# Patient Record
Sex: FEMALE | Race: WHITE | Employment: OTHER | URBAN - NONMETROPOLITAN AREA
[De-identification: names, ages, dates, MRNs, and addresses within clinical notes are randomized per-mention and may not be internally consistent; named-entity substitution may affect disease eponyms.]

---

## 2019-01-30 ENCOUNTER — DOCTOR'S OFFICE (OUTPATIENT)
Dept: URBAN - NONMETROPOLITAN AREA CLINIC 1 | Facility: CLINIC | Age: 74
Setting detail: OPHTHALMOLOGY
End: 2019-01-30
Payer: COMMERCIAL

## 2019-01-30 ENCOUNTER — RX ONLY (RX ONLY)
Age: 74
End: 2019-01-30

## 2019-01-30 DIAGNOSIS — H16.222: ICD-10-CM

## 2019-01-30 DIAGNOSIS — H25.13: ICD-10-CM

## 2019-01-30 DIAGNOSIS — E11.9: ICD-10-CM

## 2019-01-30 DIAGNOSIS — H16.221: ICD-10-CM

## 2019-01-30 DIAGNOSIS — H35.3131: ICD-10-CM

## 2019-01-30 PROCEDURE — 92004 COMPRE OPH EXAM NEW PT 1/>: CPT | Performed by: OPHTHALMOLOGY

## 2019-01-30 PROCEDURE — 92134 CPTRZ OPH DX IMG PST SGM RTA: CPT | Performed by: OPHTHALMOLOGY

## 2019-01-30 ASSESSMENT — REFRACTION_MANIFEST
OS_VA3: 20/
OD_VA2: 20/
OU_VA: 20/
OS_VA3: 20/
OD_VA2: 20/
OD_VA3: 20/
OS_VA1: 20/
OD_VA1: 20/
OU_VA: 20/
OS_VA1: 20/
OS_VA2: 20/
OS_VA2: 20/
OD_VA3: 20/
OD_VA1: 20/

## 2019-01-30 ASSESSMENT — CONFRONTATIONAL VISUAL FIELD TEST (CVF)
OD_FINDINGS: FULL
OS_FINDINGS: FULL

## 2019-01-30 ASSESSMENT — REFRACTION_AUTOREFRACTION
OD_CYLINDER: -0.75
OS_CYLINDER: -1.00
OD_SPHERE: -0.75
OD_AXIS: 012
OS_SPHERE: -0.25
OS_AXIS: 091

## 2019-01-30 ASSESSMENT — SPHEQUIV_DERIVED
OS_SPHEQUIV: -0.75
OD_SPHEQUIV: -1.125

## 2019-01-30 ASSESSMENT — REFRACTION_CURRENTRX
OS_OVR_VA: 20/
OS_OVR_VA: 20/
OD_OVR_VA: 20/
OS_OVR_VA: 20/
OD_OVR_VA: 20/
OD_OVR_VA: 20/

## 2019-01-30 ASSESSMENT — VISUAL ACUITY
OD_BCVA: 20/70-1
OS_BCVA: 20/80-1

## 2019-01-30 ASSESSMENT — SUPERFICIAL PUNCTATE KERATITIS (SPK)
OD_SPK: T 1+
OS_SPK: T 1+

## 2019-07-26 ENCOUNTER — DOCTOR'S OFFICE (OUTPATIENT)
Dept: URBAN - NONMETROPOLITAN AREA CLINIC 1 | Facility: CLINIC | Age: 74
Setting detail: OPHTHALMOLOGY
End: 2019-07-26
Payer: COMMERCIAL

## 2019-07-26 DIAGNOSIS — E11.9: ICD-10-CM

## 2019-07-26 DIAGNOSIS — H16.221: ICD-10-CM

## 2019-07-26 DIAGNOSIS — H16.222: ICD-10-CM

## 2019-07-26 DIAGNOSIS — H35.3131: ICD-10-CM

## 2019-07-26 DIAGNOSIS — H16.223: ICD-10-CM

## 2019-07-26 DIAGNOSIS — H25.13: ICD-10-CM

## 2019-07-26 PROCEDURE — 83861 MICROFLUID ANALY TEARS: CPT | Performed by: OPHTHALMOLOGY

## 2019-07-26 PROCEDURE — 92014 COMPRE OPH EXAM EST PT 1/>: CPT | Performed by: OPHTHALMOLOGY

## 2019-07-26 ASSESSMENT — REFRACTION_MANIFEST
OD_VA3: 20/
OS_VA1: 20/
OS_VA1: 20/
OD_VA2: 20/
OD_VA1: 20/
OS_VA3: 20/
OS_VA2: 20/
OU_VA: 20/
OD_VA3: 20/
OS_VA2: 20/
OS_VA3: 20/
OU_VA: 20/
OD_VA2: 20/
OD_VA1: 20/

## 2019-07-26 ASSESSMENT — SPHEQUIV_DERIVED
OD_SPHEQUIV: -1.125
OS_SPHEQUIV: -0.75

## 2019-07-26 ASSESSMENT — REFRACTION_AUTOREFRACTION
OD_SPHERE: -0.75
OS_SPHERE: -0.25
OS_AXIS: 091
OD_AXIS: 012
OS_CYLINDER: -1.00
OD_CYLINDER: -0.75

## 2019-07-26 ASSESSMENT — REFRACTION_CURRENTRX
OD_OVR_VA: 20/
OD_OVR_VA: 20/
OS_OVR_VA: 20/
OD_OVR_VA: 20/

## 2019-07-26 ASSESSMENT — SUPERFICIAL PUNCTATE KERATITIS (SPK)
OS_SPK: T 1+
OD_SPK: T 1+

## 2019-07-26 ASSESSMENT — CONFRONTATIONAL VISUAL FIELD TEST (CVF)
OD_FINDINGS: FULL
OS_FINDINGS: FULL

## 2019-07-26 ASSESSMENT — VISUAL ACUITY
OS_BCVA: 20/100-1
OD_BCVA: 20/150-1

## 2019-12-13 ENCOUNTER — DOCTOR'S OFFICE (OUTPATIENT)
Dept: URBAN - NONMETROPOLITAN AREA CLINIC 1 | Facility: CLINIC | Age: 74
Setting detail: OPHTHALMOLOGY
End: 2019-12-13
Payer: COMMERCIAL

## 2019-12-13 DIAGNOSIS — E11.9: ICD-10-CM

## 2019-12-13 DIAGNOSIS — H25.13: ICD-10-CM

## 2019-12-13 DIAGNOSIS — H16.221: ICD-10-CM

## 2019-12-13 DIAGNOSIS — H35.3131: ICD-10-CM

## 2019-12-13 PROCEDURE — 92134 CPTRZ OPH DX IMG PST SGM RTA: CPT | Performed by: OPHTHALMOLOGY

## 2019-12-13 PROCEDURE — 92025 CPTRIZED CORNEAL TOPOGRAPHY: CPT | Performed by: OPHTHALMOLOGY

## 2019-12-13 PROCEDURE — 99214 OFFICE O/P EST MOD 30 MIN: CPT | Performed by: OPHTHALMOLOGY

## 2019-12-13 ASSESSMENT — SUPERFICIAL PUNCTATE KERATITIS (SPK)
OS_SPK: T 1+
OD_SPK: T 1+

## 2019-12-13 ASSESSMENT — CONFRONTATIONAL VISUAL FIELD TEST (CVF)
OS_FINDINGS: FULL
OD_FINDINGS: FULL

## 2019-12-16 ASSESSMENT — REFRACTION_MANIFEST
OD_VA3: 20/
OS_VA2: 20/
OD_VA2: 20/
OS_VA3: 20/
OD_VA3: 20/
OU_VA: 20/
OS_VA1: 20/
OU_VA: 20/
OD_VA2: 20/
OS_VA3: 20/
OS_VA1: 20/
OD_VA1: 20/
OD_VA1: 20/
OS_VA2: 20/

## 2019-12-16 ASSESSMENT — REFRACTION_CURRENTRX
OD_OVR_VA: 20/
OD_OVR_VA: 20/
OS_OVR_VA: 20/
OD_OVR_VA: 20/

## 2019-12-16 ASSESSMENT — REFRACTION_AUTOREFRACTION
OS_SPHERE: -0.25
OD_AXIS: 012
OS_AXIS: 091
OD_SPHERE: -0.75
OS_CYLINDER: -1.00
OD_CYLINDER: -0.75

## 2019-12-16 ASSESSMENT — SPHEQUIV_DERIVED
OD_SPHEQUIV: -1.125
OS_SPHEQUIV: -0.75

## 2019-12-16 ASSESSMENT — VISUAL ACUITY
OD_BCVA: 20/150
OS_BCVA: 20/150+1

## 2020-01-15 ENCOUNTER — DOCTOR'S OFFICE (OUTPATIENT)
Dept: URBAN - NONMETROPOLITAN AREA CLINIC 1 | Facility: CLINIC | Age: 75
Setting detail: OPHTHALMOLOGY
End: 2020-01-15
Payer: COMMERCIAL

## 2020-01-15 DIAGNOSIS — H25.12: ICD-10-CM

## 2020-01-15 PROCEDURE — 92136 OPHTHALMIC BIOMETRY: CPT | Performed by: OPHTHALMOLOGY

## 2020-01-30 ENCOUNTER — AMBUL SURGICAL CARE (OUTPATIENT)
Dept: URBAN - NONMETROPOLITAN AREA SURGERY 1 | Facility: SURGERY | Age: 75
Setting detail: OPHTHALMOLOGY
End: 2020-01-30
Payer: COMMERCIAL

## 2020-01-30 DIAGNOSIS — H25.042: ICD-10-CM

## 2020-01-30 DIAGNOSIS — H25.012: ICD-10-CM

## 2020-01-30 DIAGNOSIS — H25.12: ICD-10-CM

## 2020-01-30 PROCEDURE — 66984 XCAPSL CTRC RMVL W/O ECP: CPT | Performed by: OPHTHALMOLOGY

## 2020-01-30 PROCEDURE — G8907 PT DOC NO EVENTS ON DISCHARG: HCPCS | Performed by: CLINIC/CENTER

## 2020-01-30 PROCEDURE — G8918 PT W/O PREOP ORDER IV AB PRO: HCPCS | Performed by: CLINIC/CENTER

## 2020-01-30 PROCEDURE — G8907 PT DOC NO EVENTS ON DISCHARG: HCPCS | Performed by: OPHTHALMOLOGY

## 2020-01-30 PROCEDURE — G8918 PT W/O PREOP ORDER IV AB PRO: HCPCS | Performed by: OPHTHALMOLOGY

## 2020-01-30 PROCEDURE — 66984 XCAPSL CTRC RMVL W/O ECP: CPT | Performed by: CLINIC/CENTER

## 2020-01-31 ENCOUNTER — RX ONLY (RX ONLY)
Age: 75
End: 2020-01-31

## 2020-01-31 ENCOUNTER — DOCTOR'S OFFICE (OUTPATIENT)
Dept: URBAN - NONMETROPOLITAN AREA CLINIC 1 | Facility: CLINIC | Age: 75
Setting detail: OPHTHALMOLOGY
End: 2020-01-31
Payer: COMMERCIAL

## 2020-01-31 DIAGNOSIS — H25.11: ICD-10-CM

## 2020-01-31 DIAGNOSIS — Z96.1: ICD-10-CM

## 2020-01-31 PROCEDURE — 92136 OPHTHALMIC BIOMETRY: CPT | Performed by: OPHTHALMOLOGY

## 2020-01-31 PROCEDURE — 99024 POSTOP FOLLOW-UP VISIT: CPT | Performed by: PHYSICIAN ASSISTANT

## 2020-01-31 ASSESSMENT — SUPERFICIAL PUNCTATE KERATITIS (SPK)
OD_SPK: T 1+
OS_SPK: T

## 2020-02-03 ASSESSMENT — SPHEQUIV_DERIVED
OS_SPHEQUIV: 0.125
OD_SPHEQUIV: -1.75

## 2020-02-03 ASSESSMENT — REFRACTION_AUTOREFRACTION
OD_AXIS: 016
OS_CYLINDER: -0.25
OD_SPHERE: -1.50
OD_CYLINDER: -0.50
OS_AXIS: 127
OS_SPHERE: +0.25

## 2020-02-03 ASSESSMENT — VISUAL ACUITY
OS_BCVA: 20/150-1
OD_BCVA: 20/25-1

## 2020-02-05 ENCOUNTER — AMBUL SURGICAL CARE (OUTPATIENT)
Dept: URBAN - NONMETROPOLITAN AREA SURGERY 1 | Facility: SURGERY | Age: 75
Setting detail: OPHTHALMOLOGY
End: 2020-02-05
Payer: COMMERCIAL

## 2020-02-05 DIAGNOSIS — H25.13: ICD-10-CM

## 2020-02-05 PROCEDURE — S9986 NOT MEDICALLY NECESSARY SVC: HCPCS | Performed by: OPHTHALMOLOGY

## 2020-02-05 PROCEDURE — V2787 ASTIGMATISM-CORRECT FUNCTION: HCPCS | Performed by: OPHTHALMOLOGY

## 2020-02-06 ENCOUNTER — AMBUL SURGICAL CARE (OUTPATIENT)
Dept: URBAN - NONMETROPOLITAN AREA SURGERY 1 | Facility: SURGERY | Age: 75
Setting detail: OPHTHALMOLOGY
End: 2020-02-06
Payer: COMMERCIAL

## 2020-02-06 DIAGNOSIS — H25.11: ICD-10-CM

## 2020-02-06 DIAGNOSIS — H25.041: ICD-10-CM

## 2020-02-06 DIAGNOSIS — H25.011: ICD-10-CM

## 2020-02-06 PROCEDURE — V2787 ASTIGMATISM-CORRECT FUNCTION: HCPCS | Performed by: OPHTHALMOLOGY

## 2020-02-06 PROCEDURE — G8918 PT W/O PREOP ORDER IV AB PRO: HCPCS | Performed by: CLINIC/CENTER

## 2020-02-06 PROCEDURE — 66984 XCAPSL CTRC RMVL W/O ECP: CPT | Performed by: OPHTHALMOLOGY

## 2020-02-06 PROCEDURE — G8918 PT W/O PREOP ORDER IV AB PRO: HCPCS | Performed by: OPHTHALMOLOGY

## 2020-02-06 PROCEDURE — G8907 PT DOC NO EVENTS ON DISCHARG: HCPCS | Performed by: CLINIC/CENTER

## 2020-02-06 PROCEDURE — V2787 ASTIGMATISM-CORRECT FUNCTION: HCPCS | Performed by: CLINIC/CENTER

## 2020-02-06 PROCEDURE — G8907 PT DOC NO EVENTS ON DISCHARG: HCPCS | Performed by: OPHTHALMOLOGY

## 2020-02-06 PROCEDURE — 66984 XCAPSL CTRC RMVL W/O ECP: CPT | Performed by: CLINIC/CENTER

## 2020-02-07 ENCOUNTER — DOCTOR'S OFFICE (OUTPATIENT)
Dept: URBAN - NONMETROPOLITAN AREA CLINIC 1 | Facility: CLINIC | Age: 75
Setting detail: OPHTHALMOLOGY
End: 2020-02-07
Payer: COMMERCIAL

## 2020-02-07 DIAGNOSIS — Z96.1: ICD-10-CM

## 2020-02-07 PROBLEM — H25.11 CATARACT NUCLEAR SCLEROSIS AGE RELATED; RIGHT EYE: Status: RESOLVED | Noted: 2020-01-31 | Resolved: 2020-02-07

## 2020-02-07 PROCEDURE — 99024 POSTOP FOLLOW-UP VISIT: CPT | Performed by: OPHTHALMOLOGY

## 2020-02-07 ASSESSMENT — SUPERFICIAL PUNCTATE KERATITIS (SPK): OS_SPK: T

## 2020-02-10 ASSESSMENT — VISUAL ACUITY
OS_BCVA: 20/40-1
OD_BCVA: 20/50-2

## 2020-02-10 ASSESSMENT — SPHEQUIV_DERIVED
OD_SPHEQUIV: 1.125
OS_SPHEQUIV: 0

## 2020-02-10 ASSESSMENT — REFRACTION_AUTOREFRACTION
OS_AXIS: 085
OS_CYLINDER: -1.50
OS_SPHERE: +0.75
OD_AXIS: 176
OD_CYLINDER: -1.25
OD_SPHERE: +1.75

## 2020-02-21 ENCOUNTER — RX ONLY (RX ONLY)
Age: 75
End: 2020-02-21

## 2020-02-21 ENCOUNTER — DOCTOR'S OFFICE (OUTPATIENT)
Dept: URBAN - NONMETROPOLITAN AREA CLINIC 1 | Facility: CLINIC | Age: 75
Setting detail: OPHTHALMOLOGY
End: 2020-02-21
Payer: COMMERCIAL

## 2020-02-21 DIAGNOSIS — H43.392: ICD-10-CM

## 2020-02-21 DIAGNOSIS — Z96.1: ICD-10-CM

## 2020-02-21 DIAGNOSIS — H43.391: ICD-10-CM

## 2020-02-21 PROCEDURE — 99024 POSTOP FOLLOW-UP VISIT: CPT | Performed by: OPHTHALMOLOGY

## 2020-02-21 ASSESSMENT — DRY EYES - PHYSICIAN NOTES
OD_GENERALCOMMENTS: RAPID TBUT, TR PEE
OS_GENERALCOMMENTS: RAPID TBUT, TR PEE

## 2020-02-21 ASSESSMENT — SPHEQUIV_DERIVED
OS_SPHEQUIV: 0
OD_SPHEQUIV: -0.125

## 2020-02-21 ASSESSMENT — REFRACTION_AUTOREFRACTION
OD_CYLINDER: -1.25
OD_SPHERE: +0.50
OS_AXIS: 085
OS_SPHERE: +0.75
OD_AXIS: 020
OS_CYLINDER: -1.50

## 2020-02-21 ASSESSMENT — VISUAL ACUITY
OS_BCVA: 20/30+1
OD_BCVA: 20/25-1

## 2020-03-13 ENCOUNTER — RX ONLY (RX ONLY)
Age: 75
End: 2020-03-13

## 2020-03-13 ENCOUNTER — DOCTOR'S OFFICE (OUTPATIENT)
Dept: URBAN - NONMETROPOLITAN AREA CLINIC 1 | Facility: CLINIC | Age: 75
Setting detail: OPHTHALMOLOGY
End: 2020-03-13
Payer: COMMERCIAL

## 2020-03-13 DIAGNOSIS — Z96.1: ICD-10-CM

## 2020-03-13 PROCEDURE — 99024 POSTOP FOLLOW-UP VISIT: CPT | Performed by: OPHTHALMOLOGY

## 2020-03-13 ASSESSMENT — VISUAL ACUITY
OD_BCVA: 20/25-2
OS_BCVA: 20/40-2

## 2020-03-13 ASSESSMENT — REFRACTION_AUTOREFRACTION
OS_CYLINDER: -0.75
OD_AXIS: 80
OD_CYLINDER: -0.25
OS_SPHERE: +0.25
OD_SPHERE: 0.00
OS_AXIS: 90

## 2020-03-13 ASSESSMENT — DRY EYES - PHYSICIAN NOTES
OD_GENERALCOMMENTS: RAPID TBUT, TR PEE
OS_GENERALCOMMENTS: RAPID TBUT, TR PEE

## 2020-03-13 ASSESSMENT — SPHEQUIV_DERIVED
OD_SPHEQUIV: -0.125
OS_SPHEQUIV: -0.125

## 2020-08-11 ENCOUNTER — TRANSCRIBE ORDERS (OUTPATIENT)
Dept: ADMINISTRATIVE | Facility: HOSPITAL | Age: 75
End: 2020-08-11

## 2020-08-11 DIAGNOSIS — I48.91 ATRIAL FIBRILLATION, UNSPECIFIED TYPE (HCC): Primary | ICD-10-CM

## 2020-08-18 ENCOUNTER — HOSPITAL ENCOUNTER (OUTPATIENT)
Dept: NON INVASIVE DIAGNOSTICS | Facility: HOSPITAL | Age: 75
Discharge: HOME | End: 2020-08-18
Payer: MEDICARE

## 2020-08-18 DIAGNOSIS — I48.91 ATRIAL FIBRILLATION, UNSPECIFIED TYPE (HCC): ICD-10-CM

## 2020-08-18 PROCEDURE — 93306 TTE W/DOPPLER COMPLETE: CPT | Performed by: INTERNAL MEDICINE

## 2020-08-18 PROCEDURE — C8929 TTE W OR WO FOL WCON,DOPPLER: HCPCS

## 2020-08-18 RX ADMIN — PERFLUTREN 1 ML/MIN: 6.52 INJECTION, SUSPENSION INTRAVENOUS at 11:50

## 2020-09-05 ENCOUNTER — APPOINTMENT (EMERGENCY)
Dept: RADIOLOGY | Facility: HOSPITAL | Age: 75
End: 2020-09-05
Payer: MEDICARE

## 2020-09-05 ENCOUNTER — HOSPITAL ENCOUNTER (EMERGENCY)
Facility: HOSPITAL | Age: 75
Discharge: HOME | End: 2020-09-05
Attending: EMERGENCY MEDICINE | Admitting: EMERGENCY MEDICINE
Payer: MEDICARE

## 2020-09-05 ENCOUNTER — APPOINTMENT (EMERGENCY)
Dept: CT IMAGING | Facility: HOSPITAL | Age: 75
End: 2020-09-05
Payer: MEDICARE

## 2020-09-05 VITALS
HEIGHT: 62 IN | BODY MASS INDEX: 53.92 KG/M2 | TEMPERATURE: 98 F | SYSTOLIC BLOOD PRESSURE: 141 MMHG | OXYGEN SATURATION: 97 % | WEIGHT: 293 LBS | HEART RATE: 82 BPM | DIASTOLIC BLOOD PRESSURE: 98 MMHG | RESPIRATION RATE: 16 BRPM

## 2020-09-05 DIAGNOSIS — S09.90XA INJURY OF HEAD, INITIAL ENCOUNTER: ICD-10-CM
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
## 2020-09-05 PROCEDURE — G1004 CDSM NDSC: HCPCS

## 2020-09-05 PROCEDURE — 73564 X-RAY EXAM KNEE 4 OR MORE: CPT

## 2020-09-05 PROCEDURE — 99285 EMERGENCY DEPT VISIT HI MDM: CPT | Performed by: EMERGENCY MEDICINE

## 2020-09-05 PROCEDURE — 99284 EMERGENCY DEPT VISIT MOD MDM: CPT

## 2020-09-05 PROCEDURE — 70450 CT HEAD/BRAIN W/O DYE: CPT

## 2020-09-05 RX ORDER — RIVAROXABAN 20 MG/1
TABLET, FILM COATED ORAL
Status: ON HOLD | COMMUNITY
Start: 2020-08-11 | End: 2021-01-01 | Stop reason: SDUPTHER

## 2020-09-05 RX ORDER — SIMVASTATIN 10 MG
10 TABLET ORAL
COMMUNITY
Start: 2020-08-11

## 2020-09-05 RX ORDER — FUROSEMIDE 40 MG/1
40 TABLET ORAL DAILY
COMMUNITY
Start: 2020-07-29 | End: 2021-01-01

## 2020-09-05 RX ORDER — METOPROLOL SUCCINATE 100 MG/1
100 TABLET, EXTENDED RELEASE ORAL DAILY
COMMUNITY
End: 2021-01-01 | Stop reason: HOSPADM

## 2020-09-05 RX ORDER — POTASSIUM CHLORIDE 20 MEQ/1
20 TABLET, EXTENDED RELEASE ORAL DAILY
COMMUNITY
End: 2021-01-01 | Stop reason: HOSPADM

## 2020-09-05 RX ORDER — TRAMADOL HYDROCHLORIDE 50 MG/1
TABLET ORAL
COMMUNITY
Start: 2020-07-24 | End: 2021-01-01 | Stop reason: HOSPADM

## 2020-09-05 RX ORDER — LOSARTAN POTASSIUM 100 MG/1
100 TABLET ORAL DAILY
COMMUNITY
Start: 2020-08-11 | End: 2021-01-01 | Stop reason: HOSPADM

## 2020-09-05 NOTE — ED PROVIDER NOTES
History  Chief Complaint   Patient presents with   Wash Caller Fall     fell and hit the back of her head     This is 72-year-old female presents emergency room after a fall getting out of [employer]  Patient apparently was coming home from breakfast with her brother and stepping out of the [employer] when she lost her  and fell and struck her head  She also reports that she twisted her knee  She reports no loss of consciousness but she has a slight headache  No change in vision  Patient generally gets around in a motorized scooter  Past medical history is noted  Patient does take Xarelto secondary to atrial fibrillation  History provided by:  Patient  Fall   Mechanism of injury: fall    Injury location:  Head/neck and leg  Head/neck injury location:  Head  Leg injury location:  R knee  Incident location:  Home  Fall:     Fall occurred:  From a vehicle    Impact surface: pavement     Point of impact:  Head  Prior to arrival data:     Patient ambulatory at scene: yes      Blood loss:  None  Associated symptoms: headaches    Associated symptoms: no abdominal pain, no back pain, no chest pain, no difficulty breathing and no hearing loss    Headaches:     Severity:  Mild    Onset quality:  Sudden    Timing:  Constant  Risk factors: anticoagulation therapy, CAD and diabetes        Prior to Admission Medications   Prescriptions Last Dose Informant Patient Reported? Taking?    Xarelto 20 MG tablet   Yes No   furosemide (LASIX) 40 mg tablet   Yes No   Sig: Take 40 mg by mouth daily   losartan (COZAAR) 100 MG tablet   Yes No   metFORMIN (GLUCOPHAGE) 500 mg tablet   Yes No   metoprolol succinate (TOPROL-XL) 100 mg 24 hr tablet   Yes No   Sig: Take 100 mg by mouth daily   potassium chloride (Klor-Con M20) 20 mEq tablet   Yes No   Sig: Take by mouth   silver sulfadiazine (SILVADENE,SSD) 1 % cream   Yes Yes   Sig: Apply daily   simvastatin (ZOCOR) 10 mg tablet   Yes No   traMADol (ULTRAM) 50 mg tablet   Yes No   Si TAB BY MOUTH DAILY AS NEEDED FOR PAIN      Facility-Administered Medications: None       Past Medical History:   Diagnosis Date    Coronary artery disease     Diabetes mellitus ([facility] [address] )     Hypertension     Lymphedema     Sleep apnea        History reviewed  No pertinent surgical history  History reviewed  No pertinent family history  I have reviewed and agree with the history as documented  E-Cigarette/Vaping    E-Cigarette Use Never User      E-Cigarette/Vaping Substances     Social History     Tobacco Use    Smoking status: Never Smoker    Smokeless tobacco: Never Used   Substance Use Topics    Alcohol use: Not Currently    Drug use: Not Currently       Review of Systems   Constitutional: Negative for activity change, diaphoresis and fatigue  HENT: Negative  Negative for hearing loss  Eyes: Negative  Respiratory: Negative  Cardiovascular: Negative for chest pain  Gastrointestinal: Negative for abdominal pain  Endocrine: Negative  Musculoskeletal: Negative for back pain  Neurological: Positive for headaches  All other systems reviewed and are negative  Physical Exam  Physical Exam  Vitals signs reviewed  Constitutional:       Appearance: Normal appearance  She is obese  HENT:      Head: Normocephalic  Abrasion and contusion present  No laceration  Nose: Nose normal       Mouth/Throat:      Mouth: Mucous membranes are moist       Pharynx: Oropharynx is clear  Eyes:      Conjunctiva/sclera: Conjunctivae normal       Pupils: Pupils are equal, round, and reactive to light  Neck:      Musculoskeletal: Normal range of motion and neck supple  No neck rigidity or muscular tenderness  Pulmonary:      Effort: Pulmonary effort is normal    Musculoskeletal:      Right knee: She exhibits decreased range of motion  She exhibits no swelling and no effusion  Tenderness found  Skin:     General: Skin is warm and dry  Neurological:      General: No focal deficit present        Mental Status: She is alert  Vital Signs  ED Triage Vitals [09/05/20 1128]   Temperature Pulse Respirations Blood Pressure SpO2   98 °F (36 7 °C) 82 16 (!) 201/101 97 %      Temp src Heart Rate Source Patient Position - Orthostatic VS BP Location FiO2 (%)   -- -- -- -- --      Pain Score       3           Vitals:    09/05/20 1128   BP: (!) 201/101   Pulse: 82         Visual Acuity      ED Medications  Medications - No data to display    Diagnostic Studies  Results Reviewed     None                 CT head without contrast   Final Result by Josh Viveros DO (09/05 1314)      No acute intracranial abnormality  Microangiopathic changes  Workstation performed: [id number]         XR knee 4+ vw right injury   ED Interpretation by Jurgen Vargas DO (09/05 1419)   Severe degenerative changes with bone-on-bone phenomena                 Procedures  Procedures         ED Course  ED Course as of Sep 05 1420   Sat Sep 05, 2020   1418 CT the head is negative for any acute intracranial injury  US AUDIT      Most Recent Value   Initial Alcohol Screen: US AUDIT-C    1  How often do you have a drink containing alcohol?  0 Filed at: 09/05/2020 1130   2  How many drinks containing alcohol do you have on a typical day you are drinking? 0 Filed at: 09/05/2020 1130   3a  Male UNDER 65: How often do you have five or more drinks on one occasion? 0 Filed at: 09/05/2020 1130   3b  FEMALE Any Age, or MALE 65+: How often do you have 4 or more drinks on one occassion? 0 Filed at: 09/05/2020 1130   Audit-C Score  0 Filed at: 09/05/2020 1130                  GOPAL/DAST-10      Most Recent Value   How many times in the past year have you    Used an illegal drug or used a prescription medication for non-medical reasons?   Never Filed at: 09/05/2020 1130                                MDM      Disposition  Final diagnoses:   Fall, initial encounter   Injury of head, initial encounter     Time reflects when diagnosis was documented in both MDM as applicable and the Disposition within this note     Time User Action Codes Description Comment    9/5/2020  2:20 PM Fernie Burkett Add [Q75  QUNH] Fall, initial encounter     9/5/2020  2:20 PM Fernie Burkett Add [Q03 [id number]] Injury of head, initial encounter       ED Disposition     ED Disposition Condition Date/Time Comment    Discharge Stable Sat Sep 5, 2020  2:20 PM Jose Orozco discharge to home/self care  Follow-up Information     Follow up With Specialties Details Why Contact Info    Ariel Rowan MD Family Medicine In 1 week As needed [address]  [place] [id number] [phone]            Current Discharge Medication List      CONTINUE these medications which have NOT CHANGED    Details   silver sulfadiazine (SILVADENE,SSD) 1 % cream Apply daily      furosemide (LASIX) 40 mg tablet Take 40 mg by mouth daily      losartan (COZAAR) 100 MG tablet       metFORMIN (GLUCOPHAGE) 500 mg tablet       metoprolol succinate (TOPROL-XL) 100 mg 24 hr tablet Take 100 mg by mouth daily      potassium chloride (Klor-Con M20) 20 mEq tablet Take by mouth      simvastatin (ZOCOR) 10 mg tablet       traMADol (ULTRAM) 50 mg tablet 1 TAB BY MOUTH DAILY AS NEEDED FOR PAIN      Xarelto 20 MG tablet            No discharge procedures on file      PDMP Review     None          ED Provider  Electronically Signed by           Jurgen Vargas DO  09/05/20 7445

## 2020-09-05 NOTE — DISCHARGE INSTRUCTIONS
Your imaging studies have been preliminarily reviewed by the emergency department  Further review by Radiology is pending at this time  If there is a discrepancy or a finding of additional concern identified, we will attempt to contact you at the number you have provided us  If you do not hear from us, follow-up with your primary care provider within 1-2 weeks is always recommended to ensure that all findings were normal or as initially reported  Your results may also be available on [facility] [employer] [facility]    Thank you for choosing the emergency department at [facility], [place]  We appreciated the opportunity and privilege to address your healthcare needs  We remain available to you should you require additional evaluation or assistance  We value your feedback and would appreciate the opportunity to address anything you identified as an opportunity to improve or where we excelled  If there are colleagues who deserve special recognition, please let us know! We hope you are feeling better soon!

## 2020-09-25 ENCOUNTER — DOCTOR'S OFFICE (OUTPATIENT)
Dept: URBAN - NONMETROPOLITAN AREA CLINIC 1 | Facility: CLINIC | Age: 75
Setting detail: OPHTHALMOLOGY
End: 2020-09-25
Payer: COMMERCIAL

## 2020-09-25 DIAGNOSIS — H16.221: ICD-10-CM

## 2020-09-25 DIAGNOSIS — H35.3131: ICD-10-CM

## 2020-09-25 DIAGNOSIS — E11.9: ICD-10-CM

## 2020-09-25 DIAGNOSIS — H16.222: ICD-10-CM

## 2020-09-25 DIAGNOSIS — H53.123: ICD-10-CM

## 2020-09-25 DIAGNOSIS — Z96.1: ICD-10-CM

## 2020-09-25 PROCEDURE — 92083 EXTENDED VISUAL FIELD XM: CPT | Performed by: OPHTHALMOLOGY

## 2020-09-25 PROCEDURE — 99214 OFFICE O/P EST MOD 30 MIN: CPT | Performed by: OPHTHALMOLOGY

## 2020-09-25 PROCEDURE — 92134 CPTRZ OPH DX IMG PST SGM RTA: CPT | Performed by: OPHTHALMOLOGY

## 2020-09-25 ASSESSMENT — DRY EYES - PHYSICIAN NOTES
OS_GENERALCOMMENTS: RAPID TBUT, TR PEE
OD_GENERALCOMMENTS: RAPID TBUT, TR PEE

## 2020-09-25 ASSESSMENT — SPHEQUIV_DERIVED
OS_SPHEQUIV: -0.125
OD_SPHEQUIV: -0.125

## 2020-09-25 ASSESSMENT — VISUAL ACUITY
OS_BCVA: 20/30-1
OD_BCVA: 20/20-2

## 2020-09-25 ASSESSMENT — REFRACTION_AUTOREFRACTION
OS_SPHERE: +0.25
OD_CYLINDER: -0.25
OS_AXIS: 90
OD_SPHERE: 0.00
OD_AXIS: 80
OS_CYLINDER: -0.75

## 2020-09-25 ASSESSMENT — CONFRONTATIONAL VISUAL FIELD TEST (CVF)
OS_FINDINGS: FULL
OD_FINDINGS: FULL

## 2021-01-01 ENCOUNTER — HOSPITAL ENCOUNTER (INPATIENT)
Facility: HOSPITAL | Age: 76
LOS: 6 days | Discharge: NURSING, REHAB OR LONG-TERM CARE | DRG: 571 | End: 2021-06-07
Attending: STUDENT IN AN ORGANIZED HEALTH CARE EDUCATION/TRAINING PROGRAM | Admitting: STUDENT IN AN ORGANIZED HEALTH CARE EDUCATION/TRAINING PROGRAM
Payer: MEDICARE

## 2021-01-01 ENCOUNTER — HOSPITAL ENCOUNTER (OUTPATIENT)
Dept: NON INVASIVE DIAGNOSTICS | Facility: HOSPITAL | Age: 76
Discharge: HOME | End: 2021-09-17
Payer: MEDICARE

## 2021-01-01 ENCOUNTER — NURSE TRIAGE (OUTPATIENT)
Dept: OTHER | Facility: OTHER | Age: 76
End: 2021-01-01

## 2021-01-01 ENCOUNTER — APPOINTMENT (EMERGENCY)
Dept: CT IMAGING | Facility: HOSPITAL | Age: 76
DRG: 871 | End: 2021-01-01
Payer: MEDICARE

## 2021-01-01 ENCOUNTER — ANESTHESIA (INPATIENT)
Dept: PERIOP | Facility: HOSPITAL | Age: 76
DRG: 571 | End: 2021-01-01
Payer: MEDICARE

## 2021-01-01 ENCOUNTER — APPOINTMENT (INPATIENT)
Dept: MRI IMAGING | Facility: HOSPITAL | Age: 76
DRG: 871 | End: 2021-01-01
Payer: MEDICARE

## 2021-01-01 ENCOUNTER — HOSPITAL ENCOUNTER (EMERGENCY)
Facility: HOSPITAL | Age: 76
Discharge: HOME | DRG: 571 | End: 2021-05-31
Attending: EMERGENCY MEDICINE
Payer: MEDICARE

## 2021-01-01 ENCOUNTER — APPOINTMENT (EMERGENCY)
Dept: RADIOLOGY | Facility: HOSPITAL | Age: 76
DRG: 871 | End: 2021-01-01
Payer: MEDICARE

## 2021-01-01 ENCOUNTER — APPOINTMENT (INPATIENT)
Dept: NON INVASIVE DIAGNOSTICS | Facility: HOSPITAL | Age: 76
DRG: 871 | End: 2021-01-01
Payer: MEDICARE

## 2021-01-01 ENCOUNTER — APPOINTMENT (EMERGENCY)
Dept: RADIOLOGY | Facility: HOSPITAL | Age: 76
DRG: 571 | End: 2021-01-01
Payer: MEDICARE

## 2021-01-01 ENCOUNTER — APPOINTMENT (INPATIENT)
Dept: RADIOLOGY | Facility: HOSPITAL | Age: 76
DRG: 871 | End: 2021-01-01
Payer: MEDICARE

## 2021-01-01 ENCOUNTER — APPOINTMENT (INPATIENT)
Dept: RADIOLOGY | Facility: HOSPITAL | Age: 76
DRG: 571 | End: 2021-01-01
Payer: MEDICARE

## 2021-01-01 ENCOUNTER — HOSPITAL ENCOUNTER (INPATIENT)
Facility: HOSPITAL | Age: 76
LOS: 4 days | Discharge: NURSING, REHAB OR LONG-TERM CARE | DRG: 603 | End: 2021-08-10
Attending: EMERGENCY MEDICINE | Admitting: INTERNAL MEDICINE
Payer: MEDICARE

## 2021-01-01 ENCOUNTER — APPOINTMENT (INPATIENT)
Dept: RADIOLOGY | Facility: HOSPITAL | Age: 76
DRG: 603 | End: 2021-01-01
Payer: MEDICARE

## 2021-01-01 ENCOUNTER — APPOINTMENT (INPATIENT)
Dept: ULTRASOUND IMAGING | Facility: HOSPITAL | Age: 76
DRG: 871 | End: 2021-01-01
Payer: MEDICARE

## 2021-01-01 ENCOUNTER — APPOINTMENT (INPATIENT)
Dept: CT IMAGING | Facility: HOSPITAL | Age: 76
DRG: 871 | End: 2021-01-01
Payer: MEDICARE

## 2021-01-01 ENCOUNTER — ANESTHESIA EVENT (INPATIENT)
Dept: PERIOP | Facility: HOSPITAL | Age: 76
DRG: 571 | End: 2021-01-01
Payer: MEDICARE

## 2021-01-01 ENCOUNTER — HOSPITAL ENCOUNTER (INPATIENT)
Facility: HOSPITAL | Age: 76
LOS: 6 days | Discharge: NURSING, REHAB OR LONG-TERM CARE | DRG: 871 | End: 2021-05-22
Attending: EMERGENCY MEDICINE | Admitting: ANESTHESIOLOGY
Payer: MEDICARE

## 2021-01-01 VITALS
TEMPERATURE: 96.9 F | DIASTOLIC BLOOD PRESSURE: 67 MMHG | BODY MASS INDEX: 53.92 KG/M2 | SYSTOLIC BLOOD PRESSURE: 139 MMHG | OXYGEN SATURATION: 94 % | RESPIRATION RATE: 16 BRPM | HEART RATE: 90 BPM | WEIGHT: 293 LBS | HEIGHT: 62 IN

## 2021-01-01 VITALS
TEMPERATURE: 98.8 F | HEIGHT: 62 IN | BODY MASS INDEX: 53.92 KG/M2 | RESPIRATION RATE: 21 BRPM | DIASTOLIC BLOOD PRESSURE: 69 MMHG | HEART RATE: 95 BPM | SYSTOLIC BLOOD PRESSURE: 130 MMHG | WEIGHT: 293 LBS | OXYGEN SATURATION: 93 %

## 2021-01-01 VITALS
RESPIRATION RATE: 18 BRPM | DIASTOLIC BLOOD PRESSURE: 68 MMHG | TEMPERATURE: 97.8 F | SYSTOLIC BLOOD PRESSURE: 111 MMHG | HEART RATE: 101 BPM | WEIGHT: 293 LBS | BODY MASS INDEX: 53.92 KG/M2 | HEIGHT: 62 IN | OXYGEN SATURATION: 92 %

## 2021-01-01 VITALS
SYSTOLIC BLOOD PRESSURE: 162 MMHG | HEIGHT: 62 IN | OXYGEN SATURATION: 97 % | DIASTOLIC BLOOD PRESSURE: 78 MMHG | WEIGHT: 293 LBS | BODY MASS INDEX: 53.92 KG/M2 | RESPIRATION RATE: 18 BRPM | HEART RATE: 94 BPM | TEMPERATURE: 98.6 F

## 2021-01-01 DIAGNOSIS — M25.532 LEFT WRIST PAIN: ICD-10-CM

## 2021-01-01 DIAGNOSIS — R60.0 BILATERAL LEG EDEMA: ICD-10-CM

## 2021-01-01 DIAGNOSIS — I50.32 CHRONIC DIASTOLIC HEART FAILURE (HCC): ICD-10-CM

## 2021-01-01 DIAGNOSIS — R79.1 ELEVATED INR (INTERNATIONAL NORMALIZED RATIO): ICD-10-CM

## 2021-01-01 DIAGNOSIS — I27.20 PULMONARY HYPERTENSION, UNSPECIFIED (HCC): ICD-10-CM

## 2021-01-01 DIAGNOSIS — R79.89 ELEVATED BRAIN NATRIURETIC PEPTIDE (BNP) LEVEL: ICD-10-CM

## 2021-01-01 DIAGNOSIS — I48.20 CHRONIC ATRIAL FIBRILLATION (HCC): ICD-10-CM

## 2021-01-01 DIAGNOSIS — M89.8X1 PAIN OF RIGHT SCAPULA: ICD-10-CM

## 2021-01-01 DIAGNOSIS — R79.89 ELEVATED D-DIMER: ICD-10-CM

## 2021-01-01 DIAGNOSIS — E04.1 THYROID NODULE: ICD-10-CM

## 2021-01-01 DIAGNOSIS — T14.8XXA COMPLICATED WOUND INFECTION: Primary | ICD-10-CM

## 2021-01-01 DIAGNOSIS — L08.9 COMPLICATED WOUND INFECTION: Primary | ICD-10-CM

## 2021-01-01 DIAGNOSIS — M25.522 LEFT ELBOW PAIN: ICD-10-CM

## 2021-01-01 DIAGNOSIS — R93.0 ABNORMAL CT OF THE HEAD: ICD-10-CM

## 2021-01-01 DIAGNOSIS — S81.809A WOUND OF LOWER EXTREMITY: ICD-10-CM

## 2021-01-01 DIAGNOSIS — I10 ESSENTIAL HYPERTENSION: ICD-10-CM

## 2021-01-01 DIAGNOSIS — E87.1 HYPONATREMIA: ICD-10-CM

## 2021-01-01 DIAGNOSIS — R26.2 AMBULATORY DYSFUNCTION: ICD-10-CM
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
## 2021-01-01 DIAGNOSIS — L03.115 CELLULITIS OF RIGHT LOWER EXTREMITY: ICD-10-CM

## 2021-01-01 DIAGNOSIS — N17.9 AKI (ACUTE KIDNEY INJURY) (HCC): ICD-10-CM

## 2021-01-01 DIAGNOSIS — D72.829 LEUKOCYTOSIS: ICD-10-CM

## 2021-01-01 DIAGNOSIS — I48.91 ATRIAL FIBRILLATION WITH RVR (HCC): ICD-10-CM

## 2021-01-01 DIAGNOSIS — E87.5 HYPERKALEMIA: ICD-10-CM

## 2021-01-01 DIAGNOSIS — M25.522 ELBOW PAIN, LEFT: ICD-10-CM

## 2021-01-01 DIAGNOSIS — I27.20 SEVERE PULMONARY HYPERTENSION (HCC): ICD-10-CM

## 2021-01-01 DIAGNOSIS — K59.00 CONSTIPATION: ICD-10-CM

## 2021-01-01 DIAGNOSIS — R78.81 GRAM-POSITIVE BACTEREMIA: ICD-10-CM

## 2021-01-01 DIAGNOSIS — A41.9 SEVERE SEPSIS (HCC): ICD-10-CM

## 2021-01-01 DIAGNOSIS — I50.33 ACUTE ON CHRONIC DIASTOLIC HEART FAILURE (HCC): ICD-10-CM

## 2021-01-01 DIAGNOSIS — R65.20 SEVERE SEPSIS (HCC): ICD-10-CM

## 2021-01-01 DIAGNOSIS — L03.90 WOUND CELLULITIS: ICD-10-CM

## 2021-01-01 DIAGNOSIS — M19.90 OSTEOARTHRITIS: ICD-10-CM

## 2021-01-01 DIAGNOSIS — J96.01 ACUTE RESPIRATORY FAILURE WITH HYPOXIA (HCC): ICD-10-CM

## 2021-01-01 DIAGNOSIS — T14.8XXA OPEN WOUND OF SKIN: ICD-10-CM

## 2021-01-01 DIAGNOSIS — S52.122A CLOSED DISPLACED FRACTURE OF HEAD OF LEFT RADIUS, INITIAL ENCOUNTER: Primary | ICD-10-CM

## 2021-01-01 DIAGNOSIS — N39.0 URINARY TRACT INFECTION ASSOCIATED WITH INDWELLING URETHRAL CATHETER, INITIAL ENCOUNTER (HCC): ICD-10-CM

## 2021-01-01 DIAGNOSIS — E66.01 CLASS 3 SEVERE OBESITY DUE TO EXCESS CALORIES WITH SERIOUS COMORBIDITY AND BODY MASS INDEX (BMI) OF 60.0 TO 69.9 IN ADULT (HCC): ICD-10-CM

## 2021-01-01 DIAGNOSIS — E11.9 TYPE 2 DIABETES MELLITUS, WITHOUT LONG-TERM CURRENT USE OF INSULIN (HCC): ICD-10-CM

## 2021-01-01 DIAGNOSIS — L03.115 CELLULITIS OF RIGHT LEG: Primary | ICD-10-CM

## 2021-01-01 DIAGNOSIS — T83.511A URINARY TRACT INFECTION ASSOCIATED WITH INDWELLING URETHRAL CATHETER, INITIAL ENCOUNTER (HCC): ICD-10-CM

## 2021-01-01 LAB
ALBUMIN SERPL BCP-MCNC: 1.6 G/DL (ref 3.5–5)
ALBUMIN SERPL BCP-MCNC: 1.8 G/DL (ref 3.5–5)
ALBUMIN SERPL BCP-MCNC: 2 G/DL (ref 3.5–5)
ALBUMIN SERPL BCP-MCNC: 2 G/DL (ref 3.5–5)
ALBUMIN SERPL BCP-MCNC: 2.1 G/DL (ref 3.5–5)
ALBUMIN SERPL BCP-MCNC: 2.2 G/DL (ref 3.5–5)
ALBUMIN SERPL BCP-MCNC: 2.5 G/DL (ref 3.5–5)
ALP SERPL-CCNC: 109 U/L (ref 46–116)
ALP SERPL-CCNC: 124 U/L (ref 46–116)
ALP SERPL-CCNC: 160 U/L (ref 46–116)
ALP SERPL-CCNC: 178 U/L (ref 46–116)
ALP SERPL-CCNC: 198 U/L (ref 46–116)
ALP SERPL-CCNC: 213 U/L (ref 46–116)
ALP SERPL-CCNC: 67 U/L (ref 46–116)
ALP SERPL-CCNC: 71 U/L (ref 46–116)
ALP SERPL-CCNC: 88 U/L (ref 46–116)
ALT SERPL W P-5'-P-CCNC: 14 U/L (ref 12–78)
ALT SERPL W P-5'-P-CCNC: 20 U/L (ref 12–78)
ALT SERPL W P-5'-P-CCNC: 20 U/L (ref 12–78)
ALT SERPL W P-5'-P-CCNC: 21 U/L (ref 12–78)
ALT SERPL W P-5'-P-CCNC: 21 U/L (ref 12–78)
ALT SERPL W P-5'-P-CCNC: 22 U/L (ref 12–78)
ALT SERPL W P-5'-P-CCNC: 26 U/L (ref 12–78)
ALT SERPL W P-5'-P-CCNC: 7 U/L (ref 12–78)
ALT SERPL W P-5'-P-CCNC: 9 U/L (ref 12–78)
AMORPH URATE CRY URNS QL MICRO: ABNORMAL /HPF
ANION GAP SERPL CALCULATED.3IONS-SCNC: 10 MMOL/L (ref 4–13)
ANION GAP SERPL CALCULATED.3IONS-SCNC: 11 MMOL/L (ref 4–13)
ANION GAP SERPL CALCULATED.3IONS-SCNC: 12 MMOL/L (ref 4–13)
ANION GAP SERPL CALCULATED.3IONS-SCNC: 13 MMOL/L (ref 4–13)
ANION GAP SERPL CALCULATED.3IONS-SCNC: 14 MMOL/L (ref 4–13)
ANION GAP SERPL CALCULATED.3IONS-SCNC: 4 MMOL/L (ref 4–13)
ANION GAP SERPL CALCULATED.3IONS-SCNC: 4 MMOL/L (ref 4–13)
ANION GAP SERPL CALCULATED.3IONS-SCNC: 5 MMOL/L (ref 4–13)
ANION GAP SERPL CALCULATED.3IONS-SCNC: 6 MMOL/L (ref 4–13)
ANION GAP SERPL CALCULATED.3IONS-SCNC: 7 MMOL/L (ref 4–13)
ANION GAP SERPL CALCULATED.3IONS-SCNC: 8 MMOL/L (ref 4–13)
ANION GAP SERPL CALCULATED.3IONS-SCNC: 9 MMOL/L (ref 4–13)
ANISOCYTOSIS BLD QL SMEAR: PRESENT
ANISOCYTOSIS BLD QL SMEAR: PRESENT
AST SERPL W P-5'-P-CCNC: 13 U/L (ref 5–45)
AST SERPL W P-5'-P-CCNC: 15 U/L (ref 5–45)
AST SERPL W P-5'-P-CCNC: 23 U/L (ref 5–45)
AST SERPL W P-5'-P-CCNC: 31 U/L (ref 5–45)
AST SERPL W P-5'-P-CCNC: 33 U/L (ref 5–45)
AST SERPL W P-5'-P-CCNC: 35 U/L (ref 5–45)
AST SERPL W P-5'-P-CCNC: 35 U/L (ref 5–45)
AST SERPL W P-5'-P-CCNC: 44 U/L (ref 5–45)
AST SERPL W P-5'-P-CCNC: 58 U/L (ref 5–45)
ATRIAL RATE: 141 BPM
ATRIAL RATE: 170 BPM
BACTERIA BLD CULT: ABNORMAL
BACTERIA BLD CULT: ABNORMAL
BACTERIA BLD CULT: NORMAL
BACTERIA UR CULT: ABNORMAL
BACTERIA UR CULT: ABNORMAL
BACTERIA UR QL AUTO: ABNORMAL /HPF
BACTERIA UR QL AUTO: ABNORMAL /HPF
BACTERIA WND AEROBE CULT: ABNORMAL
BASE EX.OXY STD BLDV CALC-SCNC: 83.7 % (ref 60–80)
BASE EX.OXY STD BLDV CALC-SCNC: 91.9 % (ref 60–80)
BASE EX.OXY STD BLDV CALC-SCNC: 94.3 % (ref 60–80)
BASE EXCESS BLDV CALC-SCNC: -6.6 MMOL/L
BASE EXCESS BLDV CALC-SCNC: -8.4 MMOL/L
BASE EXCESS BLDV CALC-SCNC: 1.8 MMOL/L
BASOPHILS # BLD AUTO: 0.03 THOUSANDS/ΜL (ref 0–0.1)
BASOPHILS # BLD AUTO: 0.04 THOUSANDS/ΜL (ref 0–0.1)
BASOPHILS # BLD AUTO: 0.05 THOUSANDS/ΜL (ref 0–0.1)
BASOPHILS # BLD MANUAL: 0 THOUSAND/UL (ref 0–0.1)
BASOPHILS # BLD MANUAL: 0.06 THOUSAND/UL (ref 0–0.1)
BASOPHILS NFR BLD AUTO: 0 % (ref 0–1)
BASOPHILS NFR BLD AUTO: 0 % (ref 0–1)
BASOPHILS NFR BLD AUTO: 1 % (ref 0–1)
BASOPHILS NFR MAR MANUAL: 0 % (ref 0–1)
BASOPHILS NFR MAR MANUAL: 1 % (ref 0–1)
BILIRUB DIRECT SERPL-MCNC: 2.15 MG/DL (ref 0–0.2)
BILIRUB SERPL-MCNC: 0.46 MG/DL (ref 0.2–1)
BILIRUB SERPL-MCNC: 0.51 MG/DL (ref 0.2–1)
BILIRUB SERPL-MCNC: 0.59 MG/DL (ref 0.2–1)
BILIRUB SERPL-MCNC: 2.19 MG/DL (ref 0.2–1)
BILIRUB SERPL-MCNC: 2.3 MG/DL (ref 0.2–1)
BILIRUB SERPL-MCNC: 2.36 MG/DL (ref 0.2–1)
BILIRUB SERPL-MCNC: 2.61 MG/DL (ref 0.2–1)
BILIRUB SERPL-MCNC: 2.72 MG/DL (ref 0.2–1)
BILIRUB SERPL-MCNC: 2.86 MG/DL (ref 0.2–1)
BILIRUB UR QL STRIP: ABNORMAL
BILIRUB UR QL STRIP: ABNORMAL
BUN SERPL-MCNC: 12 MG/DL (ref 5–25)
BUN SERPL-MCNC: 14 MG/DL (ref 5–25)
BUN SERPL-MCNC: 17 MG/DL (ref 5–25)
BUN SERPL-MCNC: 17 MG/DL (ref 5–25)
BUN SERPL-MCNC: 19 MG/DL (ref 5–25)
BUN SERPL-MCNC: 21 MG/DL (ref 5–25)
BUN SERPL-MCNC: 22 MG/DL (ref 5–25)
BUN SERPL-MCNC: 22 MG/DL (ref 5–25)
BUN SERPL-MCNC: 26 MG/DL (ref 5–25)
BUN SERPL-MCNC: 27 MG/DL (ref 5–25)
BUN SERPL-MCNC: 29 MG/DL (ref 5–25)
BUN SERPL-MCNC: 33 MG/DL (ref 5–25)
BUN SERPL-MCNC: 35 MG/DL (ref 5–25)
BUN SERPL-MCNC: 36 MG/DL (ref 5–25)
BUN SERPL-MCNC: 48 MG/DL (ref 5–25)
BUN SERPL-MCNC: 64 MG/DL (ref 5–25)
BUN SERPL-MCNC: 65 MG/DL (ref 5–25)
BUN SERPL-MCNC: 68 MG/DL (ref 5–25)
BUN SERPL-MCNC: 68 MG/DL (ref 5–25)
BUN SERPL-MCNC: 71 MG/DL (ref 5–25)
BUN SERPL-MCNC: 72 MG/DL (ref 5–25)
BUN SERPL-MCNC: 72 MG/DL (ref 5–25)
BUN SERPL-MCNC: 74 MG/DL (ref 5–25)
CALCIUM ALBUM COR SERPL-MCNC: 10.1 MG/DL (ref 8.3–10.1)
CALCIUM ALBUM COR SERPL-MCNC: 10.3 MG/DL (ref 8.3–10.1)
CALCIUM ALBUM COR SERPL-MCNC: 10.6 MG/DL (ref 8.3–10.1)
CALCIUM ALBUM COR SERPL-MCNC: 10.7 MG/DL (ref 8.3–10.1)
CALCIUM ALBUM COR SERPL-MCNC: 10.8 MG/DL (ref 8.3–10.1)
CALCIUM ALBUM COR SERPL-MCNC: 10.9 MG/DL (ref 8.3–10.1)
CALCIUM ALBUM COR SERPL-MCNC: 11 MG/DL (ref 8.3–10.1)
CALCIUM ALBUM COR SERPL-MCNC: 9.6 MG/DL (ref 8.3–10.1)
CALCIUM ALBUM COR SERPL-MCNC: 9.9 MG/DL (ref 8.3–10.1)
CALCIUM SERPL-MCNC: 8 MG/DL (ref 8.3–10.1)
CALCIUM SERPL-MCNC: 8.3 MG/DL (ref 8.3–10.1)
CALCIUM SERPL-MCNC: 8.4 MG/DL (ref 8.3–10.1)
CALCIUM SERPL-MCNC: 8.5 MG/DL (ref 8.3–10.1)
CALCIUM SERPL-MCNC: 8.6 MG/DL (ref 8.3–10.1)
CALCIUM SERPL-MCNC: 8.6 MG/DL (ref 8.3–10.1)
CALCIUM SERPL-MCNC: 8.7 MG/DL (ref 8.3–10.1)
CALCIUM SERPL-MCNC: 8.8 MG/DL (ref 8.3–10.1)
CALCIUM SERPL-MCNC: 8.8 MG/DL (ref 8.3–10.1)
CALCIUM SERPL-MCNC: 8.9 MG/DL (ref 8.3–10.1)
CALCIUM SERPL-MCNC: 8.9 MG/DL (ref 8.3–10.1)
CALCIUM SERPL-MCNC: 9.1 MG/DL (ref 8.3–10.1)
CALCIUM SERPL-MCNC: 9.1 MG/DL (ref 8.3–10.1)
CALCIUM SERPL-MCNC: 9.2 MG/DL (ref 8.3–10.1)
CHLORIDE SERPL-SCNC: 100 MMOL/L (ref 100–108)
CHLORIDE SERPL-SCNC: 101 MMOL/L (ref 100–108)
CHLORIDE SERPL-SCNC: 101 MMOL/L (ref 100–108)
CHLORIDE SERPL-SCNC: 102 MMOL/L (ref 100–108)
CHLORIDE SERPL-SCNC: 103 MMOL/L (ref 100–108)
CHLORIDE SERPL-SCNC: 104 MMOL/L (ref 100–108)
CHLORIDE SERPL-SCNC: 106 MMOL/L (ref 100–108)
CHLORIDE SERPL-SCNC: 108 MMOL/L (ref 100–108)
CHLORIDE SERPL-SCNC: 96 MMOL/L (ref 100–108)
CHLORIDE SERPL-SCNC: 97 MMOL/L (ref 100–108)
CHLORIDE SERPL-SCNC: 98 MMOL/L (ref 100–108)
CHLORIDE SERPL-SCNC: 99 MMOL/L (ref 100–108)
CK SERPL-CCNC: 62 U/L (ref 26–192)
CLARITY UR: ABNORMAL
CLARITY UR: CLEAR
CO2 SERPL-SCNC: 14 MMOL/L (ref 21–32)
CO2 SERPL-SCNC: 17 MMOL/L (ref 21–32)
CO2 SERPL-SCNC: 17 MMOL/L (ref 21–32)
CO2 SERPL-SCNC: 19 MMOL/L (ref 21–32)
CO2 SERPL-SCNC: 21 MMOL/L (ref 21–32)
CO2 SERPL-SCNC: 22 MMOL/L (ref 21–32)
CO2 SERPL-SCNC: 22 MMOL/L (ref 21–32)
CO2 SERPL-SCNC: 23 MMOL/L (ref 21–32)
CO2 SERPL-SCNC: 24 MMOL/L (ref 21–32)
CO2 SERPL-SCNC: 24 MMOL/L (ref 21–32)
CO2 SERPL-SCNC: 25 MMOL/L (ref 21–32)
CO2 SERPL-SCNC: 25 MMOL/L (ref 21–32)
CO2 SERPL-SCNC: 26 MMOL/L (ref 21–32)
CO2 SERPL-SCNC: 27 MMOL/L (ref 21–32)
CO2 SERPL-SCNC: 28 MMOL/L (ref 21–32)
CO2 SERPL-SCNC: 28 MMOL/L (ref 21–32)
COLOR UR: YELLOW
COLOR UR: YELLOW
CREAT SERPL-MCNC: 0.75 MG/DL (ref 0.6–1.3)
CREAT SERPL-MCNC: 0.76 MG/DL (ref 0.6–1.3)
CREAT SERPL-MCNC: 0.8 MG/DL (ref 0.6–1.3)
CREAT SERPL-MCNC: 0.8 MG/DL (ref 0.6–1.3)
CREAT SERPL-MCNC: 0.83 MG/DL (ref 0.6–1.3)
CREAT SERPL-MCNC: 0.84 MG/DL (ref 0.6–1.3)
CREAT SERPL-MCNC: 0.88 MG/DL (ref 0.6–1.3)
CREAT SERPL-MCNC: 0.91 MG/DL (ref 0.6–1.3)
CREAT SERPL-MCNC: 0.94 MG/DL (ref 0.6–1.3)
CREAT SERPL-MCNC: 0.95 MG/DL (ref 0.6–1.3)
CREAT SERPL-MCNC: 0.96 MG/DL (ref 0.6–1.3)
CREAT SERPL-MCNC: 0.97 MG/DL (ref 0.6–1.3)
CREAT SERPL-MCNC: 1.26 MG/DL (ref 0.6–1.3)
CREAT SERPL-MCNC: 1.72 MG/DL (ref 0.6–1.3)
CREAT SERPL-MCNC: 2.13 MG/DL (ref 0.6–1.3)
CREAT SERPL-MCNC: 2.2 MG/DL (ref 0.6–1.3)
CREAT SERPL-MCNC: 2.4 MG/DL (ref 0.6–1.3)
CREAT SERPL-MCNC: 2.48 MG/DL (ref 0.6–1.3)
CREAT SERPL-MCNC: 2.48 MG/DL (ref 0.6–1.3)
CREAT SERPL-MCNC: 2.49 MG/DL (ref 0.6–1.3)
CREAT SERPL-MCNC: 2.66 MG/DL (ref 0.6–1.3)
CRP SERPL QL: 297.5 MG/L
CRP SERPL QL: 403.9 MG/L
D DIMER PPP FEU-MCNC: 0.8 UG/ML FEU
DOHLE BOD BLD QL SMEAR: PRESENT
EOSINOPHIL # BLD AUTO: 0.09 THOUSAND/ΜL (ref 0–0.61)
EOSINOPHIL # BLD AUTO: 0.13 THOUSAND/ΜL (ref 0–0.61)
EOSINOPHIL # BLD AUTO: 0.25 THOUSAND/ΜL (ref 0–0.61)
EOSINOPHIL # BLD AUTO: 0.4 THOUSAND/ΜL (ref 0–0.61)
EOSINOPHIL # BLD AUTO: 0.5 THOUSAND/ΜL (ref 0–0.61)
EOSINOPHIL # BLD MANUAL: 0 THOUSAND/UL (ref 0–0.4)
EOSINOPHIL # BLD MANUAL: 0 THOUSAND/UL (ref 0–0.4)
EOSINOPHIL # BLD MANUAL: 0.26 THOUSAND/UL (ref 0–0.4)
EOSINOPHIL # BLD MANUAL: 0.32 THOUSAND/UL (ref 0–0.4)
EOSINOPHIL NFR BLD AUTO: 1 % (ref 0–6)
EOSINOPHIL NFR BLD AUTO: 2 % (ref 0–6)
EOSINOPHIL NFR BLD AUTO: 4 % (ref 0–6)
EOSINOPHIL NFR BLD AUTO: 5 % (ref 0–6)
EOSINOPHIL NFR BLD AUTO: 5 % (ref 0–6)
EOSINOPHIL NFR BLD MANUAL: 0 % (ref 0–6)
EOSINOPHIL NFR BLD MANUAL: 0 % (ref 0–6)
EOSINOPHIL NFR BLD MANUAL: 1 % (ref 0–6)
EOSINOPHIL NFR BLD MANUAL: 5 % (ref 0–6)
ERYTHROCYTE [DISTWIDTH] IN BLOOD BY AUTOMATED COUNT: 16.3 % (ref 11.6–15.1)
ERYTHROCYTE [DISTWIDTH] IN BLOOD BY AUTOMATED COUNT: 16.4 % (ref 11.6–15.1)
ERYTHROCYTE [DISTWIDTH] IN BLOOD BY AUTOMATED COUNT: 16.5 % (ref 11.6–15.1)
ERYTHROCYTE [DISTWIDTH] IN BLOOD BY AUTOMATED COUNT: 16.6 % (ref 11.6–15.1)
ERYTHROCYTE [DISTWIDTH] IN BLOOD BY AUTOMATED COUNT: 16.7 % (ref 11.6–15.1)
ERYTHROCYTE [DISTWIDTH] IN BLOOD BY AUTOMATED COUNT: 16.8 % (ref 11.6–15.1)
ERYTHROCYTE [DISTWIDTH] IN BLOOD BY AUTOMATED COUNT: 16.9 % (ref 11.6–15.1)
ERYTHROCYTE [DISTWIDTH] IN BLOOD BY AUTOMATED COUNT: 17 % (ref 11.6–15.1)
ERYTHROCYTE [DISTWIDTH] IN BLOOD BY AUTOMATED COUNT: 17.1 % (ref 11.6–15.1)
ERYTHROCYTE [DISTWIDTH] IN BLOOD BY AUTOMATED COUNT: 17.2 % (ref 11.6–15.1)
ERYTHROCYTE [DISTWIDTH] IN BLOOD BY AUTOMATED COUNT: 18 % (ref 11.6–15.1)
ERYTHROCYTE [DISTWIDTH] IN BLOOD BY AUTOMATED COUNT: 18.3 % (ref 11.6–15.1)
ERYTHROCYTE [SEDIMENTATION RATE] IN BLOOD: 130 MM/HOUR (ref 0–29)
EST. AVERAGE GLUCOSE BLD GHB EST-MCNC: 128 MG/DL
GFR SERPL CREATININE-BSD FRML MDRD: 17 ML/MIN/1.73SQ M
GFR SERPL CREATININE-BSD FRML MDRD: 19 ML/MIN/1.73SQ M
GFR SERPL CREATININE-BSD FRML MDRD: 22 ML/MIN/1.73SQ M
GFR SERPL CREATININE-BSD FRML MDRD: 22 ML/MIN/1.73SQ M
GFR SERPL CREATININE-BSD FRML MDRD: 29 ML/MIN/1.73SQ M
GFR SERPL CREATININE-BSD FRML MDRD: 42 ML/MIN/1.73SQ M
GFR SERPL CREATININE-BSD FRML MDRD: 58 ML/MIN/1.73SQ M
GFR SERPL CREATININE-BSD FRML MDRD: 59 ML/MIN/1.73SQ M
GFR SERPL CREATININE-BSD FRML MDRD: 60 ML/MIN/1.73SQ M
GFR SERPL CREATININE-BSD FRML MDRD: 60 ML/MIN/1.73SQ M
GFR SERPL CREATININE-BSD FRML MDRD: 63 ML/MIN/1.73SQ M
GFR SERPL CREATININE-BSD FRML MDRD: 65 ML/MIN/1.73SQ M
GFR SERPL CREATININE-BSD FRML MDRD: 69 ML/MIN/1.73SQ M
GFR SERPL CREATININE-BSD FRML MDRD: 70 ML/MIN/1.73SQ M
GFR SERPL CREATININE-BSD FRML MDRD: 73 ML/MIN/1.73SQ M
GFR SERPL CREATININE-BSD FRML MDRD: 73 ML/MIN/1.73SQ M
GFR SERPL CREATININE-BSD FRML MDRD: 78 ML/MIN/1.73SQ M
GFR SERPL CREATININE-BSD FRML MDRD: 79 ML/MIN/1.73SQ M
GIANT PLATELETS BLD QL SMEAR: PRESENT
GLUCOSE SERPL-MCNC: 101 MG/DL (ref 65–140)
GLUCOSE SERPL-MCNC: 102 MG/DL (ref 65–140)
GLUCOSE SERPL-MCNC: 103 MG/DL (ref 65–140)
GLUCOSE SERPL-MCNC: 104 MG/DL (ref 65–140)
GLUCOSE SERPL-MCNC: 105 MG/DL (ref 65–140)
GLUCOSE SERPL-MCNC: 108 MG/DL (ref 65–140)
GLUCOSE SERPL-MCNC: 111 MG/DL (ref 65–140)
GLUCOSE SERPL-MCNC: 111 MG/DL (ref 65–140)
GLUCOSE SERPL-MCNC: 112 MG/DL (ref 65–140)
GLUCOSE SERPL-MCNC: 113 MG/DL (ref 65–140)
GLUCOSE SERPL-MCNC: 114 MG/DL (ref 65–140)
GLUCOSE SERPL-MCNC: 115 MG/DL (ref 65–140)
GLUCOSE SERPL-MCNC: 115 MG/DL (ref 65–140)
GLUCOSE SERPL-MCNC: 116 MG/DL (ref 65–140)
GLUCOSE SERPL-MCNC: 117 MG/DL (ref 65–140)
GLUCOSE SERPL-MCNC: 118 MG/DL (ref 65–140)
GLUCOSE SERPL-MCNC: 118 MG/DL (ref 65–140)
GLUCOSE SERPL-MCNC: 119 MG/DL (ref 65–140)
GLUCOSE SERPL-MCNC: 119 MG/DL (ref 65–140)
GLUCOSE SERPL-MCNC: 120 MG/DL (ref 65–140)
GLUCOSE SERPL-MCNC: 120 MG/DL (ref 65–140)
GLUCOSE SERPL-MCNC: 121 MG/DL (ref 65–140)
GLUCOSE SERPL-MCNC: 121 MG/DL (ref 65–140)
GLUCOSE SERPL-MCNC: 122 MG/DL (ref 65–140)
GLUCOSE SERPL-MCNC: 123 MG/DL (ref 65–140)
GLUCOSE SERPL-MCNC: 124 MG/DL (ref 65–140)
GLUCOSE SERPL-MCNC: 125 MG/DL (ref 65–140)
GLUCOSE SERPL-MCNC: 127 MG/DL (ref 65–140)
GLUCOSE SERPL-MCNC: 128 MG/DL (ref 65–140)
GLUCOSE SERPL-MCNC: 129 MG/DL (ref 65–140)
GLUCOSE SERPL-MCNC: 129 MG/DL (ref 65–140)
GLUCOSE SERPL-MCNC: 130 MG/DL (ref 65–140)
GLUCOSE SERPL-MCNC: 131 MG/DL (ref 65–140)
GLUCOSE SERPL-MCNC: 131 MG/DL (ref 65–140)
GLUCOSE SERPL-MCNC: 132 MG/DL (ref 65–140)
GLUCOSE SERPL-MCNC: 134 MG/DL (ref 65–140)
GLUCOSE SERPL-MCNC: 134 MG/DL (ref 65–140)
GLUCOSE SERPL-MCNC: 135 MG/DL (ref 65–140)
GLUCOSE SERPL-MCNC: 135 MG/DL (ref 65–140)
GLUCOSE SERPL-MCNC: 136 MG/DL (ref 65–140)
GLUCOSE SERPL-MCNC: 137 MG/DL (ref 65–140)
GLUCOSE SERPL-MCNC: 138 MG/DL (ref 65–140)
GLUCOSE SERPL-MCNC: 139 MG/DL (ref 65–140)
GLUCOSE SERPL-MCNC: 144 MG/DL (ref 65–140)
GLUCOSE SERPL-MCNC: 146 MG/DL (ref 65–140)
GLUCOSE SERPL-MCNC: 147 MG/DL (ref 65–140)
GLUCOSE SERPL-MCNC: 151 MG/DL (ref 65–140)
GLUCOSE SERPL-MCNC: 152 MG/DL (ref 65–140)
GLUCOSE SERPL-MCNC: 152 MG/DL (ref 65–140)
GLUCOSE SERPL-MCNC: 153 MG/DL (ref 65–140)
GLUCOSE SERPL-MCNC: 155 MG/DL (ref 65–140)
GLUCOSE SERPL-MCNC: 155 MG/DL (ref 65–140)
GLUCOSE SERPL-MCNC: 160 MG/DL (ref 65–140)
GLUCOSE SERPL-MCNC: 165 MG/DL (ref 65–140)
GLUCOSE SERPL-MCNC: 174 MG/DL (ref 65–140)
GLUCOSE SERPL-MCNC: 175 MG/DL (ref 65–140)
GLUCOSE SERPL-MCNC: 175 MG/DL (ref 65–140)
GLUCOSE SERPL-MCNC: 187 MG/DL (ref 65–140)
GLUCOSE SERPL-MCNC: 198 MG/DL (ref 65–140)
GLUCOSE SERPL-MCNC: 83 MG/DL (ref 65–140)
GLUCOSE SERPL-MCNC: 90 MG/DL (ref 65–140)
GLUCOSE SERPL-MCNC: 93 MG/DL (ref 65–140)
GLUCOSE SERPL-MCNC: 94 MG/DL (ref 65–140)
GLUCOSE SERPL-MCNC: 96 MG/DL (ref 65–140)
GLUCOSE SERPL-MCNC: 97 MG/DL (ref 65–140)
GLUCOSE SERPL-MCNC: 99 MG/DL (ref 65–140)
GLUCOSE UR STRIP-MCNC: NEGATIVE MG/DL
GLUCOSE UR STRIP-MCNC: NEGATIVE MG/DL
GRAM STN SPEC: ABNORMAL
HBA1C MFR BLD: 6.1 %
HCO3 BLDV-SCNC: 14.7 MMOL/L (ref 24–30)
HCO3 BLDV-SCNC: 16.8 MMOL/L (ref 24–30)
HCO3 BLDV-SCNC: 24.2 MMOL/L (ref 24–30)
HCT VFR BLD AUTO: 31.6 % (ref 34.8–46.1)
HCT VFR BLD AUTO: 32.1 % (ref 34.8–46.1)
HCT VFR BLD AUTO: 32.2 % (ref 34.8–46.1)
HCT VFR BLD AUTO: 32.3 % (ref 34.8–46.1)
HCT VFR BLD AUTO: 32.5 % (ref 34.8–46.1)
HCT VFR BLD AUTO: 32.8 % (ref 34.8–46.1)
HCT VFR BLD AUTO: 33.1 % (ref 34.8–46.1)
HCT VFR BLD AUTO: 33.6 % (ref 34.8–46.1)
HCT VFR BLD AUTO: 33.6 % (ref 34.8–46.1)
HCT VFR BLD AUTO: 33.8 % (ref 34.8–46.1)
HCT VFR BLD AUTO: 33.8 % (ref 34.8–46.1)
HCT VFR BLD AUTO: 33.9 % (ref 34.8–46.1)
HCT VFR BLD AUTO: 33.9 % (ref 34.8–46.1)
HCT VFR BLD AUTO: 34.3 % (ref 34.8–46.1)
HCT VFR BLD AUTO: 34.3 % (ref 34.8–46.1)
HCT VFR BLD AUTO: 34.6 % (ref 34.8–46.1)
HCT VFR BLD AUTO: 34.8 % (ref 34.8–46.1)
HCT VFR BLD AUTO: 35.5 % (ref 34.8–46.1)
HCT VFR BLD AUTO: 35.6 % (ref 34.8–46.1)
HCT VFR BLD AUTO: 37.8 % (ref 34.8–46.1)
HGB BLD-MCNC: 10 G/DL (ref 11.5–15.4)
HGB BLD-MCNC: 10.1 G/DL (ref 11.5–15.4)
HGB BLD-MCNC: 10.2 G/DL (ref 11.5–15.4)
HGB BLD-MCNC: 10.4 G/DL (ref 11.5–15.4)
HGB BLD-MCNC: 10.4 G/DL (ref 11.5–15.4)
HGB BLD-MCNC: 10.5 G/DL (ref 11.5–15.4)
HGB BLD-MCNC: 10.6 G/DL (ref 11.5–15.4)
HGB BLD-MCNC: 10.7 G/DL (ref 11.5–15.4)
HGB BLD-MCNC: 11 G/DL (ref 11.5–15.4)
HGB BLD-MCNC: 11 G/DL (ref 11.5–15.4)
HGB BLD-MCNC: 11.2 G/DL (ref 11.5–15.4)
HGB BLD-MCNC: 11.9 G/DL (ref 11.5–15.4)
HGB BLD-MCNC: 9.2 G/DL (ref 11.5–15.4)
HGB BLD-MCNC: 9.7 G/DL (ref 11.5–15.4)
HGB BLD-MCNC: 9.8 G/DL (ref 11.5–15.4)
HGB BLD-MCNC: 9.9 G/DL (ref 11.5–15.4)
HGB UR QL STRIP.AUTO: ABNORMAL
HGB UR QL STRIP.AUTO: NEGATIVE
HYALINE CASTS #/AREA URNS LPF: ABNORMAL /LPF
HYPERCHROMIA BLD QL SMEAR: PRESENT
IMM GRANULOCYTES # BLD AUTO: 0.05 THOUSAND/UL (ref 0–0.2)
IMM GRANULOCYTES # BLD AUTO: 0.06 THOUSAND/UL (ref 0–0.2)
IMM GRANULOCYTES # BLD AUTO: 0.07 THOUSAND/UL (ref 0–0.2)
IMM GRANULOCYTES # BLD AUTO: 0.07 THOUSAND/UL (ref 0–0.2)
IMM GRANULOCYTES # BLD AUTO: 0.08 THOUSAND/UL (ref 0–0.2)
IMM GRANULOCYTES NFR BLD AUTO: 1 % (ref 0–2)
INR PPP: 2.18 (ref 0.84–1.19)
INR PPP: 2.7 (ref 0.84–1.19)
INR PPP: 3.78 (ref 0.84–1.19)
INR PPP: 5.53 (ref 0.84–1.19)
KETONES UR STRIP-MCNC: NEGATIVE MG/DL
KETONES UR STRIP-MCNC: NEGATIVE MG/DL
LACTATE SERPL-SCNC: 1.1 MMOL/L (ref 0.5–2)
LACTATE SERPL-SCNC: 1.2 MMOL/L (ref 0.5–2)
LACTATE SERPL-SCNC: 1.4 MMOL/L (ref 0.5–2)
LACTATE SERPL-SCNC: 1.7 MMOL/L (ref 0.5–2)
LACTATE SERPL-SCNC: 2.1 MMOL/L (ref 0.5–2)
LACTATE SERPL-SCNC: 2.9 MMOL/L (ref 0.5–2)
LACTATE SERPL-SCNC: 2.9 MMOL/L (ref 0.5–2)
LACTATE SERPL-SCNC: 3.2 MMOL/L (ref 0.5–2)
LEUKOCYTE ESTERASE UR QL STRIP: ABNORMAL
LEUKOCYTE ESTERASE UR QL STRIP: ABNORMAL
LG PLATELETS BLD QL SMEAR: PRESENT
LIPASE SERPL-CCNC: 121 U/L (ref 73–393)
LYMPHOCYTES # BLD AUTO: 0.68 THOUSANDS/ΜL (ref 0.6–4.47)
LYMPHOCYTES # BLD AUTO: 0.71 THOUSAND/UL (ref 0.6–4.47)
LYMPHOCYTES # BLD AUTO: 0.77 THOUSAND/UL (ref 0.6–4.47)
LYMPHOCYTES # BLD AUTO: 0.86 THOUSANDS/ΜL (ref 0.6–4.47)
LYMPHOCYTES # BLD AUTO: 0.94 THOUSANDS/ΜL (ref 0.6–4.47)
LYMPHOCYTES # BLD AUTO: 1.26 THOUSANDS/ΜL (ref 0.6–4.47)
LYMPHOCYTES # BLD AUTO: 1.33 THOUSAND/UL (ref 0.6–4.47)
LYMPHOCYTES # BLD AUTO: 1.79 THOUSANDS/ΜL (ref 0.6–4.47)
LYMPHOCYTES # BLD AUTO: 11 % (ref 14–44)
LYMPHOCYTES # BLD AUTO: 2.2 THOUSAND/UL (ref 0.6–4.47)
LYMPHOCYTES # BLD AUTO: 3 % (ref 14–44)
LYMPHOCYTES # BLD AUTO: 5 % (ref 14–44)
LYMPHOCYTES # BLD AUTO: 5 % (ref 14–44)
LYMPHOCYTES NFR BLD AUTO: 10 % (ref 14–44)
LYMPHOCYTES NFR BLD AUTO: 10 % (ref 14–44)
LYMPHOCYTES NFR BLD AUTO: 11 % (ref 14–44)
LYMPHOCYTES NFR BLD AUTO: 15 % (ref 14–44)
LYMPHOCYTES NFR BLD AUTO: 18 % (ref 14–44)
MAGNESIUM SERPL-MCNC: 1.5 MG/DL (ref 1.6–2.6)
MAGNESIUM SERPL-MCNC: 2 MG/DL (ref 1.6–2.6)
MAGNESIUM SERPL-MCNC: 2.1 MG/DL (ref 1.6–2.6)
MAGNESIUM SERPL-MCNC: 2.6 MG/DL (ref 1.6–2.6)
MAGNESIUM SERPL-MCNC: 2.7 MG/DL (ref 1.6–2.6)
MAGNESIUM SERPL-MCNC: 2.8 MG/DL (ref 1.6–2.6)
MAGNESIUM SERPL-MCNC: 3 MG/DL (ref 1.6–2.6)
MCH RBC QN AUTO: 24.9 PG (ref 26.8–34.3)
MCH RBC QN AUTO: 25.1 PG (ref 26.8–34.3)
MCH RBC QN AUTO: 25.2 PG (ref 26.8–34.3)
MCH RBC QN AUTO: 25.4 PG (ref 26.8–34.3)
MCH RBC QN AUTO: 25.5 PG (ref 26.8–34.3)
MCH RBC QN AUTO: 25.6 PG (ref 26.8–34.3)
MCH RBC QN AUTO: 25.7 PG (ref 26.8–34.3)
MCH RBC QN AUTO: 25.8 PG (ref 26.8–34.3)
MCH RBC QN AUTO: 26 PG (ref 26.8–34.3)
MCH RBC QN AUTO: 26.2 PG (ref 26.8–34.3)
MCH RBC QN AUTO: 26.4 PG (ref 26.8–34.3)
MCH RBC QN AUTO: 26.5 PG (ref 26.8–34.3)
MCH RBC QN AUTO: 27.2 PG (ref 26.8–34.3)
MCHC RBC AUTO-ENTMCNC: 29.1 G/DL (ref 31.4–37.4)
MCHC RBC AUTO-ENTMCNC: 29.2 G/DL (ref 31.4–37.4)
MCHC RBC AUTO-ENTMCNC: 29.3 G/DL (ref 31.4–37.4)
MCHC RBC AUTO-ENTMCNC: 29.7 G/DL (ref 31.4–37.4)
MCHC RBC AUTO-ENTMCNC: 29.8 G/DL (ref 31.4–37.4)
MCHC RBC AUTO-ENTMCNC: 29.9 G/DL (ref 31.4–37.4)
MCHC RBC AUTO-ENTMCNC: 30.1 G/DL (ref 31.4–37.4)
MCHC RBC AUTO-ENTMCNC: 30.2 G/DL (ref 31.4–37.4)
MCHC RBC AUTO-ENTMCNC: 30.3 G/DL (ref 31.4–37.4)
MCHC RBC AUTO-ENTMCNC: 30.7 G/DL (ref 31.4–37.4)
MCHC RBC AUTO-ENTMCNC: 30.7 G/DL (ref 31.4–37.4)
MCHC RBC AUTO-ENTMCNC: 30.9 G/DL (ref 31.4–37.4)
MCHC RBC AUTO-ENTMCNC: 30.9 G/DL (ref 31.4–37.4)
MCHC RBC AUTO-ENTMCNC: 31.5 G/DL (ref 31.4–37.4)
MCHC RBC AUTO-ENTMCNC: 31.5 G/DL (ref 31.4–37.4)
MCHC RBC AUTO-ENTMCNC: 31.6 G/DL (ref 31.4–37.4)
MCHC RBC AUTO-ENTMCNC: 31.8 G/DL (ref 31.4–37.4)
MCHC RBC AUTO-ENTMCNC: 32 G/DL (ref 31.4–37.4)
MCHC RBC AUTO-ENTMCNC: 32.3 G/DL (ref 31.4–37.4)
MCHC RBC AUTO-ENTMCNC: 32.3 G/DL (ref 31.4–37.4)
MCV RBC AUTO: 79 FL (ref 82–98)
MCV RBC AUTO: 79 FL (ref 82–98)
MCV RBC AUTO: 80 FL (ref 82–98)
MCV RBC AUTO: 80 FL (ref 82–98)
MCV RBC AUTO: 81 FL (ref 82–98)
MCV RBC AUTO: 84 FL (ref 82–98)
MCV RBC AUTO: 84 FL (ref 82–98)
MCV RBC AUTO: 85 FL (ref 82–98)
MCV RBC AUTO: 86 FL (ref 82–98)
MCV RBC AUTO: 86 FL (ref 82–98)
MCV RBC AUTO: 87 FL (ref 82–98)
MCV RBC AUTO: 88 FL (ref 82–98)
MCV RBC AUTO: 89 FL (ref 82–98)
MCV RBC AUTO: 89 FL (ref 82–98)
METAMYELOCYTES NFR BLD MANUAL: 12 % (ref 0–1)
METAMYELOCYTES NFR BLD MANUAL: 2 % (ref 0–1)
MICROCYTES BLD QL AUTO: PRESENT
MICROCYTES BLD QL AUTO: PRESENT
MONOCYTES # BLD AUTO: 0 THOUSAND/UL (ref 0–1.22)
MONOCYTES # BLD AUTO: 0.38 THOUSAND/UL (ref 0–1.22)
MONOCYTES # BLD AUTO: 0.51 THOUSAND/UL (ref 0–1.22)
MONOCYTES # BLD AUTO: 0.63 THOUSAND/ΜL (ref 0.17–1.22)
MONOCYTES # BLD AUTO: 0.8 THOUSAND/UL (ref 0–1.22)
MONOCYTES # BLD AUTO: 0.85 THOUSAND/ΜL (ref 0.17–1.22)
MONOCYTES # BLD AUTO: 0.85 THOUSAND/ΜL (ref 0.17–1.22)
MONOCYTES # BLD AUTO: 0.93 THOUSAND/ΜL (ref 0.17–1.22)
MONOCYTES # BLD AUTO: 0.94 THOUSAND/ΜL (ref 0.17–1.22)
MONOCYTES NFR BLD AUTO: 10 % (ref 4–12)
MONOCYTES NFR BLD AUTO: 11 % (ref 4–12)
MONOCYTES NFR BLD AUTO: 11 % (ref 4–12)
MONOCYTES NFR BLD AUTO: 9 % (ref 4–12)
MONOCYTES NFR BLD AUTO: 9 % (ref 4–12)
MONOCYTES NFR BLD: 0 % (ref 4–12)
MONOCYTES NFR BLD: 2 % (ref 4–12)
MONOCYTES NFR BLD: 3 % (ref 4–12)
MONOCYTES NFR BLD: 6 % (ref 4–12)
MRSA NOSE QL CULT: NORMAL
MYELOCYTES NFR BLD MANUAL: 1 % (ref 0–1)
MYELOCYTES NFR BLD MANUAL: 1 % (ref 0–1)
MYELOCYTES NFR BLD MANUAL: 8 % (ref 0–1)
NEUTROPHILS # BLD AUTO: 5.16 THOUSANDS/ΜL (ref 1.85–7.62)
NEUTROPHILS # BLD AUTO: 6.07 THOUSANDS/ΜL (ref 1.85–7.62)
NEUTROPHILS # BLD AUTO: 6.48 THOUSANDS/ΜL (ref 1.85–7.62)
NEUTROPHILS # BLD AUTO: 6.69 THOUSANDS/ΜL (ref 1.85–7.62)
NEUTROPHILS # BLD AUTO: 6.78 THOUSANDS/ΜL (ref 1.85–7.62)
NEUTROPHILS # BLD MANUAL: 18.87 THOUSAND/UL (ref 1.85–7.62)
NEUTROPHILS # BLD MANUAL: 23.75 THOUSAND/UL (ref 1.85–7.62)
NEUTROPHILS # BLD MANUAL: 4.94 THOUSAND/UL (ref 1.85–7.62)
NEUTROPHILS # BLD MANUAL: 41.39 THOUSAND/UL (ref 1.85–7.62)
NEUTS BAND NFR BLD MANUAL: 15 % (ref 0–8)
NEUTS BAND NFR BLD MANUAL: 2 % (ref 0–8)
NEUTS BAND NFR BLD MANUAL: 3 % (ref 0–8)
NEUTS BAND NFR BLD MANUAL: 4 % (ref 0–8)
NEUTS SEG NFR BLD AUTO: 67 % (ref 43–75)
NEUTS SEG NFR BLD AUTO: 68 % (ref 43–75)
NEUTS SEG NFR BLD AUTO: 70 % (ref 43–75)
NEUTS SEG NFR BLD AUTO: 74 % (ref 43–75)
NEUTS SEG NFR BLD AUTO: 75 % (ref 43–75)
NEUTS SEG NFR BLD AUTO: 76 % (ref 43–75)
NEUTS SEG NFR BLD AUTO: 91 % (ref 43–75)
NITRITE UR QL STRIP: NEGATIVE
NITRITE UR QL STRIP: NEGATIVE
NON VENT- BIPAP: ABNORMAL
NON-SQ EPI CELLS URNS QL MICRO: ABNORMAL /HPF
NON-SQ EPI CELLS URNS QL MICRO: ABNORMAL /HPF
NRBC BLD AUTO-RTO: 0 /100 WBCS
NRBC BLD AUTO-RTO: 1 /100 WBC (ref 0–2)
NRBC BLD AUTO-RTO: 1 /100 WBC (ref 0–2)
NT-PROBNP SERPL-MCNC: 6213 PG/ML
O2 CT BLDV-SCNC: 14.7 ML/DL
O2 CT BLDV-SCNC: 14.9 ML/DL
O2 CT BLDV-SCNC: 15.4 ML/DL
PCO2 BLDV: 24.3 MM HG (ref 42–50)
PCO2 BLDV: 27.9 MM HG (ref 42–50)
PCO2 BLDV: 30.7 MM HG (ref 42–50)
PH BLDV: 7.4 [PH] (ref 7.3–7.4)
PH BLDV: 7.4 [PH] (ref 7.3–7.4)
PH BLDV: 7.51 [PH] (ref 7.3–7.4)
PH UR STRIP.AUTO: 5.5 [PH]
PH UR STRIP.AUTO: 6 [PH]
PHOSPHATE SERPL-MCNC: 3.4 MG/DL (ref 2.3–4.1)
PHOSPHATE SERPL-MCNC: 3.8 MG/DL (ref 2.3–4.1)
PHOSPHATE SERPL-MCNC: 3.9 MG/DL (ref 2.3–4.1)
PHOSPHATE SERPL-MCNC: 4.3 MG/DL (ref 2.3–4.1)
PHOSPHATE SERPL-MCNC: 4.4 MG/DL (ref 2.3–4.1)
PLATELET # BLD AUTO: 180 THOUSANDS/UL (ref 149–390)
PLATELET # BLD AUTO: 189 THOUSANDS/UL (ref 149–390)
PLATELET # BLD AUTO: 196 THOUSANDS/UL (ref 149–390)
PLATELET # BLD AUTO: 199 THOUSANDS/UL (ref 149–390)
PLATELET # BLD AUTO: 209 THOUSANDS/UL (ref 149–390)
PLATELET # BLD AUTO: 217 THOUSANDS/UL (ref 149–390)
PLATELET # BLD AUTO: 223 THOUSANDS/UL (ref 149–390)
PLATELET # BLD AUTO: 224 THOUSANDS/UL (ref 149–390)
PLATELET # BLD AUTO: 225 THOUSANDS/UL (ref 149–390)
PLATELET # BLD AUTO: 233 THOUSANDS/UL (ref 149–390)
PLATELET # BLD AUTO: 234 THOUSANDS/UL (ref 149–390)
PLATELET # BLD AUTO: 239 THOUSANDS/UL (ref 149–390)
PLATELET # BLD AUTO: 240 THOUSANDS/UL (ref 149–390)
PLATELET # BLD AUTO: 247 THOUSANDS/UL (ref 149–390)
PLATELET # BLD AUTO: 258 THOUSANDS/UL (ref 149–390)
PLATELET # BLD AUTO: 262 THOUSANDS/UL (ref 149–390)
PLATELET # BLD AUTO: 264 THOUSANDS/UL (ref 149–390)
PLATELET # BLD AUTO: 266 THOUSANDS/UL (ref 149–390)
PLATELET # BLD AUTO: 275 THOUSANDS/UL (ref 149–390)
PLATELET # BLD AUTO: 313 THOUSANDS/UL (ref 149–390)
PLATELET BLD QL SMEAR: ADEQUATE
PMV BLD AUTO: 10 FL (ref 8.9–12.7)
PMV BLD AUTO: 10 FL (ref 8.9–12.7)
PMV BLD AUTO: 10.3 FL (ref 8.9–12.7)
PMV BLD AUTO: 10.3 FL (ref 8.9–12.7)
PMV BLD AUTO: 10.4 FL (ref 8.9–12.7)
PMV BLD AUTO: 10.5 FL (ref 8.9–12.7)
PMV BLD AUTO: 10.7 FL (ref 8.9–12.7)
PMV BLD AUTO: 10.8 FL (ref 8.9–12.7)
PMV BLD AUTO: 9 FL (ref 8.9–12.7)
PMV BLD AUTO: 9.1 FL (ref 8.9–12.7)
PMV BLD AUTO: 9.1 FL (ref 8.9–12.7)
PMV BLD AUTO: 9.3 FL (ref 8.9–12.7)
PMV BLD AUTO: 9.3 FL (ref 8.9–12.7)
PMV BLD AUTO: 9.4 FL (ref 8.9–12.7)
PMV BLD AUTO: 9.5 FL (ref 8.9–12.7)
PMV BLD AUTO: 9.5 FL (ref 8.9–12.7)
PMV BLD AUTO: 9.6 FL (ref 8.9–12.7)
PMV BLD AUTO: 9.6 FL (ref 8.9–12.7)
PMV BLD AUTO: 9.7 FL (ref 8.9–12.7)
PMV BLD AUTO: 9.9 FL (ref 8.9–12.7)
PO2 BLDV: 102 MM HG (ref 35–45)
PO2 BLDV: 48.8 MM HG (ref 35–45)
PO2 BLDV: 68.2 MM HG (ref 35–45)
POLYCHROMASIA BLD QL SMEAR: PRESENT
POLYCHROMASIA BLD QL SMEAR: PRESENT
POTASSIUM SERPL-SCNC: 3.1 MMOL/L (ref 3.5–5.3)
POTASSIUM SERPL-SCNC: 3.2 MMOL/L (ref 3.5–5.3)
POTASSIUM SERPL-SCNC: 3.5 MMOL/L (ref 3.5–5.3)
POTASSIUM SERPL-SCNC: 3.5 MMOL/L (ref 3.5–5.3)
POTASSIUM SERPL-SCNC: 3.6 MMOL/L (ref 3.5–5.3)
POTASSIUM SERPL-SCNC: 3.7 MMOL/L (ref 3.5–5.3)
POTASSIUM SERPL-SCNC: 3.8 MMOL/L (ref 3.5–5.3)
POTASSIUM SERPL-SCNC: 3.9 MMOL/L (ref 3.5–5.3)
POTASSIUM SERPL-SCNC: 4 MMOL/L (ref 3.5–5.3)
POTASSIUM SERPL-SCNC: 4.1 MMOL/L (ref 3.5–5.3)
POTASSIUM SERPL-SCNC: 4.3 MMOL/L (ref 3.5–5.3)
POTASSIUM SERPL-SCNC: 4.3 MMOL/L (ref 3.5–5.3)
POTASSIUM SERPL-SCNC: 4.6 MMOL/L (ref 3.5–5.3)
POTASSIUM SERPL-SCNC: 4.6 MMOL/L (ref 3.5–5.3)
POTASSIUM SERPL-SCNC: 4.9 MMOL/L (ref 3.5–5.3)
POTASSIUM SERPL-SCNC: 5.1 MMOL/L (ref 3.5–5.3)
POTASSIUM SERPL-SCNC: 5.2 MMOL/L (ref 3.5–5.3)
POTASSIUM SERPL-SCNC: 5.5 MMOL/L (ref 3.5–5.3)
POTASSIUM SERPL-SCNC: 5.6 MMOL/L (ref 3.5–5.3)
PROCALCITONIN SERPL-MCNC: 0.09 NG/ML
PROCALCITONIN SERPL-MCNC: 0.09 NG/ML
PROCALCITONIN SERPL-MCNC: 1.15 NG/ML
PROCALCITONIN SERPL-MCNC: 11.82 NG/ML
PROCALCITONIN SERPL-MCNC: 13.99 NG/ML
PROCALCITONIN SERPL-MCNC: 2.3 NG/ML
PROCALCITONIN SERPL-MCNC: 6.08 NG/ML
PROT SERPL-MCNC: 6.3 G/DL (ref 6.4–8.2)
PROT SERPL-MCNC: 6.5 G/DL (ref 6.4–8.2)
PROT SERPL-MCNC: 6.6 G/DL (ref 6.4–8.2)
PROT SERPL-MCNC: 6.7 G/DL (ref 6.4–8.2)
PROT SERPL-MCNC: 6.8 G/DL (ref 6.4–8.2)
PROT SERPL-MCNC: 6.9 G/DL (ref 6.4–8.2)
PROT SERPL-MCNC: 7 G/DL (ref 6.4–8.2)
PROT SERPL-MCNC: 7 G/DL (ref 6.4–8.2)
PROT SERPL-MCNC: 7.1 G/DL (ref 6.4–8.2)
PROT UR STRIP-MCNC: ABNORMAL MG/DL
PROT UR STRIP-MCNC: NEGATIVE MG/DL
PROTHROMBIN TIME: 23.8 SECONDS (ref 11.6–14.5)
PROTHROMBIN TIME: 28.1 SECONDS (ref 11.6–14.5)
PROTHROMBIN TIME: 36.4 SECONDS (ref 11.6–14.5)
PROTHROMBIN TIME: 49 SECONDS (ref 11.6–14.5)
QRS AXIS: -14 DEGREES
QRS AXIS: 3 DEGREES
QRS AXIS: 5 DEGREES
QRSD INTERVAL: 82 MS
QRSD INTERVAL: 84 MS
QRSD INTERVAL: 88 MS
QT INTERVAL: 294 MS
QT INTERVAL: 318 MS
QT INTERVAL: 402 MS
QTC INTERVAL: 443 MS
QTC INTERVAL: 453 MS
QTC INTERVAL: 483 MS
RBC # BLD AUTO: 3.56 MILLION/UL (ref 3.81–5.12)
RBC # BLD AUTO: 3.64 MILLION/UL (ref 3.81–5.12)
RBC # BLD AUTO: 3.66 MILLION/UL (ref 3.81–5.12)
RBC # BLD AUTO: 3.83 MILLION/UL (ref 3.81–5.12)
RBC # BLD AUTO: 3.85 MILLION/UL (ref 3.81–5.12)
RBC # BLD AUTO: 3.89 MILLION/UL (ref 3.81–5.12)
RBC # BLD AUTO: 3.9 MILLION/UL (ref 3.81–5.12)
RBC # BLD AUTO: 3.93 MILLION/UL (ref 3.81–5.12)
RBC # BLD AUTO: 3.94 MILLION/UL (ref 3.81–5.12)
RBC # BLD AUTO: 4.04 MILLION/UL (ref 3.81–5.12)
RBC # BLD AUTO: 4.04 MILLION/UL (ref 3.81–5.12)
RBC # BLD AUTO: 4.05 MILLION/UL (ref 3.81–5.12)
RBC # BLD AUTO: 4.13 MILLION/UL (ref 3.81–5.12)
RBC # BLD AUTO: 4.16 MILLION/UL (ref 3.81–5.12)
RBC # BLD AUTO: 4.23 MILLION/UL (ref 3.81–5.12)
RBC # BLD AUTO: 4.24 MILLION/UL (ref 3.81–5.12)
RBC # BLD AUTO: 4.31 MILLION/UL (ref 3.81–5.12)
RBC # BLD AUTO: 4.4 MILLION/UL (ref 3.81–5.12)
RBC # BLD AUTO: 4.42 MILLION/UL (ref 3.81–5.12)
RBC # BLD AUTO: 4.66 MILLION/UL (ref 3.81–5.12)
RBC #/AREA URNS AUTO: ABNORMAL /HPF
RBC #/AREA URNS AUTO: ABNORMAL /HPF
RBC MORPH BLD: NORMAL
RBC MORPH BLD: PRESENT
SARS-COV-2 RNA RESP QL NAA+PROBE: NEGATIVE
SMUDGE CELLS BLD QL SMEAR: PRESENT
SODIUM SERPL-SCNC: 126 MMOL/L (ref 136–145)
SODIUM SERPL-SCNC: 128 MMOL/L (ref 136–145)
SODIUM SERPL-SCNC: 128 MMOL/L (ref 136–145)
SODIUM SERPL-SCNC: 129 MMOL/L (ref 136–145)
SODIUM SERPL-SCNC: 130 MMOL/L (ref 136–145)
SODIUM SERPL-SCNC: 130 MMOL/L (ref 136–145)
SODIUM SERPL-SCNC: 132 MMOL/L (ref 136–145)
SODIUM SERPL-SCNC: 132 MMOL/L (ref 136–145)
SODIUM SERPL-SCNC: 133 MMOL/L (ref 136–145)
SODIUM SERPL-SCNC: 133 MMOL/L (ref 136–145)
SODIUM SERPL-SCNC: 134 MMOL/L (ref 136–145)
SODIUM SERPL-SCNC: 135 MMOL/L (ref 136–145)
SODIUM SERPL-SCNC: 135 MMOL/L (ref 136–145)
SODIUM SERPL-SCNC: 136 MMOL/L (ref 136–145)
SODIUM SERPL-SCNC: 136 MMOL/L (ref 136–145)
SODIUM SERPL-SCNC: 137 MMOL/L (ref 136–145)
SODIUM SERPL-SCNC: 138 MMOL/L (ref 136–145)
SODIUM SERPL-SCNC: 139 MMOL/L (ref 136–145)
SODIUM SERPL-SCNC: 140 MMOL/L (ref 136–145)
SODIUM SERPL-SCNC: 141 MMOL/L (ref 136–145)
SODIUM SERPL-SCNC: 142 MMOL/L (ref 136–145)
SP GR UR STRIP.AUTO: 1.01 (ref 1–1.03)
SP GR UR STRIP.AUTO: 1.02 (ref 1–1.03)
T WAVE AXIS: 69 DEGREES
T WAVE AXIS: 86 DEGREES
T WAVE AXIS: 97 DEGREES
TOTAL CELLS COUNTED SPEC: 100
TOXIC GRANULES BLD QL SMEAR: PRESENT
TROPONIN I SERPL-MCNC: 0.03 NG/ML
TROPONIN I SERPL-MCNC: 0.04 NG/ML
TSH SERPL DL<=0.05 MIU/L-ACNC: 1.04 UIU/ML (ref 0.36–3.74)
URATE SERPL-MCNC: 5.2 MG/DL (ref 2–6.8)
UROBILINOGEN UR QL STRIP.AUTO: 0.2 E.U./DL
UROBILINOGEN UR QL STRIP.AUTO: 4 E.U./DL
VANCOMYCIN TROUGH SERPL-MCNC: 21.8 UG/ML (ref 10–20)
VENTRICULAR RATE: 122 BPM
VENTRICULAR RATE: 137 BPM
VENTRICULAR RATE: 87 BPM
WBC # BLD AUTO: 10.66 THOUSAND/UL (ref 4.31–10.16)
WBC # BLD AUTO: 25.5 THOUSAND/UL (ref 4.31–10.16)
WBC # BLD AUTO: 26.68 THOUSAND/UL (ref 4.31–10.16)
WBC # BLD AUTO: 28.8 THOUSAND/UL (ref 4.31–10.16)
WBC # BLD AUTO: 32.6 THOUSAND/UL (ref 4.31–10.16)
WBC # BLD AUTO: 35.68 THOUSAND/UL (ref 4.31–10.16)
WBC # BLD AUTO: 38.23 THOUSAND/UL (ref 4.31–10.16)
WBC # BLD AUTO: 39.92 THOUSAND/UL (ref 4.31–10.16)
WBC # BLD AUTO: 42.95 THOUSAND/UL (ref 4.31–10.16)
WBC # BLD AUTO: 44.03 THOUSAND/UL (ref 4.31–10.16)
WBC # BLD AUTO: 5.89 THOUSAND/UL (ref 4.31–10.16)
WBC # BLD AUTO: 6.41 THOUSAND/UL (ref 4.31–10.16)
WBC # BLD AUTO: 6.82 THOUSAND/UL (ref 4.31–10.16)
WBC # BLD AUTO: 6.97 THOUSAND/UL (ref 4.31–10.16)
WBC # BLD AUTO: 7.41 THOUSAND/UL (ref 4.31–10.16)
WBC # BLD AUTO: 8.52 THOUSAND/UL (ref 4.31–10.16)
WBC # BLD AUTO: 8.67 THOUSAND/UL (ref 4.31–10.16)
WBC # BLD AUTO: 8.85 THOUSAND/UL (ref 4.31–10.16)
WBC # BLD AUTO: 9.52 THOUSAND/UL (ref 4.31–10.16)
WBC # BLD AUTO: 9.95 THOUSAND/UL (ref 4.31–10.16)
WBC #/AREA URNS AUTO: ABNORMAL /HPF
WBC #/AREA URNS AUTO: ABNORMAL /HPF
WBC CLUMPS # UR AUTO: PRESENT /UL

## 2021-01-01 PROCEDURE — 96360 HYDRATION IV INFUSION INIT: CPT

## 2021-01-01 PROCEDURE — 73010 X-RAY EXAM OF SHOULDER BLADE: CPT

## 2021-01-01 PROCEDURE — C8929 TTE W OR WO FOL WCON,DOPPLER: HCPCS

## 2021-01-01 PROCEDURE — 82948 REAGENT STRIP/BLOOD GLUCOSE: CPT

## 2021-01-01 PROCEDURE — 73720 MRI LWR EXTREMITY W/O&W/DYE: CPT

## 2021-01-01 PROCEDURE — 93010 ELECTROCARDIOGRAM REPORT: CPT | Performed by: INTERNAL MEDICINE

## 2021-01-01 PROCEDURE — 88304 TISSUE EXAM BY PATHOLOGIST: CPT | Performed by: PATHOLOGY

## 2021-01-01 PROCEDURE — 85027 COMPLETE CBC AUTOMATED: CPT | Performed by: NURSE PRACTITIONER

## 2021-01-01 PROCEDURE — 87070 CULTURE OTHR SPECIMN AEROBIC: CPT | Performed by: STUDENT IN AN ORGANIZED HEALTH CARE EDUCATION/TRAINING PROGRAM

## 2021-01-01 PROCEDURE — 85025 COMPLETE CBC W/AUTO DIFF WBC: CPT | Performed by: EMERGENCY MEDICINE

## 2021-01-01 PROCEDURE — 94760 N-INVAS EAR/PLS OXIMETRY 1: CPT

## 2021-01-01 PROCEDURE — 83690 ASSAY OF LIPASE: CPT | Performed by: EMERGENCY MEDICINE

## 2021-01-01 PROCEDURE — 73564 X-RAY EXAM KNEE 4 OR MORE: CPT

## 2021-01-01 PROCEDURE — 85027 COMPLETE CBC AUTOMATED: CPT | Performed by: FAMILY MEDICINE

## 2021-01-01 PROCEDURE — 87077 CULTURE AEROBIC IDENTIFY: CPT | Performed by: STUDENT IN AN ORGANIZED HEALTH CARE EDUCATION/TRAINING PROGRAM

## 2021-01-01 PROCEDURE — 83605 ASSAY OF LACTIC ACID: CPT | Performed by: INTERNAL MEDICINE

## 2021-01-01 PROCEDURE — 94660 CPAP INITIATION&MGMT: CPT

## 2021-01-01 PROCEDURE — 36415 COLL VENOUS BLD VENIPUNCTURE: CPT | Performed by: EMERGENCY MEDICINE

## 2021-01-01 PROCEDURE — 71045 X-RAY EXAM CHEST 1 VIEW: CPT

## 2021-01-01 PROCEDURE — 97530 THERAPEUTIC ACTIVITIES: CPT

## 2021-01-01 PROCEDURE — 93005 ELECTROCARDIOGRAM TRACING: CPT

## 2021-01-01 PROCEDURE — 94003 VENT MGMT INPAT SUBQ DAY: CPT

## 2021-01-01 PROCEDURE — NC001 PR NO CHARGE: Performed by: INTERNAL MEDICINE

## 2021-01-01 PROCEDURE — 85025 COMPLETE CBC W/AUTO DIFF WBC: CPT | Performed by: FAMILY MEDICINE

## 2021-01-01 PROCEDURE — 99239 HOSP IP/OBS DSCHRG MGMT >30: CPT | Performed by: FAMILY MEDICINE

## 2021-01-01 PROCEDURE — 85027 COMPLETE CBC AUTOMATED: CPT | Performed by: EMERGENCY MEDICINE

## 2021-01-01 PROCEDURE — 84484 ASSAY OF TROPONIN QUANT: CPT | Performed by: EMERGENCY MEDICINE

## 2021-01-01 PROCEDURE — 99232 SBSQ HOSP IP/OBS MODERATE 35: CPT

## 2021-01-01 PROCEDURE — 80048 BASIC METABOLIC PNL TOTAL CA: CPT | Performed by: FAMILY MEDICINE

## 2021-01-01 PROCEDURE — 80048 BASIC METABOLIC PNL TOTAL CA: CPT | Performed by: INTERNAL MEDICINE

## 2021-01-01 PROCEDURE — 73030 X-RAY EXAM OF SHOULDER: CPT

## 2021-01-01 PROCEDURE — 85027 COMPLETE CBC AUTOMATED: CPT | Performed by: INTERNAL MEDICINE

## 2021-01-01 PROCEDURE — 80053 COMPREHEN METABOLIC PANEL: CPT | Performed by: INTERNAL MEDICINE

## 2021-01-01 PROCEDURE — U0003 INFECTIOUS AGENT DETECTION BY NUCLEIC ACID (DNA OR RNA); SEVERE ACUTE RESPIRATORY SYNDROME CORONAVIRUS 2 (SARS-COV-2) (CORONAVIRUS DISEASE [COVID-19]), AMPLIFIED PROBE TECHNIQUE, MAKING USE OF HIGH THROUGHPUT TECHNOLOGIES AS DESCRIBED BY CMS-2020-01-R: HCPCS | Performed by: FAMILY MEDICINE

## 2021-01-01 PROCEDURE — G1004 CDSM NDSC: HCPCS

## 2021-01-01 PROCEDURE — 73110 X-RAY EXAM OF WRIST: CPT

## 2021-01-01 PROCEDURE — 84145 PROCALCITONIN (PCT): CPT

## 2021-01-01 PROCEDURE — 36415 COLL VENOUS BLD VENIPUNCTURE: CPT | Performed by: STUDENT IN AN ORGANIZED HEALTH CARE EDUCATION/TRAINING PROGRAM

## 2021-01-01 PROCEDURE — 99232 SBSQ HOSP IP/OBS MODERATE 35: CPT | Performed by: FAMILY MEDICINE

## 2021-01-01 PROCEDURE — 93970 EXTREMITY STUDY: CPT

## 2021-01-01 PROCEDURE — 99284 EMERGENCY DEPT VISIT MOD MDM: CPT | Performed by: EMERGENCY MEDICINE

## 2021-01-01 PROCEDURE — 84145 PROCALCITONIN (PCT): CPT | Performed by: NURSE PRACTITIONER

## 2021-01-01 PROCEDURE — 80048 BASIC METABOLIC PNL TOTAL CA: CPT | Performed by: NURSE PRACTITIONER

## 2021-01-01 PROCEDURE — 85007 BL SMEAR W/DIFF WBC COUNT: CPT | Performed by: EMERGENCY MEDICINE

## 2021-01-01 PROCEDURE — 96366 THER/PROPH/DIAG IV INF ADDON: CPT

## 2021-01-01 PROCEDURE — 85007 BL SMEAR W/DIFF WBC COUNT: CPT | Performed by: FAMILY MEDICINE

## 2021-01-01 PROCEDURE — A9585 GADOBUTROL INJECTION: HCPCS | Performed by: FAMILY MEDICINE

## 2021-01-01 PROCEDURE — 84100 ASSAY OF PHOSPHORUS: CPT | Performed by: STUDENT IN AN ORGANIZED HEALTH CARE EDUCATION/TRAINING PROGRAM

## 2021-01-01 PROCEDURE — 83735 ASSAY OF MAGNESIUM: CPT | Performed by: NURSE PRACTITIONER

## 2021-01-01 PROCEDURE — 82248 BILIRUBIN DIRECT: CPT | Performed by: PHYSICIAN ASSISTANT

## 2021-01-01 PROCEDURE — 80053 COMPREHEN METABOLIC PANEL: CPT | Performed by: EMERGENCY MEDICINE

## 2021-01-01 PROCEDURE — 85007 BL SMEAR W/DIFF WBC COUNT: CPT | Performed by: PHYSICIAN ASSISTANT

## 2021-01-01 PROCEDURE — 86140 C-REACTIVE PROTEIN: CPT | Performed by: NURSE PRACTITIONER

## 2021-01-01 PROCEDURE — 85610 PROTHROMBIN TIME: CPT | Performed by: EMERGENCY MEDICINE

## 2021-01-01 PROCEDURE — 94668 MNPJ CHEST WALL SBSQ: CPT

## 2021-01-01 PROCEDURE — 83605 ASSAY OF LACTIC ACID: CPT | Performed by: PHYSICIAN ASSISTANT

## 2021-01-01 PROCEDURE — 99233 SBSQ HOSP IP/OBS HIGH 50: CPT | Performed by: ANESTHESIOLOGY

## 2021-01-01 PROCEDURE — 85025 COMPLETE CBC W/AUTO DIFF WBC: CPT | Performed by: NURSE PRACTITIONER

## 2021-01-01 PROCEDURE — 87086 URINE CULTURE/COLONY COUNT: CPT | Performed by: STUDENT IN AN ORGANIZED HEALTH CARE EDUCATION/TRAINING PROGRAM

## 2021-01-01 PROCEDURE — 80048 BASIC METABOLIC PNL TOTAL CA: CPT | Performed by: PHYSICIAN ASSISTANT

## 2021-01-01 PROCEDURE — 93306 TTE W/DOPPLER COMPLETE: CPT | Performed by: INTERNAL MEDICINE

## 2021-01-01 PROCEDURE — 87040 BLOOD CULTURE FOR BACTERIA: CPT | Performed by: STUDENT IN AN ORGANIZED HEALTH CARE EDUCATION/TRAINING PROGRAM

## 2021-01-01 PROCEDURE — 84100 ASSAY OF PHOSPHORUS: CPT | Performed by: NURSE PRACTITIONER

## 2021-01-01 PROCEDURE — 84484 ASSAY OF TROPONIN QUANT: CPT | Performed by: NURSE PRACTITIONER

## 2021-01-01 PROCEDURE — 82805 BLOOD GASES W/O2 SATURATION: CPT | Performed by: NURSE PRACTITIONER

## 2021-01-01 PROCEDURE — 87186 SC STD MICRODIL/AGAR DIL: CPT | Performed by: STUDENT IN AN ORGANIZED HEALTH CARE EDUCATION/TRAINING PROGRAM

## 2021-01-01 PROCEDURE — 80202 ASSAY OF VANCOMYCIN: CPT | Performed by: NURSE PRACTITIONER

## 2021-01-01 PROCEDURE — U0005 INFEC AGEN DETEC AMPLI PROBE: HCPCS | Performed by: FAMILY MEDICINE

## 2021-01-01 PROCEDURE — 73502 X-RAY EXAM HIP UNI 2-3 VIEWS: CPT

## 2021-01-01 PROCEDURE — 83735 ASSAY OF MAGNESIUM: CPT | Performed by: PHYSICIAN ASSISTANT

## 2021-01-01 PROCEDURE — 85379 FIBRIN DEGRADATION QUANT: CPT | Performed by: EMERGENCY MEDICINE

## 2021-01-01 PROCEDURE — 80053 COMPREHEN METABOLIC PANEL: CPT | Performed by: NURSE PRACTITIONER

## 2021-01-01 PROCEDURE — 72125 CT NECK SPINE W/O DYE: CPT

## 2021-01-01 PROCEDURE — 73590 X-RAY EXAM OF LOWER LEG: CPT

## 2021-01-01 PROCEDURE — 84550 ASSAY OF BLOOD/URIC ACID: CPT | Performed by: FAMILY MEDICINE

## 2021-01-01 PROCEDURE — 87040 BLOOD CULTURE FOR BACTERIA: CPT | Performed by: EMERGENCY MEDICINE

## 2021-01-01 PROCEDURE — 99223 1ST HOSP IP/OBS HIGH 75: CPT | Performed by: INTERNAL MEDICINE

## 2021-01-01 PROCEDURE — 99291 CRITICAL CARE FIRST HOUR: CPT | Performed by: INTERNAL MEDICINE

## 2021-01-01 PROCEDURE — 80053 COMPREHEN METABOLIC PANEL: CPT | Performed by: FAMILY MEDICINE

## 2021-01-01 PROCEDURE — 99285 EMERGENCY DEPT VISIT HI MDM: CPT | Performed by: STUDENT IN AN ORGANIZED HEALTH CARE EDUCATION/TRAINING PROGRAM

## 2021-01-01 PROCEDURE — 96375 TX/PRO/DX INJ NEW DRUG ADDON: CPT

## 2021-01-01 PROCEDURE — 87081 CULTURE SCREEN ONLY: CPT | Performed by: NURSE PRACTITIONER

## 2021-01-01 PROCEDURE — 73610 X-RAY EXAM OF ANKLE: CPT

## 2021-01-01 PROCEDURE — 93970 EXTREMITY STUDY: CPT | Performed by: SURGERY

## 2021-01-01 PROCEDURE — 81001 URINALYSIS AUTO W/SCOPE: CPT | Performed by: EMERGENCY MEDICINE

## 2021-01-01 PROCEDURE — 87040 BLOOD CULTURE FOR BACTERIA: CPT | Performed by: NURSE PRACTITIONER

## 2021-01-01 PROCEDURE — 82805 BLOOD GASES W/O2 SATURATION: CPT | Performed by: STUDENT IN AN ORGANIZED HEALTH CARE EDUCATION/TRAINING PROGRAM

## 2021-01-01 PROCEDURE — 99291 CRITICAL CARE FIRST HOUR: CPT | Performed by: ANESTHESIOLOGY

## 2021-01-01 PROCEDURE — 99232 SBSQ HOSP IP/OBS MODERATE 35: CPT | Performed by: STUDENT IN AN ORGANIZED HEALTH CARE EDUCATION/TRAINING PROGRAM

## 2021-01-01 PROCEDURE — 82550 ASSAY OF CK (CPK): CPT | Performed by: EMERGENCY MEDICINE

## 2021-01-01 PROCEDURE — 97163 PT EVAL HIGH COMPLEX 45 MIN: CPT

## 2021-01-01 PROCEDURE — 97116 GAIT TRAINING THERAPY: CPT

## 2021-01-01 PROCEDURE — 87147 CULTURE TYPE IMMUNOLOGIC: CPT | Performed by: EMERGENCY MEDICINE

## 2021-01-01 PROCEDURE — 0JBL0ZZ EXCISION OF RIGHT UPPER LEG SUBCUTANEOUS TISSUE AND FASCIA, OPEN APPROACH: ICD-10-PCS | Performed by: STUDENT IN AN ORGANIZED HEALTH CARE EDUCATION/TRAINING PROGRAM

## 2021-01-01 PROCEDURE — 99285 EMERGENCY DEPT VISIT HI MDM: CPT

## 2021-01-01 PROCEDURE — 99284 EMERGENCY DEPT VISIT MOD MDM: CPT

## 2021-01-01 PROCEDURE — 73080 X-RAY EXAM OF ELBOW: CPT

## 2021-01-01 PROCEDURE — 99223 1ST HOSP IP/OBS HIGH 75: CPT

## 2021-01-01 PROCEDURE — 96361 HYDRATE IV INFUSION ADD-ON: CPT

## 2021-01-01 PROCEDURE — 1123F ACP DISCUSS/DSCN MKR DOCD: CPT | Performed by: STUDENT IN AN ORGANIZED HEALTH CARE EDUCATION/TRAINING PROGRAM

## 2021-01-01 PROCEDURE — 83605 ASSAY OF LACTIC ACID: CPT | Performed by: STUDENT IN AN ORGANIZED HEALTH CARE EDUCATION/TRAINING PROGRAM

## 2021-01-01 PROCEDURE — 94002 VENT MGMT INPAT INIT DAY: CPT

## 2021-01-01 PROCEDURE — 85610 PROTHROMBIN TIME: CPT | Performed by: NURSE PRACTITIONER

## 2021-01-01 PROCEDURE — 99231 SBSQ HOSP IP/OBS SF/LOW 25: CPT | Performed by: ORTHOPAEDIC SURGERY

## 2021-01-01 PROCEDURE — 97167 OT EVAL HIGH COMPLEX 60 MIN: CPT

## 2021-01-01 PROCEDURE — 80048 BASIC METABOLIC PNL TOTAL CA: CPT | Performed by: STUDENT IN AN ORGANIZED HEALTH CARE EDUCATION/TRAINING PROGRAM

## 2021-01-01 PROCEDURE — 83605 ASSAY OF LACTIC ACID: CPT | Performed by: EMERGENCY MEDICINE

## 2021-01-01 PROCEDURE — 70450 CT HEAD/BRAIN W/O DYE: CPT

## 2021-01-01 PROCEDURE — 85027 COMPLETE CBC AUTOMATED: CPT | Performed by: PHYSICIAN ASSISTANT

## 2021-01-01 PROCEDURE — 84100 ASSAY OF PHOSPHORUS: CPT | Performed by: PHYSICIAN ASSISTANT

## 2021-01-01 PROCEDURE — 36415 COLL VENOUS BLD VENIPUNCTURE: CPT | Performed by: NURSE PRACTITIONER

## 2021-01-01 PROCEDURE — 99222 1ST HOSP IP/OBS MODERATE 55: CPT

## 2021-01-01 PROCEDURE — 99232 SBSQ HOSP IP/OBS MODERATE 35: CPT | Performed by: INTERNAL MEDICINE

## 2021-01-01 PROCEDURE — 83735 ASSAY OF MAGNESIUM: CPT | Performed by: STUDENT IN AN ORGANIZED HEALTH CARE EDUCATION/TRAINING PROGRAM

## 2021-01-01 PROCEDURE — 83036 HEMOGLOBIN GLYCOSYLATED A1C: CPT | Performed by: INTERNAL MEDICINE

## 2021-01-01 PROCEDURE — 96365 THER/PROPH/DIAG IV INF INIT: CPT

## 2021-01-01 PROCEDURE — 76770 US EXAM ABDO BACK WALL COMP: CPT

## 2021-01-01 PROCEDURE — NC001 PR NO CHARGE

## 2021-01-01 PROCEDURE — 85025 COMPLETE CBC W/AUTO DIFF WBC: CPT | Performed by: STUDENT IN AN ORGANIZED HEALTH CARE EDUCATION/TRAINING PROGRAM

## 2021-01-01 PROCEDURE — 99223 1ST HOSP IP/OBS HIGH 75: CPT | Performed by: FAMILY MEDICINE

## 2021-01-01 PROCEDURE — 83605 ASSAY OF LACTIC ACID: CPT

## 2021-01-01 PROCEDURE — 85652 RBC SED RATE AUTOMATED: CPT | Performed by: ANESTHESIOLOGY

## 2021-01-01 PROCEDURE — 99291 CRITICAL CARE FIRST HOUR: CPT | Performed by: EMERGENCY MEDICINE

## 2021-01-01 PROCEDURE — 99222 1ST HOSP IP/OBS MODERATE 55: CPT | Performed by: PHYSICIAN ASSISTANT

## 2021-01-01 PROCEDURE — 83735 ASSAY OF MAGNESIUM: CPT | Performed by: FAMILY MEDICINE

## 2021-01-01 PROCEDURE — 96376 TX/PRO/DX INJ SAME DRUG ADON: CPT

## 2021-01-01 PROCEDURE — 85610 PROTHROMBIN TIME: CPT | Performed by: STUDENT IN AN ORGANIZED HEALTH CARE EDUCATION/TRAINING PROGRAM

## 2021-01-01 PROCEDURE — 82805 BLOOD GASES W/O2 SATURATION: CPT | Performed by: EMERGENCY MEDICINE

## 2021-01-01 PROCEDURE — 99233 SBSQ HOSP IP/OBS HIGH 50: CPT | Performed by: FAMILY MEDICINE

## 2021-01-01 PROCEDURE — 73700 CT LOWER EXTREMITY W/O DYE: CPT

## 2021-01-01 PROCEDURE — 99232 SBSQ HOSP IP/OBS MODERATE 35: CPT | Performed by: NURSE PRACTITIONER

## 2021-01-01 PROCEDURE — 83735 ASSAY OF MAGNESIUM: CPT | Performed by: EMERGENCY MEDICINE

## 2021-01-01 PROCEDURE — 97535 SELF CARE MNGMENT TRAINING: CPT

## 2021-01-01 PROCEDURE — 99292 CRITICAL CARE ADDL 30 MIN: CPT | Performed by: EMERGENCY MEDICINE

## 2021-01-01 PROCEDURE — 81001 URINALYSIS AUTO W/SCOPE: CPT | Performed by: STUDENT IN AN ORGANIZED HEALTH CARE EDUCATION/TRAINING PROGRAM

## 2021-01-01 PROCEDURE — 83880 ASSAY OF NATRIURETIC PEPTIDE: CPT | Performed by: EMERGENCY MEDICINE

## 2021-01-01 PROCEDURE — 86140 C-REACTIVE PROTEIN: CPT | Performed by: ANESTHESIOLOGY

## 2021-01-01 PROCEDURE — 87205 SMEAR GRAM STAIN: CPT | Performed by: STUDENT IN AN ORGANIZED HEALTH CARE EDUCATION/TRAINING PROGRAM

## 2021-01-01 PROCEDURE — 84443 ASSAY THYROID STIM HORMONE: CPT | Performed by: EMERGENCY MEDICINE

## 2021-01-01 RX ORDER — METOPROLOL SUCCINATE 100 MG/1
100 TABLET, EXTENDED RELEASE ORAL DAILY
Status: DISCONTINUED | OUTPATIENT
Start: 2021-01-01 | End: 2021-01-01

## 2021-01-01 RX ORDER — FENTANYL CITRATE 50 UG/ML
INJECTION, SOLUTION INTRAMUSCULAR; INTRAVENOUS AS NEEDED
Status: DISCONTINUED | OUTPATIENT
Start: 2021-01-01 | End: 2021-01-01

## 2021-01-01 RX ORDER — LEVOFLOXACIN 5 MG/ML
750 INJECTION, SOLUTION INTRAVENOUS EVERY 24 HOURS
Status: DISCONTINUED | OUTPATIENT
Start: 2021-01-01 | End: 2021-01-01

## 2021-01-01 RX ORDER — MAGNESIUM HYDROXIDE/ALUMINUM HYDROXICE/SIMETHICONE 120; 1200; 1200 MG/30ML; MG/30ML; MG/30ML
30 SUSPENSION ORAL EVERY 4 HOURS PRN
COMMUNITY

## 2021-01-01 RX ORDER — METOPROLOL SUCCINATE 25 MG/1
25 TABLET, EXTENDED RELEASE ORAL EVERY EVENING
Status: DISCONTINUED | OUTPATIENT
Start: 2021-01-01 | End: 2021-01-01

## 2021-01-01 RX ORDER — POLYETHYLENE GLYCOL 3350 17 G/17G
17 POWDER, FOR SOLUTION ORAL DAILY PRN
Status: DISCONTINUED | OUTPATIENT
Start: 2021-01-01 | End: 2021-01-01 | Stop reason: HOSPADM

## 2021-01-01 RX ORDER — CEFEPIME HYDROCHLORIDE 2 G/50ML
2000 INJECTION, SOLUTION INTRAVENOUS EVERY 24 HOURS
Status: DISCONTINUED | OUTPATIENT
Start: 2021-01-01 | End: 2021-01-01

## 2021-01-01 RX ORDER — LOSARTAN POTASSIUM 50 MG/1
100 TABLET ORAL DAILY
Status: DISCONTINUED | OUTPATIENT
Start: 2021-01-01 | End: 2021-01-01

## 2021-01-01 RX ORDER — SODIUM CHLORIDE, SODIUM LACTATE, POTASSIUM CHLORIDE, CALCIUM CHLORIDE 600; 310; 30; 20 MG/100ML; MG/100ML; MG/100ML; MG/100ML
INJECTION, SOLUTION INTRAVENOUS CONTINUOUS PRN
Status: DISCONTINUED | OUTPATIENT
Start: 2021-01-01 | End: 2021-01-01

## 2021-01-01 RX ORDER — FUROSEMIDE 10 MG/ML
20 INJECTION INTRAMUSCULAR; INTRAVENOUS ONCE
Status: COMPLETED | OUTPATIENT
Start: 2021-01-01 | End: 2021-01-01

## 2021-01-01 RX ORDER — SODIUM CHLORIDE, SODIUM GLUCONATE, SODIUM ACETATE, POTASSIUM CHLORIDE, MAGNESIUM CHLORIDE, SODIUM PHOSPHATE, DIBASIC, AND POTASSIUM PHOSPHATE .53; .5; .37; .037; .03; .012; .00082 G/100ML; G/100ML; G/100ML; G/100ML; G/100ML; G/100ML; G/100ML
500 INJECTION, SOLUTION INTRAVENOUS ONCE
Status: DISCONTINUED | OUTPATIENT
Start: 2021-01-01 | End: 2021-01-01

## 2021-01-01 RX ORDER — ONDANSETRON 2 MG/ML
4 INJECTION INTRAMUSCULAR; INTRAVENOUS ONCE AS NEEDED
Status: DISCONTINUED | OUTPATIENT
Start: 2021-01-01 | End: 2021-01-01 | Stop reason: HOSPADM

## 2021-01-01 RX ORDER — CALCIUM GLUCONATE 20 MG/ML
1 INJECTION, SOLUTION INTRAVENOUS ONCE
Status: COMPLETED | OUTPATIENT
Start: 2021-01-01 | End: 2021-01-01

## 2021-01-01 RX ORDER — HYDROMORPHONE HCL/PF 1 MG/ML
1 SYRINGE (ML) INJECTION
Status: DISCONTINUED | OUTPATIENT
Start: 2021-01-01 | End: 2021-01-01 | Stop reason: HOSPADM

## 2021-01-01 RX ORDER — CYCLOBENZAPRINE HCL 5 MG
5 TABLET ORAL 3 TIMES DAILY PRN
Refills: 0
Start: 2021-01-01

## 2021-01-01 RX ORDER — HYDROMORPHONE HCL/PF 1 MG/ML
0.5 SYRINGE (ML) INJECTION
Status: DISCONTINUED | OUTPATIENT
Start: 2021-01-01 | End: 2021-01-01

## 2021-01-01 RX ORDER — OXYCODONE HYDROCHLORIDE 5 MG/1
5 TABLET ORAL EVERY 4 HOURS PRN
Status: DISCONTINUED | OUTPATIENT
Start: 2021-01-01 | End: 2021-01-01 | Stop reason: HOSPADM

## 2021-01-01 RX ORDER — BUMETANIDE 2 MG/1
2 TABLET ORAL DAILY
Refills: 0
Start: 2021-01-01 | End: 2021-01-01 | Stop reason: HOSPADM

## 2021-01-01 RX ORDER — FENTANYL CITRATE/PF 50 MCG/ML
25 SYRINGE (ML) INJECTION
Status: DISCONTINUED | OUTPATIENT
Start: 2021-01-01 | End: 2021-01-01 | Stop reason: HOSPADM

## 2021-01-01 RX ORDER — MORPHINE SULFATE 10 MG/ML
6 INJECTION, SOLUTION INTRAMUSCULAR; INTRAVENOUS ONCE
Status: COMPLETED | OUTPATIENT
Start: 2021-01-01 | End: 2021-01-01

## 2021-01-01 RX ORDER — ONDANSETRON 2 MG/ML
4 INJECTION INTRAMUSCULAR; INTRAVENOUS EVERY 6 HOURS PRN
Status: DISCONTINUED | OUTPATIENT
Start: 2021-01-01 | End: 2021-01-01 | Stop reason: HOSPADM

## 2021-01-01 RX ORDER — TRAMADOL HYDROCHLORIDE 50 MG/1
50 TABLET ORAL EVERY 6 HOURS PRN
Qty: 20 TABLET | Refills: 0 | Status: SHIPPED | OUTPATIENT
Start: 2021-01-01 | End: 2021-01-01 | Stop reason: HOSPADM

## 2021-01-01 RX ORDER — MORPHINE SULFATE 15 MG/1
15 TABLET ORAL EVERY 4 HOURS PRN
Status: DISCONTINUED | OUTPATIENT
Start: 2021-01-01 | End: 2021-01-01 | Stop reason: HOSPADM

## 2021-01-01 RX ORDER — PROMETHAZINE HYDROCHLORIDE 12.5 MG/1
25 TABLET ORAL EVERY 6 HOURS PRN
COMMUNITY

## 2021-01-01 RX ORDER — MORPHINE SULFATE 15 MG/1
15 TABLET ORAL EVERY 4 HOURS PRN
Qty: 30 TABLET | Refills: 0 | Status: SHIPPED | OUTPATIENT
Start: 2021-01-01 | End: 2021-01-01

## 2021-01-01 RX ORDER — BUMETANIDE 1 MG/1
2 TABLET ORAL DAILY
Status: DISCONTINUED | OUTPATIENT
Start: 2021-01-01 | End: 2021-01-01

## 2021-01-01 RX ORDER — METOPROLOL SUCCINATE 100 MG/1
100 TABLET, EXTENDED RELEASE ORAL 2 TIMES DAILY
Status: DISCONTINUED | OUTPATIENT
Start: 2021-01-01 | End: 2021-01-01 | Stop reason: HOSPADM

## 2021-01-01 RX ORDER — LEVOFLOXACIN 5 MG/ML
750 INJECTION, SOLUTION INTRAVENOUS EVERY 24 HOURS
Status: DISCONTINUED | OUTPATIENT
Start: 2021-01-01 | End: 2021-01-01 | Stop reason: HOSPADM

## 2021-01-01 RX ORDER — PRAVASTATIN SODIUM 20 MG
20 TABLET ORAL
Status: DISCONTINUED | OUTPATIENT
Start: 2021-01-01 | End: 2021-01-01 | Stop reason: HOSPADM

## 2021-01-01 RX ORDER — FENTANYL CITRATE 50 UG/ML
INJECTION, SOLUTION INTRAMUSCULAR; INTRAVENOUS
Status: COMPLETED
Start: 2021-01-01 | End: 2021-01-01

## 2021-01-01 RX ORDER — BUMETANIDE 1 MG/1
1 TABLET ORAL DAILY
Refills: 0
Start: 2021-01-01

## 2021-01-01 RX ORDER — METOPROLOL TARTRATE 50 MG/1
50 TABLET, FILM COATED ORAL EVERY 12 HOURS SCHEDULED
Status: DISCONTINUED | OUTPATIENT
Start: 2021-01-01 | End: 2021-01-01

## 2021-01-01 RX ORDER — POTASSIUM CHLORIDE 20 MEQ/1
40 TABLET, EXTENDED RELEASE ORAL ONCE
Status: COMPLETED | OUTPATIENT
Start: 2021-01-01 | End: 2021-01-01

## 2021-01-01 RX ORDER — PROPOFOL 10 MG/ML
INJECTION, EMULSION INTRAVENOUS CONTINUOUS PRN
Status: DISCONTINUED | OUTPATIENT
Start: 2021-01-01 | End: 2021-01-01

## 2021-01-01 RX ORDER — CYCLOBENZAPRINE HCL 10 MG
5 TABLET ORAL ONCE
Status: COMPLETED | OUTPATIENT
Start: 2021-01-01 | End: 2021-01-01

## 2021-01-01 RX ORDER — OXYCODONE HYDROCHLORIDE 5 MG/1
5 TABLET ORAL EVERY 4 HOURS PRN
Qty: 30 TABLET | Refills: 0 | Status: SHIPPED | OUTPATIENT
Start: 2021-01-01 | End: 2021-01-01

## 2021-01-01 RX ORDER — METOPROLOL SUCCINATE 100 MG/1
100 TABLET, EXTENDED RELEASE ORAL 2 TIMES DAILY
Refills: 0
Start: 2021-01-01

## 2021-01-01 RX ORDER — LEVOFLOXACIN 750 MG/1
750 TABLET ORAL EVERY 24 HOURS
Refills: 0
Start: 2021-01-01 | End: 2021-01-01

## 2021-01-01 RX ORDER — ALBUMIN, HUMAN INJ 5% 5 %
25 SOLUTION INTRAVENOUS ONCE
Status: COMPLETED | OUTPATIENT
Start: 2021-01-01 | End: 2021-01-01

## 2021-01-01 RX ORDER — METOPROLOL TARTRATE 5 MG/5ML
5 INJECTION INTRAVENOUS EVERY 4 HOURS PRN
Status: DISCONTINUED | OUTPATIENT
Start: 2021-01-01 | End: 2021-01-01

## 2021-01-01 RX ORDER — CEPHALEXIN 500 MG/1
500 CAPSULE ORAL EVERY 6 HOURS SCHEDULED
Qty: 32 CAPSULE | Refills: 0
Start: 2021-01-01 | End: 2021-01-01

## 2021-01-01 RX ORDER — AMOXICILLIN 250 MG
1 CAPSULE ORAL
Status: DISCONTINUED | OUTPATIENT
Start: 2021-01-01 | End: 2021-01-01 | Stop reason: HOSPADM

## 2021-01-01 RX ORDER — FUROSEMIDE 10 MG/ML
40 INJECTION INTRAMUSCULAR; INTRAVENOUS ONCE
Status: COMPLETED | OUTPATIENT
Start: 2021-01-01 | End: 2021-01-01

## 2021-01-01 RX ORDER — LEVALBUTEROL 1.25 MG/.5ML
1.25 SOLUTION, CONCENTRATE RESPIRATORY (INHALATION) EVERY 4 HOURS PRN
Status: DISCONTINUED | OUTPATIENT
Start: 2021-01-01 | End: 2021-01-01 | Stop reason: HOSPADM

## 2021-01-01 RX ORDER — VANCOMYCIN HYDROCHLORIDE 1 G/200ML
1000 INJECTION, SOLUTION INTRAVENOUS ONCE
Status: COMPLETED | OUTPATIENT
Start: 2021-01-01 | End: 2021-01-01

## 2021-01-01 RX ORDER — HYDRALAZINE HYDROCHLORIDE 20 MG/ML
5 INJECTION INTRAMUSCULAR; INTRAVENOUS EVERY 6 HOURS PRN
Status: DISCONTINUED | OUTPATIENT
Start: 2021-01-01 | End: 2021-01-01 | Stop reason: HOSPADM

## 2021-01-01 RX ORDER — HYDROMORPHONE HCL/PF 1 MG/ML
0.5 SYRINGE (ML) INJECTION
Status: DISCONTINUED | OUTPATIENT
Start: 2021-01-01 | End: 2021-01-01 | Stop reason: HOSPADM

## 2021-01-01 RX ORDER — CEFEPIME HYDROCHLORIDE 2 G/50ML
2000 INJECTION, SOLUTION INTRAVENOUS ONCE
Status: COMPLETED | OUTPATIENT
Start: 2021-01-01 | End: 2021-01-01

## 2021-01-01 RX ORDER — METOPROLOL TARTRATE 5 MG/5ML
5 INJECTION INTRAVENOUS ONCE
Status: COMPLETED | OUTPATIENT
Start: 2021-01-01 | End: 2021-01-01

## 2021-01-01 RX ORDER — DEXTROSE MONOHYDRATE 25 G/50ML
25 INJECTION, SOLUTION INTRAVENOUS ONCE
Status: COMPLETED | OUTPATIENT
Start: 2021-01-01 | End: 2021-01-01

## 2021-01-01 RX ORDER — BUMETANIDE 1 MG/1
2 TABLET ORAL DAILY
Status: DISCONTINUED | OUTPATIENT
Start: 2021-01-01 | End: 2021-01-01 | Stop reason: HOSPADM

## 2021-01-01 RX ORDER — BUMETANIDE 1 MG/1
1 TABLET ORAL DAILY
Status: DISCONTINUED | OUTPATIENT
Start: 2021-01-01 | End: 2021-01-01 | Stop reason: HOSPADM

## 2021-01-01 RX ORDER — FENTANYL CITRATE 50 UG/ML
25 INJECTION, SOLUTION INTRAMUSCULAR; INTRAVENOUS ONCE
Status: COMPLETED | OUTPATIENT
Start: 2021-01-01 | End: 2021-01-01

## 2021-01-01 RX ORDER — LEVOFLOXACIN 750 MG/1
750 TABLET ORAL EVERY 24 HOURS
Status: DISCONTINUED | OUTPATIENT
Start: 2021-01-01 | End: 2021-01-01 | Stop reason: HOSPADM

## 2021-01-01 RX ORDER — RIVAROXABAN 20 MG/1
20 TABLET, FILM COATED ORAL
Refills: 0
Start: 2021-01-01

## 2021-01-01 RX ORDER — MAGNESIUM OXIDE 400 MG/1
TABLET ORAL
COMMUNITY
End: 2021-01-01 | Stop reason: HOSPADM

## 2021-01-01 RX ORDER — MORPHINE SULFATE 4 MG/ML
4 INJECTION, SOLUTION INTRAMUSCULAR; INTRAVENOUS ONCE
Status: COMPLETED | OUTPATIENT
Start: 2021-01-01 | End: 2021-01-01

## 2021-01-01 RX ORDER — CEFAZOLIN SODIUM 2 G/50ML
2000 SOLUTION INTRAVENOUS EVERY 8 HOURS
Status: DISCONTINUED | OUTPATIENT
Start: 2021-01-01 | End: 2021-01-01 | Stop reason: HOSPADM

## 2021-01-01 RX ORDER — ACETAMINOPHEN 325 MG/1
650 TABLET ORAL EVERY 6 HOURS PRN
Status: DISCONTINUED | OUTPATIENT
Start: 2021-01-01 | End: 2021-01-01 | Stop reason: HOSPADM

## 2021-01-01 RX ORDER — POLYETHYLENE GLYCOL 3350 17 G/17G
17 POWDER, FOR SOLUTION ORAL DAILY PRN
Refills: 0
Start: 2021-01-01

## 2021-01-01 RX ORDER — AMOXICILLIN 250 MG
1 CAPSULE ORAL
Refills: 0
Start: 2021-01-01

## 2021-01-01 RX ORDER — POTASSIUM CHLORIDE 750 MG/1
10 CAPSULE, EXTENDED RELEASE ORAL DAILY
Refills: 0
Start: 2021-01-01

## 2021-01-01 RX ORDER — SODIUM CHLORIDE, SODIUM GLUCONATE, SODIUM ACETATE, POTASSIUM CHLORIDE, MAGNESIUM CHLORIDE, SODIUM PHOSPHATE, DIBASIC, AND POTASSIUM PHOSPHATE .53; .5; .37; .037; .03; .012; .00082 G/100ML; G/100ML; G/100ML; G/100ML; G/100ML; G/100ML; G/100ML
1000 INJECTION, SOLUTION INTRAVENOUS ONCE
Status: COMPLETED | OUTPATIENT
Start: 2021-01-01 | End: 2021-01-01

## 2021-01-01 RX ORDER — PANTOPRAZOLE SODIUM 40 MG/1
40 TABLET, DELAYED RELEASE ORAL
Status: DISCONTINUED | OUTPATIENT
Start: 2021-01-01 | End: 2021-01-01 | Stop reason: HOSPADM

## 2021-01-01 RX ORDER — MORPHINE SULFATE 4 MG/ML
4 INJECTION, SOLUTION INTRAMUSCULAR; INTRAVENOUS EVERY 4 HOURS PRN
Status: DISCONTINUED | OUTPATIENT
Start: 2021-01-01 | End: 2021-01-01 | Stop reason: HOSPADM

## 2021-01-01 RX ORDER — MIDAZOLAM HYDROCHLORIDE 2 MG/2ML
INJECTION, SOLUTION INTRAMUSCULAR; INTRAVENOUS AS NEEDED
Status: DISCONTINUED | OUTPATIENT
Start: 2021-01-01 | End: 2021-01-01

## 2021-01-01 RX ORDER — HYDROMORPHONE HCL/PF 1 MG/ML
0.5 SYRINGE (ML) INJECTION EVERY 4 HOURS PRN
Status: DISCONTINUED | OUTPATIENT
Start: 2021-01-01 | End: 2021-01-01 | Stop reason: HOSPADM

## 2021-01-01 RX ORDER — HYDRALAZINE HYDROCHLORIDE 50 MG/1
TABLET, FILM COATED ORAL
COMMUNITY
End: 2021-01-01 | Stop reason: HOSPADM

## 2021-01-01 RX ORDER — LOPERAMIDE HYDROCHLORIDE 2 MG/1
2 CAPSULE ORAL 4 TIMES DAILY PRN
COMMUNITY

## 2021-01-01 RX ORDER — PROPOFOL 10 MG/ML
INJECTION, EMULSION INTRAVENOUS AS NEEDED
Status: DISCONTINUED | OUTPATIENT
Start: 2021-01-01 | End: 2021-01-01

## 2021-01-01 RX ORDER — OMEPRAZOLE 20 MG/1
CAPSULE, DELAYED RELEASE ORAL
COMMUNITY

## 2021-01-01 RX ORDER — BUMETANIDE 2 MG/1
TABLET ORAL
Status: ON HOLD | COMMUNITY
Start: 2021-01-01 | End: 2021-01-01 | Stop reason: SDUPTHER

## 2021-01-01 RX ORDER — METOPROLOL SUCCINATE 100 MG/1
100 TABLET, EXTENDED RELEASE ORAL DAILY
Status: DISCONTINUED | OUTPATIENT
Start: 2021-01-01 | End: 2021-01-01 | Stop reason: HOSPADM

## 2021-01-01 RX ORDER — SODIUM CHLORIDE 9 MG/ML
3 INJECTION INTRAVENOUS
Status: DISCONTINUED | OUTPATIENT
Start: 2021-01-01 | End: 2021-01-01

## 2021-01-01 RX ORDER — PROMETHAZINE HYDROCHLORIDE 25 MG/1
25 SUPPOSITORY RECTAL EVERY 6 HOURS PRN
COMMUNITY
End: 2021-01-01 | Stop reason: HOSPADM

## 2021-01-01 RX ORDER — LIDOCAINE HYDROCHLORIDE 10 MG/ML
INJECTION, SOLUTION EPIDURAL; INFILTRATION; INTRACAUDAL; PERINEURAL AS NEEDED
Status: DISCONTINUED | OUTPATIENT
Start: 2021-01-01 | End: 2021-01-01

## 2021-01-01 RX ORDER — METOPROLOL SUCCINATE 100 MG/1
100 TABLET, EXTENDED RELEASE ORAL 2 TIMES DAILY
Status: DISCONTINUED | OUTPATIENT
Start: 2021-01-01 | End: 2021-01-01

## 2021-01-01 RX ORDER — LEVALBUTEROL 1.25 MG/.5ML
1.25 SOLUTION, CONCENTRATE RESPIRATORY (INHALATION) EVERY 4 HOURS PRN
Refills: 0
Start: 2021-01-01

## 2021-01-01 RX ORDER — LOSARTAN POTASSIUM 100 MG/1
100 TABLET ORAL
COMMUNITY

## 2021-01-01 RX ORDER — ONDANSETRON 2 MG/ML
INJECTION INTRAMUSCULAR; INTRAVENOUS AS NEEDED
Status: DISCONTINUED | OUTPATIENT
Start: 2021-01-01 | End: 2021-01-01

## 2021-01-01 RX ORDER — CLINDAMYCIN PHOSPHATE 600 MG/50ML
600 INJECTION INTRAVENOUS ONCE
Status: DISCONTINUED | OUTPATIENT
Start: 2021-01-01 | End: 2021-01-01

## 2021-01-01 RX ORDER — CEFEPIME HYDROCHLORIDE 2 G/50ML
2000 INJECTION, SOLUTION INTRAVENOUS EVERY 12 HOURS
Status: DISCONTINUED | OUTPATIENT
Start: 2021-01-01 | End: 2021-01-01

## 2021-01-01 RX ORDER — FENTANYL CITRATE 50 UG/ML
25 INJECTION, SOLUTION INTRAMUSCULAR; INTRAVENOUS EVERY 2 HOUR PRN
Status: DISCONTINUED | OUTPATIENT
Start: 2021-01-01 | End: 2021-01-01

## 2021-01-01 RX ORDER — METOPROLOL TARTRATE 5 MG/5ML
5 INJECTION INTRAVENOUS EVERY 6 HOURS PRN
Status: DISCONTINUED | OUTPATIENT
Start: 2021-01-01 | End: 2021-01-01

## 2021-01-01 RX ORDER — TRAMADOL HYDROCHLORIDE 50 MG/1
50 TABLET ORAL EVERY 12 HOURS PRN
Status: DISCONTINUED | OUTPATIENT
Start: 2021-01-01 | End: 2021-01-01

## 2021-01-01 RX ORDER — LEVOFLOXACIN 750 MG/1
750 TABLET ORAL EVERY 24 HOURS
Qty: 6 TABLET | Refills: 0
Start: 2021-01-01 | End: 2021-01-01

## 2021-01-01 RX ORDER — MAGNESIUM HYDROXIDE 1200 MG/15ML
LIQUID ORAL AS NEEDED
Status: DISCONTINUED | OUTPATIENT
Start: 2021-01-01 | End: 2021-01-01 | Stop reason: HOSPADM

## 2021-01-01 RX ORDER — ACETAMINOPHEN 325 MG/1
650 TABLET ORAL EVERY 6 HOURS PRN
Refills: 0
Start: 2021-01-01

## 2021-01-01 RX ORDER — TRAMADOL HYDROCHLORIDE 50 MG/1
50 TABLET ORAL EVERY 6 HOURS PRN
Status: DISCONTINUED | OUTPATIENT
Start: 2021-01-01 | End: 2021-01-01 | Stop reason: HOSPADM

## 2021-01-01 RX ORDER — MAGNESIUM SULFATE HEPTAHYDRATE 40 MG/ML
2 INJECTION, SOLUTION INTRAVENOUS ONCE
Status: COMPLETED | OUTPATIENT
Start: 2021-01-01 | End: 2021-01-01

## 2021-01-01 RX ORDER — SUCCINYLCHOLINE/SOD CL,ISO/PF 100 MG/5ML
SYRINGE (ML) INTRAVENOUS AS NEEDED
Status: DISCONTINUED | OUTPATIENT
Start: 2021-01-01 | End: 2021-01-01

## 2021-01-01 RX ORDER — LOSARTAN POTASSIUM 50 MG/1
100 TABLET ORAL DAILY
Status: DISCONTINUED | OUTPATIENT
Start: 2021-01-01 | End: 2021-01-01 | Stop reason: HOSPADM

## 2021-01-01 RX ADMIN — TRAMADOL HYDROCHLORIDE 50 MG: 50 TABLET, FILM COATED ORAL at 00:38

## 2021-01-01 RX ADMIN — LOSARTAN POTASSIUM 100 MG: 50 TABLET, FILM COATED ORAL at 10:12

## 2021-01-01 RX ADMIN — LEVOFLOXACIN 750 MG: 5 INJECTION, SOLUTION INTRAVENOUS at 16:54

## 2021-01-01 RX ADMIN — INSULIN LISPRO 2 UNITS: 100 INJECTION, SOLUTION INTRAVENOUS; SUBCUTANEOUS at 18:00

## 2021-01-01 RX ADMIN — MICONAZOLE NITRATE: 20 CREAM TOPICAL at 10:20

## 2021-01-01 RX ADMIN — CEFEPIME HYDROCHLORIDE 2000 MG: 2 INJECTION, SOLUTION INTRAVENOUS at 13:39

## 2021-01-01 RX ADMIN — ACETAMINOPHEN 650 MG: 325 TABLET ORAL at 19:34

## 2021-01-01 RX ADMIN — CEFAZOLIN SODIUM 2000 MG: 2 SOLUTION INTRAVENOUS at 13:15

## 2021-01-01 RX ADMIN — PRAVASTATIN SODIUM 20 MG: 20 TABLET ORAL at 16:47

## 2021-01-01 RX ADMIN — POTASSIUM CHLORIDE 40 MEQ: 1500 TABLET, EXTENDED RELEASE ORAL at 14:48

## 2021-01-01 RX ADMIN — RIVAROXABAN 20 MG: 20 TABLET, FILM COATED ORAL at 10:19

## 2021-01-01 RX ADMIN — CEFAZOLIN SODIUM 2000 MG: 2 SOLUTION INTRAVENOUS at 23:31

## 2021-01-01 RX ADMIN — ONDANSETRON 4 MG: 2 INJECTION INTRAMUSCULAR; INTRAVENOUS at 11:02

## 2021-01-01 RX ADMIN — RIVAROXABAN 20 MG: 20 TABLET, FILM COATED ORAL at 10:12

## 2021-01-01 RX ADMIN — BUMETANIDE 2 MG: 1 TABLET ORAL at 08:18

## 2021-01-01 RX ADMIN — Medication 140 MG: at 10:35

## 2021-01-01 RX ADMIN — MORPHINE SULFATE 15 MG: 15 TABLET ORAL at 10:11

## 2021-01-01 RX ADMIN — LIDOCAINE HYDROCHLORIDE 50 MG: 10 INJECTION, SOLUTION EPIDURAL; INFILTRATION; INTRACAUDAL; PERINEURAL at 10:35

## 2021-01-01 RX ADMIN — SODIUM CHLORIDE, SODIUM GLUCONATE, SODIUM ACETATE, POTASSIUM CHLORIDE, MAGNESIUM CHLORIDE, SODIUM PHOSPHATE, DIBASIC, AND POTASSIUM PHOSPHATE 1000 ML: .53; .5; .37; .037; .03; .012; .00082 INJECTION, SOLUTION INTRAVENOUS at 17:10

## 2021-01-01 RX ADMIN — PANTOPRAZOLE SODIUM 40 MG: 40 TABLET, DELAYED RELEASE ORAL at 06:16

## 2021-01-01 RX ADMIN — METOROPROLOL TARTRATE 5 MG: 5 INJECTION, SOLUTION INTRAVENOUS at 08:50

## 2021-01-01 RX ADMIN — CEFAZOLIN SODIUM 2000 MG: 2 SOLUTION INTRAVENOUS at 21:25

## 2021-01-01 RX ADMIN — MICONAZOLE NITRATE 1 APPLICATION: 20 CREAM TOPICAL at 17:31

## 2021-01-01 RX ADMIN — PRAVASTATIN SODIUM 20 MG: 20 TABLET ORAL at 16:08

## 2021-01-01 RX ADMIN — METFORMIN HYDROCHLORIDE 500 MG: 500 TABLET ORAL at 08:10

## 2021-01-01 RX ADMIN — PRAVASTATIN SODIUM 20 MG: 20 TABLET ORAL at 16:54

## 2021-01-01 RX ADMIN — BUMETANIDE 2 MG: 1 TABLET ORAL at 08:29

## 2021-01-01 RX ADMIN — METOPROLOL SUCCINATE 25 MG: 25 TABLET, EXTENDED RELEASE ORAL at 17:20

## 2021-01-01 RX ADMIN — CEFAZOLIN SODIUM 2000 MG: 2 SOLUTION INTRAVENOUS at 13:34

## 2021-01-01 RX ADMIN — PRAVASTATIN SODIUM 20 MG: 20 TABLET ORAL at 16:58

## 2021-01-01 RX ADMIN — SODIUM CHLORIDE, SODIUM GLUCONATE, SODIUM ACETATE, POTASSIUM CHLORIDE, MAGNESIUM CHLORIDE, SODIUM PHOSPHATE, DIBASIC, AND POTASSIUM PHOSPHATE 1000 ML: .53; .5; .37; .037; .03; .012; .00082 INJECTION, SOLUTION INTRAVENOUS at 09:42

## 2021-01-01 RX ADMIN — CEFAZOLIN SODIUM 2000 MG: 2 SOLUTION INTRAVENOUS at 13:21

## 2021-01-01 RX ADMIN — COLLAGENASE SANTYL: 250 OINTMENT TOPICAL at 09:23

## 2021-01-01 RX ADMIN — PRAVASTATIN SODIUM 20 MG: 20 TABLET ORAL at 16:50

## 2021-01-01 RX ADMIN — PANTOPRAZOLE SODIUM 40 MG: 40 TABLET, DELAYED RELEASE ORAL at 05:00

## 2021-01-01 RX ADMIN — RIVAROXABAN 20 MG: 20 TABLET, FILM COATED ORAL at 08:10

## 2021-01-01 RX ADMIN — PRAVASTATIN SODIUM 20 MG: 20 TABLET ORAL at 16:56

## 2021-01-01 RX ADMIN — LEVOFLOXACIN 750 MG: 750 TABLET, FILM COATED ORAL at 11:30

## 2021-01-01 RX ADMIN — LOSARTAN POTASSIUM 100 MG: 50 TABLET, FILM COATED ORAL at 08:29

## 2021-01-01 RX ADMIN — DOCUSATE SODIUM AND SENNOSIDES 1 TABLET: 8.6; 5 TABLET ORAL at 23:59

## 2021-01-01 RX ADMIN — METOPROLOL SUCCINATE 100 MG: 100 TABLET, EXTENDED RELEASE ORAL at 10:12

## 2021-01-01 RX ADMIN — INSULIN HUMAN 10 UNITS: 100 INJECTION, SOLUTION PARENTERAL at 01:49

## 2021-01-01 RX ADMIN — RIVAROXABAN 20 MG: 20 TABLET, FILM COATED ORAL at 12:53

## 2021-01-01 RX ADMIN — INSULIN LISPRO 2 UNITS: 100 INJECTION, SOLUTION INTRAVENOUS; SUBCUTANEOUS at 16:25

## 2021-01-01 RX ADMIN — POLYETHYLENE GLYCOL 3350 17 G: 17 POWDER, FOR SOLUTION ORAL at 18:46

## 2021-01-01 RX ADMIN — DOCUSATE SODIUM AND SENNOSIDES 1 TABLET: 8.6; 5 TABLET ORAL at 22:03

## 2021-01-01 RX ADMIN — TRAMADOL HYDROCHLORIDE 50 MG: 50 TABLET, FILM COATED ORAL at 02:08

## 2021-01-01 RX ADMIN — LOSARTAN POTASSIUM 100 MG: 50 TABLET, FILM COATED ORAL at 08:00

## 2021-01-01 RX ADMIN — CEFAZOLIN SODIUM 2000 MG: 2 SOLUTION INTRAVENOUS at 15:34

## 2021-01-01 RX ADMIN — SODIUM BICARBONATE 50 MEQ: 84 INJECTION, SOLUTION INTRAVENOUS at 01:50

## 2021-01-01 RX ADMIN — DOCUSATE SODIUM AND SENNOSIDES 1 TABLET: 8.6; 5 TABLET ORAL at 21:39

## 2021-01-01 RX ADMIN — HYDROMORPHONE HYDROCHLORIDE 0.5 MG: 1 INJECTION, SOLUTION INTRAMUSCULAR; INTRAVENOUS; SUBCUTANEOUS at 12:27

## 2021-01-01 RX ADMIN — METOPROLOL SUCCINATE 25 MG: 25 TABLET, EXTENDED RELEASE ORAL at 16:25

## 2021-01-01 RX ADMIN — PRAVASTATIN SODIUM 20 MG: 20 TABLET ORAL at 15:55

## 2021-01-01 RX ADMIN — CALCIUM GLUCONATE 1 G: 20 INJECTION, SOLUTION INTRAVENOUS at 01:50

## 2021-01-01 RX ADMIN — METOPROLOL SUCCINATE 25 MG: 25 TABLET, EXTENDED RELEASE ORAL at 17:22

## 2021-01-01 RX ADMIN — RIVAROXABAN 20 MG: 20 TABLET, FILM COATED ORAL at 08:44

## 2021-01-01 RX ADMIN — METOPROLOL TARTRATE 25 MG: 25 TABLET, FILM COATED ORAL at 09:56

## 2021-01-01 RX ADMIN — MORPHINE SULFATE 4 MG: 4 INJECTION INTRAVENOUS at 14:31

## 2021-01-01 RX ADMIN — RIVAROXABAN 20 MG: 20 TABLET, FILM COATED ORAL at 08:29

## 2021-01-01 RX ADMIN — METOPROLOL SUCCINATE 100 MG: 100 TABLET, EXTENDED RELEASE ORAL at 08:18

## 2021-01-01 RX ADMIN — DOCUSATE SODIUM AND SENNOSIDES 1 TABLET: 8.6; 5 TABLET ORAL at 21:03

## 2021-01-01 RX ADMIN — CEFAZOLIN SODIUM 2000 MG: 2 SOLUTION INTRAVENOUS at 21:27

## 2021-01-01 RX ADMIN — PROPOFOL 50 MCG/KG/MIN: 10 INJECTION, EMULSION INTRAVENOUS at 10:41

## 2021-01-01 RX ADMIN — CEFEPIME HYDROCHLORIDE 2000 MG: 2 INJECTION, SOLUTION INTRAVENOUS at 04:43

## 2021-01-01 RX ADMIN — METOPROLOL SUCCINATE 100 MG: 100 TABLET, EXTENDED RELEASE ORAL at 21:03

## 2021-01-01 RX ADMIN — SODIUM CHLORIDE 1000 ML: 0.9 INJECTION, SOLUTION INTRAVENOUS at 00:10

## 2021-01-01 RX ADMIN — INSULIN LISPRO 2 UNITS: 100 INJECTION, SOLUTION INTRAVENOUS; SUBCUTANEOUS at 12:10

## 2021-01-01 RX ADMIN — LOSARTAN POTASSIUM 100 MG: 50 TABLET, FILM COATED ORAL at 11:33

## 2021-01-01 RX ADMIN — INSULIN LISPRO 2 UNITS: 100 INJECTION, SOLUTION INTRAVENOUS; SUBCUTANEOUS at 08:28

## 2021-01-01 RX ADMIN — TRAMADOL HYDROCHLORIDE 50 MG: 50 TABLET, FILM COATED ORAL at 13:15

## 2021-01-01 RX ADMIN — PERFLUTREN 1 ML/MIN: 6.52 INJECTION, SUSPENSION INTRAVENOUS at 11:16

## 2021-01-01 RX ADMIN — MICONAZOLE NITRATE: 20 CREAM TOPICAL at 10:06

## 2021-01-01 RX ADMIN — MICONAZOLE NITRATE 1 APPLICATION: 20 CREAM TOPICAL at 17:40

## 2021-01-01 RX ADMIN — MORPHINE SULFATE 6 MG: 10 INJECTION INTRAVENOUS at 00:47

## 2021-01-01 RX ADMIN — PANTOPRAZOLE SODIUM 40 MG: 40 TABLET, DELAYED RELEASE ORAL at 05:40

## 2021-01-01 RX ADMIN — GADOBUTROL 15 ML: 604.72 INJECTION INTRAVENOUS at 14:46

## 2021-01-01 RX ADMIN — RIVAROXABAN 20 MG: 20 TABLET, FILM COATED ORAL at 08:24

## 2021-01-01 RX ADMIN — METOPROLOL SUCCINATE 100 MG: 100 TABLET, EXTENDED RELEASE ORAL at 08:41

## 2021-01-01 RX ADMIN — LEVOFLOXACIN 750 MG: 750 TABLET, FILM COATED ORAL at 12:53

## 2021-01-01 RX ADMIN — BUMETANIDE 1 MG: 1 TABLET ORAL at 08:28

## 2021-01-01 RX ADMIN — BUMETANIDE 1 MG: 1 TABLET ORAL at 08:25

## 2021-01-01 RX ADMIN — LEVOFLOXACIN 750 MG: 750 TABLET, FILM COATED ORAL at 11:55

## 2021-01-01 RX ADMIN — FENTANYL CITRATE 25 MCG: 0.05 INJECTION, SOLUTION INTRAMUSCULAR; INTRAVENOUS at 12:14

## 2021-01-01 RX ADMIN — METOPROLOL SUCCINATE 100 MG: 100 TABLET, EXTENDED RELEASE ORAL at 08:44

## 2021-01-01 RX ADMIN — PRAVASTATIN SODIUM 20 MG: 20 TABLET ORAL at 17:36

## 2021-01-01 RX ADMIN — ACETAMINOPHEN 650 MG: 325 TABLET ORAL at 16:25

## 2021-01-01 RX ADMIN — VANCOMYCIN HYDROCHLORIDE 1250 MG: 1 INJECTION, POWDER, LYOPHILIZED, FOR SOLUTION INTRAVENOUS at 15:11

## 2021-01-01 RX ADMIN — MORPHINE SULFATE 15 MG: 15 TABLET ORAL at 06:32

## 2021-01-01 RX ADMIN — VANCOMYCIN HYDROCHLORIDE 2000 MG: 1 INJECTION, POWDER, LYOPHILIZED, FOR SOLUTION INTRAVENOUS at 15:06

## 2021-01-01 RX ADMIN — LOSARTAN POTASSIUM 100 MG: 50 TABLET, FILM COATED ORAL at 07:23

## 2021-01-01 RX ADMIN — VANCOMYCIN HYDROCHLORIDE 1000 MG: 1 INJECTION, SOLUTION INTRAVENOUS at 04:14

## 2021-01-01 RX ADMIN — BUMETANIDE 1 MG: 1 TABLET ORAL at 08:01

## 2021-01-01 RX ADMIN — TRAMADOL HYDROCHLORIDE 50 MG: 50 TABLET, FILM COATED ORAL at 19:52

## 2021-01-01 RX ADMIN — INSULIN LISPRO 2 UNITS: 100 INJECTION, SOLUTION INTRAVENOUS; SUBCUTANEOUS at 21:39

## 2021-01-01 RX ADMIN — LEVOFLOXACIN 750 MG: 750 TABLET, FILM COATED ORAL at 12:19

## 2021-01-01 RX ADMIN — FENTANYL CITRATE 25 MCG: 50 INJECTION, SOLUTION INTRAMUSCULAR; INTRAVENOUS at 11:24

## 2021-01-01 RX ADMIN — LEVOFLOXACIN 750 MG: 5 INJECTION, SOLUTION INTRAVENOUS at 14:38

## 2021-01-01 RX ADMIN — INSULIN LISPRO 2 UNITS: 100 INJECTION, SOLUTION INTRAVENOUS; SUBCUTANEOUS at 11:30

## 2021-01-01 RX ADMIN — SODIUM CHLORIDE 1000 ML: 0.9 INJECTION, SOLUTION INTRAVENOUS at 16:41

## 2021-01-01 RX ADMIN — RIVAROXABAN 20 MG: 20 TABLET, FILM COATED ORAL at 08:00

## 2021-01-01 RX ADMIN — CEFEPIME HYDROCHLORIDE 2000 MG: 2 INJECTION, SOLUTION INTRAVENOUS at 03:22

## 2021-01-01 RX ADMIN — COLLAGENASE SANTYL: 250 OINTMENT TOPICAL at 10:19

## 2021-01-01 RX ADMIN — FENTANYL CITRATE 25 MCG: 0.05 INJECTION, SOLUTION INTRAMUSCULAR; INTRAVENOUS at 20:26

## 2021-01-01 RX ADMIN — BUMETANIDE 1 MG: 1 TABLET ORAL at 07:24

## 2021-01-01 RX ADMIN — TRAMADOL HYDROCHLORIDE 50 MG: 50 TABLET, FILM COATED ORAL at 12:37

## 2021-01-01 RX ADMIN — MORPHINE SULFATE 15 MG: 15 TABLET ORAL at 18:47

## 2021-01-01 RX ADMIN — VANCOMYCIN HYDROCHLORIDE 1500 MG: 5 INJECTION, POWDER, LYOPHILIZED, FOR SOLUTION INTRAVENOUS at 16:27

## 2021-01-01 RX ADMIN — FUROSEMIDE 20 MG: 10 INJECTION, SOLUTION INTRAMUSCULAR; INTRAVENOUS at 09:23

## 2021-01-01 RX ADMIN — METOPROLOL TARTRATE 25 MG: 25 TABLET, FILM COATED ORAL at 08:05

## 2021-01-01 RX ADMIN — METOPROLOL TARTRATE 25 MG: 25 TABLET, FILM COATED ORAL at 08:54

## 2021-01-01 RX ADMIN — ACETAMINOPHEN 650 MG: 325 TABLET ORAL at 22:03

## 2021-01-01 RX ADMIN — CEFAZOLIN SODIUM 2000 MG: 2 SOLUTION INTRAVENOUS at 06:32

## 2021-01-01 RX ADMIN — BUMETANIDE 1 MG: 1 TABLET ORAL at 08:20

## 2021-01-01 RX ADMIN — DOCUSATE SODIUM AND SENNOSIDES 1 TABLET: 8.6; 5 TABLET ORAL at 21:50

## 2021-01-01 RX ADMIN — METOPROLOL SUCCINATE 100 MG: 100 TABLET, EXTENDED RELEASE ORAL at 21:26

## 2021-01-01 RX ADMIN — METOPROLOL SUCCINATE 100 MG: 100 TABLET, EXTENDED RELEASE ORAL at 08:10

## 2021-01-01 RX ADMIN — MICONAZOLE NITRATE 1 APPLICATION: 20 CREAM TOPICAL at 09:57

## 2021-01-01 RX ADMIN — DOCUSATE SODIUM AND SENNOSIDES 1 TABLET: 8.6; 5 TABLET ORAL at 20:57

## 2021-01-01 RX ADMIN — CEFEPIME HYDROCHLORIDE 2000 MG: 2 INJECTION, SOLUTION INTRAVENOUS at 11:09

## 2021-01-01 RX ADMIN — CEFAZOLIN SODIUM 2000 MG: 2 SOLUTION INTRAVENOUS at 05:45

## 2021-01-01 RX ADMIN — DOCUSATE SODIUM AND SENNOSIDES 1 TABLET: 8.6; 5 TABLET ORAL at 21:28

## 2021-01-01 RX ADMIN — BUMETANIDE 2 MG: 1 TABLET ORAL at 08:41

## 2021-01-01 RX ADMIN — LEVOFLOXACIN 750 MG: 5 INJECTION, SOLUTION INTRAVENOUS at 17:03

## 2021-01-01 RX ADMIN — CEFEPIME HYDROCHLORIDE 2000 MG: 2 INJECTION, SOLUTION INTRAVENOUS at 17:11

## 2021-01-01 RX ADMIN — MICONAZOLE NITRATE: 20 CREAM TOPICAL at 18:07

## 2021-01-01 RX ADMIN — INSULIN LISPRO 2 UNITS: 100 INJECTION, SOLUTION INTRAVENOUS; SUBCUTANEOUS at 21:28

## 2021-01-01 RX ADMIN — FENTANYL CITRATE 25 MCG: 0.05 INJECTION, SOLUTION INTRAMUSCULAR; INTRAVENOUS at 15:11

## 2021-01-01 RX ADMIN — PRAVASTATIN SODIUM 20 MG: 20 TABLET ORAL at 17:03

## 2021-01-01 RX ADMIN — METOPROLOL SUCCINATE 100 MG: 100 TABLET, EXTENDED RELEASE ORAL at 08:01

## 2021-01-01 RX ADMIN — BUMETANIDE 1 MG: 1 TABLET ORAL at 08:10

## 2021-01-01 RX ADMIN — ACETAMINOPHEN 650 MG: 325 TABLET ORAL at 10:03

## 2021-01-01 RX ADMIN — CEFEPIME HYDROCHLORIDE 2000 MG: 2 INJECTION, SOLUTION INTRAVENOUS at 23:59

## 2021-01-01 RX ADMIN — COLLAGENASE SANTYL: 250 OINTMENT TOPICAL at 08:55

## 2021-01-01 RX ADMIN — LEVOFLOXACIN 750 MG: 5 INJECTION, SOLUTION INTRAVENOUS at 17:56

## 2021-01-01 RX ADMIN — CEFEPIME HYDROCHLORIDE 2000 MG: 2 INJECTION, SOLUTION INTRAVENOUS at 12:13

## 2021-01-01 RX ADMIN — DEXTROSE MONOHYDRATE 25 ML: 25 INJECTION, SOLUTION INTRAVENOUS at 01:50

## 2021-01-01 RX ADMIN — METOROPROLOL TARTRATE 5 MG: 5 INJECTION, SOLUTION INTRAVENOUS at 16:08

## 2021-01-01 RX ADMIN — CEFAZOLIN SODIUM 2000 MG: 2 SOLUTION INTRAVENOUS at 06:57

## 2021-01-01 RX ADMIN — CEFEPIME HYDROCHLORIDE 2000 MG: 2 INJECTION, SOLUTION INTRAVENOUS at 19:30

## 2021-01-01 RX ADMIN — METOPROLOL SUCCINATE 100 MG: 100 TABLET, EXTENDED RELEASE ORAL at 13:02

## 2021-01-01 RX ADMIN — CEFEPIME HYDROCHLORIDE 2000 MG: 2 INJECTION, SOLUTION INTRAVENOUS at 00:09

## 2021-01-01 RX ADMIN — LOSARTAN POTASSIUM 100 MG: 50 TABLET, FILM COATED ORAL at 08:25

## 2021-01-01 RX ADMIN — LOSARTAN POTASSIUM 100 MG: 50 TABLET, FILM COATED ORAL at 08:10

## 2021-01-01 RX ADMIN — CEFAZOLIN SODIUM 2000 MG: 2 SOLUTION INTRAVENOUS at 04:53

## 2021-01-01 RX ADMIN — CEFEPIME HYDROCHLORIDE 2000 MG: 2 INJECTION, SOLUTION INTRAVENOUS at 12:48

## 2021-01-01 RX ADMIN — METOPROLOL TARTRATE 25 MG: 25 TABLET, FILM COATED ORAL at 21:50

## 2021-01-01 RX ADMIN — DOCUSATE SODIUM AND SENNOSIDES 1 TABLET: 8.6; 5 TABLET ORAL at 20:24

## 2021-01-01 RX ADMIN — Medication 1 SPRAY: at 00:42

## 2021-01-01 RX ADMIN — METOPROLOL SUCCINATE 100 MG: 100 TABLET, EXTENDED RELEASE ORAL at 07:24

## 2021-01-01 RX ADMIN — METOROPROLOL TARTRATE 5 MG: 5 INJECTION, SOLUTION INTRAVENOUS at 14:16

## 2021-01-01 RX ADMIN — RIVAROXABAN 15 MG: 15 TABLET, FILM COATED ORAL at 08:15

## 2021-01-01 RX ADMIN — VANCOMYCIN HYDROCHLORIDE 2000 MG: 1 INJECTION, POWDER, LYOPHILIZED, FOR SOLUTION INTRAVENOUS at 00:45

## 2021-01-01 RX ADMIN — PERFLUTREN 0.8 ML/MIN: 6.52 INJECTION, SUSPENSION INTRAVENOUS at 16:00

## 2021-01-01 RX ADMIN — METOPROLOL TARTRATE 25 MG: 25 TABLET, FILM COATED ORAL at 20:12

## 2021-01-01 RX ADMIN — RIVAROXABAN 20 MG: 20 TABLET, FILM COATED ORAL at 08:28

## 2021-01-01 RX ADMIN — BUMETANIDE 1 MG: 1 TABLET ORAL at 10:12

## 2021-01-01 RX ADMIN — CEFAZOLIN SODIUM 2000 MG: 2 SOLUTION INTRAVENOUS at 15:28

## 2021-01-01 RX ADMIN — DOXYCYCLINE 100 MG: 100 INJECTION, POWDER, LYOPHILIZED, FOR SOLUTION INTRAVENOUS at 01:27

## 2021-01-01 RX ADMIN — DOCUSATE SODIUM AND SENNOSIDES 1 TABLET: 8.6; 5 TABLET ORAL at 21:26

## 2021-01-01 RX ADMIN — FENTANYL CITRATE 25 MCG: 0.05 INJECTION, SOLUTION INTRAMUSCULAR; INTRAVENOUS at 12:11

## 2021-01-01 RX ADMIN — CEFEPIME HYDROCHLORIDE 2000 MG: 2 INJECTION, SOLUTION INTRAVENOUS at 00:07

## 2021-01-01 RX ADMIN — ALBUMIN (HUMAN) 25 G: 12.5 INJECTION, SOLUTION INTRAVENOUS at 20:27

## 2021-01-01 RX ADMIN — CEFAZOLIN SODIUM 2000 MG: 2 SOLUTION INTRAVENOUS at 23:50

## 2021-01-01 RX ADMIN — HYDROMORPHONE HYDROCHLORIDE 0.5 MG: 1 INJECTION, SOLUTION INTRAMUSCULAR; INTRAVENOUS; SUBCUTANEOUS at 20:53

## 2021-01-01 RX ADMIN — ACETAMINOPHEN 650 MG: 325 TABLET ORAL at 22:51

## 2021-01-01 RX ADMIN — SODIUM CHLORIDE 500 ML: 0.9 INJECTION, SOLUTION INTRAVENOUS at 23:04

## 2021-01-01 RX ADMIN — BUMETANIDE 1 MG: 1 TABLET ORAL at 08:44

## 2021-01-01 RX ADMIN — MICONAZOLE NITRATE: 20 CREAM TOPICAL at 17:46

## 2021-01-01 RX ADMIN — DICLOFENAC SODIUM TOPICAL GEL, 1%, 2 G: 10 GEL TOPICAL at 11:41

## 2021-01-01 RX ADMIN — OXYCODONE HYDROCHLORIDE 5 MG: 5 TABLET ORAL at 20:24

## 2021-01-01 RX ADMIN — SODIUM CHLORIDE, SODIUM LACTATE, POTASSIUM CHLORIDE, AND CALCIUM CHLORIDE: .6; .31; .03; .02 INJECTION, SOLUTION INTRAVENOUS at 10:26

## 2021-01-01 RX ADMIN — RIVAROXABAN 20 MG: 20 TABLET, FILM COATED ORAL at 08:01

## 2021-01-01 RX ADMIN — VANCOMYCIN HYDROCHLORIDE 1500 MG: 5 INJECTION, POWDER, LYOPHILIZED, FOR SOLUTION INTRAVENOUS at 05:06

## 2021-01-01 RX ADMIN — ACETAMINOPHEN 650 MG: 325 TABLET ORAL at 02:43

## 2021-01-01 RX ADMIN — INSULIN LISPRO 2 UNITS: 100 INJECTION, SOLUTION INTRAVENOUS; SUBCUTANEOUS at 12:14

## 2021-01-01 RX ADMIN — PRAVASTATIN SODIUM 20 MG: 20 TABLET ORAL at 16:14

## 2021-01-01 RX ADMIN — METOPROLOL SUCCINATE 100 MG: 100 TABLET, EXTENDED RELEASE ORAL at 21:21

## 2021-01-01 RX ADMIN — METOPROLOL SUCCINATE 100 MG: 100 TABLET, EXTENDED RELEASE ORAL at 21:11

## 2021-01-01 RX ADMIN — MICONAZOLE NITRATE 1 APPLICATION: 20 CREAM TOPICAL at 20:06

## 2021-01-01 RX ADMIN — PRAVASTATIN SODIUM 20 MG: 20 TABLET ORAL at 16:13

## 2021-01-01 RX ADMIN — DOCUSATE SODIUM AND SENNOSIDES 1 TABLET: 8.6; 5 TABLET ORAL at 21:44

## 2021-01-01 RX ADMIN — METOPROLOL SUCCINATE 100 MG: 100 TABLET, EXTENDED RELEASE ORAL at 08:28

## 2021-01-01 RX ADMIN — INSULIN LISPRO 1 UNITS: 100 INJECTION, SOLUTION INTRAVENOUS; SUBCUTANEOUS at 22:33

## 2021-01-01 RX ADMIN — BUMETANIDE 2 MG: 1 TABLET ORAL at 13:02

## 2021-01-01 RX ADMIN — MICONAZOLE NITRATE 1 APPLICATION: 20 CREAM TOPICAL at 18:00

## 2021-01-01 RX ADMIN — RIVAROXABAN 15 MG: 15 TABLET, FILM COATED ORAL at 08:05

## 2021-01-01 RX ADMIN — METOPROLOL SUCCINATE 100 MG: 100 TABLET, EXTENDED RELEASE ORAL at 08:00

## 2021-01-01 RX ADMIN — PANTOPRAZOLE SODIUM 40 MG: 40 TABLET, DELAYED RELEASE ORAL at 05:02

## 2021-01-01 RX ADMIN — VANCOMYCIN HYDROCHLORIDE 1000 MG: 1 INJECTION, SOLUTION INTRAVENOUS at 00:23

## 2021-01-01 RX ADMIN — DOCUSATE SODIUM AND SENNOSIDES 1 TABLET: 8.6; 5 TABLET ORAL at 21:07

## 2021-01-01 RX ADMIN — METOPROLOL TARTRATE 25 MG: 25 TABLET, FILM COATED ORAL at 08:55

## 2021-01-01 RX ADMIN — CYCLOBENZAPRINE HYDROCHLORIDE 5 MG: 10 TABLET, FILM COATED ORAL at 11:40

## 2021-01-01 RX ADMIN — CEFAZOLIN SODIUM 2000 MG: 2 SOLUTION INTRAVENOUS at 14:01

## 2021-01-01 RX ADMIN — RIVAROXABAN 20 MG: 20 TABLET, FILM COATED ORAL at 13:02

## 2021-01-01 RX ADMIN — MIDAZOLAM HYDROCHLORIDE 1 MG: 1 INJECTION, SOLUTION INTRAMUSCULAR; INTRAVENOUS at 10:27

## 2021-01-01 RX ADMIN — PANTOPRAZOLE SODIUM 40 MG: 40 TABLET, DELAYED RELEASE ORAL at 05:41

## 2021-01-01 RX ADMIN — LOSARTAN POTASSIUM 100 MG: 50 TABLET, FILM COATED ORAL at 08:20

## 2021-01-01 RX ADMIN — CEFAZOLIN SODIUM 2000 MG: 2 SOLUTION INTRAVENOUS at 14:04

## 2021-01-01 RX ADMIN — POTASSIUM CHLORIDE 40 MEQ: 1500 TABLET, EXTENDED RELEASE ORAL at 08:50

## 2021-01-01 RX ADMIN — CEFAZOLIN SODIUM 2000 MG: 2 SOLUTION INTRAVENOUS at 16:56

## 2021-01-01 RX ADMIN — METOPROLOL SUCCINATE 100 MG: 100 TABLET, EXTENDED RELEASE ORAL at 08:20

## 2021-01-01 RX ADMIN — BISACODYL 5 MG: 5 TABLET, COATED ORAL at 18:26

## 2021-01-01 RX ADMIN — METOPROLOL TARTRATE 5 MG: 5 INJECTION INTRAVENOUS at 19:30

## 2021-01-01 RX ADMIN — METOPROLOL TARTRATE 50 MG: 50 TABLET, FILM COATED ORAL at 12:37

## 2021-01-01 RX ADMIN — HYDROMORPHONE HYDROCHLORIDE 0.5 MG: 1 INJECTION, SOLUTION INTRAMUSCULAR; INTRAVENOUS; SUBCUTANEOUS at 11:43

## 2021-01-01 RX ADMIN — RIVAROXABAN 15 MG: 15 TABLET, FILM COATED ORAL at 09:56

## 2021-01-01 RX ADMIN — METOPROLOL TARTRATE 5 MG: 5 INJECTION INTRAVENOUS at 00:02

## 2021-01-01 RX ADMIN — COLLAGENASE SANTYL: 250 OINTMENT TOPICAL at 09:57

## 2021-01-01 RX ADMIN — TRAMADOL HYDROCHLORIDE 50 MG: 50 TABLET, FILM COATED ORAL at 11:18

## 2021-01-01 RX ADMIN — LOSARTAN POTASSIUM 100 MG: 50 TABLET, FILM COATED ORAL at 08:18

## 2021-01-01 RX ADMIN — RIVAROXABAN 20 MG: 20 TABLET, FILM COATED ORAL at 06:32

## 2021-01-01 RX ADMIN — HYDROMORPHONE HYDROCHLORIDE 0.5 MG: 1 INJECTION, SOLUTION INTRAMUSCULAR; INTRAVENOUS; SUBCUTANEOUS at 12:19

## 2021-01-01 RX ADMIN — INSULIN LISPRO 2 UNITS: 100 INJECTION, SOLUTION INTRAVENOUS; SUBCUTANEOUS at 12:26

## 2021-01-01 RX ADMIN — ACETAMINOPHEN 650 MG: 325 TABLET ORAL at 11:38

## 2021-01-01 RX ADMIN — OXYCODONE HYDROCHLORIDE 5 MG: 5 TABLET ORAL at 16:13

## 2021-01-01 RX ADMIN — METOPROLOL SUCCINATE 100 MG: 100 TABLET, EXTENDED RELEASE ORAL at 08:24

## 2021-01-01 RX ADMIN — INSULIN LISPRO 1 UNITS: 100 INJECTION, SOLUTION INTRAVENOUS; SUBCUTANEOUS at 21:11

## 2021-01-01 RX ADMIN — MAGNESIUM SULFATE HEPTAHYDRATE 2 G: 40 INJECTION, SOLUTION INTRAVENOUS at 03:34

## 2021-01-01 RX ADMIN — CEFAZOLIN SODIUM 2000 MG: 2 SOLUTION INTRAVENOUS at 05:40

## 2021-01-01 RX ADMIN — ACETAMINOPHEN 650 MG: 325 TABLET ORAL at 20:10

## 2021-01-01 RX ADMIN — PROPOFOL 130 MG: 10 INJECTION, EMULSION INTRAVENOUS at 10:35

## 2021-01-01 RX ADMIN — SODIUM CHLORIDE 500 ML: 0.9 INJECTION, SOLUTION INTRAVENOUS at 14:32

## 2021-01-01 RX ADMIN — METOPROLOL SUCCINATE 100 MG: 100 TABLET, EXTENDED RELEASE ORAL at 20:53

## 2021-01-01 RX ADMIN — METOPROLOL TARTRATE 25 MG: 25 TABLET, FILM COATED ORAL at 20:24

## 2021-01-01 RX ADMIN — DOCUSATE SODIUM AND SENNOSIDES 1 TABLET: 8.6; 5 TABLET ORAL at 22:35

## 2021-01-01 RX ADMIN — MICONAZOLE NITRATE: 20 CREAM TOPICAL at 09:00

## 2021-01-01 RX ADMIN — PRAVASTATIN SODIUM 20 MG: 20 TABLET ORAL at 16:25

## 2021-01-01 RX ADMIN — POLYETHYLENE GLYCOL 3350 17 G: 17 POWDER, FOR SOLUTION ORAL at 18:26

## 2021-01-01 RX ADMIN — CEFAZOLIN SODIUM 2000 MG: 2 SOLUTION INTRAVENOUS at 08:01

## 2021-01-01 RX ADMIN — DOCUSATE SODIUM AND SENNOSIDES 1 TABLET: 8.6; 5 TABLET ORAL at 21:21

## 2021-01-01 RX ADMIN — CEFAZOLIN SODIUM 2000 MG: 2 SOLUTION INTRAVENOUS at 21:51

## 2021-01-01 RX ADMIN — PRAVASTATIN SODIUM 20 MG: 20 TABLET ORAL at 16:28

## 2021-01-01 RX ADMIN — CEFAZOLIN SODIUM 2000 MG: 2 SOLUTION INTRAVENOUS at 05:08

## 2021-01-01 RX ADMIN — PRAVASTATIN SODIUM 20 MG: 20 TABLET ORAL at 17:11

## 2021-01-01 RX ADMIN — CEFAZOLIN SODIUM 2000 MG: 2 SOLUTION INTRAVENOUS at 22:51

## 2021-01-01 RX ADMIN — METOPROLOL SUCCINATE 100 MG: 100 TABLET, EXTENDED RELEASE ORAL at 08:29

## 2021-01-01 RX ADMIN — MICONAZOLE NITRATE 1 APPLICATION: 20 CREAM TOPICAL at 08:06

## 2021-01-01 RX ADMIN — COLLAGENASE SANTYL 1 APPLICATION: 250 OINTMENT TOPICAL at 08:06

## 2021-01-01 RX ADMIN — CEFAZOLIN SODIUM 2000 MG: 2 SOLUTION INTRAVENOUS at 20:30

## 2021-01-01 RX ADMIN — FUROSEMIDE 40 MG: 10 INJECTION, SOLUTION INTRAMUSCULAR; INTRAVENOUS at 16:08

## 2021-03-26 ENCOUNTER — DOCTOR'S OFFICE (OUTPATIENT)
Dept: URBAN - NONMETROPOLITAN AREA CLINIC 1 | Facility: CLINIC | Age: 76
Setting detail: OPHTHALMOLOGY
End: 2021-03-26
Payer: COMMERCIAL

## 2021-03-26 DIAGNOSIS — H53.123: ICD-10-CM

## 2021-03-26 DIAGNOSIS — Z96.1: ICD-10-CM

## 2021-03-26 DIAGNOSIS — E11.9: ICD-10-CM

## 2021-03-26 DIAGNOSIS — H35.3131: ICD-10-CM

## 2021-03-26 DIAGNOSIS — H16.222: ICD-10-CM

## 2021-03-26 DIAGNOSIS — Z79.4: ICD-10-CM

## 2021-03-26 DIAGNOSIS — E11.3292: ICD-10-CM

## 2021-03-26 DIAGNOSIS — E11.3211: ICD-10-CM

## 2021-03-26 DIAGNOSIS — H16.221: ICD-10-CM

## 2021-03-26 PROCEDURE — 92134 CPTRZ OPH DX IMG PST SGM RTA: CPT | Performed by: OPHTHALMOLOGY

## 2021-03-26 PROCEDURE — 92014 COMPRE OPH EXAM EST PT 1/>: CPT | Performed by: OPHTHALMOLOGY

## 2021-03-26 ASSESSMENT — VISUAL ACUITY
OS_BCVA: 20/30
OD_BCVA: 20/25-1

## 2021-03-26 ASSESSMENT — SPHEQUIV_DERIVED
OD_SPHEQUIV: -0.125
OS_SPHEQUIV: -0.125

## 2021-03-26 ASSESSMENT — REFRACTION_AUTOREFRACTION
OD_CYLINDER: -0.25
OD_SPHERE: 0.00
OS_CYLINDER: -0.75
OD_AXIS: 80
OS_SPHERE: +0.25
OS_AXIS: 90

## 2021-03-26 ASSESSMENT — CONFRONTATIONAL VISUAL FIELD TEST (CVF)
OD_FINDINGS: FULL
OS_FINDINGS: FULL

## 2021-05-12 ENCOUNTER — RX ONLY (RX ONLY)
Age: 76
End: 2021-05-12

## 2021-05-12 ENCOUNTER — DOCTOR'S OFFICE (OUTPATIENT)
Dept: URBAN - NONMETROPOLITAN AREA CLINIC 1 | Facility: CLINIC | Age: 76
Setting detail: OPHTHALMOLOGY
End: 2021-05-12
Payer: COMMERCIAL

## 2021-05-12 DIAGNOSIS — H16.221: ICD-10-CM

## 2021-05-12 DIAGNOSIS — Z96.1: ICD-10-CM

## 2021-05-12 DIAGNOSIS — H43.392: ICD-10-CM

## 2021-05-12 DIAGNOSIS — H35.3131: ICD-10-CM

## 2021-05-12 DIAGNOSIS — H43.391: ICD-10-CM

## 2021-05-12 DIAGNOSIS — E11.3211: ICD-10-CM

## 2021-05-12 DIAGNOSIS — Z79.4: ICD-10-CM

## 2021-05-12 DIAGNOSIS — E11.9: ICD-10-CM

## 2021-05-12 DIAGNOSIS — H53.123: ICD-10-CM

## 2021-05-12 DIAGNOSIS — E11.3292: ICD-10-CM

## 2021-05-12 DIAGNOSIS — H16.222: ICD-10-CM

## 2021-05-12 PROCEDURE — 92134 CPTRZ OPH DX IMG PST SGM RTA: CPT | Performed by: OPHTHALMOLOGY

## 2021-05-12 PROCEDURE — 92014 COMPRE OPH EXAM EST PT 1/>: CPT | Performed by: OPHTHALMOLOGY

## 2021-05-12 ASSESSMENT — REFRACTION_AUTOREFRACTION
OS_AXIS: 90
OS_CYLINDER: -0.75
OS_SPHERE: +0.25
OD_AXIS: 80
OD_SPHERE: 0.00
OD_CYLINDER: -0.25

## 2021-05-12 ASSESSMENT — SPHEQUIV_DERIVED
OD_SPHEQUIV: -0.125
OS_SPHEQUIV: -0.125

## 2021-05-12 ASSESSMENT — VISUAL ACUITY
OD_BCVA: 20/25-1
OS_BCVA: 20/30-1

## 2021-05-12 ASSESSMENT — CONFRONTATIONAL VISUAL FIELD TEST (CVF)
OD_FINDINGS: FULL
OS_FINDINGS: FULL

## 2021-05-13 NOTE — TELEPHONE ENCOUNTER
Patient reporting new fever and increased SOB from baseline  Patient reports chills and a runny nose but states she also has seasonal allergies  Advised patient to go to ED for evaluation of the worsening SOB with her history of pulmonary HTN  Patient verbalized understanding

## 2021-05-13 NOTE — TELEPHONE ENCOUNTER
Reason for Disposition   MODERATE difficulty breathing (e g , speaks in phrases, SOB even at rest, pulse 100-120)    Answer Assessment - Initial Assessment Questions  1  Were you within 6 feet or less, for up to 15 minutes or more with a person that has a confirmed COVID-19 test?   Denies     2  What was the date of your exposure? Denies     3  Are you experiencing any symptoms attributed to the virus?  (Assess for SOB, cough, fever, difficulty breathing)   Fever 101, chills, runny nose but reports seasonal allergies, no appetite, SOB worse then usual       4  HIGH RISK: Do you have any history heart or lung conditions, weakened immune system, diabetes, Asthma, CHF, HIV, COPD, Chemo, renal failure, sickle cell, etc?   Pulmonary HTN (SOB all the time at baseline)    Protocols used: CORONAVIRUS (COVID-19) DIAGNOSED OR SUSPECTED-ADULT-

## 2021-05-13 NOTE — TELEPHONE ENCOUNTER
Regarding: COVID test/ Symptomatic   ----- Message from Cesia Williamson sent at 5/12/2021  9:41 PM EDT -----  Pt's brother called in behalf of patient, " My sister has a spiked temperautre of 101  She does not have any other symptoms that she is aware of  I would like to know if she can get tested, and where can she get tested " Pt's brother has mentioned that she received her second vaccine

## 2021-05-16 PROBLEM — I50.22 CHRONIC SYSTOLIC HEART FAILURE (HCC): Status: ACTIVE | Noted: 2021-01-01

## 2021-05-16 PROBLEM — R93.0 ABNORMAL CT OF THE HEAD: Status: ACTIVE | Noted: 2021-01-01

## 2021-05-16 PROBLEM — E11.9 TYPE 2 DIABETES MELLITUS, WITHOUT LONG-TERM CURRENT USE OF INSULIN (HCC): Status: ACTIVE | Noted: 2021-01-01

## 2021-05-16 PROBLEM — E87.5 HYPERKALEMIA: Status: ACTIVE | Noted: 2021-01-01

## 2021-05-16 PROBLEM — R79.1 ELEVATED INR: Status: ACTIVE | Noted: 2021-01-01

## 2021-05-16 PROBLEM — R65.20 SEVERE SEPSIS (HCC): Status: ACTIVE | Noted: 2021-01-01

## 2021-05-16 PROBLEM — J96.01 ACUTE RESPIRATORY FAILURE WITH HYPOXIA (HCC): Status: ACTIVE | Noted: 2021-01-01

## 2021-05-16 PROBLEM — E04.1 THYROID NODULE GREATER THAN OR EQUAL TO 1.5 CM IN DIAMETER INCIDENTALLY NOTED ON IMAGING STUDY: Status: ACTIVE | Noted: 2021-01-01

## 2021-05-16 PROBLEM — E87.2 METABOLIC ACIDOSIS: Status: ACTIVE | Noted: 2021-01-01

## 2021-05-16 PROBLEM — N17.9 AKI (ACUTE KIDNEY INJURY) (HCC): Status: ACTIVE | Noted: 2021-01-01

## 2021-05-16 PROBLEM — S81.809A WOUND OF LOWER EXTREMITY: Status: ACTIVE | Noted: 2021-01-01

## 2021-05-16 PROBLEM — A41.9 SEVERE SEPSIS (HCC): Status: ACTIVE | Noted: 2021-01-01

## 2021-05-16 PROBLEM — E66.01 CLASS 3 SEVERE OBESITY WITH SERIOUS COMORBIDITY AND BODY MASS INDEX (BMI) OF 60.0 TO 69.9 IN ADULT (HCC): Status: ACTIVE | Noted: 2021-01-01

## 2021-05-16 PROBLEM — I10 HYPERTENSION: Status: ACTIVE | Noted: 2021-01-01

## 2021-05-16 PROBLEM — I48.91 ATRIAL FIBRILLATION WITH RVR (HCC): Status: ACTIVE | Noted: 2021-01-01

## 2021-05-16 PROBLEM — E87.1 HYPONATREMIA: Status: ACTIVE | Noted: 2021-01-01

## 2021-05-16 NOTE — ASSESSMENT & PLAN NOTE
· Longstanding history of Afib, maintained on Toprol  mg daily and Xarelto  · Hold home Toprol with current soft BP  · Hold home Xarelto due to supra therapeutic INR

## 2021-05-16 NOTE — ASSESSMENT & PLAN NOTE
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

## 2021-05-16 NOTE — ASSESSMENT & PLAN NOTE
Wt Readings from Last 3 Encounters:   05/16/21 (!) 157 kg (347 lb 3 6 oz)   09/05/20 (!) 155 kg (341 lb)         · Last ECHO 8/2020:  EF 50-55%, RV moderately reduced systolic function, PA pressure 60 mmHg, b/l atrial enlargement, mild MR and TR  · On admission BNP 6,213  · Despite lower extremity edema and elevated BNP pt appears vascularly dry and not in acute CHF exacerbation  · Caution with IVF resuscitation given tenuous cardiac status  · Hold home Toprol and Bumex

## 2021-05-16 NOTE — ASSESSMENT & PLAN NOTE
Lab Results   Component Value Date    HGBA1C 6 3 (H) 03/03/2021       No results for input(s): POCGLU in the last 72 hours      Blood Sugar Average: Last 72 hrs:  · Hold home metformin  · Start SSI and accucheck Q6

## 2021-05-16 NOTE — ASSESSMENT & PLAN NOTE
· POA a/e/b - reported fevers prior to admission, tachycardia, tachypnea, leukocytosis with bandemia, elevated lactate, Cr, and T Bili  · Likely source lower extremity cellulitis  · In the ED received initial 500 cc IVF bolus followed by additional 1L IVF, given Vanco and ordered Clindamycin but was not infused  · BC - pending  · UA - not indicative of UTI  · PCT - pending  · CT right LE - pending  · CXR - no infiltrate or concern for pna  · COVID - negative  · Broaden ABX - Cefepime and Vanco  · Lactate trend 2 8 > 3 2 - cont to trend and treat with additional IVF as needed

## 2021-05-16 NOTE — ASSESSMENT & PLAN NOTE
· Multifactorial - baseline pulm HTN with RV dysfunction, ARIAN on Cpap, Obesity Hypoventilation Syndrome  · In the ED SpO2 90% on room air with increased WOB  · Placed on 6L NC with improvement in SpO2 however no improvement in WOB  · Currently on Bipap 12/6 35%  · Wean off Bipap as tolerated  · Caution with IVF resuscitation in setting of CHF

## 2021-05-16 NOTE — ASSESSMENT & PLAN NOTE
· Secondary to severe sepsis and dehydration in setting of metformin use  · Compensated pH on VBG  · Hold metformin  · Cont IVF resuscitation as needed  · Repeat BMP

## 2021-05-16 NOTE — INCIDENTAL FINDINGS
The following findings require follow up:  Radiographic finding   Finding: Incidental 2 9 cm right thyroid nodule    Follow up required: nonemergent thyroid ultrasound is recommended     Follow up should be done within - non emergent study  Please notify the following clinician to assist with the follow up:   Dr Kwame Yadav (PCP)

## 2021-05-16 NOTE — ASSESSMENT & PLAN NOTE
· Chronic with concern for acute cellulitis, possible abscess or hematoma  · See media for imaging  · CT Right leg - pending  · See ABX under sepsis  · Wound care consult  · Pain - fentanyl prn

## 2021-05-16 NOTE — H&P
[address] Shaylee Jorgensen  H&P- Grey Music 1945, 70 y o  female MRN: [id number]  Unit/Bed#: ED 10 Encounter: [id number]  Primary Care Provider: Mana Hernandez MD   Date and time admitted to hospital: 5/16/2021  1:33 PM    * Severe sepsis ([facility] )  Assessment & Plan  · POA a/e/b - reported fevers prior to admission, tachycardia, tachypnea, leukocytosis with bandemia, elevated lactate, Cr, and T Bili  · Likely source lower extremity cellulitis  · In the ED received initial 500 cc IVF bolus followed by additional 1L IVF, given Vanco and ordered Clindamycin but was not infused  · BC - pending  · UA - not indicative of UTI  · PCT - pending  · CT right LE - pending  · CXR - no infiltrate or concern for pna  · COVID - negative  · Broaden ABX - Cefepime and Vanco  · Lactate trend 2 8 > 3 2 - cont to trend and treat with additional IVF as needed    Metabolic acidosis  Assessment & Plan  · Secondary to severe sepsis and dehydration in setting of metformin use  · Compensated pH on VBG  · Hold metformin  · Cont IVF resuscitation as needed  · Repeat BMP    Atrial fibrillation with RVR (HCC)  Assessment & Plan  · Longstanding history of Afib, maintained on Toprol  mg daily and Xarelto  · Hold home Toprol with current soft BP  · Hold home Xarelto due to supra therapeutic INR    Chronic systolic heart failure ([facility] )  Assessment & Plan  Wt Readings from Last 3 Encounters:   05/16/21 (!) 157 kg (347 lb 3 6 oz)   09/05/20 (!) 155 kg (341 lb)         · Last ECHO 8/2020:  EF 50-55%, RV moderately reduced systolic function, PA pressure 60 mmHg, b/l atrial enlargement, mild MR and TR  · On admission BNP 6,213  · Despite lower extremity edema and elevated BNP pt appears vascularly dry and not in acute CHF exacerbation  · Caution with IVF resuscitation given tenuous cardiac status  · Hold home Toprol and Bumex    Hyponatremia  Assessment & Plan  · Likely secondary to dehydration   · Na 129 on admission  · Cont IVF resuscitation  · Trend BMP    Wound of lower extremity  Assessment & Plan  · Chronic with concern for acute cellulitis, possible abscess or hematoma  · See media for imaging  · CT Right leg - pending  · See ABX under sepsis  · Wound care consult  · Pain - fentanyl prn    Abnormal CT of the head  Assessment & Plan  · CT head - There is a 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   · No indications for changes in treatment at this time  · Outpatient follow up    Type 2 diabetes mellitus, without long-term current use of insulin (HCC)  Assessment & Plan  Lab Results   Component Value Date    HGBA1C 6 3 (H) 03/03/2021       No results for input(s): POCGLU in the last 72 hours      Blood Sugar Average: Last 72 hrs:  · Hold home metformin  · Start SSI and accucheck Q6    Acute respiratory failure with hypoxia ([facility] )  Assessment & Plan  · Multifactorial - baseline pulm HTN with RV dysfunction, ARIAN on Cpap, Obesity Hypoventilation Syndrome  · In the ED SpO2 90% on room air with increased WOB  · Placed on 6L NC with improvement in SpO2 however no improvement in WOB  · Currently on Bipap 12/6 35%  · Wean off Bipap as tolerated  · Caution with IVF resuscitation in setting of CHF    Elevated INR  Assessment & Plan  · Elevated likely due to AYDEE  · INR 5 53 on admission  · Hold home Xarelto    AYDEE (acute kidney injury) ([facility] )  Assessment & Plan  · Secondary to severe sepsis and dehydration in setting of home losartan and Bumex use  · Hold home losartan and Bumex  · Cont IVF resuscitation  · Maintain ward, strict I/O  · Avoid nephrotoxins  · Trend BMP    -------------------------------------------------------------------------------------------------------------  Chief Complaint: generalized weakness    History of Present Illness   HX and PE limited by: respiratory distress  Everton Delong is a 70 y o  female with PMH systolic left and right sided heart failure, severe pulm HTN, CAD, Afib on Xarelto, DM2, ARIAN on CPAP who presents from home after family noted progressive generalized weakness, SOB, and fever 3 days ago  In the ED pt was noted to be septic and in Afib RVR, was treated with IV ABX and fluids followed by Lasix and then additional IVF  Was also given Lopressor for A fib RVR without improvement  Several imaging studies were done due to possible fall at home, all which were negative for acute injury  Upon exam pt reports to have severe pain in her right knee, which has an open wound that is hot, firm, and tender to touch  She was also noted to have visible increase in WOB  Pt was placed on Bipap and CT of the right leg was ordered  History obtained from chart review and the patient   -------------------------------------------------------------------------------------------------------------  Dispo: Admit to Stepdown Level 1    Code Status: No Order  --------------------------------------------------------------------------------------------------------------  Review of Systems    Review of systems was unable to be performed secondary to respiratory distress    Physical Exam  Constitutional:       General: She is awake  She is in acute distress  Appearance: She is morbidly obese  She is ill-appearing  Interventions: Nasal cannula in place  HENT:      Mouth/Throat:      Mouth: Mucous membranes are dry  Eyes:      Pupils: Pupils are equal, round, and reactive to light  Cardiovascular:      Rate and Rhythm: Tachycardia present  Rhythm irregular  Pulses: Decreased pulses  Heart sounds: Heart sounds are distant  No murmur  No friction rub  No gallop  Pulmonary:      Effort: Tachypnea present  Breath sounds: Decreased breath sounds and wheezing present  No rhonchi or rales  Abdominal:      General: Bowel sounds are normal  There is no distension  Palpations: Abdomen is soft  Tenderness: There is no abdominal tenderness     Musculoskeletal:      Right lower leg: 3+ Edema present  Left lower leg: 3+ Edema present  Skin:     General: Skin is warm and dry  Capillary Refill: Capillary refill takes 2 to 3 seconds  Comments: Right inner thigh with open wound, beefy red wound bed with ecchymosis surrounding, tender, firm, and hot to touch  Open wound right shin, pink wound bed with minimal sloughing  Other wounds - see media   Neurological:      General: No focal deficit present  Mental Status: She is alert and oriented to person, place, and time  Psychiatric:         Behavior: Behavior is cooperative        --------------------------------------------------------------------------------------------------------------  Vitals:   Vitals:    05/16/21 1515 05/16/21 1600 05/16/21 1630 05/16/21 1731   BP: 92/56 (!) 143/109 98/70 98/70   BP Location:       Pulse: (!) 150  (!) 150 (!) 150   Resp:    (!) 34   Temp:       TempSrc:       SpO2: 92%  97% 99%   Weight:       Height:         Temp  Min: 99 2 °F (37 3 °C)  Max: 99 2 °F (37 3 °C)  IBW (Ideal Body Weight): 50 1 kg  Height: 5' 2" (157 5 cm)  Body mass index is 63 51 kg/m²      Laboratory and Diagnostics:  Results from last 7 days   Lab Units 05/16/21  1407   WBC Thousand/uL 26 68*   HEMOGLOBIN g/dL 11 9   HEMATOCRIT % 37 8   PLATELETS Thousands/uL 209   BANDS PCT % 15*   MONO PCT % 3*     Results from last 7 days   Lab Units 05/16/21  1407   SODIUM mmol/L 129*   POTASSIUM mmol/L 5 1   CHLORIDE mmol/L 96*   CO2 mmol/L 19*   ANION GAP mmol/L 14*   BUN mg/dL 64*   CREATININE mg/dL 2 40*   CALCIUM mg/dL 9 2   GLUCOSE RANDOM mg/dL 96   ALT U/L 22   AST U/L 31   ALK PHOS U/L 124*   ALBUMIN g/dL 2 1*   TOTAL BILIRUBIN mg/dL 2 72*     Results from last 7 days   Lab Units 05/16/21  1407   MAGNESIUM mg/dL 2 6      Results from last 7 days   Lab Units 05/16/21  1407   INR  5 53*      Results from last 7 days   Lab Units 05/16/21  1415   TROPONIN I ng/mL 0 04     Results from last 7 days   Lab Units 05/16/21  1645 05/16/21  1407 LACTIC ACID mmol/L 3 2* 2 9*     ABG:    VBG:  Results from last 7 days   Lab Units 05/16/21  1645   PH FABIOLA  7 398   PCO2 FABIOLA mm Hg 27 9*   PO2 FABIOLA mm Hg 48 8*   HCO3 FABIOLA mmol/L 16 8*   BASE EXC FABIOLA mmol/L -6 6           Micro:        EKG: A fib RVR 's  Imaging: I have personally reviewed pertinent reports  and I have personally reviewed pertinent films in PACS      Historical Information   Past Medical History:   Diagnosis Date    Coronary artery disease     Diabetes mellitus ([address] )     Hypertension     Lymphedema     Sleep apnea      Past Surgical History:   Procedure Laterality Date    CHOLECYSTECTOMY      TONSILLECTOMY       Social History   Social History     Substance and Sexual Activity   Alcohol Use Not Currently     Social History     Substance and Sexual Activity   Drug Use Not Currently     Social History     Tobacco Use   Smoking Status Never Smoker   Smokeless Tobacco Never Used     Exercise History: unable  Family History:   History reviewed  No pertinent family history  I have reviewed this patient's family history and commented on sigificant items within the HPI      Medications:  Current Facility-Administered Medications   Medication Dose Route Frequency    clindamycin (CLEOCIN) IVPB (premix in dextrose) 600 mg 50 mL  600 mg Intravenous Once    sodium chloride (PF) 0 9 % injection 3 mL  3 mL Intravenous Q1H PRN    sodium chloride 0 9 % bolus 1,000 mL  1,000 mL Intravenous Once     Home medications:  Prior to Admission Medications   Prescriptions Last Dose Informant Patient Reported? Taking?    Xarelto 20 MG tablet   Yes Yes   bumetanide (Bumex) 2 mg tablet   Yes Yes   Sig: Take by mouth   losartan (COZAAR) 100 MG tablet   Yes Yes   metFORMIN (GLUCOPHAGE) 500 mg tablet   Yes Yes   metoprolol succinate (TOPROL-XL) 100 mg 24 hr tablet   Yes Yes   Sig: Take 100 mg by mouth daily   potassium chloride (Klor-Con M20) 20 mEq tablet   Yes Yes   Sig: Take by mouth   silver sulfadiazine (SILVADENE,SSD) 1 % cream   Yes Yes   Sig: Apply daily   simvastatin (ZOCOR) 10 mg tablet   Yes Yes   traMADol (ULTRAM) 50 mg tablet   Yes Yes   Si TAB BY MOUTH DAILY AS NEEDED FOR PAIN      Facility-Administered Medications: None     Allergies: Allergies   Allergen Reactions    Iodinated Diagnostic Agents     Metronidazole      Other reaction(s): Unknown Reaction  seizures  Seizures      Other Hives     IV Dye, laryngeal edema    Diltiazem Rash    Penicillins Rash       ------------------------------------------------------------------------------------------------------------  Advance Directive and Living Will:      Power of :    POLST:    ------------------------------------------------------------------------------------------------------------  Anticipated Length of Stay is > 2 midnights    Care Time Delivered:   Upon my evaluation, this patient had a high probability of imminent or life-threatening deterioration due to severe sepsis, AYDEE, a fib RVR, acute hypoxic resp failure, which required my direct attention, intervention, and personal management  I have personally provided 65 minutes (15:50 to 18:05) of critical care time, exclusive of procedures, teaching, family meetings, and any prior time recorded by providers other than myself  MELBA Berger        Portions of the record may have been created with voice recognition software  Occasional wrong word or "sound a like" substitutions may have occurred due to the inherent limitations of voice recognition software    Read the chart carefully and recognize, using context, where substitutions have occurred

## 2021-05-16 NOTE — Clinical Note
accompanied Jose Sykes to the emergency department on 5/16/2021  Return date if applicable: 15/63/7636    Patient will need to self quarantine as she has been exposed to a COVID positive individual   Recommend getting COVID test within 3-5 days  If you have any questions or concerns, please don't hesitate to call        Joan Larkin MD

## 2021-05-16 NOTE — ASSESSMENT & PLAN NOTE
· Secondary to severe sepsis and dehydration in setting of home losartan and Bumex use  · Hold home losartan and Bumex  · Cont IVF resuscitation  · Maintain ward, strict I/O  · Avoid nephrotoxins  · Trend BMP

## 2021-05-16 NOTE — ED PROVIDER NOTES
History  Chief Complaint   Patient presents with   Randa Clunes Fall     pt arrives from home after a slip out of chair  pt is unable to care for herself at home  pt has open wounds to right lower leg with bruising  pt denies hitting head, denies loc  81-year-old female with a past medical history of atrial fibrillation, pulmonary hypertension, coronary artery disease, status post stents, diabetes, eczema, hyperlipidemia, hypertension, osteoarthritis, obstructive sleep apnea on CPAP, kidney stones, status post laparoscopic cholecystectomy presents with worsening shortness of breath, right leg pain and fall at home  Patient arrives via EMS who states that patient lives with brother and that patient was getting out of bed, using her walker when she slipped out of the bed onto her buttocks on the bedroom floor  No head strike  Patient is on Xarelto  No prodromal symptoms prior to fall and no loss consciousness  Brother was unable to get her back into bed so he called 911  EMS states that patient is complaining of right leg pain and that she had chronic wounds which she has had for over a year and has a home health aide who provides wound care  Patient was also complaining of shortness of breath and generalized weakness  Patient states she has been unable to stand because of extreme fatigue  She uses CPAP at night but otherwise is not on home oxygen during the day  Patient was found in bed incontinent of urine and appeared that she had been there for several days  EMS also stated that brother insinuated that he was unable to take care of patient anymore and needed her to be placed in a nursing home  Patient is denying headache, chest pain or shortness of breath, back pain, abdominal pain, focal motor sensory deficits    Review of medical chart shows no recent hospitalizations, however, patient was seen at local emergency department on May 11, 2021 after receiving a V/Q scan that was intermediate concerning for pulmonary embolism  He completed a CTA which was negative for pulmonary embolism  Patient sees pulmonologist at [facility], Dr Abi Lee and Cardiology, Dr Tameka Puckett  Patient has also been treated by Surgeon, Dr Ramón Palacios at [facility] for her right leg ulcers  Dr Megan Metz wore PPE during clinical evaluation due to COVID-19 pandemic including bonnet, eye goggles, face mask and gloves          History provided by:  Patient, medical records, relative and EMS personnel   used: No    Fatigue  Severity:  Severe  Onset quality:  Gradual  Duration:  4 days  Timing:  Constant  Progression:  Worsening  Chronicity:  Recurrent  Context: dehydration and recent infection    Context: not alcohol use, not allergies, not change in medication, not decreased sleep, not drug use, not increased activity, not pinched nerve, not stress and not urinary tract infection    Relieved by:  Nothing  Worsened by:  Nothing  Ineffective treatments:  None tried  Associated symptoms: arthralgias, difficulty walking, falls and shortness of breath    Associated symptoms: no abdominal pain, no anorexia, no aphasia, no ataxia, no chest pain, no cough, no diarrhea, no dizziness, no drooling, no dysphagia, no dysuria, no numbness in extremities, no fever, no foul-smelling urine, no frequency, no headaches, no hematochezia, no lethargy, no loss of consciousness, no melena, no myalgias, no nausea, no near-syncope, no seizures, no sensory-motor deficit, no stroke symptoms, no syncope, no urgency, no vision change and no vomiting    Shortness of breath:     Severity:  Moderate    Onset quality:  Unable to specify    Timing:  Unable to specify    Progression:  Unable to specify  Risk factors: congestive heart failure, coronary artery disease and heart disease    Risk factors: no anemia, no diabetes, no family hx of stroke, no neurologic disease, no new medications and no recent stressors    Fall  Mechanism of injury: fall    Injury location:  Leg  Leg injury location:  R leg  Incident location:  Home  Arrived directly from scene: no    Fall:     Fall occurred:  Standing    Height of fall:  GLF    Impact surface:  [employer] of impact:  Unable to specify    Entrapped after fall: no    Suspicion of alcohol use: no    Suspicion of drug use: no    Tetanus status:  Unknown  Prior to arrival data:     Bystander interventions:  None    Patient ambulatory at scene: no      Blood loss:  None    Responsiveness at scene:  Alert    Orientation at scene:  Person, place and situation    Loss of consciousness: no      Amnesic to event: no      Airway interventions:  None    Breathing interventions:  None    IV access status:  None    IO access:  None    Fluids administered:  None    Cardiac interventions:  None    Medications administered:  None    Airway condition since incident:  Stable    Breathing condition since incident:  Stable    Circulation condition since incident:  Stable    Mental status condition since incident:  Stable    Disability condition since incident:  Stable  Associated symptoms: no abdominal pain, no back pain, no blindness, no chest pain, no difficulty breathing, no headaches, no hearing loss, no loss of consciousness, no nausea, no neck pain, no seizures and no vomiting        Prior to Admission Medications   Prescriptions Last Dose Informant Patient Reported? Taking?    Xarelto 20 MG tablet   Yes Yes   bumetanide (Bumex) 2 mg tablet   Yes Yes   Sig: Take by mouth   losartan (COZAAR) 100 MG tablet   Yes Yes   metFORMIN (GLUCOPHAGE) 500 mg tablet   Yes Yes   metoprolol succinate (TOPROL-XL) 100 mg 24 hr tablet   Yes Yes   Sig: Take 100 mg by mouth daily   potassium chloride (Klor-Con M20) 20 mEq tablet   Yes Yes   Sig: Take by mouth   silver sulfadiazine (SILVADENE,SSD) 1 % cream   Yes Yes   Sig: Apply daily   simvastatin (ZOCOR) 10 mg tablet   Yes Yes   traMADol (ULTRAM) 50 mg tablet   Yes Yes   Si TAB BY MOUTH DAILY AS NEEDED FOR PAIN      Facility-Administered Medications: None       Past Medical History:   Diagnosis Date    Coronary artery disease     Diabetes mellitus ([facility] [address] )     Hypertension     Lymphedema     Sleep apnea        Past Surgical History:   Procedure Laterality Date    CHOLECYSTECTOMY      TONSILLECTOMY         History reviewed  No pertinent family history  I have reviewed and agree with the history as documented  E-Cigarette/Vaping    E-Cigarette Use Never User      E-Cigarette/Vaping Substances     Social History     Tobacco Use    Smoking status: Never Smoker    Smokeless tobacco: Never Used   Substance Use Topics    Alcohol use: Not Currently    Drug use: Not Currently       Review of Systems   Constitutional: Positive for fatigue  Negative for chills, diaphoresis and fever  HENT: Negative for congestion, drooling, facial swelling, hearing loss, nosebleeds, sneezing, trouble swallowing and voice change  Eyes: Negative for blindness, photophobia, pain and visual disturbance  Respiratory: Positive for shortness of breath  Negative for cough, chest tightness and wheezing  Cardiovascular: Negative for chest pain, palpitations, leg swelling, syncope and near-syncope  Gastrointestinal: Negative for abdominal pain, anorexia, constipation, diarrhea, dysphagia, hematochezia, melena, nausea and vomiting  Genitourinary: Negative for decreased urine volume, difficulty urinating, dysuria, flank pain, frequency, hematuria, pelvic pain, urgency, vaginal bleeding and vaginal discharge  Musculoskeletal: Positive for arthralgias, falls and gait problem  Negative for back pain, joint swelling, myalgias, neck pain and neck stiffness  Skin: Negative for color change, pallor, rash and wound  Allergic/Immunologic: Negative for immunocompromised state  Neurological: Positive for weakness (Generalized)   Negative for dizziness, tremors, seizures, loss of consciousness, syncope, facial asymmetry, speech difficulty, light-headedness, numbness and headaches  Hematological: Negative for adenopathy  Psychiatric/Behavioral: Negative for agitation, confusion, hallucinations and suicidal ideas  The patient is not nervous/anxious  Physical Exam  Physical Exam  Vitals signs and nursing note reviewed  Exam conducted with a chaperone present  Constitutional:       General: She is not in acute distress  Appearance: Normal appearance  She is well-developed  She is obese  She is ill-appearing  She is not toxic-appearing or diaphoretic  Comments: Patient is ill-appearing and tachypneic with mild respiratory distress, placed on 2 L nasal cannula   HENT:      Head: Normocephalic and atraumatic  No raccoon eyes, King's sign, abrasion, contusion, masses, right periorbital erythema, left periorbital erythema or laceration  Hair is normal       Jaw: There is normal jaw occlusion  Right Ear: Hearing, tympanic membrane, ear canal and external ear normal  There is no impacted cerumen  No mastoid tenderness  No hemotympanum  Tympanic membrane is not injected, scarred, perforated, erythematous, retracted or bulging  Tympanic membrane has normal mobility  Left Ear: Hearing, tympanic membrane, ear canal and external ear normal  There is no impacted cerumen  No mastoid tenderness  No hemotympanum  Tympanic membrane is not injected, scarred, perforated, erythematous, retracted or bulging  Tympanic membrane has normal mobility  Nose: Nose normal       Right Nostril: No epistaxis  Left Nostril: No epistaxis  Right Sinus: No maxillary sinus tenderness or frontal sinus tenderness  Left Sinus: No maxillary sinus tenderness or frontal sinus tenderness  Mouth/Throat:      Lips: Pink  No lesions  Mouth: Mucous membranes are dry  No lacerations or angioedema  Tongue: No lesions  Tongue does not deviate from midline  Palate: No mass and lesions        Pharynx: Oropharynx is clear  Uvula midline  No pharyngeal swelling, oropharyngeal exudate, posterior oropharyngeal erythema or uvula swelling  Tonsils: No tonsillar exudate or tonsillar abscesses  Eyes:      General: Lids are normal  Vision grossly intact  Gaze aligned appropriately  No visual field deficit or scleral icterus  Right eye: No discharge  Left eye: No discharge  Extraocular Movements: Extraocular movements intact  Right eye: No nystagmus  Left eye: No nystagmus  Conjunctiva/sclera: Conjunctivae normal       Right eye: Right conjunctiva is not injected  Left eye: Left conjunctiva is not injected  Pupils: Pupils are equal, round, and reactive to light  Neck:      Musculoskeletal: Full passive range of motion without pain, normal range of motion and neck supple  No neck rigidity, spinous process tenderness or muscular tenderness  Thyroid: No thyroid mass or thyromegaly  Trachea: Trachea and phonation normal    Cardiovascular:      Rate and Rhythm: Tachycardia present  Rhythm irregular  Pulses: Normal pulses  Radial pulses are 2+ on the right side and 2+ on the left side  Dorsalis pedis pulses are 2+ on the right side and 2+ on the left side  Heart sounds: S1 normal and S2 normal    Pulmonary:      Effort: Pulmonary effort is normal  Tachypnea present  No accessory muscle usage, respiratory distress or retractions  Breath sounds: Normal air entry  No stridor or decreased air movement  Examination of the right-upper field reveals decreased breath sounds  Examination of the left-upper field reveals decreased breath sounds  Examination of the right-middle field reveals decreased breath sounds and rhonchi  Examination of the left-middle field reveals decreased breath sounds and rhonchi  Examination of the right-lower field reveals decreased breath sounds  Examination of the left-lower field reveals decreased breath sounds   Decreased breath sounds and rhonchi present  No wheezing or rales  Comments: Decreased breath sounds bilaterally due to poor inspiratory effort  Chest:      Chest wall: No tenderness  Abdominal:      General: Abdomen is flat  Bowel sounds are normal  There is no distension  Palpations: Abdomen is soft  Abdomen is not rigid  There is no mass  Tenderness: There is no abdominal tenderness  There is no right CVA tenderness, left CVA tenderness, guarding or rebound  Negative signs include Handy's sign and McBurney's sign  Hernia: No hernia is present  Musculoskeletal: Normal range of motion  General: No swelling or signs of injury  Right shoulder: She exhibits normal range of motion, no tenderness, no bony tenderness, no swelling, no effusion, no crepitus, no deformity, no laceration, no pain, no spasm, normal pulse and normal strength  Right upper leg: She exhibits tenderness and swelling  She exhibits no bony tenderness, no edema, no deformity and no laceration  Right lower leg: She exhibits tenderness  She exhibits no bony tenderness, no swelling, no deformity and no laceration  Edema present  Left lower leg: Edema present  Comments: Plus two pitting edema bilaterally; right lower extremity ulceration to medial thigh and to anterior lower extremity and ankle; skin avulsions to right lower leg with tenderness; full range of motion of right lower extremity with +2 dorsalis pedis pulse and good cap refill less than 2 seconds; motor and sensation intact; erythema and induration of right calf; open wounds to right thigh are oozing   Lymphadenopathy:      Cervical: No cervical adenopathy  Skin:     General: Skin is warm and dry  Capillary Refill: Capillary refill takes less than 2 seconds  Coloration: Skin is not ashen, cyanotic, jaundiced, mottled, pale or sallow  Findings: Erythema and lesion present   No abrasion, abscess, acne, bruising, burn, ecchymosis, signs of injury, laceration, petechiae, rash or wound  Rash is not macular or papular  Neurological:      General: No focal deficit present  Mental Status: She is alert and oriented to person, place, and time  Mental status is at baseline  She is not disoriented  GCS: GCS eye subscore is 4  GCS verbal subscore is 5  GCS motor subscore is 6  Cranial Nerves: Cranial nerves are intact  No cranial nerve deficit, dysarthria or facial asymmetry  Sensory: Sensation is intact  No sensory deficit  Motor: Motor function is intact  No weakness, tremor, atrophy, abnormal muscle tone or seizure activity  Coordination: Coordination is intact  Coordination normal       Gait: Gait is intact  Gait normal       Comments: Patient is AAOx4, GCS 15; speaking clearly and appropriately; motor and sensation intact; visual fields intact; cranial nerves II-XII grossly intact; no facial droop, slurred speech or arm drift   Psychiatric:         Attention and Perception: Attention and perception normal  She is attentive  Mood and Affect: Mood and affect normal          Speech: Speech normal          Behavior: Behavior normal  Behavior is cooperative  Thought Content:  Thought content normal          Cognition and Memory: Cognition and memory normal          Judgment: Judgment normal          Vital Signs  ED Triage Vitals   Temperature Pulse Respirations Blood Pressure SpO2   05/16/21 1359 05/16/21 1345 05/16/21 1345 05/16/21 1345 05/16/21 1345   99 2 °F (37 3 °C) (!) 159 (!) 37 98/73 94 %      Temp Source Heart Rate Source Patient Position - Orthostatic VS BP Location FiO2 (%)   05/16/21 1359 05/16/21 1504 05/16/21 1504 05/16/21 1504 --   Temporal Monitor Lying Right arm       Pain Score       05/16/21 1431       6           Vitals:    05/16/21 1515 05/16/21 1600 05/16/21 1630 05/16/21 1731   BP: 92/56 (!) 143/109 98/70 98/70   Pulse: (!) 150  (!) 150 (!) 150   Patient Position - Orthostatic VS: Visual Acuity      ED Medications  Medications   sodium chloride (PF) 0 9 % injection 3 mL (has no administration in time range)   clindamycin (CLEOCIN) IVPB (premix in dextrose) 600 mg 50 mL (has no administration in time range)   sodium chloride 0 9 % bolus 1,000 mL (1,000 mL Intravenous New Bag 5/16/21 1641)   metoprolol (LOPRESSOR) injection 5 mg (5 mg Intravenous Given 5/16/21 1416)   morphine (PF) 4 mg/mL injection 4 mg (4 mg Intravenous Given 5/16/21 1431)   sodium chloride 0 9 % bolus 500 mL (0 mL Intravenous Stopped 5/16/21 1638)   vancomycin (VANCOCIN) 2,000 mg in sodium chloride 0 9 % 500 mL IVPB (2,000 mg Intravenous New Bag 5/16/21 1506)   metoprolol (LOPRESSOR) injection 5 mg (5 mg Intravenous Given 5/16/21 1608)   furosemide (LASIX) injection 40 mg (40 mg Intravenous Given 5/16/21 1608)       Diagnostic Studies  Results Reviewed     Procedure Component Value Units Date/Time    Lactic acid 2 Hours [[id number]]  (Abnormal) Collected: 05/16/21 1645    Lab Status: Final result Specimen: Blood from Arm, Right Updated: 05/16/21 1714     LACTIC ACID 3 2 mmol/L     Narrative:      Result may be elevated if tourniquet was used during collection      Blood gas, venous [[id number]]  (Abnormal) Collected: 05/16/21 1645    Lab Status: Final result Specimen: Blood from Arm, Right Updated: 05/16/21 1651     pH, Fortun[place] 7 398     pCO2, Fortunato 27 9 mm Hg      pO2, Fortunato 48 8 mm Hg      HCO3, Fortunato 16 8 mmol/L      Base Excess, Fortunato -6 6 mmol/L      O2 Content, Fortunato 14 7 ml/dL      O2 HGB, VENOUS 83 7 %     Protime-INR [[id number]]  (Abnormal) Collected: 05/16/21 1407    Lab Status: Final result Specimen: Blood from Arm, Left Updated: 05/16/21 1515     Protime 49 0 seconds      INR 5 53    D-dimer, quantitative [[id number]]  (Abnormal) Collected: 05/16/21 1407    Lab Status: Final result Specimen: Blood from Arm, Left Updated: 05/16/21 1504     D-Dimer, Quant 0 80 ug/ml FEU     Lactic acid, plasma [[id number]]  (Abnormal) Collected: 05/16/21 1407    Lab Status: Final result Specimen: Blood from Arm, Left Updated: 05/16/21 1459     LACTIC ACID 2 9 mmol/L     Narrative:      Result may be elevated if tourniquet was used during collection      NT-BNP PRO [[id number]]  (Abnormal) Collected: 05/16/21 1407    Lab Status: Final result Specimen: Blood from Arm, Left Updated: 05/16/21 1457     NT-proBNP 6,213 pg/mL     Comprehensive metabolic panel [[id number]]  (Abnormal) Collected: 05/16/21 1407    Lab Status: Final result Specimen: Blood from Arm, Left Updated: 05/16/21 1457     Sodium 129 mmol/L      Potassium 5 1 mmol/L      Chloride 96 mmol/L      CO2 19 mmol/L      ANION GAP 14 mmol/L      BUN 64 mg/dL      Creatinine 2 40 mg/dL      Glucose 96 mg/dL      Calcium 9 2 mg/dL      Corrected Calcium 10 7 mg/dL      AST 31 U/L      ALT 22 U/L      Alkaline Phosphatase 124 U/L      Total Protein 7 1 g/dL      Albumin 2 1 g/dL      Total Bilirubin 2 72 mg/dL      eGFR 19 ml/min/1 73sq m     Narrative:      [place] guidelines for Chronic Kidney Disease (CKD):     Stage 1 with normal or high GFR (GFR > 90 mL/min/1 73 square meters)    Stage 2 Mild CKD (GFR = 60-89 mL/min/1 73 square meters)    Stage 3A Moderate CKD (GFR = 45-59 mL/min/1 73 square meters)    Stage 3B Moderate CKD (GFR = 30-44 mL/min/1 73 square meters)    Stage 4 Severe CKD (GFR = 15-29 mL/min/1 73 square meters)    Stage 5 End Stage CKD (GFR <15 mL/min/1 73 square meters)  Note: GFR calculation is accurate only with a steady state creatinine    Lipase [[id number]]  (Normal) Collected: 05/16/21 1407    Lab Status: Final result Specimen: Blood from Arm, Left Updated: 05/16/21 1457     Lipase 121 u/L     Magnesium [[id number]]  (Normal) Collected: 05/16/21 1407    Lab Status: Final result Specimen: Blood from Arm, Left Updated: 05/16/21 1457     Magnesium 2 6 mg/dL     Urine Microscopic [[id number]]  (Abnormal) Collected: 05/16/21 1435    Lab Status: Final result Specimen: Urine, Indwelling Holden Catheter Updated: 05/16/21 1455     RBC, UA 1-2 /hpf      WBC, UA 2-4 /hpf      Epithelial Cells Occasional /hpf      Bacteria, UA Innumerable /hpf      AMORPH URATES Occasional /hpf     Manual Differential(PHLEBS Do Not Order) [[id number]]  (Abnormal) Collected: 05/16/21 1407    Lab Status: Final result Specimen: Blood from Arm, Left Updated: 05/16/21 1452     Segmented % 74 %      Bands % 15 %      Lymphocytes % 5 %      Monocytes % 3 %      Eosinophils, % 0 %      Basophils % 0 %      Metamyelocytes% 2 %      Myelocytes % 1 %      Absolute Neutrophils 23 75 Thousand/uL      Lymphocytes Absolute 1 33 Thousand/uL      Monocytes Absolute 0 80 Thousand/uL      Eosinophils Absolute 0 00 Thousand/uL      Basophils Absolute 0 00 Thousand/uL      Total Counted 100     nRBC 1 /100 WBC      RBC Morphology Normal     Platelet Estimate Adequate     Giant PLTs Present    TSH, 3rd generation [[id number]]  (Normal) Collected: 05/16/21 1407    Lab Status: Final result Specimen: Blood from Arm, Left Updated: 05/16/21 1448     TSH 3RD GENERATON 1 039 uIU/mL     Narrative:      Patients undergoing fluorescein dye angiography may retain small amounts of fluorescein in the body for 48-72 hours post procedure  Samples containing fluorescein can produce falsely depressed TSH values  If the patient had this procedure,a specimen should be resubmitted post fluorescein clearance        CK (with reflex to MB) [[id number]]  (Normal) Collected: 05/16/21 1407    Lab Status: Final result Specimen: Blood from Arm, Left Updated: 05/16/21 1448     Total CK 62 U/L     UA w Reflex to Microscopic w Reflex to Culture [[id number]]  (Abnormal) Collected: 05/16/21 1435    Lab Status: Final result Specimen: Urine, Indwelling Holden Catheter Updated: 05/16/21 1445     Color, UA Yellow     Clarity, UA Cloudy     Specific [place], UA 1 020     pH, UA 5 5     Leukocytes, UA Trace     Nitrite, UA Negative     Protein, UA Trace mg/dl      Glucose, UA Negative mg/dl      Ketones, UA Negative mg/dl      Urobilinogen, UA 0 2 E U /dl      Bilirubin, UA Small     Blood, UA Negative    Troponin I [[id number]]  (Normal) Collected: 05/16/21 1415    Lab Status: Final result Specimen: Blood from Arm, Right Updated: 05/16/21 1442     Troponin I 0 04 ng/mL     Blood culture #1 [[id number]] Collected: 05/16/21 1425    Lab Status: In process Specimen: Blood from Arm, Left Updated: 05/16/21 1438    CBC and differential [[id number]]  (Abnormal) Collected: 05/16/21 1407    Lab Status: Final result Specimen: Blood from Arm, Left Updated: 05/16/21 1429     WBC 26 68 Thousand/uL      RBC 4 66 Million/uL      Hemoglobin 11 9 g/dL      Hematocrit 37 8 %      MCV 81 fL      MCH 25 5 pg      MCHC 31 5 g/dL      RDW 16 5 %      MPV 10 8 fL      Platelets 788 Thousands/uL      nRBC 0 /100 WBCs     Narrative: This is an appended report  These results have been appended to a previously verified report  Blood culture #2 [[id number]] Collected: 05/16/21 1416    Lab Status: In process Specimen: Blood from Arm, Right Updated: 05/16/21 1420                 CT head without contrast   Final Result by Ta Cabral MD (05/16 1659)      No acute intracranial injury  There is a 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 (series 2 images 18-21 )  This was not visible on 9/5/2020 CT  Workstation performed: [id number]         CT spine cervical without contrast   Final Result by Ta Cabral MD (05/16 1702)      No cervical spine fracture or traumatic malalignment  Incidental 2 9 cm right thyroid nodule for which nonemergent thyroid ultrasound is recommended  Workstation performed: [id number]         XR hip/pelv 2-3 vws right if performed   Final Result by Ta Cabral MD (05/16 1703)      No acute osseous abnormality              Workstation performed: [id number]         XR knee 4+ vw right injury   Final Result by Estell Frankel, MD (05/16 1705)      No acute osseous abnormality  Severe tricompartmental osteoarthritis  Workstation performed: [id number]         XR tibia fibula 2 views RIGHT   Final Result by Estell Frankel, MD (05/16 1705)      No acute osseous abnormality  Workstation performed: [id number]         XR ankle 3+ views RIGHT   Final Result by Estell Frankel, MD (05/16 1706)      No acute osseous abnormality  Severe osteoarthritis throughout the midfoot and hindfoot  Workstation performed: [id number]         XR chest 1 view   Final Result by Estell Frankel, MD (05/16 1703)      No acute cardiopulmonary disease  There is cardiomegaly  Workstation performed: [id number]         VAS lower limb venous duplex study, complete bilateral    (Results Pending)   CT lower extremity wo contrast right    (Results Pending)              Procedures  ECG 12 Lead Documentation Only    Date/Time: 5/16/2021 1:47 PM  Performed by: Keo Garcia MD  Authorized by: Keo Garcia MD     Patient location:  ED  Previous ECG:     Comparison to cardiac monitor: Yes    Interpretation:     Interpretation: abnormal    Rate:     ECG rate:  137bpm    ECG rate assessment: tachycardic    Rhythm:     Rhythm: atrial fibrillation    Ectopy:     Ectopy: none    QRS:     QRS axis:  Left  Conduction:     Conduction: normal    ST segments:     ST segments:  Normal  T waves:     T waves: flattening and inverted      Flattening:  V1    Inverted:  AVR and aVL  Comments:      No STEMI  QRS 84ms  QT 443ms    Cardiac monitoring ordered  Heart rate and rhythm as above  I have personally read and interpreted the EKG as above  CriticalCare Time  Performed by: Keo Garcia MD  Authorized by:  Koe Garcia MD     Critical care provider statement:     Critical care time (minutes):  90    Critical care was necessary to treat or prevent imminent or life-threatening deterioration of the following conditions:  Sepsis    Critical care was time spent personally by me on the following activities:  Obtaining history from patient or surrogate, development of treatment plan with patient or surrogate, discussions with consultants, discussions with primary provider, evaluation of patient's response to treatment, examination of patient, review of old charts, re-evaluation of patient's condition, ordering and review of radiographic studies, ordering and review of laboratory studies, ordering and performing treatments and interventions and ventilator management    I assumed direction of critical care for this patient from another provider in my specialty: no    Comments:      Severe sepsis in light chronic leg wounds and cellulitis of right lower extremity, patient presents in atrial fibrillation with RVR with hypotension, acute kidney injury requiring IV fluids, IV antibiotics and placed on BiPAP             ED Course  ED Course as of May 16 1812   Sun May 16, 2021   25 [facility], Vascular tech to come in for bilateral LE duplex ultrasound  1418 Ordered Lopressor 5mg IV for atrial fibrillation with RVR  Requested nurse to place Holden  [id number] Vascular tech is completed bedside duplex ultrasound of bilateral lower extremities which is negative for deep vein thrombosis  1508 Age-adjusted d-dimer 0 730      1515 No recent BNP no results  Today 6213       5429 INR elevated, will check CTH      1519 Last creatinine, 1 02 on 05/11/21      1519 Last INR 1 4 on 05/11/2021      1549 Texted Dr Ezekiel Rodriguez, Cardiology regarding afib w/RVR in the setting of sepsis likely secondary to her RLE cellulitis versus her afib w/RVR  3043 Dr Ezekiel Rodriguez agrees with IVF and another Lopressor 5mg  Treat sepsis  2701 [facility] with [facility] PA-C regarding patient's severe sepsis likely secondary to RLE cellulitis and wounds  Awaiting CT results   Will discuss with Dr Reji Barraza if she needs StepDown Level 1 or could go to Hospitalist for StepDown Level 2       1631 Spoke with patient's brother who is now at bedside  Patient lives with him and he states that she is typically able to ambulate with a walker but has been and ambulate to do so for the last 3-4 days due to generalized weakness  Patient slipped out of bed either 2 or 3 days ago, he cannot recall exactly  He called EMS this morning as patient was having shortness of breath and because she has new wounds to her right inner thigh which he noticed  Patient was also unable to get out of bed  He states that he is unable to take care of her if she is unable to ambulate  Patient has been in AFib with RVR for quite some time according to him and that patient has been reluctant to go to the hospital until today  Code status is unknown as he states they have never discussed it  1650 Chest x-ray read and interpreted by me  Negative for pneumothorax, mediastinal widening or focal consolidation  Cardiomegaly  IMPRESSION:     No acute cardiopulmonary disease      There is cardiomegaly             1700 [place] requested Respiratory to place patient on a CPAP which she uses at home  80 CTH:IMPRESSION:     No acute intracranial injury      There is a 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 (series 2 images 18-21 )  This was not visible on 9/5/2020 CT            1709 Right ankle xray:IMPRESSION:     No acute osseous abnormality      Severe osteoarthritis throughout the midfoot and hindfoot  1709 Right tib/fib xray:   IMPRESSION:     No acute osseous abnormality         1710 Right knee xray:IMPRESSION:     No acute osseous abnormality      Severe tricompartmental osteoarthritis  1710 Right hip/oelvis xray:IMPRESSION:     No acute osseous abnormality           1711 CT c-spine:   No cervical spine fracture or traumatic malalignment      Incidental 2 9 cm right thyroid nodule for which nonemergent thyroid ultrasound is recommended             1712 Rick states that Dr Sree lEise, [address] Attending has accepted patient to StepDown Level 1  She is getting CT RLE to rule out necrotizing fasciitis  SBIRT 20yo+      Most Recent Value   SBIRT (22 yo +)   In order to provide better care to our patients, we are screening all of our patients for alcohol and drug use  Would it be okay to ask you these screening questions? No Filed at: 05/16/2021 1400            MDM  Number of Diagnoses or Management Options  AYDEE (acute kidney injury) [facility]):   Ambulatory dysfunction:   Atrial fibrillation with RVR ([facility] ):   Elevated brain natriuretic peptide (BNP) level:   Elevated d-dimer:   Elevated INR (international normalized ratio):   Fall, initial encounter:   Leukocytosis:   Severe sepsis ([facility] [address] ):    Wound cellulitis:      Amount and/or Complexity of Data Reviewed  Clinical lab tests: reviewed and ordered  Tests in the radiology section of CPT®: reviewed and ordered  Tests in the medicine section of CPT®: ordered and reviewed  Obtain history from someone other than the patient: yes (Brother, EMS)  Review and summarize past medical records: yes  Discuss the patient with other providers: yes (Critical Care, Keyla Livingston, [facility]  Cardiology, Dr Hilda Schmidt  )  Independent visualization of images, tracings, or specimens: yes (CTH, CT C-spine, x-rays, EKG)    Risk of Complications, Morbidity, and/or Mortality  Presenting problems: moderate  Diagnostic procedures: moderate  Management options: moderate    Patient Progress  Patient progress: stable      Disposition  Final diagnoses:   Fall, initial encounter   Atrial fibrillation with RVR ([facility] [address] )   AYDEE (acute kidney injury) ([facility] [address] )   Elevated d-dimer   Wound cellulitis   Elevated brain natriuretic peptide (BNP) level   Leukocytosis   Ambulatory dysfunction   Elevated INR (international normalized ratio)   Severe sepsis ([address] )   Thyroid nodule   Open wound of skin   Osteoarthritis Hyponatremia   Abnormal CT of the head     Time reflects when diagnosis was documented in both MDM as applicable and the Disposition within this note     Time User Action Codes Description Comment    5/16/2021  3:11 PM Catracho Osuna Apo Add [R60 0] Bilateral leg edema     5/16/2021  3:27 PM Evelin Bio Add [Z20  HDLS] Fall, initial encounter     5/16/2021  3:27 PM Evelin Bio Add [I48 91] Atrial fibrillation with RVR ([facility] )     5/16/2021  3:27 PM Evelin Bio Add [N17 9] AYDEE (acute kidney injury) ([facility] )     5/16/2021  3:27 PM Evelin Bio Add [R79 89] Elevated d-dimer     5/16/2021  3:27 PM Evelin Bio Add [W20 74] Wound cellulitis     5/16/2021  3:27 PM Evelin Bio Add [R79 89] Elevated brain natriuretic peptide (BNP) level     5/16/2021  3:28 PM Evelin Bio Add [N85 525] Leukocytosis     5/16/2021  3:28 PM Evelin Bio Add [A41 9] Sepsis ([facility] )     5/16/2021  3:28 PM Evelin Bio Add [R26 2] Ambulatory dysfunction     5/16/2021  3:28 PM Evelin Bio Add [R79 1] Elevated INR (international normalized ratio)     5/16/2021  4:32 PM Evelin Bio Remove [A41 9] Sepsis ([place] [address] )     5/16/2021  4:33 PM Evelin Bio Add [A41 9,  R65 20] Severe sepsis ([facility] )     5/16/2021  5:11 PM Evelin Bio Add [E04 1] Thyroid nodule     5/16/2021  5:11 PM Evelin Bio Add [A99  8XXA] Open wound of skin     5/16/2021  5:11 PM Evelin Bio Add [M19 90] Osteoarthritis     5/16/2021  6:09 PM Evelin Bio Add [E87 1] Hyponatremia     5/16/2021  6:09 PM Evelin Bio Add [I63 89] Infarction of left temporal lobe ([facility] )     5/16/2021  6:09 PM Evelin Bio Remove [I63 89] Infarction of left temporal lobe [facility])     5/16/2021  6:10 PM Evelin Bio Add [R93 0] Abnormal CT of the head       ED Disposition     ED Disposition Condition Date/Time Comment    Admit Stable Sun May 16, 2021  5:15 PM Case was discussed with Rick [facility] and the patient's admission status was agreed to be Admission Status: inpatient status to the service of Dr Paola Munguia   Follow-up Information    None         Patient's Medications   Discharge Prescriptions    No medications on file     No discharge procedures on file      PDMP Review     None          ED Provider  Electronically Signed by      MD Maged Floyd MD  05/16/21 5342

## 2021-05-17 PROBLEM — G47.33 OSA ON CPAP: Status: ACTIVE | Noted: 2021-01-01

## 2021-05-17 PROBLEM — Z99.89 OSA ON CPAP: Status: ACTIVE | Noted: 2021-01-01

## 2021-05-17 PROBLEM — E87.2 METABOLIC ACIDOSIS: Status: RESOLVED | Noted: 2021-01-01 | Resolved: 2021-01-01

## 2021-05-17 PROBLEM — R78.81 GRAM-POSITIVE BACTEREMIA: Status: ACTIVE | Noted: 2021-01-01

## 2021-05-17 NOTE — PROGRESS NOTES
Vancomycin IV Pharmacy-to-Dose Consultation    Boni Farfan is a 70 y o  female who is currently receiving Vancomycin IV with management by the Pharmacy Consult service  Assessment/Plan:  The patient was reviewed  Renal function is stable and no signs or symptoms of nephrotoxicity and/or infusion reactions were documented in the chart  Based on todays assessment, continue current vancomycin (day # 2) dosing of 1250 mg iv q 12 hrs, with a plan for trough to be drawn at 1430 on 5/19/21  We will continue to follow the patients culture results and clinical progress daily      Caio Kaiser, Pharmacist

## 2021-05-17 NOTE — ASSESSMENT & PLAN NOTE
· Secondary to severe sepsis and dehydration in setting of metformin use  · Compensated pH on VBG  · Hold metformin  · Cont IVF resuscitation as needed

## 2021-05-17 NOTE — ASSESSMENT & PLAN NOTE
· Hx ARIAN, wears CPAP +15 at hs PTA  · Required Bipap 12/6 35% overnight on day of admission, pt tolerated well  · Transitioned back to CPAP now that clinically improved  Family brought in home unit

## 2021-05-17 NOTE — ASSESSMENT & PLAN NOTE
· GPC in pairs and clusters in 2/2 bottles from cultures drawn on 5/16  · Repeat cx ordered for 5/18  · Follow up on further cx results, sensitivities  · Cont vanc w pharmacy consult

## 2021-05-17 NOTE — PLAN OF CARE
Problem: Potential for Falls  Goal: Patient will remain free of falls  Description: INTERVENTIONS:  - Assess patient frequently for physical needs  -  Identify cognitive and physical deficits and behaviors that affect risk of falls    -  [place] fall precautions as indicated by assessment   - Educate patient/family on patient safety including physical limitations  - Instruct patient to call for assistance with activity based on assessment  - Modify environment to reduce risk of injury  - Consider OT/PT consult to assist with strengthening/mobility  Outcome: Progressing     Problem: Prexisting or High Potential for Compromised Skin Integrity  Goal: Skin integrity is maintained or improved  Description: INTERVENTIONS:  - Identify patients at risk for skin breakdown  - Assess and monitor skin integrity  - Assess and monitor nutrition and hydration status  - Monitor labs   - Assess for incontinence   - Turn and reposition patient  - Assist with mobility/ambulation  - Relieve pressure over bony prominences  - Avoid friction and shearing  - Provide appropriate hygiene as needed including keeping skin clean and dry  - Evaluate need for skin moisturizer/barrier cream  - Collaborate with interdisciplinary team   - Patient/family teaching  - Consider wound care consult   Outcome: Progressing

## 2021-05-17 NOTE — WOUND OSTOMY CARE
Consult Note - Wound   Jose Sykes 70 y o  female MRN: [id number]  Unit/Bed#: -01 Encounter: [id number]        History and Present Illness:70year old female admitted severe sepsis,elevated INR, a fib with RVR, Type 2 DM, wound of lower extremity,hyponatremia, class 3 severe obesity BMI 60 ,/gram positive bacteremia  Pt had a fall getting out of bed,per brother  Pt lives at home with her brother  Per medical record, pt sees Dr Annalee [facility] at [facility] leg ulcer  Pt states she is a patient at [facility] for chronic venous ulcers  Assessment Findings:   Alert, easily confused  Holden catheter in place max or 2 to turn and reposition  1) Bilateral heels intact  2) left calf skin intact hemosiderin staining  3)Right anterior distal tibia  Chronic venous ulcer,25% pink pale epithelization noted  edges white macerated wound bed, edges intact   4)Right lateral calf chronic full thickness venous ulcer  Wound bed pink with 30% epithelization present along with 70%white slough  Edges intact dry  5)Right inner thigh above knee and behind bend of knee  Pt had a large hematoma on admission  Area is absorbing, along with partial thickness skin loss  Area at bend of the knee Skin is intact, remains purple, non blanchable  No areas of induration noted  Pt was down an unknown time after her fall   Cleansed areas of skin loss and Dermagran guaze applied, followed by ABD and wrapped with hamilton  6) Medical aspect of right upper thigh  Wound bed 100% yellow slough  Area cleansed  Will recommend Santyl as an enzymatic debridement cover with Allevyn foam to protect fragile surrounding skin  7) Buttocks  Blanchable  Dry intact callous flaky skin  Areas of hyperpigmentation and scarring noted  Area cleansed and allevyn foam applied  8) MASD abdominal skin folds and pannus  Marce Cream as antifungal and moisture barrier  9) Bariatric Bed  All above wounds obtained prior to admission   Pt was down after fall unknown time  Dark purple area behind right knee may absorb or evolve to unstageable or stage 3 or stage 4 pressure injury sustained due to fall at home  Skin care plans:  1-Hydraguard to bilateral heels BID and PRN  2-Elevate heels to offload pressure  3-Ehob cushion in chair when out of bed  4-Moisturize skin daily with skin nourishing cream   5-Turn/reposition q2h or when medically stable for pressure re-distribution on skin  6-Cleanse right anterior tibia with nss, apply Dermagran abd hamilton  Change QOD  7- Apply Santyl to medial upper thigh wound bed cover with Allevyn foam to protect surrounding skin  Change daily and prn  8-Adaptic gauze  to right thigh absorbing hematoma  Cover with ABD and wrap with hamilton  Change daily and prn drainage  9- Allevyn foam to buttocks, gluteal cleft  Change QOD  10- Marce antifungal/moisture barrier to skin folds BID  Wounds:  Wound 05/16/21 Other (comment) Thigh Right; Inner (Active)   Wound Image   05/17/21 1159   Wound Description Bleeding;Black;Dark edges;Drainage;Fragile;Light purple;Pink;Swelling 05/17/21 1159   Pressure Injury Stage U 05/17/21 1159   Vickie-wound Assessment Black; Denuded;Edema; Erythema; Excoriated;Fragile; Purple; Swelling 05/17/21 1159   Wound Width (cm) 28 cm 05/17/21 1159   Drainage Amount Scant 05/17/21 1159   Drainage Description Bloody;Clear 05/17/21 1159   Non-staged Wound Description Partial thickness 05/17/21 1159   Treatments Cleansed;Irrigation with NSS;Site care 05/17/21 1159   Dressing ABD; Dermagran gauze 05/17/21 1159   Patient Tolerance Tolerated well 05/17/21 1159   Dressing Status Clean;Dry; Intact 05/17/21 1159       Wound 05/16/21 Venous Ulcer Pretibial Distal;Right (Active)   Wound Image   05/17/21 1146   Wound Description Beefy red;Fragile;Pale;Pink;Slough; White 05/17/21 1146   Vickie-wound Assessment Erythema 05/17/21 0815   Wound Length (cm) 8 cm 05/17/21 1146   Wound Width (cm) 8 cm 05/17/21 1146   Wound Surface Area (cm^2) 64 cm^2 05/17/21 1146   Drainage Amount None 05/17/21 1146   Drainage Description Serosanguineous 05/17/21 0815   Non-staged Wound Description Partial thickness 05/17/21 1146   Treatments Cleansed;Site care;Elevated 05/17/21 1146   Dressing ABD; Dermagran gauze 05/17/21 1146   Dressing Changed Changed 05/17/21 1146   Patient Tolerance Tolerated well 05/17/21 1146   Dressing Status Clean;Dry; Intact 05/17/21 1146       Wound 05/16/21 Venous Ulcer Tibial Distal;Posterior;Right (Active)   Wound Image   05/17/21 1208   Wound Description Fragile;Pink;Slough; Tan 05/17/21 1208   Vickie-wound Assessment Erythema 05/17/21 0815   Drainage Amount Small 05/17/21 0815   Drainage Description Serosanguineous 05/17/21 0815   Non-staged Wound Description Full thickness 05/17/21 1208   Treatments Cleansed;Irrigation with NSS;Site care;Elevated 05/17/21 1208   Dressing Dermagran gauze 05/17/21 1208   Dressing Changed Changed 05/17/21 1208   Patient Tolerance Tolerated well 05/17/21 1208   Dressing Status Clean;Dry; Intact 05/17/21 1208       Wound 05/16/21 Venous Ulcer Tibial Posterior;Right (Active)   Wound Image   05/17/21 1207   Wound Description Intact; Black; Brown;Dark edges;Fragile 05/17/21 1207   Pressure Injury Stage U 05/17/21 1207   Vickie-wound Assessment Erythema;Fragile 05/17/21 1207   Drainage Amount Small 05/17/21 0815   Drainage Description Serosanguineous 05/17/21 0815   Treatments Cleansed;Site care;Elevated 05/17/21 1207   Dressing ABD; Dermagran gauze 05/17/21 1207   Dressing Changed Changed 05/17/21 1207   Patient Tolerance Tolerated well 05/17/21 1207   Dressing Status Clean;Dry; Intact 05/17/21 1207       Wound 05/16/21 Pressure Injury Pretibial Proximal;Right (Active)   Wound Image   05/16/21 2000   Wound Description Light purple; Intact 05/17/21 0815   Vickie-wound Assessment Erythema 05/17/21 0815   Drainage Amount None 05/17/21 0815   Dressing Open to air 05/17/21 0815       Wound 05/16/21 Other (comment) Knee Anterior; Left (Active)   Wound Image   05/16/21 2000   Wound Description Light purple 05/17/21 0815   Drainage Amount None 05/17/21 0815   Dressing Open to air 05/17/21 0815       Wound 05/16/21 MASD Buttocks Inner (Active)   Wound Image    05/16/21 2000   Wound Description Fragile;Pink;Slough 05/17/21 0815   Drainage Amount None 05/17/21 0815   Dressing Open to air 05/17/21 0815       Wound 05/16/21 MASD Abdomen Lower (Active)   Wound Image    05/16/21 2000   Wound Description Pink 05/17/21 0815   Drainage Amount None 05/17/21 0815   Dressing Open to air 05/17/21 0815       Wound 05/16/21 Other (comment) Thigh Medial;Right;Upper (Active)   Wound Image   05/17/21 1214   Wound Description Yellow 05/17/21 1214   Pressure Injury Stage U 05/17/21 1214   Vickie-wound Assessment Fragile;Rash;Swelling 05/17/21 1214   Wound Length (cm) 0 8 cm 05/17/21 1214   Wound Width (cm) 1 3 cm 05/17/21 1214   Wound Surface Area (cm^2) 1 04 cm^2 05/17/21 1214   Drainage Amount Scant 05/17/21 1214   Drainage Description Clear 05/17/21 1214   Treatments Cleansed;Site care 05/17/21 1214   Dressing Enzymatic debrider 05/17/21 1214   Patient Tolerance Tolerated well 05/17/21 1214   Dressing Status Clean;Dry; Intact 05/17/21 1214       Call or tigertext with any questions  Wound Care will continue to follow    Vernon Cook RN

## 2021-05-17 NOTE — RESPIRATORY THERAPY NOTE
RT Protocol Note  Chavez Music 70 y o  female MRN: [id number]  Unit/Bed#: -01 Encounter: [id number]    Assessment    Principal Problem:    Severe sepsis [facility])  Active Problems:    AYDEE (acute kidney injury) ([facility] )    Metabolic acidosis    Elevated INR    Atrial fibrillation with RVR (HCC)    Acute respiratory failure with hypoxia (HCC)    Type 2 diabetes mellitus, without long-term current use of insulin (HCC)    Chronic systolic heart failure ([facility])    Abnormal CT of the head    Wound of lower extremity    Hyponatremia      Home Pulmonary Medications:  None  Home Devices/Therapy: BiPAP/CPAP    Past Medical History:   Diagnosis Date    Coronary artery disease     Diabetes mellitus ([facility] )     Hypertension     Lymphedema     Sleep apnea      Social History     Socioeconomic History    Marital status: Single     Spouse name: None    Number of children: None    Years of education: None    Highest education level: None   Occupational History    None   Social Needs    Financial resource strain: None    Food insecurity     Worry: None     Inability: None    Transportation needs     Medical: None     Non-medical: None   Tobacco Use    Smoking status: Never Smoker    Smokeless tobacco: Never Used   Substance and Sexual Activity    Alcohol use: Not Currently    Drug use: Not Currently    Sexual activity: Not Currently   Lifestyle    Physical activity     Days per week: None     Minutes per session: None    Stress: None   Relationships    Social connections     Talks on phone: None     Gets together: None     Attends Qu[employer] service: None     Active member of club or organization: None     Attends meetings of clubs or organizations: None     Relationship status: None    Intimate partner violence     Fear of current or ex partner: None     Emotionally abused: None     Physically abused: None     Forced sexual activity: None   Other Topics Concern    None   Social History Narrative    None Subjective    Subjective Data: Home CPAP [id number]    Objective    Physical Exam:   Assessment Type: Assess only  General Appearance: Alert, Awake  Respiratory Pattern: Labored, Tachypneic  Chest Assessment: Chest expansion symmetrical  Bilateral Breath Sounds: Clear, Diminished  O2 Device: 35% Bipap    Vitals:  Blood pressure 110/59, pulse (!) 147, temperature 99 2 °F (37 3 °C), temperature source Temporal, resp  rate (!) 42, height 5' 2" (1 575 m), weight (!) 151 kg (332 lb 14 3 oz), SpO2 (!) 89 %  Imaging and other studies: I have personally reviewed pertinent reports  O2 Device: 35% Bipap     Plan    Respiratory Plan: Mild Distress pathway, Vent/NIV/HFNC        Pt admitted due to severe sepsis  No hx of COPD/Asthma, PFT done 10/2020 Normal Lung volumes  Pt does not take any home pulmonary medications  She does have Hx of ARIAN uses CPAP of 15 cmH2O at home  Currently on Bipap 12/6 35% says she is comfortable at this time on Bipap  Cont to be tachypnic but decreased WOB  Will cont to follow due to pt WOB

## 2021-05-17 NOTE — CASE MANAGEMENT
CM met with the patient and her brother at bedside to review the CM role and discuss possible dc needs  At time of interview pt is AAOx4, pt lives with her brother in a [facility] with [address] in [facility]  Pt was IPTA  Pt has DME of a walker and a motorized scooter and is ambulatory without assistance  Pt has a CPAP at HS  Pt has bathroom/bedroom on second floor with a full flight of steps  Pt currently has a first floor set up  Pt denies any history of drug/etoh abuse, mental illness or inpatient psych admissions  Pt denies any history of SNF/Rehab or VNA  Pt does have a POA, her brother, Aurora Cartagena  Pt drives but has not been driving lately, pts brother Aurora Cartagena is available for transportation needs  Pts brother is available at time of discharge to assist pt at home  Brother sts if pt would need a leona at time of dc, they could "borrow" one from a friend that owns a DME store  Preferred Pharmacy: [facility], [address] [place] [address], brother, [phone]  PCP: Dr Telma Frias    CM reviewed d/c planning process including the following: identifying help at home, patient preference for d/c planning needs, availability of treatment team to discuss questions or concerns patient and/or family may have regarding understanding medications and recognizing signs and symptoms once discharged  CM also encouraged patient to follow up with all recommended appointments after discharge  Patient advised of importance for patient and family to participate in managing patients medical well being

## 2021-05-17 NOTE — ASSESSMENT & PLAN NOTE
· Incidental 2 9 cm right thyroid nodule reported on CT scan  · Recommends nonemergent thyroid u/s when clinically improved  · Upon further chart review- pt was been seen OP by Dr Glendy gonzales biopsy of right and left thyroid nodules in 2/2021

## 2021-05-17 NOTE — ASSESSMENT & PLAN NOTE
· Chronic with concern for acute cellulitis, possible abscess or hematoma  · Was being followed outpt since spring of 2020 by Dr Hermelindo gonzales [facility] for 3 venous insufficiency ulcers with acquired lymphedema of LLE  · Referred to vascular surgery and was seen by Dr Hina Smith on 4/6/2021  Pt w normal arterial studies and was recommended for possible ablation of great saphenous and small saphenous veins which may help her wound healing  Pt was considering her options (per office note in care everywhere)  · See media for imaging  · CT Right leg - No evidence of focal hematoma or discernible abscess within limitations of noncontrast imaging  Asymmetric diffuse fat stranding throughout the right thigh region becoming less pronounced below the level of the knee  These findings could be secondary to edema and/or cellulitis  Some subcutaneous edema in the right flank would favor at least some component of generalized edema  · See ABX under sepsis  · Wound care consult   Will need recs for specialty bed given LLE wounds and obesity  · Pain - fentanyl prn

## 2021-05-17 NOTE — ASSESSMENT & PLAN NOTE
· Pt was taking losartan and metoprolol XL prior to admission  · Hold home meds due to sepsis and hypotension

## 2021-05-17 NOTE — ASSESSMENT & PLAN NOTE
· Was prescribed PO potassium 20 mEq daily PTA     · Hold home med  · K was 5 1 then 5 5 on admit; likely secondary to sepsis, AYDEE  · EKG without changes  · Trend labs; treat as needed  · EKG PRN

## 2021-05-17 NOTE — ASSESSMENT & PLAN NOTE
Wt Readings from Last 3 Encounters:   05/18/21 (!) 156 kg (344 lb 5 7 oz)   09/05/20 (!) 155 kg (341 lb)     · Last ECHO 8/2020:  EF 50-55%, RV moderately reduced systolic function, PA pressure 60 mmHg, b/l atrial enlargement, mild MR and TR  · On admission BNP 6,213  · Despite lower extremity edema and elevated BNP pt appears vascularly dry and not in acute CHF exacerbation  · Caution with IVF resuscitation given tenuous cardiac status  · Hold home Toprol and Bumex  · Repeat Echo ordered - EF 55-60%, no RWMA identified, moderate concentric hypertrophy of LV, RV normal size and systolic function, LA markedly dilated, IVC markedly dilated with respirophasic changes blunted (< 50% variation)

## 2021-05-17 NOTE — ASSESSMENT & PLAN NOTE
· Longstanding history of Afib, maintained on Toprol  mg daily and Xarelto  · Initially held home Toprol due to soft BP  · Resume Metoprolol 50 mg BID 5/17  · Hold home Xarelto due to supra therapeutic INR  Trend INR    · Lopressor 5mg IV q4 PRN HR > 130 with hold parameters  · Goal HR < 130

## 2021-05-17 NOTE — SEPSIS NOTE
Sepsis Reassessment Note   Verlenora Crawley 70 y o  female MRN: [id number]  Unit/Bed#: - Encounter: [id number]      qSOFA     Row Name 05/16/21 2100 05/16/21 2000 05/16/21 1900 05/16/21 1731 05/16/21 1630    Altered mental status GCS < 15  --  --  --  --  --    Respiratory Rate > / =22  1  1  1  1  --    Systolic BP < / =528  0  0  0  1  1    Q Sofa Score  1  1  2  3  3    Row Name 05/16/21 1600 05/16/21 1515 05/16/21 1504 05/16/21 1502 05/16/21 1500    Altered mental status GCS < 15  --  --  --  1  --    Respiratory Rate > / =22  --  --  1  --  --    Systolic BP < / =630  0  1  1  --  1    Q Sofa Score  2  3  3  3  3    Row Name 05/16/21 1430 05/16/21 1415 05/16/21 1400 05/16/21 1359 05/16/21 1352    Altered mental status GCS < 15  --  --  --  1  --    Respiratory Rate > / =22  --  --  --  --  1    Systolic BP < / =019  1  0  0  --  1    Q Sofa Score  3  2  2  3  2    Row Name 05/16/21 1345                Altered mental status GCS < 15  --        Respiratory Rate > / =45  1        Systolic BP < / =387  1        Q Sofa Score  2                     Default Flowsheet Data (last 720 hours)      Sepsis Reassess     Row Name 05/16/21 1900                   Repeat Volume Status and Tissue Perfusion Assessment Performed    Repeat Volume Status and Tissue Perfusion Assessment Performed  Yes  -HS           Volume Status and Tissue Perfusion Post Fluid Resuscitation * Must Document All *    Vital Signs Reviewed (HR, RR, BP, T)  Yes 147, 42, 110/59, 97 8  -HS        Shock Index Reviewed  --        Arterial Oxygen Saturation Reviewed (POx, SaO2 or SpO2)  --        Cardio  (!) Tachycardia;Irregular rhythm Afib RVR  -HS        Pulmonary  (!) Tachypnea diminshed, no crackles or wheezing   On Bipap  -HS        Capillary Refill  Brisk  -HS        Peripheral Pulses  Radial;Dorsalis Pedis  -HS        Peripheral Pulse  --        Dorsalis Pedis  --        Skin  Warm Skin warm and dry except multiple LE wounds  -HS        Urine output assessed  Decreased  -HS           *OR*   Intensive Monitoring- Must Document One of the Following Four *:    Vital Signs Reviewed  --        * Central Venous Pressure (CVP or RAP)  --        * Central Venous Oxygen (SVO2, ScvO2 or Oxygen saturation via central catheter)  --        * Bedside Cardiovascular US in IVC diameter and % collapse  --        * Passive Leg Raise OR Crystalloid Challenge  --          User Key  (r) = Recorded By, (t) = Taken By, (c) = Cosigned By    Initials Name Provider Type    HS Agustin Manzano, Yareli Hurt Nurse Practitioner

## 2021-05-17 NOTE — PROGRESS NOTES
[address]  Progress Note - [place] 1945, 70 y o  female MRN: [id number]  Unit/Bed#: -01 Encounter: [id number]  Primary Care Provider: Kaya Bassett MD   Date and time admitted to hospital: 5/16/2021  1:33 PM    Acute respiratory failure with hypoxia ([place] [address] )  Assessment & Plan  · Multifactorial - baseline pulm HTN with RV dysfunction, ARIAN on Cpap, Obesity Hypoventilation Syndrome  · In the ED SpO2 90% on room air with increased WOB  · Placed on 6L NC with improvement in SpO2 however no improvement in WOB  · Currently on Bipap 12/6 35%  · Wean off Bipap as tolerated  · Caution with IVF resuscitation in setting of CHF    * Severe sepsis ([place] [address] )  Assessment & Plan  · POA a/e/b - reported fevers prior to admission, tachycardia, tachypnea, leukocytosis with bandemia, elevated lactate, Cr, and T Bili  · Likely source lower extremity cellulitis  · In the ED received initial 500 cc IVF bolus followed by additional 1L IVF, given Vanco and ordered Clindamycin but was not infused  · BC - pending  · UA - not indicative of UTI  · PCT - pending  · CT right LE - pending  · CXR - no infiltrate or concern for pna  · COVID - negative  · Broaden ABX - Cefepime and Vanco  · Lactate trend 2 8 > 3 2 - cont to trend and treat with additional IVF as needed    Metabolic acidosis  Assessment & Plan  · Secondary to severe sepsis and dehydration in setting of metformin use  · Compensated pH on VBG  · Hold metformin  · Cont IVF resuscitation as needed    Atrial fibrillation with RVR ([facility] )  Assessment & Plan  · Longstanding history of Afib, maintained on Toprol  mg daily and Xarelto  · Hold home Toprol with current soft BP  · Hold home Xarelto due to supra therapeutic INR  · Lopressor 5mg IV q4 PRN HR > 130 with hold parameters  · Consider amiodarone +/ gtt for rate control if she has worsening hypotension requiring vasopressors  · Goal HR < 130    AYDEE (acute kidney injury) ([facility] )  Assessment & Plan  · Secondary to severe sepsis and dehydration in setting of home losartan and Bumex use  · Baseline appears around 1 0 based on labs earlier this year; previously around 0 7 in 2018  · Hold home losartan and Bumex  · Cont IVF resuscitation  · Maintain ward, strict I/O  · Avoid nephrotoxins, hypotension  · Trend BMP    Chronic systolic heart failure (HCC)  Assessment & Plan  Wt Readings from Last 3 Encounters:   05/16/21 (!) 151 kg (332 lb 14 3 oz)   09/05/20 (!) 155 kg (341 lb)         · Last ECHO 8/2020:  EF 50-55%, RV moderately reduced systolic function, PA pressure 60 mmHg, b/l atrial enlargement, mild MR and TR  · On admission BNP 6,213  · Despite lower extremity edema and elevated BNP pt appears vascularly dry and not in acute CHF exacerbation  · Caution with IVF resuscitation given tenuous cardiac status  · Hold home Toprol and Bumex  · May need repeat Echo if she does not improve or if need to evaluate fluid status further    Elevated INR  Assessment & Plan  · Elevated likely due to AYDEE  · INR 5 53 on admission  · Hold home Xarelto    Hyperkalemia  Assessment & Plan  · Was prescribed PO potassium 20 mEq BID PTA  · Hold home med  · K was 5 1 then 5 5 on admit; likely secondary to sepsis, AYDEE  · EKG without changes  · Trend labs; treat as needed  · EKG PRN    Wound of lower extremity  Assessment & Plan  · Chronic with concern for acute cellulitis, possible abscess or hematoma  · Was being followed outpt since spring of 2020 by Dr Daria Simons w [facility] for 3 venous insufficiency ulcers with acquired lymphedema of LLE  · Referred to vascular surgery and was seen by Dr Tomy Smith on 4/6/2021  Pt w normal arterial studies and was recommended for possible ablation of great saphenous and small saphenous veins which may help her wound healing  Pt was considering her options (per office note in care everywhere)     · See media for imaging  · CT Right leg - No evidence of focal hematoma or discernible abscess within limitations of noncontrast imaging  Asymmetric diffuse fat stranding throughout the right thigh region becoming less pronounced below the level of the knee  These findings could be secondary to edema and/or cellulitis  Some subcutaneous edema in the right flank would favor at least some component of generalized edema  · See ABX under sepsis  · Wound care consult  Will need recs for specialty bed given LLE wounds and obesity  · Pain - fentanyl prn    Hypertension  Assessment & Plan  · Pt was taking losartan and metoprolol XL prior to admission  · Hold home meds due to sepsis and hypotension    Hyponatremia  Assessment & Plan  · Likely secondary to dehydration   · Na 129 on admission  · Cont IVF resuscitation  · Trend BMP  · Monitor neuro status    Type 2 diabetes mellitus, without long-term current use of insulin (HCC)  Assessment & Plan  Lab Results   Component Value Date    HGBA1C 6 3 (H) 03/03/2021       Recent Labs     05/16/21  1926   POCGLU 83       Blood Sugar Average: Last 72 hrs:  · Hold home metformin  · Start SSI and accucheck Q6 while NPO    Abnormal CT of the head  Assessment & Plan  · CT head - There is a 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   · No indications for changes in treatment at this time  · Outpatient follow up    Thyroid nodule greater than or equal to 1 5 cm in diameter incidentally noted on imaging study  Assessment & Plan  · Incidental 2 9 cm right thyroid nodule reported on CT scan  · Recommends nonemergent thyroid u/s when clinically improved  · Upon further chart review- pt was been seen OP by Dr Kaushik gonzales biopsy of right and left thyroid nodules in 2/2021       Class 3 severe obesity with serious comorbidity and body mass index (BMI) of 60 0 to 69 9 in adult [facility])  Assessment & Plan  · Nutrition consult  · Encourage weight management     ARIAN on CPAP  Assessment & Plan  · Hx ARIAN, wears CPAP +15 at hs PTA  · Currently on Bipap 12/6 35%, pt tolerating well  · Continue Bipap, will need to transition back to home CPAP when clinically improved  Family instructed to bring in home unit        ----------------------------------------------------------------------------------------  HPI/24hr events: Pt admitted yesterday w sepsis secondary to LE cellulitis  Was placed on bipap in ED for increased WOB  Remained comfortable to bipap overnight  CT scan of RLE was negative for hematoma or abscess  Noted for edema/ cellulitis  Remainedin Afib RVR which slowly improved overnight w iv Lopressor, Gently IVF and pain control  Lactic cleared overnight  C/o RLE pain but denies other pain  States feeling improved compared to when admitted  Disposition: Continue Stepdown Level 1 level of care   Code Status: Level 1 - Full Code  ---------------------------------------------------------------------------------------  SUBJECTIVE    Review of systems was reviewed and negative unless stated above in HPI/24-hour events   ---------------------------------------------------------------------------------------  OBJECTIVE    Vitals   Vitals:    21 2100 21 0000   BP: 112/61 101/74 123/63 100/70   BP Location: Right arm   Right arm   Pulse: (!) 138 (!) 138 (!) 140 (!) 143   Resp: (!) 27 (!) 31 (!) 26 (!) 32   Temp: 97 8 °F (36 6 °C)   98 °F (36 7 °C)   TempSrc: Axillary   Temporal   SpO2: 99% 99% 98% 99%   Weight:       Height:         Temp (24hrs), Av 5 °F (36 9 °C), Min:97 8 °F (36 6 °C), Max:99 2 °F (37 3 °C)  Current: Temperature: 97 8 °F (36 6 °C)          Respiratory:  SpO2: SpO2: 99 %, SpO2 Activity: SpO2 Activity: At Rest, SpO2 Device: O2 Device: BiPAP       Invasive/non-invasive ventilation settings   Respiratory    Lab Data (Last 4 hours)    None         O2/Vent Data (Last 4 hours)       [id number]          Non-Invasive Ventilation Mode BiPAP                   Physical Exam  Vitals signs and nursing note reviewed  Constitutional:       General: She is sleeping  She is not in acute distress  Appearance: She is morbidly obese  She is ill-appearing  She is not diaphoretic  Interventions: Face mask in place  HENT:      Head: Normocephalic and atraumatic  Nose: No congestion  Mouth/Throat:      Mouth: Mucous membranes are dry  Pharynx: No oropharyngeal exudate  Comments: Poor dentition and oral care  Teeth and mucous membranes dry  Eyes:      Conjunctiva/sclera: Conjunctivae normal       Pupils: Pupils are equal, round, and reactive to light  Neck:      Musculoskeletal: Neck supple  No neck rigidity  Trachea: No tracheal deviation  Cardiovascular:      Rate and Rhythm: Rhythm irregular  Pulses: Normal pulses  Heart sounds: Normal heart sounds  No murmur  No friction rub  Pulmonary:      Effort: Pulmonary effort is normal  No respiratory distress  Breath sounds: Normal breath sounds  No wheezing, rhonchi or rales  Comments: On bipap  Lungs clear, diminished bilat  Abdominal:      General: Bowel sounds are normal  There is no distension  Palpations: Abdomen is soft  Tenderness: There is no abdominal tenderness  There is no guarding  Comments: Abd large, obese  Genitourinary:     Comments: Holden, draining dark yellow cloudy urine  Musculoskeletal: Normal range of motion  General: Swelling present  Right lower leg: Edema present  Left lower leg: Edema present  Skin:     General: Skin is warm  Capillary Refill: Capillary refill takes less than 2 seconds  Findings: Bruising, ecchymosis and wound present  Comments: RLE with multiple wounds  3 venous stasis ulcer distal LE  Ecchymosis in areas surrounding wounds  See media for pics  Wound to sacral area, under pannis   Neurological:      General: No focal deficit present  Mental Status: She is oriented to person, place, and time and easily aroused     Psychiatric: Mood and Affect: Mood normal          Behavior: Behavior is cooperative  Laboratory and Diagnostics:  Results from last 7 days   Lab Units 05/16/21  1407   WBC Thousand/uL 26 68*   HEMOGLOBIN g/dL 11 9   HEMATOCRIT % 37 8   PLATELETS Thousands/uL 209   BANDS PCT % 15*   MONO PCT % 3*     Results from last 7 days   Lab Units 05/16/21  1925 05/16/21  1407   SODIUM mmol/L 128* 129*   POTASSIUM mmol/L 5 5* 5 1   CHLORIDE mmol/L 99* 96*   CO2 mmol/L 17* 19*   ANION GAP mmol/L 12 14*   BUN mg/dL 65* 64*   CREATININE mg/dL 2 48* 2 40*   CALCIUM mg/dL 8 3 9 2   GLUCOSE RANDOM mg/dL 94 96   ALT U/L  --  22   AST U/L  --  31   ALK PHOS U/L  --  124*   ALBUMIN g/dL  --  2 1*   TOTAL BILIRUBIN mg/dL  --  2 72*     Results from last 7 days   Lab Units 05/16/21  1407   MAGNESIUM mg/dL 2 6      Results from last 7 days   Lab Units 05/16/21  1407   INR  5 53*      Results from last 7 days   Lab Units 05/16/21  2034 05/16/21  1415   TROPONIN I ng/mL 0 03 0 04     Results from last 7 days   Lab Units 05/16/21  2205 05/16/21  1925 05/16/21  1645 05/16/21  1407   LACTIC ACID mmol/L 2 1* 2 9* 3 2* 2 9*     ABG:    VBG:  Results from last 7 days   Lab Units 05/16/21  1645   PH FABIOLA  7 398   PCO2 FABIOLA mm Hg 27 9*   PO2 FABIOLA mm Hg 48 8*   HCO3 FABIOLA mmol/L 16 8*   BASE EXC FABIOLA mmol/L -6 6           Micro  Results from last 7 days   Lab Units 05/16/21  1425 05/16/21  1416   BLOOD CULTURE  Received in Microbiology Lab  Culture in Progress  Received in Microbiology Lab  Culture in Progress  EKG: Afib rate 90's -130's  Imaging: I have personally reviewed pertinent reports  and I have personally reviewed pertinent films in PACS     CT RLE; No evidence of focal hematoma or discernible abscess within limitations of noncontrast imaging  Asymmetric diffuse fat stranding throughout the right thigh region becoming less pronounced below the level of the knee  These findings could be secondary to edema and/or cellulitis    Some subcutaneous edema in the right flank would favor at least some component of generalized edema  Intake and Output  I/O       05/15 0701 - 05/16 0700 05/16 0701 - 05/17 0700    IV Piggyback  1000    Total Intake(mL/kg)  1000 (6 6)    Urine (mL/kg/hr)  160    Total Output  160    Net  +840                Height and Weights   Height: 5' 2" (157 5 cm)  IBW (Ideal Body Weight): 50 1 kg  Body mass index is 60 89 kg/m²  Weight (last 2 days)     Date/Time   Weight    05/16/21 1912   (!) 151 (332 89)    05/16/21 1352   (!) 157 (347 22)                Nutrition       Diet Orders   (From admission, onward)             Start     Ordered    05/16/21 1912  Diet NPO  Diet effective now     Question Answer Comment   Diet Type NPO    RD to adjust diet per protocol?  Yes        05/16/21 1911                  Active Medications  Scheduled Meds:  Current Facility-Administered Medications   Medication Dose Route Frequency Provider Last Rate    acetaminophen  650 mg Oral Q6H PRN Kirke Earing, CRNP      cefepime  2,000 mg Intravenous Q24H Salena A Carmen, CRNP Stopped (05/16/21 2000)    fentanyl citrate (PF)  25 mcg Intravenous Q2H PRN Kirke Earing, CRNP      insulin lispro  2-12 Units Subcutaneous Q6H [address] Salena A Ramella, CRNP      metoprolol  5 mg Intravenous Q4H PRN Kirke Earing, CRNP      sodium chloride (PF)  3 mL Intravenous Q1H PRN Salena A Ramella, CRNP      sodium chloride  500 mL Intravenous Once Kirke Earing, CRNP 500 mL (05/16/21 2304)    vancomycin  15 mg/kg (Adjusted) Intravenous Q24H Salena A Ramella, CRNP       Continuous Infusions:     PRN Meds:   acetaminophen, 650 mg, Q6H PRN  fentanyl citrate (PF), 25 mcg, Q2H PRN  metoprolol, 5 mg, Q4H PRN  sodium chloride (PF), 3 mL, Q1H PRN        Invasive Devices Review  Invasive Devices     Peripheral Intravenous Line            Peripheral IV 05/16/21 Right Arm 1 day    Peripheral IV 05/16/21 Left;Ventral (anterior) Wrist less than 1 day Drain            Urethral Catheter Double-lumen; Latex 16 Fr  less than 1 day                Rationale for remaining devices: ward- strict I&O  ---------------------------------------------------------------------------------------  Advance Directive and Living Will:      Power of :    POLST:    ---------------------------------------------------------------------------------------  Care Time Delivered:   Upon my evaluation, this patient had a high probability of imminent or life-threatening deterioration due to sepsis, Afib RVR, resp failure, which required my direct attention, intervention, and personal management  I have personally provided 25 minutes (between 0005 to 0345) of critical care time, exclusive of procedures, teaching, family meetings, and any prior time recorded by providers other than myself  MELBA Becker      Portions of the record may have been created with voice recognition software  Occasional wrong word or "sound a like" substitutions may have occurred due to the inherent limitations of voice recognition software    Read the chart carefully and recognize, using context, where substitutions have occurred

## 2021-05-17 NOTE — ASSESSMENT & PLAN NOTE
· Chronic with concern for acute cellulitis, possible abscess or hematoma  · Was being followed outpt since spring of 2020 by Dr Aneesh gonzales [facility] for 3 venous insufficiency ulcers with acquired lymphedema of LLE  · Referred to vascular surgery and was seen by Dr Davide Smith on 4/6/2021  Pt w normal arterial studies and was recommended for possible ablation of great saphenous and small saphenous veins which may help her wound healing  Pt was considering her options (per office note in care everywhere)  · See media for imaging  · CT Right leg - No evidence of focal hematoma or discernible abscess within limitations of noncontrast imaging  Asymmetric diffuse fat stranding throughout the right thigh region becoming less pronounced below the level of the knee  These findings could be secondary to edema and/or cellulitis  Some subcutaneous edema in the right flank would favor at least some component of generalized edema  · See ABX under sepsis  · Wound care consult    · Specialty bed given LLE wounds and obesity  · Pain - tylenol, tramadol and fentanyl prn

## 2021-05-17 NOTE — ASSESSMENT & PLAN NOTE
· POA a/e/b - reported fevers prior to admission, tachycardia, tachypnea, leukocytosis with bandemia, elevated lactate, Cr, and T Bili  · Likely source lower extremity cellulitis  · Concern for left TKR hardware given pain and positive BC  · In the ED received initial 500 cc IVF bolus followed by additional 1 5L IVF, given Vanco and ordered Clindamycin but was not infused  · BC - 2/2 positive GPC  · Repeat BC drawn 5/18  · UA - not indicative of UTI  · PCT - 11 82, repeat pending  · CT right LE - no abscess  · CXR - no infiltrate or concern for pna  · COVID - negative  · ABX - Cefepime and Vanco  · D/C Cefepime as no GN growth  · Continue Vanco for GPC in pairs and chains  · Lactate trend 2 8 > 3 2 > 2 1 > 1 4

## 2021-05-17 NOTE — INCIDENTAL FINDINGS
The following findings require follow up:  Radiographic finding   Findin 8 cm right external iliac chain lymph node, nonspecific, possibly reactive due to findings in the right lower extremity        Follow up required: CT follow up   Follow up should be done within 3  month(s)  Please notify the following clinician to assist with the follow up:   Dr PATRICK Love Cap

## 2021-05-17 NOTE — QUICK NOTE
Updated brother, Dereck Espinal, at bedside per patient request   He provided history to include that the patient had slid out of bed onto the floor on Thursday May 13th and was there approximately 3 hours until he came back to check on her  At that time he called EMS to assist getting her off the floor  She refused to go to the hospital   He states she had not been feeling well since Tuesday May 11th when she had a fever and went to her PCP for a COVID test   The COVID test was negative and a V/Q scan was indeterminate, which was what prompted her to go to an ER for a CTA which was negative for PE  She then went home and they deny any further fevers to their knowledge  On Friday May 14th, he states she got out of bed to a chair where she was all day  He then assisted her getting back into bed with a repeat of minimal activity and minimal p o  Intake on May 15th  She did continue taking her Bumex  The morning of May 16th she was altered, SOB, and weak yet refusing to go to the emergency room  He called a friend who is a physician assistant who came over assessed her and convinced her to allow him to call EMS

## 2021-05-17 NOTE — ASSESSMENT & PLAN NOTE
· Incidental 2 9 cm right thyroid nodule reported on CT scan  · Recommends nonemergent thyroid u/s when clinically improved  · Upon further chart review- pt was been seen OP by Dr Nu gonzales biopsy of right and left thyroid nodules in 2/2021

## 2021-05-17 NOTE — ASSESSMENT & PLAN NOTE
Wt Readings from Last 3 Encounters:   05/16/21 (!) 151 kg (332 lb 14 3 oz)   09/05/20 (!) 155 kg (341 lb)         · Last ECHO 8/2020:  EF 50-55%, RV moderately reduced systolic function, PA pressure 60 mmHg, b/l atrial enlargement, mild MR and TR  · On admission BNP 6,213  · Despite lower extremity edema and elevated BNP pt appears vascularly dry and not in acute CHF exacerbation  · Caution with IVF resuscitation given tenuous cardiac status  · Hold home Toprol and Bumex  · May need repeat Echo if she does not improve or if need to evaluate fluid status further

## 2021-05-17 NOTE — ASSESSMENT & PLAN NOTE
· Longstanding history of Afib, maintained on Toprol  mg daily and Xarelto  · Hold home Toprol with current soft BP  · Hold home Xarelto due to supra therapeutic INR  · Lopressor 5mg IV q4 PRN HR > 130 with hold parameters  · Consider amiodarone +/ gtt for rate control if she has worsening hypotension requiring vasopressors  · Goal HR < 130

## 2021-05-17 NOTE — ASSESSMENT & PLAN NOTE
Lab Results   Component Value Date    HGBA1C 6 1 (H) 05/16/2021       Recent Labs     05/17/21  0220 05/17/21  1231 05/17/21  1706 05/17/21  2257   POCGLU 131 97 123 127       Blood Sugar Average: Last 72 hrs:  · Hold home metformin  · Continue SSI and accucheck Q6 till improved PO intake  · Transition to ACHS SSI when consistently tolerating diet

## 2021-05-17 NOTE — ASSESSMENT & PLAN NOTE
· Likely secondary to dehydration   · Na 129 on admission  · Cont IVF resuscitation  · Trend BMP  · Monitor neuro status

## 2021-05-17 NOTE — DISCHARGE INSTR - OTHER ORDERS
Skin care plans:  1-Hydraguard to bilateral heels BID and PRN  2-Elevate heels to offload pressure  3-Ehob cushion in chair when out of bed  4-Moisturize skin daily with skin nourishing cream   5-Turn/reposition q2h or when medically stable for pressure re-distribution on skin  6-Cleanse right anterior tibia with nss, apply Dermagran abd hamilton  Change QOD  7- Apply Santyl to medial upper thigh wound bed cover with Allevyn foam to protect surrounding skin  Change daily and prn  8-Adaptic gauze  to right thigh absorbing hematoma  Cover with ABD and wrap with hamilton  Change daily and prn drainage  9- Allevyn foam to buttocks, gluteal cleft   Change QOD  10- Marce antifungal/moisture barrier to skin folds BID

## 2021-05-17 NOTE — ASSESSMENT & PLAN NOTE
· Secondary to severe sepsis and dehydration in setting of home losartan and Bumex use  · Baseline appears around 1 0 based on labs earlier this year; previously around 0 7 in 2018  · Hold home losartan and Bumex  · Cont IVF resuscitation  · Maintain ward, strict I/O  · Avoid nephrotoxins, hypotension  · Trend BMP

## 2021-05-17 NOTE — PROGRESS NOTES
Vancomycin Assessment    Gilbert Urena is a 70 y o  female who is currently receiving vancomycin 1250 mg IV every 24 hours for skin-soft tissue infection     Relevant clinical data and objective history reviewed:  Creatinine   Date Value Ref Range Status   05/16/2021 2 48 (H) 0 60 - 1 30 mg/dL Final     Comment:     Standardized to IDMS reference method   05/16/2021 2 40 (H) 0 60 - 1 30 mg/dL Final     Comment:     Standardized to IDMS reference method     /59   Pulse (!) 147   Temp 99 2 °F (37 3 °C) (Temporal)   Resp (!) 42   Ht 5' 2" (1 575 m)   Wt (!) 151 kg (332 lb 14 3 oz)   SpO2 (!) 89%   BMI 60 89 kg/m²   I/O last 3 completed shifts: In: 500 [IV Piggyback:500]  Out: 100 [Urine:100]  Lab Results   Component Value Date/Time    BUN 65 (H) 05/16/2021 07:25 PM    WBC 26 68 (H) 05/16/2021 02:07 PM    HGB 11 9 05/16/2021 02:07 PM    HCT 37 8 05/16/2021 02:07 PM    MCV 81 (L) 05/16/2021 02:07 PM     05/16/2021 02:07 PM     Temp Readings from Last 3 Encounters:   05/16/21 99 2 °F (37 3 °C) (Temporal)   09/05/20 98 °F (36 7 °C)     Vancomycin Days of Therapy: 1    Assessment/Plan  The patient is currently on vancomycin utilizing scheduled dosing based on adjusted body weight (due to obesity)  The patient is currently receiving 1250 mg IV every 24 hours and is clinically appropriate and dose will be continued  Pharmacy will also follow closely for s/sx of nephrotoxicity, infusion reactions, and appropriateness of therapy  BMP and CBC will be ordered per protocol  Plan for trough as patient approaches steady state, prior to the 4th  dose at approximately 1430 on 5/19/21  Due to infection severity, will target a trough of 15-20 (appropriate for most indications)   Pharmacy will continue to follow the patients culture results and clinical progress daily      Abhay Morales, Pharmacist

## 2021-05-17 NOTE — ASSESSMENT & PLAN NOTE
· Hx ARIAN, wears CPAP +15 at hs PTA  · Currently on Bipap 12/6 35%, pt tolerating well  · Continue Bipap, will need to transition back to home CPAP when clinically improved  Family instructed to bring in home unit

## 2021-05-17 NOTE — ASSESSMENT & PLAN NOTE
· Multifactorial - baseline pulm HTN with RV dysfunction, ARIAN on Cpap, Obesity Hypoventilation Syndrome  · In the ED SpO2 90% on room air with increased WOB  · Placed on 6L NC with improvement in SpO2 however no improvement in WOB  · Weaned off Bipap as tolerated  · Currently on 1-2 L NC  · Caution with IVF resuscitation in setting of CHF  · Continue home CPAP HS

## 2021-05-17 NOTE — ASSESSMENT & PLAN NOTE
· Secondary to severe sepsis and dehydration in setting of home losartan and Bumex use  · Baseline appears around 1 0 based on labs from earlier this month; previously around 0 7 in 2018  · Hold home losartan and Bumex  · Cont IVF resuscitation  · Maintain ward, strict I/O  · Check renal US - no hydronephrosis  · Patient with h/o left ureteral stent  · Avoid nephrotoxins, hypotension  · Trend BMP

## 2021-05-18 PROBLEM — R79.1 ELEVATED INR: Status: RESOLVED | Noted: 2021-01-01 | Resolved: 2021-01-01

## 2021-05-18 PROBLEM — E87.5 HYPERKALEMIA: Status: RESOLVED | Noted: 2021-01-01 | Resolved: 2021-01-01

## 2021-05-18 PROBLEM — J96.01 ACUTE RESPIRATORY FAILURE WITH HYPOXIA (HCC): Status: RESOLVED | Noted: 2021-01-01 | Resolved: 2021-01-01

## 2021-05-18 NOTE — ASSESSMENT & PLAN NOTE
Wt Readings from Last 3 Encounters:   05/18/21 (!) 156 kg (344 lb 5 7 oz)   09/05/20 (!) 155 kg (341 lb)     · Last ECHO 8/2020:  EF 50-55%, RV moderately reduced systolic function, PA pressure 60 mmHg, b/l atrial enlargement, mild MR and TR  · On admission BNP 6,213  · Despite lower extremity edema and elevated BNP pt appears vascularly dry and not in acute CHF exacerbation  · Caution with IVF resuscitation given tenuous cardiac status  · Hold home Toprol XL and Bumex  · Repeat Echo ordered - EF 55-60%, no RWMA identified, moderate concentric hypertrophy of LV, RV normal size and systolic function, LA markedly dilated, IVC markedly dilated with respirophasic changes blunted (< 50% variation)

## 2021-05-18 NOTE — ASSESSMENT & PLAN NOTE
· Chronic with concern for acute cellulitis, possible abscess or hematoma  · Was being followed outpt since spring of 2020 by Dr Mendy gonzales [facility] for 3 venous insufficiency ulcers with acquired lymphedema of LLE  · Referred to vascular surgery and was seen by Dr Gini Smith on 4/6/2021  Pt w normal arterial studies and was recommended for possible ablation of great saphenous and small saphenous veins which may help her wound healing  Pt was considering her options (per office note in care everywhere)  · See media for imaging  · CT Right leg - No evidence of focal hematoma or discernible abscess within limitations of noncontrast imaging  Asymmetric diffuse fat stranding throughout the right thigh region becoming less pronounced below the level of the knee  These findings could be secondary to edema and/or cellulitis  Some subcutaneous edema in the right flank would favor at least some component of generalized edema  · See ABX under sepsis  · Wound care consult    · General Surgery consulted- hold off on repeat CT for now  · Specialty bed given LLE wounds and obesity  · Pain - tylenol, tramadol and fentanyl prn

## 2021-05-18 NOTE — TELEMEDICINE
Consultation - Infectious Disease   Scott Miguel 70 y o  female MRN: [id number]  Unit/Bed#: -01 Encounter: [id number]      Inpatient consult to Infectious Diseases  Consult performed by: Cornel Mulligan MD  Consult ordered by: Ivon Pabon PA-C            REQUIRED DOCUMENTATION:     1  This service was provided via Telemedicine  2  Provider located at [facility]  3  TeleMed provider: Cornel Mulligan MD   4  Identify all parties in room with patient during tele consult:RN  5  After connecting through [place], patient was identified by name and date of birth and assistant checked wristband  Patient was then informed that this was a Telemedicine visit and that the exam was being conducted confidentially over secure lines  My office door was closed  No one else was in the room  Patient acknowledged consent and understanding of privacy and security of the Telemedicine visit, and gave us permission to have the assistant stay in the room in order to assist with the history and to conduct the exam   I informed the patient that I have reviewed their record in Epic and presented the opportunity for them to ask any questions regarding the visit today  The patient agreed to participate  Assessment/Recommendations     1  Sepsis, present on admission  - Source of sepsis is bacteremia  - Clinically stable    · Management as below    2  Group A strep Bacteremia  - Source of bacteremia is right leg cellulitis  - TTE without vegetation  - Clinically stable, afebrile but with worsening leukocytosis    · Discontinue Vancomycin and switch to Cefazolin 2g iv q8h  · FU repeat blood cultures  · Duration of therapy to be defined, anticipate 2 weeks of therapy with oral keflexon discharge    3   Right leg cellulitis  - Portal of entry is likely chronic open leg wounds in the setting of morbid obesity, chronic lymphedema  - Initial CT without evidence of abscess  - No clinical evidence of deep space infection/necrotizing fasciitis but concern for evolving abscess given worsening leukocytosis     · Antibiotic management as above  · Recommend repeat CT RLE  · Recommend surgery consult  · Discussed lower extremity skin care, management of venous edema with oral diuretics, compression stockings, limb elevation  · Discussed natural history of cellulitis and discussed with patient that she is at risk for recurrent cellulitis/bacteremia if underlying risk factors cannot be modified    4  Leukocytosis  - Possibly reactive v/s evolving leg abscess    · Recommend surgery and wound care consult to evaluate for need for debridement    5  Obesity, lymphedema, pre diabetes  - A1C 6 1  - Risk factor for immunosuppression, poor wound healing    · Management per primary team and as above    Will follow  Thank you for involving me in the care of your patient  Please call with questions, change in clinical status or if tests recommended above are abnormal      Discussed with the primary service  History     Reason for Consult: Bacteremia, cellulitis  HPI: Emily Hollingsworth is a 70y o  year old female with morbid obesity, type 2 diabetes, lymphedema  She has had chronic non healing venous ulcers over her right leg  She presented to the ER on 5/16 with a mechanical fall in the setting of generalized weakness, fatigue and right leg pain  In the ER, vitals were notable for tachycardia, tachypnea   Labs were notable for lactic acidosis with leukocytosis and elevated creatinine  EKG noted no acute ST changes  CT right leg noted finidings of cellulitis  She was admitted and started on vanc, cefepime  Blood cultures from admission are growing GAS  She has had progressive worsening leukocytosis, white count is now 44K  No fever  Denies nausea, vomiting, diarrhea or rash and is tolerating antibiotics well  Infectious disease is being consulted for diagnostic work up and antibiotic management      Review of Systems  Pertinent positives and negatives as noted in HPI  Rest complete 12 point system-based review of systems is otherwise negative  PAST MEDICAL HISTORY:  Past Medical History:   Diagnosis Date    Coronary artery disease     Diabetes mellitus ([address] )     Hypertension     Lymphedema     Sleep apnea      Past Surgical History:   Procedure Laterality Date    CHOLECYSTECTOMY      TONSILLECTOMY         FAMILY HISTORY:  Non-contributory    SOCIAL HISTORY:  Social History   Single  Social History     Substance and Sexual Activity   Alcohol Use Not Currently     Social History     Substance and Sexual Activity   Drug Use Not Currently     Social History     Tobacco Use   Smoking Status Never Smoker   Smokeless Tobacco Never Used       ALLERGIES:  Allergies   Allergen Reactions    Iodinated Diagnostic Agents Anaphylaxis    Metronidazole Seizures           Cimetidine Anxiety    Diltiazem Rash    Penicillins Rash       MEDICATIONS:  All current active medications have been reviewed      Physical Exam     Temp:  [96 9 °F (36 1 °C)-98 4 °F (36 9 °C)] 97 4 °F (36 3 °C)  HR:  [] 113  Resp:  [23-42] 29  BP: ()/(51-76) 152/76  SpO2:  [93 %-98 %] 95 %  Temp (24hrs), Av 7 °F (36 5 °C), Min:96 9 °F (36 1 °C), Max:98 4 °F (36 9 °C)  Current: Temperature: (!) 97 4 °F (36 3 °C)    Intake/Output Summary (Last 24 hours) at 2021 1135  Last data filed at 2021 1101  Gross per 24 hour   Intake 3640 05 ml   Output 1280 ml   Net 2360 05 ml         Physical exam findings reported by bedside and primary medical team staff    General Appearance:  Appearing ill, nontoxic, and in no distress, appears stated age   Head:  Normocephalic, without obvious abnormality, atraumatic   Eyes:  PERRL, conjunctiva pink and sclera anicteric, both eyes   Nose: Nares normal, mucosa normal, no drainage   Throat: Oropharynx moist without lesions; lips, mucosa, and tongue normal; teeth and gums normal   Neck: Supple, symmetrical, trachea midline, no adenopathy, no tenderness/mass/nodules   Back:   Symmetric, no curvature, ROM normal, no CVA tenderness   Lungs:   Diminished   Chest Wall:  No tenderness or deformity   Heart:  Irregular   Abdomen:   Soft, non-tender, non-distended, positive bowel sounds, no masses, no organomegaly    No CVA tenderness   Extremities: Extremities normal, atraumatic, no cyanosis, clubbing or edema   Skin: Multiple open wounds right leg with surrounding erythema and ecchymosis  No crepitus  Pressure ulcers under pannus and sacrum   Lymph nodes: Cervical, supraclavicular, and axillary nodes normal   Neurologic: Alert and oriented times 3       Invasive Devices:   Peripheral IV 05/16/21 Left;Ventral (anterior) Wrist (Active)   Site Assessment WDL 05/18/21 1100   Dressing Type Transparent 05/18/21 1100   Line Status Saline locked; Flushed 05/18/21 1100   Dressing Status Clean;Dry; Intact 05/18/21 1100   Reason Not Rotated Not due 05/18/21 1100       Peripheral IV 05/16/21 Right Arm (Active)   Site Assessment WD 05/18/21 1100   Dressing Type Transparent 05/18/21 1100   Line Status Saline locked 05/18/21 1100   Dressing Status Clean;Dry; Intact 05/18/21 1100   Reason Not Rotated Not due 05/18/21 1100       Urethral Catheter Double-lumen; Latex 16 Fr  (Active)   Amt returned on insertion(mL) 100 mL 05/16/21 1430   Reasons to continue Urinary Catheter  Stage III/IV sacral ulcers or open perineal wounds in incontinent patients 05/17/21 1930   Site Assessment Painful;Red 05/18/21 0400   Collection Container Standard drainage bag 05/17/21 1930   Securement Method Securing device (Describe) 05/17/21 1930   Output (mL) 300 mL 05/18/21 1101       Labs, Imaging, & Other Studies     Lab Results:    I have personally reviewed pertinent labs      Results from last 7 days   Lab Units 05/18/21  1043 05/18/21  0441 05/17/21  1456   WBC Thousand/uL 44 03* 38 23* 35 68*   HEMOGLOBIN g/dL 10 6* 10 6* 10 7*   PLATELETS Thousands/uL 189 199 196     Results from last 7 days   Lab Units 05/18/21  0441  05/17/21  0500  05/16/21  1407   POTASSIUM mmol/L 4 6   < > 5 2   < > 5 1   CHLORIDE mmol/L 97*   < > 100   < > 96*   CO2 mmol/L 19*   < > 19*   < > 19*   BUN mg/dL 71*   < > 72*   < > 64*   CREATININE mg/dL 2 20*   < > 2 66*   < > 2 40*   EGFR ml/min/1 73sq m 22   < > 17   < > 19   CALCIUM mg/dL 9 1   < > 8 7   < > 9 2   AST U/L 33  --  35  --  31   ALT U/L 21  --  20  --  22   ALK PHOS U/L 160*  --  109  --  124*    < > = values in this interval not displayed  Results from last 7 days   Lab Units 05/18/21  0442 05/16/21  1425 05/16/21  1416   BLOOD CULTURE  Received in Microbiology Lab  Culture in Progress  Received in Microbiology Lab  Culture in Progress  Beta Hemolytic Streptococcus Group A* Beta Hemolytic Streptococcus Group A*   GRAM STAIN RESULT   --  Gram positive cocci in pairs and chains* Gram positive cocci in pairs and chains*       Imaging Studies:   I have personally reviewed pertinent imaging study reports and images in PACS  EKG, Pathology, and Other Studies:   I have personally reviewed pertinent reports  Counseling/Coordination of care: Total 70 minutes communication with the patient via telehealth  Labs, medical tests and imaging studies were independently and extensively reviewed by me as noted above in HPI and old records were obtained and summarized as noted above in HPI  My recommendations were discussed with the patient in detail who verbalized understanding

## 2021-05-18 NOTE — ASSESSMENT & PLAN NOTE
· POA a/e/b - reported fevers prior to admission, tachycardia, tachypnea, leukocytosis with bandemia, elevated lactate, Cr, and T Bili  · Likely source lower extremity cellulitis  · Concern for left TKR hardware given pain and positive BC  · In the ED received initial 500 cc IVF bolus followed by additional 1 5L IVF, given Vanco and ordered Clindamycin but was not infused  · BC - 2/2 Beta Hem Strep Group A  · Repeat BC drawn 5/18  · UA - not indicative of UTI  · PCT - 11 82, repeat 13 99  · CT right LE - no abscess  · CXR - no infiltrate or concern for pna  · COVID - negative  · ABX - Cefepime and Vanco  · D/C Cefepime as no GN growth  · Continue Vanco for GPC in pairs and chains  · ID consulted and changed to Ancef on 5/18  · Lactate trend 2 8 > 3 2 > 2 1 > 1 4

## 2021-05-18 NOTE — ASSESSMENT & PLAN NOTE
· Incidental 2 9 cm right thyroid nodule reported on CT scan  · Recommends nonemergent thyroid u/s when clinically improved  · Upon further chart review- pt was been seen OP by Dr Clarisa gonzales biopsy of right and left thyroid nodules in 2/2021

## 2021-05-18 NOTE — PLAN OF CARE
Problem: PHYSICAL THERAPY ADULT  Goal: Performs mobility at highest level of function for planned discharge setting  See evaluation for individualized goals  Description: Treatment/Interventions: Functional transfer training, LE strengthening/ROM, Elevations, Therapeutic exercise, Endurance training, Patient/family training, Equipment eval/education, Bed mobility, Gait training, Compensatory technique education, Spoke to nursing, Spoke to case management, OT  Equipment Recommended: (TBD by rehab)       See flowsheet documentation for full assessment, interventions and recommendations  [id number] by Maria Guadalupe Quintanilla PT  Note: Prognosis: Fair  Problem List: Decreased strength, Decreased endurance, Impaired balance, Decreased mobility, Decreased cognition, Impaired judgement, Decreased safety awareness, Obesity, Decreased skin integrity, Pain   Pt requires increased time and assistance to complete all mobility  She was able to tolerate standing just long enough to allow recliner chair to be moved behind patient to sit  Unable to trial wt shifting due to fatigue and pain  She is limited by decreased strength, balance, endurance and increased pain  She will continue to benefit from PT services to increase mobility as able and facilitate return to home  Barriers to Discharge: Decreased caregiver support, Inaccessible home environment  Barriers to Discharge Comments: requires increased assistance to complete mobility, unable to spt     PT Discharge Recommendation: Post acute rehabilitation services     PT - OK to Discharge: (when medically cleared to rehab)    See flowsheet documentation for full assessment

## 2021-05-18 NOTE — PROGRESS NOTES
[address]orgensen  Progress Note - Everton Delong 1945, 70 y o  female MRN: [id number]  Unit/Bed#: -01 Encounter: [id number]  Primary Care Provider: Valery Fall MD   Date and time admitted to hospital: 5/16/2021  1:33 PM    * Severe sepsis ([address] )  Assessment & Plan  · POA a/e/b - reported fevers prior to admission, tachycardia, tachypnea, leukocytosis with bandemia, elevated lactate, Cr, and T Bili  · Likely source lower extremity cellulitis  · Concern for left TKR hardware given pain and positive BC  · In the ED received initial 500 cc IVF bolus followed by additional 1 5L IVF, given Vanco and ordered Clindamycin but was not infused  · BC - 2/2 positive GPC  · Repeat BC drawn 5/18  · UA - not indicative of UTI  · PCT - 11 82, repeat pending  · CT right LE - no abscess  · CXR - no infiltrate or concern for pna  · COVID - negative  · ABX - Cefepime and Vanco  · D/C Cefepime as no GN growth  · Continue Vanco for GPC in pairs and chains  · Lactate trend 2 8 > 3 2 > 2 1 > 1 4    Wound of lower extremity  Assessment & Plan  · Chronic with concern for acute cellulitis, possible abscess or hematoma  · Was being followed outpt since spring of 2020 by Dr Veronica Okeefe w [facility] for 3 venous insufficiency ulcers with acquired lymphedema of LLE  · Referred to vascular surgery and was seen by Dr Yvonne Mandujano w [facility] on 4/6/2021  Pt w normal arterial studies and was recommended for possible ablation of great saphenous and small saphenous veins which may help her wound healing  Pt was considering her options (per office note in care everywhere)  · See media for imaging  · CT Right leg - No evidence of focal hematoma or discernible abscess within limitations of noncontrast imaging  Asymmetric diffuse fat stranding throughout the right thigh region becoming less pronounced below the level of the knee  These findings could be secondary to edema and/or cellulitis    Some subcutaneous edema in the right flank would favor at least some component of generalized edema  · See ABX under sepsis  · Wound care consult  · Specialty bed given LLE wounds and obesity  · Pain - tylenol, tramadol and fentanyl prn    Metabolic acidosis-resolved as of 5/17/2021  Assessment & Plan  · Secondary to severe sepsis and dehydration in setting of metformin use  · Compensated pH on VBG  · Hold metformin  · Cont IVF resuscitation as needed    Gram-positive bacteremia  Assessment & Plan  · GPC in pairs and clusters in 2/2 bottles from cultures drawn on 5/16  · Repeat cx ordered for 5/18  · Follow up on further cx results, sensitivities  · Cont vanc w pharmacy consult    Acute respiratory failure with hypoxia (HCC)  Assessment & Plan  · Multifactorial - baseline pulm HTN with RV dysfunction, ARIAN on Cpap, Obesity Hypoventilation Syndrome  · In the ED SpO2 90% on room air with increased WOB  · Placed on 6L NC with improvement in SpO2 however no improvement in WOB  · Weaned off Bipap as tolerated  · Currently on 1-2 L NC  · Caution with IVF resuscitation in setting of CHF  · Continue home CPAP HS    Atrial fibrillation with RVR (HCC)  Assessment & Plan  · Longstanding history of Afib, maintained on Toprol  mg daily and Xarelto  · Initially held home Toprol due to soft BP  · Resume Metoprolol 50 mg BID 5/17  · Hold home Xarelto due to supra therapeutic INR  Trend INR    · Lopressor 5mg IV q4 PRN HR > 130 with hold parameters  · Goal HR < 130    AYDEE (acute kidney injury) ([facility] [address] )  Assessment & Plan  · Secondary to severe sepsis and dehydration in setting of home losartan and Bumex use  · Baseline appears around 1 0 based on labs from earlier this month; previously around 0 7 in 2018  · Hold home losartan and Bumex  · Cont IVF resuscitation  · Maintain ward, strict I/O  · Check renal US - no hydronephrosis  · Patient with h/o left ureteral stent  · Avoid nephrotoxins, hypotension  · Trend BMP    Elevated INR  Assessment & Plan  · Elevated likely due to AYDEE  · INR 5 53 on admission  · Hold home Xarelto  · Trend INR    Hyperkalemia  Assessment & Plan  · Was prescribed PO potassium 20 mEq daily PTA     · Hold home med  · K was 5 1 then 5 5 on admit; likely secondary to sepsis, AYDEE  · EKG without changes  · Trend labs; treat as needed  · EKG PRN    Hyponatremia  Assessment & Plan  · Likely secondary to dehydration   · Na 129 on admission  · Cont IVF resuscitation  · Trend BMP  · Monitor neuro status    Chronic systolic heart failure (HCC)  Assessment & Plan  Wt Readings from Last 3 Encounters:   05/18/21 (!) 156 kg (344 lb 5 7 oz)   09/05/20 (!) 155 kg (341 lb)     · Last ECHO 8/2020:  EF 50-55%, RV moderately reduced systolic function, PA pressure 60 mmHg, b/l atrial enlargement, mild MR and TR  · On admission BNP 6,213  · Despite lower extremity edema and elevated BNP pt appears vascularly dry and not in acute CHF exacerbation  · Caution with IVF resuscitation given tenuous cardiac status  · Hold home Toprol and Bumex  · Repeat Echo ordered - EF 55-60%, no RWMA identified, moderate concentric hypertrophy of LV, RV normal size and systolic function, LA markedly dilated, IVC markedly dilated with respirophasic changes blunted (< 50% variation)    Hypertension  Assessment & Plan  · Pt was taking losartan and metoprolol XL prior to admission  · Resume home metoprolol    Type 2 diabetes mellitus, without long-term current use of insulin [facility])  Assessment & Plan  Lab Results   Component Value Date    HGBA1C 6 1 (H) 05/16/2021       Recent Labs     05/17/21  0220 05/17/21  1231 05/17/21  1706 05/17/21  2257   POCGLU 131 97 123 127       Blood Sugar Average: Last 72 hrs:  · Hold home metformin  · Continue SSI and accucheck Q6 till improved PO intake  · Transition to ACHS SSI when consistently tolerating diet    Class 3 severe obesity with serious comorbidity and body mass index (BMI) of 60 0 to 69 9 in adult [facility])  Assessment & Plan  · Nutrition consult  · Encourage weight management     ARIAN on CPAP  Assessment & Plan  · Hx ARIAN, wears CPAP +15 at hs PTA  · Required Bipap 12/6 35% overnight on day of admission, pt tolerated well  · Transitioned back to CPAP now that clinically improved  Family brought in home unit  Abnormal CT of the head  Assessment & Plan  · CT head - There is a 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   · No indications for changes in treatment at this time  · Outpatient follow up    Thyroid nodule greater than or equal to 1 5 cm in diameter incidentally noted on imaging study  Assessment & Plan  · Incidental 2 9 cm right thyroid nodule reported on CT scan  · Recommends nonemergent thyroid u/s when clinically improved  · Upon further chart review- pt was been seen OP by Dr Kate gonzales biopsy of right and left thyroid nodules in 2/2021      ----------------------------------------------------------------------------------------  HPI/24hr events: Yesterday, patient was weaned from BiPAP to nasal cannula  Weaned to 1 L NC during the day and then wore home CPAP overnight  Given total of 2 liters in IVF yesterday for low urine output  No additional bolus given overnight  Remained A  Fib with rates 90s-110s  Evening dose of metoprolol PO held due to SBP parameter of hold for < 110  No additional PRN metoprolol IV required/given  Patient denies pain and states she had "discomfort" in lower extremities  Denies chest pain, SOB, N/V  Patient transferred to specialty bed prior to bedtime      Disposition: Continue Stepdown Level 1 level of care   Code Status: Level 1 - Full Code  ---------------------------------------------------------------------------------------  SUBJECTIVE    Review of systems was reviewed and negative unless stated above in HPI/24-hour events   ---------------------------------------------------------------------------------------  OBJECTIVE    Vitals   Vitals:    05/17/21 2300 05/18/21 0000 05/18/21 0200 21 0400   BP:  107/63 116/65 123/70   BP Location:  Left arm  Left arm   Pulse: (!) 106 (!) 108 (!) 117 (!) 108   Resp: (!) 29 (!) 25 (!) 24 (!) 23   Temp:  97 9 °F (36 6 °C)  (!) 96 9 °F (36 1 °C)   TempSrc:  Axillary  Axillary   SpO2: 95% 95% 95% 93%   Weight:  (!) 156 kg (344 lb 5 7 oz)     Height:         Temp (24hrs), Av °F (36 7 °C), Min:96 9 °F (36 1 °C), Max:98 7 °F (37 1 °C)  Current: Temperature: (!) 96 9 °F (36 1 °C)          Respiratory:  SpO2: SpO2: 93 %, SpO2 Activity: SpO2 Activity: At Rest, SpO2 Device: O2 Device: CPAP(15)  Nasal Cannula O2 Flow Rate (L/min): 1 L/min    Invasive/non-invasive ventilation settings   Respiratory    Lab Data (Last 4 hours)    None         O2/Vent Data (Last 4 hours)    None                Physical Exam  Vitals signs reviewed  Constitutional:       General: She is not in acute distress  Appearance: She is well-developed  She is obese  She is ill-appearing  She is not diaphoretic  Comments: CPAP   HENT:      Head: Normocephalic and atraumatic  Nose: No congestion  Mouth/Throat:      Pharynx: Oropharynx is clear  No oropharyngeal exudate  Eyes:      General: No scleral icterus  Conjunctiva/sclera: Conjunctivae normal       Pupils: Pupils are equal, round, and reactive to light  Neck:      Musculoskeletal: Normal range of motion and neck supple  Vascular: No JVD  Cardiovascular:      Rate and Rhythm: Normal rate  Rhythm irregularly irregular  Heart sounds: Normal heart sounds  No murmur  No friction rub  Pulmonary:      Effort: Pulmonary effort is normal  No respiratory distress  Breath sounds: Wheezing present  No rales  Comments: Expiratory wheezing present  Abdominal:      General: Bowel sounds are normal  There is no distension  Palpations: Abdomen is soft  There is no mass  Tenderness: There is no abdominal tenderness  There is no guarding or rebound  Musculoskeletal: Normal range of motion  General: No tenderness or deformity  Right lower leg: Edema present  Left lower leg: Edema present  Skin:     General: Skin is warm  Capillary Refill: Capillary refill takes less than 2 seconds  Coloration: Skin is not pale  Findings: No erythema or rash  Comments: Bilateral lower extremity dressings - sanguinous drainage from wounds  See images in media  Neurological:      Mental Status: She is alert and oriented to person, place, and time  Cranial Nerves: No cranial nerve deficit  Psychiatric:         Behavior: Behavior normal  Behavior is cooperative           Laboratory and Diagnostics:  Results from last 7 days   Lab Units 05/18/21  0441 05/17/21  1456 05/17/21  0500 05/16/21  1407   WBC Thousand/uL 38 23* 35 68* 28 80* 26 68*   HEMOGLOBIN g/dL 10 6* 10 7* 10 5* 11 9   HEMATOCRIT % 32 8* 33 6* 32 5* 37 8   PLATELETS Thousands/uL 199 196 180 209   BANDS PCT %  --   --   --  15*   MONO PCT %  --   --   --  3*     Results from last 7 days   Lab Units 05/18/21 0441 05/17/21  1457 05/17/21  0500 05/17/21  0001 05/16/21  1925 05/16/21  1407   SODIUM mmol/L 129* 128* 130* 126* 128* 129*   POTASSIUM mmol/L 4 6 4 9 5 2 5 6* 5 5* 5 1   CHLORIDE mmol/L 97* 98* 100 99* 99* 96*   CO2 mmol/L 19* 17* 19* 14* 17* 19*   ANION GAP mmol/L 13 13 11 13 12 14*   BUN mg/dL 71* 74* 72* 68* 65* 64*   CREATININE mg/dL 2 20* 2 49* 2 66* 2 48* 2 48* 2 40*   CALCIUM mg/dL 9 1 8 8 8 7 8 3 8 3 9 2   GLUCOSE RANDOM mg/dL 103 120 114 102 94 96   ALT U/L 21  --  20  --   --  22   AST U/L 33  --  35  --   --  31   ALK PHOS U/L 160*  --  109  --   --  124*   ALBUMIN g/dL 1 8*  --  2 0*  --   --  2 1*   TOTAL BILIRUBIN mg/dL 2 61*  --  2 86*  --   --  2 72*     Results from last 7 days   Lab Units 05/18/21 0441 05/17/21  0500 05/17/21  0001 05/16/21  1407   MAGNESIUM mg/dL 3 0* 2 7* 2 8* 2 6   PHOSPHORUS mg/dL 3 9 4 4*  --   --       Results from last 7 days   Lab Units 05/18/21  0441 05/17/21  0500 05/16/21  1407   INR  2 18* 3 78* 5 53*      Results from last 7 days   Lab Units 05/16/21  2034 05/16/21  1415   TROPONIN I ng/mL 0 03 0 04     Results from last 7 days   Lab Units 05/17/21  1653 05/16/21  2205 05/16/21  1925 05/16/21  1645 05/16/21  1407   LACTIC ACID mmol/L 1 4 2 1* 2 9* 3 2* 2 9*     ABG:    VBG:  Results from last 7 days   Lab Units 05/17/21  0001   PH FABIOLA  7 401*   PCO2 FABIOLA mm Hg 24 3*   PO2 FABIOLA mm Hg 102 0*   HCO3 FABIOLA mmol/L 14 7*   BASE EXC FABIOLA mmol/L -8 4     Results from last 7 days   Lab Units 05/16/21  2040   PROCALCITONIN ng/ml 11 82*       Micro  Results from last 7 days   Lab Units 05/16/21  1425 05/16/21  1416   GRAM STAIN RESULT  Gram positive cocci in pairs and chains* Gram positive cocci in pairs and chains*       EKG: Atrial fibrillation on telemetry, rate 90s-110s  Imaging: I have personally reviewed pertinent reports  US kidney and bladder   Final Result by Estella Bergeron MD (05/17 1332)      1  No hydronephrosis  2   Right midpole simple cyst       3   Holden catheter in place though not well visualized  Bladder is slightly distended  Workstation performed: [id number]         XR knee 4+ vw left injury   Final Result by Albret Lozada MD (05/17 3664)   No acute osseous abnormality  Intact appearing total knee arthroplasty      Workstation performed: [id number]         CT lower extremity wo contrast right   Final Result by Yolis Wall DO (05/16 2019)      No evidence of focal hematoma or discernible abscess within limitations of noncontrast imaging  Asymmetric diffuse fat stranding throughout the right thigh region becoming less pronounced below the level of the knee  These findings could be secondary to edema and/or cellulitis  Some subcutaneous edema in the right flank would favor at least some    component of generalized edema        1 8 cm right external iliac chain lymph node, nonspecific, possibly reactive due to findings in the right lower extremity  CT follow-up is warranted in 3 months  Multiarticular degenerative changes as above  Workstation performed: [id number]         CT head without contrast   Final Result by Sophie Grande MD (05/16 1659)      No acute intracranial injury  There is a 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 (series 2 images 18-21 )  This was not visible on 9/5/2020 CT  Workstation performed: [id number]         CT spine cervical without contrast   Final Result by Sophie Grande MD (05/16 1702)      No cervical spine fracture or traumatic malalignment  Incidental 2 9 cm right thyroid nodule for which nonemergent thyroid ultrasound is recommended  Workstation performed: [id number]         XR hip/pelv 2-3 vws right if performed   Final Result by Sophie Grande MD (05/16 1703)      No acute osseous abnormality  Workstation performed: [id number]         XR knee 4+ vw right injury   Final Result by Sophie Grande MD (05/16 1705)      No acute osseous abnormality  Severe tricompartmental osteoarthritis  Workstation performed: [id number]         XR tibia fibula 2 views RIGHT   Final Result by Sophie Grande MD (05/16 1705)      No acute osseous abnormality  Workstation performed: [id number]         XR ankle 3+ views RIGHT   Final Result by Sophie Grande MD (05/16 1706)      No acute osseous abnormality  Severe osteoarthritis throughout the midfoot and hindfoot  Workstation performed: [id number]         XR chest 1 view   Final Result by Sophie Grande MD (05/16 1703)      No acute cardiopulmonary disease  There is cardiomegaly  Workstation performed: [id number]         VAS lower limb venous duplex study, complete bilateral   Final Result by Yvette Lazar MD (05/17 1258)          Intake and Output  I/O       05/16 0701 - 05/17 0700 05/17 0701 - 05/18 0700    P  O   1980    I V  (mL/kg)  3010 (19 3)    IV Piggyback 1000 250 1    Total Intake(mL/kg) 1000 (6 4) 5240 1 (33 6)    Urine (mL/kg/hr) 270 850 (0 2)    Total Output 270 850    Net +730 +4390 1                Height and Weights   Height: 5' 2" (157 5 cm)  IBW (Ideal Body Weight): 50 1 kg  Body mass index is 62 98 kg/m²  Weight (last 2 days)     Date/Time   Weight    05/18/21 0000   (!) 156 (344 36)    05/17/21 0456   (!) 156 (344 36)    05/16/21 1912   (!) 151 (332 89)    05/16/21 1352   (!) 157 (347 22)                Nutrition       Diet Orders   (From admission, onward)             Start     Ordered    05/17/21 0958  Diet Ken/CHO Controlled; Consistent Carbohydrate Diet Level 3 (6 carb servings/90 grams CHO/meal)  Diet effective now     Question Answer Comment   Diet Type Ken/CHO Controlled    Ken/CHO Controlled Consistent Carbohydrate Diet Level 3 (6 carb servings/90 grams CHO/meal)    RD to adjust diet per protocol?  Yes        05/17/21 0958                  Active Medications  Scheduled Meds:  Current Facility-Administered Medications   Medication Dose Route Frequency Provider Last Rate    acetaminophen  650 mg Oral Q6H PRN Krystle HardMELBA      collagenase   Topical Daily MELBA Watkins      insulin lispro  2-12 Units Subcutaneous Q6H [facility] MELBA Merchant      levalbuterol  1 25 mg Nebulization Q4H PRN Desiree Limon PA-C      metoprolol  5 mg Intravenous Q4H PRN MELBA Frost      metoprolol tartrate  50 mg Oral Q12H [facility] Desiree Limon PA-C      miconazole   Topical BID MELBA Watkins      polyethylene glycol  17 g Oral Daily PRN Krystle HardMELBA      senna-docusate sodium  1 tablet Oral HS Krystle HardMELBA      sodium chloride (PF)  3 mL Intravenous Q1H PRN MELBA Merchant      traMADol  50 mg Oral F77L PRN Raj Mena MD      vancomycin  15 mg/kg (Adjusted) Intravenous Q24H MELBA Merchant Stopped (05/17/21 1641)     Continuous Infusions:     PRN Meds:   acetaminophen, 650 mg, Q6H PRN  levalbuterol, 1 25 mg, Q4H PRN  metoprolol, 5 mg, Q4H PRN  polyethylene glycol, 17 g, Daily PRN  sodium chloride (PF), 3 mL, Q1H PRN  traMADol, 50 mg, Q12H PRN        Invasive Devices Review  Invasive Devices     Peripheral Intravenous Line            Peripheral IV 05/16/21 Right Arm 2 days    Peripheral IV 05/16/21 Left;Ventral (anterior) Wrist 1 day          Drain            Urethral Catheter Double-lumen; Latex 16 Fr  1 day                Rationale for remaining devices: Holden for strict/accurate I&O   ---------------------------------------------------------------------------------------  Advance Directive and Living Will:      Power of :    POLST:    ---------------------------------------------------------------------------------------  Care Time Delivered:   No Critical Care time spent       [facility]      Portions of the record may have been created with voice recognition software  Occasional wrong word or "sound a like" substitutions may have occurred due to the inherent limitations of voice recognition software    Read the chart carefully and recognize, using context, where substitutions have occurred

## 2021-05-18 NOTE — PLAN OF CARE
Problem: Potential for Falls  Goal: Patient will remain free of falls  Description: INTERVENTIONS:  - Assess patient frequently for physical needs  -  Identify cognitive and physical deficits and behaviors that affect risk of falls  -  [place] fall precautions as indicated by assessment   - Educate patient/family on patient safety including physical limitations  - Instruct patient to call for assistance with activity based on assessment  - Modify environment to reduce risk of injury  - Consider OT/PT consult to assist with strengthening/mobility  Outcome: Progressing     Problem: Prexisting or High Potential for Compromised Skin Integrity  Goal: Skin integrity is maintained or improved  Description: INTERVENTIONS:  - Identify patients at risk for skin breakdown  - Assess and monitor skin integrity  - Assess and monitor nutrition and hydration status  - Monitor labs   - Assess for incontinence   - Turn and reposition patient  - Assist with mobility/ambulation  - Relieve pressure over bony prominences  - Avoid friction and shearing  - Provide appropriate hygiene as needed including keeping skin clean and dry  - Evaluate need for skin moisturizer/barrier cream  - Collaborate with interdisciplinary team   - Patient/family teaching  - Consider wound care consult   Outcome: Progressing     Problem: Nutrition/Hydration-ADULT  Goal: Nutrient/Hydration intake appropriate for improving, restoring or maintaining nutritional needs  Description: Monitor and assess patient's nutrition/hydration status for malnutrition  Collaborate with interdisciplinary team and initiate plan and interventions as ordered  Monitor patient's weight and dietary intake as ordered or per policy  Utilize nutrition screening tool and intervene as necessary  Determine patient's food preferences and provide high-protein, high-caloric foods as appropriate       INTERVENTIONS:  - Monitor oral intake, urinary output, labs, and treatment plans  - Assess nutrition and hydration status and recommend course of action  - Evaluate amount of meals eaten  - Assist patient with eating if necessary   - Allow adequate time for meals  - Recommend/ encourage appropriate diets, oral nutritional supplements, and vitamin/mineral supplements  - Order, calculate, and assess calorie counts as needed  - Recommend, monitor, and adjust tube feedings and TPN/PPN based on assessed needs  - Assess need for intravenous fluids  - Provide specific nutrition/hydration education as appropriate  - Include patient/family/caregiver in decisions related to nutrition  Outcome: Progressing     Problem: PAIN - ADULT  Goal: Verbalizes/displays adequate comfort level or baseline comfort level  Description: Interventions:  - Encourage patient to monitor pain and request assistance  - Assess pain using appropriate pain scale  - Administer analgesics based on type and severity of pain and evaluate response  - Implement non-pharmacological measures as appropriate and evaluate response  - Consider cultural and social influences on pain and pain management  - Notify physician/advanced practitioner if interventions unsuccessful or patient reports new pain  Outcome: Progressing     Problem: INFECTION - ADULT  Goal: Absence or prevention of progression during hospitalization  Description: INTERVENTIONS:  - Assess and monitor for signs and symptoms of infection  - Monitor lab/diagnostic results  - Monitor all insertion sites, i e  indwelling lines, tubes, and drains  - Monitor endotracheal if appropriate and nasal secretions for changes in amount and color  - [place] appropriate cooling/warming therapies per order  - Administer medications as ordered  - Instruct and encourage patient and family to use good hand hygiene technique  - Identify and instruct in appropriate isolation precautions for identified infection/condition  Outcome: Progressing     Problem: SAFETY ADULT  Goal: Patient will remain free of falls  Description: INTERVENTIONS:  - Assess patient frequently for physical needs  -  Identify cognitive and physical deficits and behaviors that affect risk of falls    -  [place] fall precautions as indicated by assessment   - Educate patient/family on patient safety including physical limitations  - Instruct patient to call for assistance with activity based on assessment  - Modify environment to reduce risk of injury  - Consider OT/PT consult to assist with strengthening/mobility  Outcome: Progressing  Goal: Maintain or return to baseline ADL function  Description: INTERVENTIONS:  -  Assess patient's ability to carry out ADLs; assess patient's baseline for ADL function and identify physical deficits which impact ability to perform ADLs (bathing, care of mouth/teeth, toileting, grooming, dressing, etc )  - Assess/evaluate cause of self-care deficits   - Assess range of motion  - Assess patient's mobility; develop plan if impaired  - Assess patient's need for assistive devices and provide as appropriate  - Encourage maximum independence but intervene and supervise when necessary  - Involve family in performance of ADLs  - Assess for home care needs following discharge   - Consider OT consult to assist with ADL evaluation and planning for discharge  - Provide patient education as appropriate  Outcome: Progressing  Goal: Maintain or return mobility status to optimal level  Description: INTERVENTIONS:  - Assess patient's baseline mobility status (ambulation, transfers, stairs, etc )    - Identify cognitive and physical deficits and behaviors that affect mobility  - Identify mobility aids required to assist with transfers and/or ambulation (gait belt, sit-to-stand, lift, walker, cane, etc )  - [place] fall precautions as indicated by assessment  - Record patient progress and toleration of activity level on Mobility SBAR; progress patient to next Phase/Stage  - Instruct patient to call for assistance with activity based on assessment  - Consider rehabilitation consult to assist with strengthening/weightbearing, etc   Outcome: Progressing     Problem: DISCHARGE PLANNING  Goal: Discharge to home or other facility with appropriate resources  Description: INTERVENTIONS:  - Identify barriers to discharge w/patient and caregiver  - Arrange for needed discharge resources and transportation as appropriate  - Identify discharge learning needs (meds, wound care, etc )  - Arrange for interpretive services to assist at discharge as needed  - Refer to Case Management Department for coordinating discharge planning if the patient needs post-hospital services based on physician/advanced practitioner order or complex needs related to functional status, cognitive ability, or social support system  Outcome: Progressing     Problem: Knowledge Deficit  Goal: Patient/family/caregiver demonstrates understanding of disease process, treatment plan, medications, and discharge instructions  Description: Complete learning assessment and assess knowledge base    Interventions:  - Provide teaching at level of understanding  - Provide teaching via preferred learning methods  Outcome: Progressing     Problem: CARDIOVASCULAR - ADULT  Goal: Maintains optimal cardiac output and hemodynamic stability  Description: INTERVENTIONS:  - Monitor I/O, vital signs and rhythm  - Monitor for S/S and trends of decreased cardiac output  - Administer and titrate ordered vasoactive medications to optimize hemodynamic stability  - Assess quality of pulses, skin color and temperature  - Assess for signs of decreased coronary artery perfusion  - Instruct patient to report change in severity of symptoms  Outcome: Progressing  Goal: Absence of cardiac dysrhythmias or at baseline rhythm  Description: INTERVENTIONS:  - Continuous cardiac monitoring, vital signs, obtain 12 lead EKG if ordered  - Administer antiarrhythmic and heart rate control medications as ordered  - Monitor electrolytes and administer replacement therapy as ordered  Outcome: Progressing     Problem: GENITOURINARY - ADULT  Goal: Maintains or returns to baseline urinary function  Description: INTERVENTIONS:  - Assess urinary function  - Encourage oral fluids to ensure adequate hydration if ordered  - Administer IV fluids as ordered to ensure adequate hydration  - Administer ordered medications as needed  - Offer frequent toileting  - Follow urinary retention protocol if ordered  Outcome: Progressing  Goal: Absence of urinary retention  Description: INTERVENTIONS:  - Assess patients ability to void and empty bladder  - Monitor I/O  - Bladder scan as needed  - Discuss with physician/AP medications to alleviate retention as needed  - Discuss catheterization for long term situations as appropriate  Outcome: Progressing  Goal: Urinary catheter remains patent  Description: INTERVENTIONS:  - Assess patency of urinary catheter  - If patient has a chronic ward, consider changing catheter if non-functioning  - Follow guidelines for intermittent irrigation of non-functioning urinary catheter  Outcome: Progressing     Problem: METABOLIC, FLUID AND ELECTROLYTES - ADULT  Goal: Electrolytes maintained within normal limits  Description: INTERVENTIONS:  - Monitor labs and assess patient for signs and symptoms of electrolyte imbalances  - Administer electrolyte replacement as ordered  - Monitor response to electrolyte replacements, including repeat lab results as appropriate  - Instruct patient on fluid and nutrition as appropriate  Outcome: Progressing  Goal: Fluid balance maintained  Description: INTERVENTIONS:  - Monitor labs   - Monitor I/O and WT  - Instruct patient on fluid and nutrition as appropriate  - Assess for signs & symptoms of volume excess or deficit  Outcome: Progressing  Goal: Glucose maintained within target range  Description: INTERVENTIONS:  - Monitor Blood Glucose as ordered  - Assess for signs and symptoms of hyperglycemia and hypoglycemia  - Administer ordered medications to maintain glucose within target range  - Assess nutritional intake and initiate nutrition service referral as needed  Outcome: Progressing     Problem: SKIN/TISSUE INTEGRITY - ADULT  Goal: Incision(s), wounds(s) or drain site(s) healing without S/S of infection  Description: INTERVENTIONS  - Assess and document risk factors for skin impairment   - Assess and document dressing, incision, wound bed, drain sites and surrounding tissue  - Consider nutrition services referral as needed  - Oral mucous membranes remain intact  - Provide patient/ family education  Outcome: Progressing

## 2021-05-18 NOTE — ASSESSMENT & PLAN NOTE
· Longstanding history of Afib, maintained on Toprol  mg daily and Xarelto  · Initially held home Toprol due to soft BP  · Resume Metoprolol 50 mg BID 5/17  · Decrease Metoprolol to 25 mg BID on 5/18 due to BP  · Hold home Xarelto due to supra therapeutic INR  Trend INR    · Resume Xarelto at 15 mg daily on 5/18 due to decreased CrCl  · Lopressor 5mg IV q4 PRN HR > 110 with hold parameters  · Goal HR < 110

## 2021-05-18 NOTE — ASSESSMENT & PLAN NOTE
· Pt was taking losartan and metoprolol XL prior to admission  · Resume metoprolol at lower dose of 25 mg BID

## 2021-05-18 NOTE — PROGRESS NOTES
Vancomycin IV Pharmacy-to-Dose Consultation    Jose Orozco is a 70 y o  female who is currently receiving Vancomycin IV with management by the Pharmacy Consult service  Assessment/Plan:  The patient was reviewed  Renal function is stable and no signs or symptoms of nephrotoxicity and/or infusion reactions were documented in the chart  Based on todays assessment, continue current vancomycin (day # 3) dosing of 1250 mg iv q 24 hrs, with a plan for trough to be drawn at 1430 on 5/19/21  We will continue to follow the patients culture results and clinical progress daily      Emir Moore, Pharmacist

## 2021-05-18 NOTE — ASSESSMENT & PLAN NOTE
· Beta Hem Strep Group A grew from blood cultures drawn on 5/16  · Repeat cx sent on 5/18  · Follow up on further cx results, sensitivities  · Cont Ancef

## 2021-05-18 NOTE — CASE MANAGEMENT
STR has been recommended for pt at time of discharge  CM discussing STR options with pt and her brother who is at bedside  CM giving pt and brother a list of STR within a 20 mile radius of their home    CM to revisit subject in the monring after they had time ot review list

## 2021-05-18 NOTE — CONSULTS
The patient's Vancomycin therapy has been discontinued  Thank you for this consult  Pharmacy will sign-off now

## 2021-05-18 NOTE — PLAN OF CARE
Problem: OCCUPATIONAL THERAPY ADULT  Goal: Performs self-care activities at highest level of function for planned discharge setting  See evaluation for individualized goals  Description: Treatment Interventions: ADL retraining, Functional transfer training, UE strengthening/ROM, Endurance training, Cognitive reorientation, Patient/family training, Equipment evaluation/education, Neuromuscular reeducation, Compensatory technique education, Continued evaluation, Energy conservation, Activityengagement          See flowsheet documentation for full assessment, interventions and recommendations  Note: Limitation: Decreased ADL status, Decreased UE ROM, Decreased UE strength, Decreased Safe judgement during ADL, Decreased cognition, Decreased endurance, Decreased self-care trans, Decreased high-level ADLs  Prognosis: Fair  Assessment: Pt is a 71 y/o F admitted to [facility]/16/2021 d/t experiencing a fever and increased weakness  Dx: severe sepsis  Pt with PMHx impacting performance during functional tasks including: CAD, DM, HTN, lymphedema, sleep apnea  Pt reports living at home with her brother in a 2 story home  Pt reports completing ADLs and functional ambulation @ Mod I PTA  Although recently since fall, Pt has been requiring increased assistance for ADLs and transfers  Pt has no BSC or restroom on 1st floor of home  On evaluation, Pt A&Ox4  Pt with extensive RLE wounds  Pt completing supine>sit @ Max A  UB dressing @ Mod A  Pt completing grooming and self-feeding @ S after set-up  LB Dressing @ total A  Pt completing STS from EOB>RW @ Mod x's 2 with increased anxiety noted  Unable to safely perform SPT at this time  Pt BUE ROM and MS WFL to participate in ADL tasks although ROM in B shoulders is slightly limited d/t UB girth   Pt's barriers to d/c include: decrease activity tolerance, decrease standing balance, decrease sitting balance, decrease performance during ADL tasks, decrease cognition, decrease safety awareness , increase impulsiveness, decrease BUE ROM, decrease UB MS, increased pain, decrease generalized strength, decrease activity engagement, decrease performance during functional transfers, decrease FM control and decrease GM control  Pt would benefit from continued acute OT services to address deficits as well as Post acute rehab upon d/c from [address] [place] [address]  Pt requires PT /OT co-eval due to signficant assistance with mobility and cognitive-behavioral impairments as well as to maximize Pt's performance, participation, and functional outcomes    Recommendation: (post acute rehab)  OT Discharge Recommendation: Post acute rehabilitation services

## 2021-05-18 NOTE — OCCUPATIONAL THERAPY NOTE
Occupational Therapy Evaluation     Evaluation: [id number]  Treatment: [id number]    Patient Name: Len Marie  Today's Date: 5/18/2021  Problem List  Principal Problem:    Severe sepsis [facility])  Active Problems:    AYDEE (acute kidney injury) ([facility] )    Atrial fibrillation with RVR ([facility] )    Type 2 diabetes mellitus, without long-term current use of insulin (HCC)    Chronic systolic heart failure (HCC)    Abnormal CT of the head    Wound of lower extremity    Hyponatremia    Class 3 severe obesity with serious comorbidity and body mass index (BMI) of 60 0 to 69 9 in adult [facility])    Thyroid nodule greater than or equal to 1 5 cm in diameter incidentally noted on imaging study    Hypertension    ARIAN on CPAP    Gram-positive bacteremia    Past Medical History  Past Medical History:   Diagnosis Date    Coronary artery disease     Diabetes mellitus ([place] [address] )     Hypertension     Lymphedema     Sleep apnea      Past Surgical History  Past Surgical History:   Procedure Laterality Date    CHOLECYSTECTOMY      TONSILLECTOMY             05/18/21 1034   Note Type   Note type Evaluation   Restrictions/Precautions   Other Precautions Cognitive; Chair Alarm; Bed Alarm; Fall Risk;Pain;Telemetry;Multiple lines   Pain Assessment   Pain Assessment Tool FLACC   Pain Rating: FLACC (Rest) - Face 0   Pain Rating: FLACC (Rest) - Legs 0   Pain Rating: FLACC (Rest) - Activity 0   Pain Rating: FLACC (Rest) - Cry 0   Pain Rating: FLACC (Rest) - Consolability 0   Score: FLACC (Rest) 0   Pain Rating: FLACC (Activity) - Face 1   Pain Rating: FLACC (Activity) - Legs 0   Pain Rating: FLACC (Activity) - Activity 0   Pain Rating: FLACC (Activity) - Cry 1   Pain Rating: FLACC (Activity) - Consolability 0   Score: FLACC (Activity) 2   Home Living   Type of Home House  (2 JADE no HR)   Home Layout Two level  (6-7steps to 2nd floor B HR  )   Bathroom Shower/Tub Tub/shower unit  (Pt reports sponge bathing "for a while now")   Bathroom Toilet Standard   Bathroom Equipment Shower chair;Grab bars around toilet   [address] [facility]; Wheelchair-manual;Electric scooter  (Pt reports "im getting a hospital bed")   Additional Comments Pt poor historian at time of evaluation, Pt inconsistantly reporting PLOF and home set-up  Will consult with CM as able   Prior Function   Level of [place] Independent with ADLs and functional mobility   Lives With Other (Comment)  (Brother)   Receives Help From Family   ADL Assistance Independent   IADLs Needs assistance   Falls in the last 6 months 1 to 4  (fall on the 13th of May 2021)   ADL   Where Assessed Edge of bed   Grooming Assistance 5  Supervision/Setup   Grooming Deficit Setup   UB Dressing Assistance 3  Moderate Assistance   UB Dressing Deficit Verbal cueing;Supervision/safety; Increased time to complete; Thread RUE; Thread LUE;Pull over head;Pull around back   LB Dressing Assistance 1  Total Assistance   LB Dressing Deficit Steadying; Requires assistive device for steadying;Verbal cueing;Supervision/safety; Increased time to complete; Don/doff R sock; Don/doff L sock; Thread RLE into pants; Thread LLE into pants;Pull up over hips   Additional Comments Pt completing ADL tasks while seated at EOB  Grooming and self-feeding tasks @ S after set-up  UB dressing @ Mod A d/t decreased sitting balance and limited BUE ROM d/t girth  Pt requiring Total A for LB dressing with minimal attempts to complete independently  Total A for donning socks and CM around waist while standing   Bed Mobility   Supine to Sit 2  Maximal assistance   Additional items Assist x 1;Bedrails; Increased time required;Verbal cues;LE management  (trunk management)   Additional Comments HOB flat, use of bedrails PRN   Transfers   Sit to Stand 3  Moderate assistance   Additional items Assist x 2; Increased time required;Verbal cues   Stand to Sit 3  Moderate assistance   Additional items Assist x 2; Increased time required;Verbal cues   Stand pivot Unable to assess   Additional Comments Pt completing STS from EOB>RW @ Mod x's 2  Pt then able to achieve full stand with Mod x's 2 to maintin standing  Pt able to weight shif to L foot to progress RLE although unable to shift weight to RLE to progress LLE  Unable to safely complete SPT at this time  Balance   Static Sitting Fair   Dynamic Sitting Fair -   Static Standing Zero   Activity Tolerance   Activity Tolerance Patient limited by fatigue;Patient limited by pain   Medical Staff Made Aware Spoke with PT, Rip Pantoja   Nurse Made Aware Spoke with RN, Mechelle Current   RUE Assessment   RUE Assessment WFL   LUE Assessment   LUE Assessment WFL   Hand Function   Gross Motor Coordination Functional   Fine Motor Coordination Functional   Sensation   Light Touch No apparent deficits   Additional Comments Pt's BUE shoulder ROm slightly impaired although remains functional    Cognition   Overall Cognitive Status Impaired   Arousal/Participation Alert; Responsive; Cooperative   Attention Attends with cues to redirect   Orientation Level Oriented X4   Following Commands Follows one step commands with increased time or repetition   Assessment   Limitation Decreased ADL status; Decreased UE ROM; Decreased UE strength;Decreased Safe judgement during ADL;Decreased cognition;Decreased endurance;Decreased self-care trans;Decreased high-level ADLs   Prognosis Fair   Assessment Pt is a 69 y/o F admitted to [address] [place] [address] 5/16/2021 d/t experiencing a fever and increased weakness  Dx: severe sepsis  Pt with PMHx impacting performance during functional tasks including: CAD, DM, HTN, lymphedema, sleep apnea  Pt reports living at home with her brother in a 2 story home  Pt reports completing ADLs and functional ambulation @ Mod I PTA  Although recently since fall, Pt has been requiring increased assistance for ADLs and transfers  Pt has no BSC or restroom on 1st floor of home  On evaluation, Pt A&Ox4  Pt with extensive RLE wounds  Pt completing supine>sit @ Max A  UB dressing @ Mod A   Pt completing grooming and self-feeding @ S after set-up  LB Dressing @ total A  Pt completing STS from EOB>RW @ Mod x's 2 with increased anxiety noted  Unable to safely perform SPT at this time  Pt BUE ROM and MS WFL to participate in ADL tasks although ROM in B shoulders is slightly limited d/t UB girth  Pt's barriers to d/c include: decrease activity tolerance, decrease standing balance, decrease sitting balance, decrease performance during ADL tasks, decrease cognition, decrease safety awareness , increase impulsiveness, decrease BUE ROM, decrease UB MS, increased pain, decrease generalized strength, decrease activity engagement, decrease performance during functional transfers, decrease FM control and decrease GM control  Pt would benefit from continued acute OT services to address deficits as well as Post acute rehab upon d/c from [address] [place] [address]  Pt requires PT /OT co-eval due to signficant assistance with mobility and cognitive-behavioral impairments as well as to maximize Pt's performance, participation, and functional outcomes  Plan   Treatment Interventions ADL retraining;Functional transfer training;UE strengthening/ROM; Endurance training;Cognitive reorientation;Patient/family training;Equipment evaluation/education; Neuromuscular reeducation; Compensatory technique education;Continued evaluation; Energy conservation; Activityengagement   Goal Expiration Date 05/28/21   OT Treatment Day 1   OT Frequency 3-5x/wk   Additional Treatment Session   Start Time 7649   End Time 1108   Treatment Assessment Pt seen for treatment session #1 this date  Pt alert and agreeable to participate at this time  Pt completing 2nd attempted STS from EOB>RW @ Mod x's 2  Pt continues to be unable to progress LLE forward  At this time with the assistance of a 3rd person, hospital bed was switched with drop arm recliner chair  Once chair was safely behind Pt, Pt impulsively flopped into the chair   d/t Pt's height, Pt unable to boost self back requiring Max A x's 4 for repositioning with B knees blocked  Pt then remained sitting OOB in recliner chair with BLE supported on pillow, call bell in reach, chair alarm intact and all needs met at this time  Pt requires PT /OT co-treat due to signficant assistance with mobility and cognitive-behavioral impairments as well as to maximize Pt's performance, participation, and functional outcomes  Additional Treatment Day 1   Recommendation   Recommendation   (post acute rehab)   OT Discharge Recommendation Post acute rehabilitation services   AM-PAC Daily Activity Inpatient   Lower Body Dressing 1   Bathing 1   Toileting 1   Upper Body Dressing 2   Grooming 3   Eating 3   Daily Activity Raw Score 11   Daily Activity Standardized Score (Calc for Raw Score >=11) 29 04   AM-PAC Applied Cognition Inpatient   Following a Speech/Presentation 3   Understanding Ordinary Conversation 3   Taking Medications 2   Remembering Where Things Are Placed or Put Away 2   Remembering List of 4-5 Errands 2   Taking Care of Complicated Tasks 2   Applied Cognition Raw Score 14   Applied Cognition Standardized Score 32 02     The patient's raw score on the AM-PAC Daily Activity inpatient short form is 11, standardized score is 29 04, less than 39 4  Patients at this level are likely to benefit from DC to post-acute rehabilitation services  Please refer to the recommendation of the Occupational Therapist for safe DC planning  Pt goals to be met by 5/28/2021    1  Pt will demonstrate ability to complete grooming/hygiene tasks @ Mod I after set-up  2  Pt will demonstrate ability to complete supine<>sit @ S in order to increase safety and independence during ADL tasks  3  Pt will demonstrate ability to complete UB ADLs including washing/dressing @ Mod I in order to increase performance and participation during meaningful tasks  4   Pt will demonstrate ability to complete LB dressing @ Mod A in order to increase safety and independence during meaningful tasks  5  Pt will demonstrate ability to harjinder/doff socks/shoes while sitting EOB @ Mod A in order to increase safety and independence during meaningful tasks  6  Pt will demonstrate ability to complete toileting tasks including CM and pericare @ Mod A in order to increase safety and independence during meaningful tasks  7  Pt will demonstrate ability to complete EOB, chair, toilet/commode transfers @ Mod A in order to increase performance and participation during functional tasks  8  Pt will demonstrate ability to stand for 1-2 minutes while maintaining F+ balance with use of RW for UB support PRN  9  Pt will demonstrate ability to tolerate 30-35 minute OT session with no vc'ing for deep breathing or use of energy conservation techniques in order to increase activity tolerance during functional tasks  10  Pt will demonstrate Good carryover of use of energy conservation/compensatory strategies during ADLs and functional tasks in order to increase safety and reduce risk for falls  11  Pt will demonstrate Good attention and participation in continued evaluation of functional ambulation house hold distances in order to assist with safe d/c planning  12  Pt will attend to continued cognitive assessments 100% of the time in order to provide most appropriate d/c recommendations  13  Pt will follow 100% simple 2-step commands and be A&O x4 consistently with environmental cues to increase participation in functional activities  14  Pt will identify 3 areas of interest/hobbies and 1 intervention on how to incorporate into daily life in order to increase interaction with environment and peers as well as increase participation in meaningful tasks  15  Pt will demonstrate 100% carryover of BUE HEP in order to increase BUE MS and increase performance during functional tasks upon d/c home      Marian Vieira OTR/L

## 2021-05-18 NOTE — ASSESSMENT & PLAN NOTE
Lab Results   Component Value Date    HGBA1C 6 1 (H) 05/16/2021       Recent Labs     05/17/21  2257 05/18/21  1209 05/18/21  1759 05/18/21  2313   POCGLU 127 121 187* 121       Blood Sugar Average: Last 72 hrs:  · Hold home metformin  · Continue SSI and accucheck Q6 till improved PO intake  · Transition to ACHS SSI when consistently tolerating diet

## 2021-05-18 NOTE — PLAN OF CARE
Problem: PHYSICAL THERAPY ADULT  Goal: Performs mobility at highest level of function for planned discharge setting  See evaluation for individualized goals  Description: Treatment/Interventions: Functional transfer training, LE strengthening/ROM, Elevations, Therapeutic exercise, Endurance training, Patient/family training, Equipment eval/education, Bed mobility, Gait training, Compensatory technique education, Spoke to nursing, Spoke to case management, OT  Equipment Recommended: (TBD by rehab)       See flowsheet documentation for full assessment, interventions and recommendations  Note: Prognosis: Fair  Problem List: Decreased strength, Decreased endurance, Impaired balance, Decreased mobility, Decreased cognition, Impaired judgement, Decreased safety awareness, Obesity, Decreased skin integrity, Pain  Assessment: Pt is a 70 y o  female seen for PT evaluation s/p admission to [address] [place] [address] on 5/16/2021 with Severe sepsis ([facility] )  Order placed for PT services    Upon evaluation: Pt is presenting with impaired functional mobility due to pain, decreased strength, decreased endurance, impaired balance, impaired cognition, decreased safety awareness, impaired judgment, fall risk, LE edema and impaired skin integrity requiring maximal assistance for bed mobility, moderate of 2 assistance for transfers and unable to weight shift to complete spt and ambulation despite maxAx2 and UE support on RW  Pt's clinical presentation is currently unpredictable given the functional mobility deficits above, especially weakness, edema of extremities, decreased skin integrity, decreased endurance, gait deviations, pain, decreased activity tolerance, decreased functional mobility tolerance, decreased safety awareness, impaired judgement and decreased cognition, coupled with fall risks as indicated by 15/32 on low funcitoning basic mobility form as well as hx of falls, impaired balance, polypharmacy, impaired judgement, decreased safety awareness and decreased cognition and combined with medical complications of abnormal renal lab values, abnormal H&H, abnormal WBCs, abnormal sodium values, abnormal CO2 values, fear/retreat and need for input for mobility technique/safety  Pt's PMHx and comorbidities that may affect physical performance and progress include: CHF, A fib, DM, HTN, CAD and lymphedema, severe pulmonary HTN  Personal factors affecting pt at time of IE include: inaccessible home environment, multi-level environment, inability to perform IADLs, inability to perform ADLs, inability to navigate level surfaces without external assistance, inability to ambulate household distances, limited insight into impairments and recent fall(s)/fall history  Pt will benefit from continued skilled PT services to address deficits as defined above and to maximize level of functional mobility to facilitate return toward PLOF and improved QOL  From PT/mobility standpoint, recommendation at time of d/c would be Short term rehab pending progress in order to reduce fall risk and maximize pt's functional independence and consistency with mobility in order to facilitate return to PLOF  Recommend ther ex next 1-2 sessions and recommend stretcher board with drop arm relciner for in and out of bed for safety with nursing  Barriers to Discharge: Decreased caregiver support, Inaccessible home environment  Barriers to Discharge Comments: requires increased assistance to complete mobility, unable to spt     PT Discharge Recommendation: Post acute rehabilitation services     PT - [place] to Discharge: (when medically cleared to rehab)    See flowsheet documentation for full assessment

## 2021-05-18 NOTE — ASSESSMENT & PLAN NOTE
· Secondary to severe sepsis and dehydration in setting of home losartan and Bumex use  · Baseline appears around 1 0 based on labs from earlier this month; previously around 0 7 in 2018  · Hold home losartan and Bumex  · Improved with IVF resuscitation  · Maintain ward, strict I/O  · Renal US - no hydronephrosis  · Patient with h/o left ureteral stent  · Avoid nephrotoxins, hypotension  · Trend BMP

## 2021-05-18 NOTE — PHYSICAL THERAPY NOTE
PHYSICAL THERAPY EVALUATION  NAME:  Clinton Galicia  DATE: 05/18/21    AGE:   70 y o  Mrn:   [id number]  ADMIT DX:  Thyroid nodule [E04 1]  Hyponatremia [E87 1]  Leukocytosis [D72 829]  Osteoarthritis [M19 90]  Abnormal CT of the head [R93 0]  Bilateral leg edema [R60 0]  Elevated INR (international normalized ratio) [R79 1]  AYDEE (acute kidney injury) ([facility] [address] ) [N17 9]  Atrial fibrillation with RVR (HCC) [I48 91]  Elevated brain natriuretic peptide (BNP) level [R79 89]  Wound cellulitis [L03 90]  Generalized weakness [R53 1]  Elevated d-dimer [R79 89]  Severe sepsis ([facility] [address] ) [A41 9, R65 20]  Ambulatory dysfunction [R26 2]  Open wound of skin [T14  8XXA]    Past Medical History:   Diagnosis Date    Coronary artery disease     Diabetes mellitus ([facility] [address] )     Hypertension     Lymphedema     Sleep apnea      Length Of Stay: 2  Performed at least 2 patient identifiers during session: Name and Birthday  PHYSICAL THERAPY EVALUATION :      05/18/21 1035   PT Last Visit   PT Visit Date 05/18/21   Note Type   Note type Evaluation   Pain Assessment   Pain Assessment Tool 0-10   Pain Score No Pain  (at rest  LE pain with mobility )   Home Living   Type of Home House  (2 JADE no HR, uses walker)   Home Layout Two level  (6-7 steps with B HRs to 2nd floor bathroom  no BSC )   Bathroom Shower/Tub Tub/shower unit  (has been sponge bathing for "a while now")   [address] [place] [address]  chair;Grab bars around toilet   Home Equipment Walker;Electric scooter; Terrial   (reports getting a hospital bed)   Additional Comments Reports living in a 2Sh with 2 JADE no HR and 1st floor set up, the reported she has 6-7 steps without HRs (but getting HRs) to 2nd floor to bathroom)   Questionable historian providing inaccurate PLOF and home set up   Prior Function   Level of [place] Independent with ADLs and functional mobility   Lives With Other (Comment)  (Brother)   Receives Help From Family   ADL Assistance Independent   Falls in the last 6 months 1 to 4  (fall on the 13th of May 2021)   Comments Per nursing who spoke with brother, patient was independent with mobility with RW prior to fall on the 13th of May 2021  Since then, patient has required assistance with all mobility  Restrictions/Precautions   Other Precautions Chair Alarm; Bed Alarm; Fall Risk;Telemetry   Cognition   Orientation Level Oriented X4   Following Commands Follows one step commands with increased time or repetition   Comments offers inconsistent report of PLOF and home set up   RLE Assessment   RLE Assessment WFL  (grossly WFL, however hip flexion limited by body habitus)   LLE Assessment   LLE Assessment WFL  (grossly WFL, however hip flexion limited by body habitus)   Bed Mobility   Supine to Sit 2  Maximal assistance   Additional items Assist x 1; Increased time required;Verbal cues;LE management  (trunk management)   Additional Comments HOB with bedrail  Csaandra Frias increased time to complete bed mobility with max verbal and repeated cues for technique and sequence  knees blocked upon sitting EOB due to height and increased risk of sliding anteriorly  Transfers   Sit to Stand 3  Moderate assistance   Additional items Assist x 2; Increased time required;Verbal cues   Stand to Sit 3  Moderate assistance   Additional items Assist x 2; Increased time required;Verbal cues   Stand pivot Unable to assess  (pt unable to wt shift to spt)   Additional Comments use of RW  B knees and feet blocked  achieved standing from EOB with modAx2 with verbal and manual cues for uprihgt posture  attempted wt shfiting right and left to spt to dop arm recliner  pt able to slightly elevate R LE, unable to wt shift to right to advance L LE toward chair  returned to sitting at EOB for safety  static standing with B UE support on RW with modAx2  Ambulation/Elevation   Distance unable to wt shift bilaterally to trial ambulation despite maxAx2     Balance   Static Sitting Fair Dynamic Sitting Poor +   Static Standing Poor -   Activity Tolerance   Activity Tolerance Patient limited by fatigue;Patient limited by pain   Medical Staff Made Aware Tova VAUGHAN   Nurse Made Aware RNErnestine   Assessment   Prognosis Fair   Problem List Decreased strength;Decreased endurance; Impaired balance;Decreased mobility; Decreased cognition; Impaired judgement;Decreased safety awareness; Obesity; Decreased skin integrity;Pain   Barriers to Discharge Decreased caregiver support; Inaccessible home environment   Barriers to Discharge Comments requires increased assistance to complete mobility, unable to spt   Goals   Patient Goals "Go home"   [facility] Expiration Date 05/28/21   PT Treatment Day 1   Plan   Treatment/Interventions Functional transfer training;LE strengthening/ROM; Elevations; Therapeutic exercise; Endurance training;Patient/family training;Equipment eval/education; Bed mobility;Gait training; Compensatory technique education;Spoke to nursing;Spoke to case management;OT   PT Frequency Other (Comment)  (3-5x/week)   Recommendation   PT Discharge Recommendation Post acute rehabilitation services   Equipment Recommended   (TBD by rehab)   PT - OK to Discharge   (when medically cleared to rehab)   Briana 8 in Bed Without Bedrails 2   Lying on Back to Sitting on Edge of Flat Bed 2   Moving Bed to Chair 1   Standing Up From Chair 1   Walk in Room 1   Climb 3-5 Stairs 1   Basic Mobility Inpatient Raw Score 8   Turning Head Towards Sound 4   Follow Simple Instructions 3   Low Function Basic Mobility Raw Score 15   Low Function Basic Mobility Standardized Score 23 9     Pt requires PT /OT co-eval due to signficant assistance with mobility  (Please find full objective findings from PT assessment regarding body systems outlined above)  Assessment: Pt is a 70 y o  female seen for PT evaluation s/p admission to [id number] [place] [address] 18 on 5/16/2021 with Severe sepsis [facility])  Order placed for PT services  Upon evaluation: Pt is presenting with impaired functional mobility due to pain, decreased strength, decreased endurance, impaired balance, impaired cognition, decreased safety awareness, impaired judgment, fall risk, LE edema and impaired skin integrity requiring maximal assistance for bed mobility, moderate of 2 assistance for transfers and unable to weight shift to complete spt and ambulation despite maxAx2 and UE support on RW  Pt's clinical presentation is currently unpredictable given the functional mobility deficits above, especially weakness, edema of extremities, decreased skin integrity, decreased endurance, gait deviations, pain, decreased activity tolerance, decreased functional mobility tolerance, decreased safety awareness, impaired judgement and decreased cognition, coupled with fall risks as indicated by 15/32 on low funcitoning basic mobility form as well as hx of falls, impaired balance, polypharmacy, impaired judgement, decreased safety awareness and decreased cognition and combined with medical complications of abnormal renal lab values, abnormal H&H, abnormal WBCs, abnormal sodium values, abnormal CO2 values, fear/retreat and need for input for mobility technique/safety  Pt's PMHx and comorbidities that may affect physical performance and progress include: CHF, A fib, DM, HTN, CAD and lymphedema, severe pulmonary HTN  Personal factors affecting pt at time of IE include: inaccessible home environment, multi-level environment, inability to perform IADLs, inability to perform ADLs, inability to navigate level surfaces without external assistance, inability to ambulate household distances, limited insight into impairments and recent fall(s)/fall history  Pt will benefit from continued skilled PT services to address deficits as defined above and to maximize level of functional mobility to facilitate return toward PLOF and improved QOL   From PT/mobility standpoint, recommendation at time of d/c would be Short term rehab pending progress in order to reduce fall risk and maximize pt's functional independence and consistency with mobility in order to facilitate return to PLOF  Recommend ther ex next 1-2 sessions and recommend stretcher board with drop arm relciner for in and out of bed for safety with nursing  The patient's AM-PAC Basic Mobility Inpatient Short Form Low Function Raw Score 15 , Standardized Score is 23 9  A standardized score less than 42 9 suggests the patient may benefit from discharge to post-acute rehabilitation services  Please also refer to the recommendation of the Physical Therapist for safe discharge planning  Goals: Pt will: Perform bed mobility tasks with minAx1 to reposition in bed and prepare for transfers  Pt will perform sit<>stand transfers with minAx1 to increase Indep in home environment, decrease burden of care, decrease risk for falls and improve activity tolerance and prepare for ambulation  Pt will complete spt with RW with modAx1 to prepare for ambulation and facilitate OOB  Pt will ambulate with RW for >/= 15' with  modAx1  to decrease burden of care, decrease risk for falls, improve ease of transfers, improve activity tolerance and improve gait quality and to access home environment  Pt will increase B LE strength >/= 1/2 MMT grade to facilitate functional mobility  Liam Perez, PT,DPT     PHYSICAL THERAPY TREATMENT NOTE  Time In:1058  Time Out:1108  Total Time: 8'      S:  "I don't know " (when asked what plan was upon D/C)  O:  Sit<>stand with RW with B feet blocked  Required modAx2 to ahcieve standing  Verbal cues for hand placement and technique with anterior wt shift  Unable to wt shift to complete spt  Stood with B UE support on RW for <1' with modAx2 for balance and safety  Increased fatigue limiting further standing and attempt to wt shift  Chair moved behind patient   Upon sitting, patient with c/o left ankle pain, difficult to redirect and required assistance fo 2-4 staff members to reposition in chair to prevent anterior translation  Further mobility deferred due to safety concerns  Pt requires PT /OT co-treat due to signficant assistance with mobility and cognitive-behavioral impairments  A:  Pt requires increased time and assistance to complete all mobility  She was able to tolerate standing just long enough to allow recliner chair to be moved behind patient to sit  Unable to trial wt shifting due to fatigue and pain  She is limited by decreased strength, balance, endurance and increased pain  She will continue to benefit from PT services to increase mobility as able and facilitate return to home  P:  Recommend LE strengthening, dynamic sitting and standing balance, endurance training, bed, gait and transfer training with RW       Piper Mcdonald, PT, DPT

## 2021-05-18 NOTE — CONSULTS
Consult received for severe obesity, DM and cellulitis  Pt with reported poor appetite/not feeling hungry x a few days PTA  Noted to have refused breakfast this AM per nursing flowsheets, had about 3/4 hamburger at lunch today + banana  Pt normally eats 2 meals per day with good appetite  POC BS levels and A1C WNL at this time  Will order glucerna BID to optimize intake while appetite decreased  Pt somnolent at time of visit, deferred diet ed at this time, pt did not have questions  Will provide diet ed as appropriate/desired by pt

## 2021-05-18 NOTE — CONSULTS
Consultation - General Surgery   Clinton Galicia 70 y o  female MRN: [id number]  Unit/Bed#: -01 Encounter: [id number]    Assessment/Plan     Assessment:  Right lower leg cellulitis, chronic open right leg wounds with lymphedema secondary to chronic venous insufficiency  Low suspicion for necrotizing fasciitis or evolving right thigh leg abscess  Hemoglobin remains stable, 10 6 (10 7), doubt hematoma formation within the right thigh  Group A strep bacteremia, sepsis present on admission  Worsening leukocytosis, 44 03 (38 23)  Morbid obesity, weight 156 kg (BMI 62 98)  Atrial fib with rapid ventricular response  Acute kidney injury, creatinine 2 2 today improved  Supratherapeutic INR, 2 18 today (5 53 on admission)  Initial lactic acidosis present admission resolved  Type II diabetes mellitus  ARIAN  HTN  CAD    Plan:  Right now the right leg is showing clinical improvement  Will consider repeat CT scan of right upper leg tomorrow if worsening leukocytosis or patient does not improve clinically  ID recommendations for changed to IV cephazolin 2 g Q 8 hours  Await repeat blood cultures  Apply Santyl to wound VAC of right knee, cover with cover with dry sterile dressing and surgical netting  No plan for surgical debridement at this time, may continue Jesse Zaidi also examine the patient at this time  Will re-evaluate in the a m  History of Present Illness     HPI:  Clinton Galicia is a 70 y o  morbidly obese white female who presents with cellulitis right lower extremity and chronic leg wounds, she has chronic venous insufficiency with ulcers and lymphedema, followed in the past by vascular surgery at [facility] last seen in early April, recommended for possible ablation of the right greater saphenous and small valve saphenous veins to help wound healing    Patient presented with sepsis present on admission, CT scan of the right leg done 3 days ago showed no evidence of focal hematuria or discernible abscess present done with noncontrast   There was some asymmetrical diffuse fat stranding throughout the right thigh region, less pronounced below the level of the knee  Initial blood cultures x2 grew group A strep  Lactic acidosis elevated and resolved  General surgery was consulted regarding concern for possible evolving abscess or hematoma of the right upper thigh  Patient was seen in telemedicine consultation with ID and recommended changed to cefazolin 2 g IV Q 8 hours, repeat blood cultures have since been obtained  It was believed that the patient's source of bacteremia was right leg cellulitis  TTE did not show any source of vegetation  The patient remains afebrile  Concern over the patient's increased white count now today to 44 03  The patient denies any chest pain or shortness of breath  No nausea or vomiting, no blood in her stool  Inpatient consult to Acute Care Surgery  Consult performed by: Sanjuan Phalen, PA-C  Consult ordered by: Tabitha Jang PA-C          Review of Systems   Constitutional: Positive for fatigue  Negative for activity change, appetite change, chills, diaphoresis, fever and unexpected weight change  Eyes: Negative  Respiratory: Positive for shortness of breath, wheezing and stridor  Negative for cough  Cardiovascular: Positive for leg swelling  Negative for chest pain and palpitations  Gastrointestinal: Negative for abdominal distention, abdominal pain, constipation, diarrhea, nausea, rectal pain and vomiting  Endocrine: Negative  Genitourinary: Negative for decreased urine volume, difficulty urinating, dysuria, flank pain, frequency and hematuria  Musculoskeletal: Positive for joint swelling  Negative for back pain, neck pain and neck stiffness  Skin: Negative  Neurological: Positive for weakness  Negative for tremors, seizures, syncope, numbness and headaches  Hematological: Negative for adenopathy  Does not bruise/bleed easily  Psychiatric/Behavioral: Negative for agitation and confusion  The patient is not nervous/anxious               Historical Information   Past Medical History:   Diagnosis Date    Coronary artery disease     Diabetes mellitus ([address] )     Hypertension     Lymphedema     Sleep apnea      Past Surgical History:   Procedure Laterality Date    CHOLECYSTECTOMY      TONSILLECTOMY       Social History   Social History     Substance and Sexual Activity   Alcohol Use Not Currently     Social History     Substance and Sexual Activity   Drug Use Not Currently     E-Cigarette/Vaping    E-Cigarette Use Never User      E-Cigarette/Vaping Substances     Social History     Tobacco Use   Smoking Status Never Smoker   Smokeless Tobacco Never Used     Family History: non-contributory    Meds/Allergies   current meds:   Current Facility-Administered Medications   Medication Dose Route Frequency    acetaminophen (TYLENOL) tablet 650 mg  650 mg Oral Q6H PRN    collagenase (SANTYL) ointment   Topical Daily    insulin lispro (HumaLOG) 100 units/mL subcutaneous injection 2-12 Units  2-12 Units Subcutaneous Q6H [address]    levalbuterol (XOPENEX) inhalation solution 1 25 mg  1 25 mg Nebulization Q4H PRN    metoprolol (LOPRESSOR) injection 5 mg  5 mg Intravenous Q4H PRN    metoprolol tartrate (LOPRESSOR) tablet 25 mg  25 mg Oral Q12H CELESTINA    miconazole 2 % cream   Topical BID    polyethylene glycol (MIRALAX) packet 17 g  17 g Oral Daily PRN    rivaroxaban (XARELTO) tablet 15 mg  15 mg Oral Daily With Breakfast    senna-docusate sodium (SENOKOT S) 8 6-50 mg per tablet 1 tablet  1 tablet Oral HS    sodium chloride (PF) 0 9 % injection 3 mL  3 mL Intravenous Q1H PRN    traMADol (ULTRAM) tablet 50 mg  50 mg Oral Q12H PRN    vancomycin (VANCOCIN) 1,250 mg in sodium chloride 0 9 % 250 mL IVPB  15 mg/kg (Adjusted) Intravenous Q24H     Allergies   Allergen Reactions    Iodinated Diagnostic Agents Anaphylaxis    Metronidazole Seizures  Cimetidine Anxiety    Diltiazem Rash    Penicillins Rash       Objective   First Vitals:   Blood Pressure: 98/73 (05/16/21 1345)  Pulse: (!) 159 (05/16/21 1345)  Temperature: 99 2 °F (37 3 °C) (05/16/21 1359)  Temp Source: Temporal (05/16/21 1359)  Respirations: (!) 37 (05/16/21 1345)  Height: 5' 2" (157 5 cm) (05/16/21 1352)  Weight - Scale: (!) 157 kg (347 lb 3 6 oz) (05/16/21 1352)  SpO2: 94 % (05/16/21 1345)    Current Vitals:   Blood Pressure: 152/76 (05/18/21 1000)  Pulse: (!) 113 (05/18/21 1000)  Temperature: (!) 97 4 °F (36 3 °C) (05/18/21 0700)  Temp Source: Temporal (05/18/21 0700)  Respirations: (!) 29 (05/18/21 1000)  Height: 5' 2" (157 5 cm) (05/17/21 0950)  Weight - Scale: (!) 156 kg (344 lb 5 7 oz) (05/18/21 0000)  SpO2: 95 % (05/18/21 1000)    I/O       05/16 0701 - 05/17 0700 05/17 0701 - 05/18 0700 05/18 0701 - 05/19 0700    P  O   2340 240    I V  (mL/kg)  3010 (19 3)     IV Piggyback 1000 250 1     Total Intake(mL/kg) 1000 (6 4) 5600 1 (35 9) 240 (1 5)    Urine (mL/kg/hr) 270 1280 (0 3) 575 (0 5)    Total Output 270 1280 575    Net +730 +4320 1 -335                   Invasive Devices     Peripheral Intravenous Line            Peripheral IV 05/16/21 Right Arm 2 days    Peripheral IV 05/16/21 Left;Ventral (anterior) Wrist 1 day          Drain            Urethral Catheter Double-lumen; Latex 16 Fr  1 day                Physical Exam     Awake, sitting up in bedside chair  Her brother is present with her in the room  She is in no acute distress and does not appear toxic  Skin without pallor or jaundice  Head normocephalic  Sclera white both eyes  Oral mucosa pink and moist   Poor dentition noted  Neck is supple, trachea midline, no goiter  Chest normal expansion  Heart irregular rate and rhythm  Heart sounds are distant  Lungs no wheezing heard, breath sounds diminished bilaterally  Back without flank tenderness to percussion  Difficulty sitting up    Abdomen wide abdominal girth   Positive bowel sounds heard  The abdomen is soft, nontender  No masses, no guarding or rebound  Left buttock reveals a wound on the medial aspect with some yellow slough noted  Bilateral buttocks with closed streaks noted bilaterally which are closed without drainage  Right posterior tibial area shows an open wound with erythematous border, no drainage  Appears to be pressure injury on the back of the calf  Posterior right inner thigh shows superficial skin necrosis with ecchymosis with some sloughing and serous drainage  Distal right tibia below the knee also shows large venous stasis ulceration, skin borders show no necrosis and viable skin edges  No need for debridement at this time  Right thigh muscles are soft, low suspicion for any necrotizing infection or compartment syndrome  Lab Results:   I have personally reviewed pertinent lab results    , CBC:   Lab Results   Component Value Date    WBC 44 03 (HH) 05/18/2021    HGB 10 6 (L) 05/18/2021    HCT 33 1 (L) 05/18/2021    MCV 80 (L) 05/18/2021     05/18/2021    MCH 25 5 (L) 05/18/2021    MCHC 32 0 05/18/2021    RDW 16 7 (H) 05/18/2021    MPV 9 9 05/18/2021    NRBC 0 05/18/2021    NRBC 1 05/18/2021   , CMP:   Lab Results   Component Value Date    SODIUM 129 (L) 05/18/2021    K 4 6 05/18/2021    CL 97 (L) 05/18/2021    CO2 19 (L) 05/18/2021    BUN 71 (H) 05/18/2021    CREATININE 2 20 (H) 05/18/2021    CALCIUM 9 1 05/18/2021    AST 33 05/18/2021    ALT 21 05/18/2021    ALKPHOS 160 (H) 05/18/2021    EGFR 22 05/18/2021   , Coagulation:   Lab Results   Component Value Date    INR 2 18 (H) 05/18/2021   , Urinalysis: No results found for: COLORU, CLARITYU, SPECGRAV, PHUR, LEUKOCYTESUR, NITRITE, PROTEINUA, GLUCOSEU, KETONESU, BILIRUBINUR, BLOODU     RENAL ULTRASOUND     INDICATION:   renal failure      COMPARISON: None     TECHNIQUE:   Ultrasound of the retroperitoneum was performed with a curvilinear transducer utilizing volumetric sweeps and still imaging techniques       FINDINGS:     KIDNEYS:  Symmetric and normal size  Right kidney:  11 6 x 8 9 x 7 0 cm  Left kidney:  12 9 x 7 3 x 5 7 cm      Right kidney  Normal echogenicity and contour  No suspicious masses detected  2 2 x 1 7 x 2 1 cm simple cyst is present in the midpole  No hydronephrosis  No shadowing calculi  No perinephric fluid collections      Left kidney  Normal echogenicity and contour  No suspicious masses detected  No hydronephrosis  No shadowing calculi  No perinephric fluid collections      URETERS:  Nonvisualized      BLADDER:   Holden catheter is in place though not well visualized  The bladder is slightly distended      IMPRESSION:     1  No hydronephrosis      2  Right midpole simple cyst      3  Holden catheter in place though not well visualized  Bladder is slightly distended  Procalcitonin Reflex  Order: [id number] - Reflex for Order [id number]  Status:  Final result   Visible to patient:  No (inaccessible in [facility])   Next appt:  None   Ref Range & Units 5/18/21 0441   Procalcitonin <=0 25 ng/ml 13  99High             Contains abnormal data C-reactive protein  Order: [id number]  Status:  Final result   Visible to patient:  No (inaccessible in [facility])   Next appt:  None   Ref Range & Units 5/18/21 0441   CRP <3 0 mg/L 403  9High           Specimen Collected: 05/18/21 04:41   Last Resulted: 05/18/21 09:39           Bilirubin, direct  Order: [id number]  Status:  Final result   Visible to patient:  No (inaccessible in [facility])   Next appt:  None   Ref Range & Units 5/18/21 0441   Bilirubin, Direct 0 00 - 0 20 mg/dL 2  15High           Specimen Collected: 05/18/21 04:41   Last Resulted: 05/18/21 09:07           Sedimentation rate, automated  Order: [id number]  Status:  Final result   Visible to patient:  No (inaccessible in [facility])   Next appt:  None   Ref Range & Units 5/18/21 1043   Sed Rate 0 - 29 mm/hour 130High Specimen Collected: 05/18/21 10:43   Last Resulted: 05/18/21 10:49             Imaging: I have personally reviewed pertinent films in PACS     CT right lower extremity without IV contrast     INDICATION: Fall, open wounds to right leg with bruising  Hematoma vs cellulitis      COMPARISON: None     TECHNIQUE: CT examination of the right lower extremity was performed from above the hip joint through the plantar aspect of the foot  This examination, like all CT scans performed in the [facility], was performed utilizing techniques to   minimize radiation dose exposure, including the use of iterative reconstruction and automated exposure control software  Sagittal and coronal two dimensional reconstructed images were also submitted for interpretation      Rad dose  2136 mGy-cm      FINDINGS:     OSSEOUS STRUCTURES:  No acute fracture, dislocation or destructive osseous lesion  Prominent ossifications are seen superior to the patella and intimately associated with the undersurface of the distal quadriceps  Unclear if this represents heterotopic   ossification or possibly intra-articular loose bodies  There is advanced tricompartmental osteoarthritis of the knee      Evaluation of the toes is limited by motion  Degenerative changes of the mid, hindfoot and ankle noted      VISUALIZED MUSCULATURE:  Unremarkable      SOFT TISSUES:  There is asymmetric diffuse fat stranding throughout the right thigh region  This becomes less pronounced below the level of the knee  Some stranding of the subcutaneous fat also seen in the left lower extremity below the knee which is   partially included in the field-of-view  These findings could be secondary to edema and/or cellulitis  Assessment for abscess is limited in the absence of IV contrast although no focal fluid collections are identified on limited noncontrast imaging      There is no discrete focal hematoma      OTHER PERTINENT FINDINGS:  1 8 cm right external iliac chain lymph node, nonspecific, possibly reactive due to findings in the right lower extremity        Colonic diverticulosis  The urinary bladder is collapsed by Holden catheter  Degenerative changes pubic symphysis and visualized lower lumbar spine      There is some generalized subcutaneous edema seen in the right flank wall      IMPRESSION:     No evidence of focal hematoma or discernible abscess within limitations of noncontrast imaging      Asymmetric diffuse fat stranding throughout the right thigh region becoming less pronounced below the level of the knee  These findings could be secondary to edema and/or cellulitis  Some subcutaneous edema in the right flank would favor at least some   component of generalized edema      1 8 cm right external iliac chain lymph node, nonspecific, possibly reactive due to findings in the right lower extremity  CT follow-up is warranted in 3 months      Multiarticular degenerative changes as above        EKG, Pathology, and Other Studies: I have personally reviewed pertinent reports  Counseling / Coordination of Care  Total floor / unit time spent today 50 minutes  Greater than 50% of total time was spent with the patient and / or family counseling and / or coordination of care  A description of the counseling / coordination of care:  Lengthy discussion with the critical care team including Dr Sloan Jarvis present who also examine the patient at this time, review of CT scan imaging from 3 days ago and current laboratory values  Review existing orders and ID recommendations      Stephanie aMr PA-C

## 2021-05-18 NOTE — PLAN OF CARE
Pt tolerating protocols  Pt being treated for Sepsis w/ BC + GPC  Repeat BC x2 done this am   Labs also drawn and results pending  Pt turned and repositioned frequently  Pt with multiple wounds  Right thigh wound drainage lg amts serosanguinous fluid  Pt was placed in new bed last evening  Pt sleeping on CPAP without problems  Pt received Ultram x 1 this shift for right leg pain which resolved with med  Pt's SBP less than 110 last evening so was unable to get toprol  Pt's HR hasn't been above 130 to receive prn betablocker  Will continue to monitor and intervene as needed to promote positive outcomes  Problem: Potential for Falls  Goal: Patient will remain free of falls  Description: INTERVENTIONS:  - Assess patient frequently for physical needs  -  Identify cognitive and physical deficits and behaviors that affect risk of falls    -  [place] fall precautions as indicated by assessment   - Educate patient/family on patient safety including physical limitations  - Instruct patient to call for assistance with activity based on assessment  - Modify environment to reduce risk of injury  - Consider OT/PT consult to assist with strengthening/mobility  Outcome: Progressing     Problem: Prexisting or High Potential for Compromised Skin Integrity  Goal: Skin integrity is maintained or improved  Description: INTERVENTIONS:  - Identify patients at risk for skin breakdown  - Assess and monitor skin integrity  - Assess and monitor nutrition and hydration status  - Monitor labs   - Assess for incontinence   - Turn and reposition patient  - Assist with mobility/ambulation  - Relieve pressure over bony prominences  - Avoid friction and shearing  - Provide appropriate hygiene as needed including keeping skin clean and dry  - Evaluate need for skin moisturizer/barrier cream  - Collaborate with interdisciplinary team   - Patient/family teaching  - Consider wound care consult   Outcome: Progressing     Problem: Nutrition/Hydration-ADULT  Goal: Nutrient/Hydration intake appropriate for improving, restoring or maintaining nutritional needs  Description: Monitor and assess patient's nutrition/hydration status for malnutrition  Collaborate with interdisciplinary team and initiate plan and interventions as ordered  Monitor patient's weight and dietary intake as ordered or per policy  Utilize nutrition screening tool and intervene as necessary  Determine patient's food preferences and provide high-protein, high-caloric foods as appropriate       INTERVENTIONS:  - Monitor oral intake, urinary output, labs, and treatment plans  - Assess nutrition and hydration status and recommend course of action  - Evaluate amount of meals eaten  - Assist patient with eating if necessary   - Allow adequate time for meals  - Recommend/ encourage appropriate diets, oral nutritional supplements, and vitamin/mineral supplements  - Order, calculate, and assess calorie counts as needed  - Recommend, monitor, and adjust tube feedings and TPN/PPN based on assessed needs  - Assess need for intravenous fluids  - Provide specific nutrition/hydration education as appropriate  - Include patient/family/caregiver in decisions related to nutrition  Outcome: Progressing     Problem: PAIN - ADULT  Goal: Verbalizes/displays adequate comfort level or baseline comfort level  Description: Interventions:  - Encourage patient to monitor pain and request assistance  - Assess pain using appropriate pain scale  - Administer analgesics based on type and severity of pain and evaluate response  - Implement non-pharmacological measures as appropriate and evaluate response  - Consider cultural and social influences on pain and pain management  - Notify physician/advanced practitioner if interventions unsuccessful or patient reports new pain  Outcome: Progressing     Problem: INFECTION - ADULT  Goal: Absence or prevention of progression during hospitalization  Description: INTERVENTIONS:  - Assess and monitor for signs and symptoms of infection  - Monitor lab/diagnostic results  - Monitor all insertion sites, i e  indwelling lines, tubes, and drains  - Monitor endotracheal if appropriate and nasal secretions for changes in amount and color  - [place] appropriate cooling/warming therapies per order  - Administer medications as ordered  - Instruct and encourage patient and family to use good hand hygiene technique  - Identify and instruct in appropriate isolation precautions for identified infection/condition  Outcome: Progressing     Problem: SAFETY ADULT  Goal: Patient will remain free of falls  Description: INTERVENTIONS:  - Assess patient frequently for physical needs  -  Identify cognitive and physical deficits and behaviors that affect risk of falls    -  [place] fall precautions as indicated by assessment   - Educate patient/family on patient safety including physical limitations  - Instruct patient to call for assistance with activity based on assessment  - Modify environment to reduce risk of injury  - Consider OT/PT consult to assist with strengthening/mobility  Outcome: Progressing  Goal: Maintain or return to baseline ADL function  Description: INTERVENTIONS:  -  Assess patient's ability to carry out ADLs; assess patient's baseline for ADL function and identify physical deficits which impact ability to perform ADLs (bathing, care of mouth/teeth, toileting, grooming, dressing, etc )  - Assess/evaluate cause of self-care deficits   - Assess range of motion  - Assess patient's mobility; develop plan if impaired  - Assess patient's need for assistive devices and provide as appropriate  - Encourage maximum independence but intervene and supervise when necessary  - Involve family in performance of ADLs  - Assess for home care needs following discharge   - Consider OT consult to assist with ADL evaluation and planning for discharge  - Provide patient education as appropriate  Outcome: Progressing  Goal: Maintain or return mobility status to optimal level  Description: INTERVENTIONS:  - Assess patient's baseline mobility status (ambulation, transfers, stairs, etc )    - Identify cognitive and physical deficits and behaviors that affect mobility  - Identify mobility aids required to assist with transfers and/or ambulation (gait belt, sit-to-stand, lift, walker, cane, etc )  - [place] fall precautions as indicated by assessment  - Record patient progress and toleration of activity level on Mobility SBAR; progress patient to next Phase/Stage  - Instruct patient to call for assistance with activity based on assessment  - Consider rehabilitation consult to assist with strengthening/weightbearing, etc   Outcome: Progressing     Problem: DISCHARGE PLANNING  Goal: Discharge to home or other facility with appropriate resources  Description: INTERVENTIONS:  - Identify barriers to discharge w/patient and caregiver  - Arrange for needed discharge resources and transportation as appropriate  - Identify discharge learning needs (meds, wound care, etc )  - Arrange for interpretive services to assist at discharge as needed  - Refer to Case Management Department for coordinating discharge planning if the patient needs post-hospital services based on physician/advanced practitioner order or complex needs related to functional status, cognitive ability, or social support system  Outcome: Progressing     Problem: Knowledge Deficit  Goal: Patient/family/caregiver demonstrates understanding of disease process, treatment plan, medications, and discharge instructions  Description: Complete learning assessment and assess knowledge base    Interventions:  - Provide teaching at level of understanding  - Provide teaching via preferred learning methods  Outcome: Progressing     Problem: CARDIOVASCULAR - ADULT  Goal: Maintains optimal cardiac output and hemodynamic stability  Description: INTERVENTIONS:  - Monitor I/O, vital signs and rhythm  - Monitor for S/S and trends of decreased cardiac output  - Administer and titrate ordered vasoactive medications to optimize hemodynamic stability  - Assess quality of pulses, skin color and temperature  - Assess for signs of decreased coronary artery perfusion  - Instruct patient to report change in severity of symptoms  Outcome: Progressing  Goal: Absence of cardiac dysrhythmias or at baseline rhythm  Description: INTERVENTIONS:  - Continuous cardiac monitoring, vital signs, obtain 12 lead EKG if ordered  - Administer antiarrhythmic and heart rate control medications as ordered  - Monitor electrolytes and administer replacement therapy as ordered  Outcome: Progressing     Problem: GENITOURINARY - ADULT  Goal: Maintains or returns to baseline urinary function  Description: INTERVENTIONS:  - Assess urinary function  - Encourage oral fluids to ensure adequate hydration if ordered  - Administer IV fluids as ordered to ensure adequate hydration  - Administer ordered medications as needed  - Offer frequent toileting  - Follow urinary retention protocol if ordered  Outcome: Progressing  Goal: Absence of urinary retention  Description: INTERVENTIONS:  - Assess patients ability to void and empty bladder  - Monitor I/O  - Bladder scan as needed  - Discuss with physician/AP medications to alleviate retention as needed  - Discuss catheterization for long term situations as appropriate  Outcome: Progressing  Goal: Urinary catheter remains patent  Description: INTERVENTIONS:  - Assess patency of urinary catheter  - If patient has a chronic ward, consider changing catheter if non-functioning  - Follow guidelines for intermittent irrigation of non-functioning urinary catheter  Outcome: Progressing     Problem: METABOLIC, FLUID AND ELECTROLYTES - ADULT  Goal: Electrolytes maintained within normal limits  Description: INTERVENTIONS:  - Monitor labs and assess patient for signs and symptoms of electrolyte imbalances  - Administer electrolyte replacement as ordered  - Monitor response to electrolyte replacements, including repeat lab results as appropriate  - Instruct patient on fluid and nutrition as appropriate  Outcome: Progressing  Goal: Fluid balance maintained  Description: INTERVENTIONS:  - Monitor labs   - Monitor I/O and WT  - Instruct patient on fluid and nutrition as appropriate  - Assess for signs & symptoms of volume excess or deficit  Outcome: Progressing  Goal: Glucose maintained within target range  Description: INTERVENTIONS:  - Monitor Blood Glucose as ordered  - Assess for signs and symptoms of hyperglycemia and hypoglycemia  - Administer ordered medications to maintain glucose within target range  - Assess nutritional intake and initiate nutrition service referral as needed  Outcome: Progressing     Problem: SKIN/TISSUE INTEGRITY - ADULT  Goal: Incision(s), wounds(s) or drain site(s) healing without S/S of infection  Description: INTERVENTIONS  - Assess and document risk factors for skin impairment   - Assess and document dressing, incision, wound bed, drain sites and surrounding tissue  - Consider nutrition services referral as needed  - Oral mucous membranes remain intact  - Provide patient/ family education  Outcome: Progressing

## 2021-05-19 NOTE — PHYSICAL THERAPY NOTE
PHYSICAL THERAPY NOTE          Patient Name: Colin Sanchez  INPNM'Q Date: 5/19/2021 05/19/21 1419   Note Type   Note Type Treatment   Cancel Reasons Other  (unavailable due to wound dressing changes)     Chart reviewed  Attempted to see patient this PM for treatment, however per nursing, just going to start wound dressing changes; patient unavailable at this time  Pt with extensive wounds bilateral LEs requiring changes to be completed supine  Will continue to follow and see patient as medically appropriate at a later time       Bettina Reyes, PT,DPT

## 2021-05-19 NOTE — PLAN OF CARE
Pt's antibiotics switched to ancef by ID  Surgery saw pt yesterday and determined no need for OR for wounds  WBC continues to rise  Last WBC was 44,000  Labs for this am   Pt turning and repositioning  Pt lethargic and intermittently confused to place  Pt continues to follow commands  Pt currently sleeping comfortably on home CPAP  VS stable without major changes  [place] draining QS clear jorge a urine  Will continue to monitor and intervene to promote positive outcomes for pt  Problem: Potential for Falls  Goal: Patient will remain free of falls  Description: INTERVENTIONS:  - Assess patient frequently for physical needs  -  Identify cognitive and physical deficits and behaviors that affect risk of falls    -  [place] fall precautions as indicated by assessment   - Educate patient/family on patient safety including physical limitations  - Instruct patient to call for assistance with activity based on assessment  - Modify environment to reduce risk of injury  - Consider OT/PT consult to assist with strengthening/mobility  Outcome: Progressing     Problem: Prexisting or High Potential for Compromised Skin Integrity  Goal: Skin integrity is maintained or improved  Description: INTERVENTIONS:  - Identify patients at risk for skin breakdown  - Assess and monitor skin integrity  - Assess and monitor nutrition and hydration status  - Monitor labs   - Assess for incontinence   - Turn and reposition patient  - Assist with mobility/ambulation  - Relieve pressure over bony prominences  - Avoid friction and shearing  - Provide appropriate hygiene as needed including keeping skin clean and dry  - Evaluate need for skin moisturizer/barrier cream  - Collaborate with interdisciplinary team   - Patient/family teaching  - Consider wound care consult   Outcome: Progressing     Problem: Nutrition/Hydration-ADULT  Goal: Nutrient/Hydration intake appropriate for improving, restoring or maintaining nutritional needs  Description: Monitor and assess patient's nutrition/hydration status for malnutrition  Collaborate with interdisciplinary team and initiate plan and interventions as ordered  Monitor patient's weight and dietary intake as ordered or per policy  Utilize nutrition screening tool and intervene as necessary  Determine patient's food preferences and provide high-protein, high-caloric foods as appropriate       INTERVENTIONS:  - Monitor oral intake, urinary output, labs, and treatment plans  - Assess nutrition and hydration status and recommend course of action  - Evaluate amount of meals eaten  - Assist patient with eating if necessary   - Allow adequate time for meals  - Recommend/ encourage appropriate diets, oral nutritional supplements, and vitamin/mineral supplements  - Order, calculate, and assess calorie counts as needed  - Recommend, monitor, and adjust tube feedings and TPN/PPN based on assessed needs  - Assess need for intravenous fluids  - Provide specific nutrition/hydration education as appropriate  - Include patient/family/caregiver in decisions related to nutrition  Outcome: Progressing     Problem: PAIN - ADULT  Goal: Verbalizes/displays adequate comfort level or baseline comfort level  Description: Interventions:  - Encourage patient to monitor pain and request assistance  - Assess pain using appropriate pain scale  - Administer analgesics based on type and severity of pain and evaluate response  - Implement non-pharmacological measures as appropriate and evaluate response  - Consider cultural and social influences on pain and pain management  - Notify physician/advanced practitioner if interventions unsuccessful or patient reports new pain  Outcome: Progressing     Problem: INFECTION - ADULT  Goal: Absence or prevention of progression during hospitalization  Description: INTERVENTIONS:  - Assess and monitor for signs and symptoms of infection  - Monitor lab/diagnostic results  - Monitor all insertion sites, i e  indwelling lines, tubes, and drains  - Monitor endotracheal if appropriate and nasal secretions for changes in amount and color  - [place] appropriate cooling/warming therapies per order  - Administer medications as ordered  - Instruct and encourage patient and family to use good hand hygiene technique  - Identify and instruct in appropriate isolation precautions for identified infection/condition  Outcome: Progressing     Problem: SAFETY ADULT  Goal: Patient will remain free of falls  Description: INTERVENTIONS:  - Assess patient frequently for physical needs  -  Identify cognitive and physical deficits and behaviors that affect risk of falls    -  [place] fall precautions as indicated by assessment   - Educate patient/family on patient safety including physical limitations  - Instruct patient to call for assistance with activity based on assessment  - Modify environment to reduce risk of injury  - Consider OT/PT consult to assist with strengthening/mobility  Outcome: Progressing  Goal: Maintain or return to baseline ADL function  Description: INTERVENTIONS:  -  Assess patient's ability to carry out ADLs; assess patient's baseline for ADL function and identify physical deficits which impact ability to perform ADLs (bathing, care of mouth/teeth, toileting, grooming, dressing, etc )  - Assess/evaluate cause of self-care deficits   - Assess range of motion  - Assess patient's mobility; develop plan if impaired  - Assess patient's need for assistive devices and provide as appropriate  - Encourage maximum independence but intervene and supervise when necessary  - Involve family in performance of ADLs  - Assess for home care needs following discharge   - Consider OT consult to assist with ADL evaluation and planning for discharge  - Provide patient education as appropriate  Outcome: Progressing  Goal: Maintain or return mobility status to optimal level  Description: INTERVENTIONS:  - Assess patient's baseline mobility status (ambulation, transfers, stairs, etc )    - Identify cognitive and physical deficits and behaviors that affect mobility  - Identify mobility aids required to assist with transfers and/or ambulation (gait belt, sit-to-stand, lift, walker, cane, etc )  - [place] fall precautions as indicated by assessment  - Record patient progress and toleration of activity level on Mobility SBAR; progress patient to next Phase/Stage  - Instruct patient to call for assistance with activity based on assessment  - Consider rehabilitation consult to assist with strengthening/weightbearing, etc   Outcome: Progressing     Problem: DISCHARGE PLANNING  Goal: Discharge to home or other facility with appropriate resources  Description: INTERVENTIONS:  - Identify barriers to discharge w/patient and caregiver  - Arrange for needed discharge resources and transportation as appropriate  - Identify discharge learning needs (meds, wound care, etc )  - Arrange for interpretive services to assist at discharge as needed  - Refer to Case Management Department for coordinating discharge planning if the patient needs post-hospital services based on physician/advanced practitioner order or complex needs related to functional status, cognitive ability, or social support system  Outcome: Progressing     Problem: Knowledge Deficit  Goal: Patient/family/caregiver demonstrates understanding of disease process, treatment plan, medications, and discharge instructions  Description: Complete learning assessment and assess knowledge base    Interventions:  - Provide teaching at level of understanding  - Provide teaching via preferred learning methods  Outcome: Progressing     Problem: CARDIOVASCULAR - ADULT  Goal: Maintains optimal cardiac output and hemodynamic stability  Description: INTERVENTIONS:  - Monitor I/O, vital signs and rhythm  - Monitor for S/S and trends of decreased cardiac output  - Administer and titrate ordered vasoactive medications to optimize hemodynamic stability  - Assess quality of pulses, skin color and temperature  - Assess for signs of decreased coronary artery perfusion  - Instruct patient to report change in severity of symptoms  Outcome: Progressing  Goal: Absence of cardiac dysrhythmias or at baseline rhythm  Description: INTERVENTIONS:  - Continuous cardiac monitoring, vital signs, obtain 12 lead EKG if ordered  - Administer antiarrhythmic and heart rate control medications as ordered  - Monitor electrolytes and administer replacement therapy as ordered  Outcome: Progressing     Problem: GENITOURINARY - ADULT  Goal: Maintains or returns to baseline urinary function  Description: INTERVENTIONS:  - Assess urinary function  - Encourage oral fluids to ensure adequate hydration if ordered  - Administer IV fluids as ordered to ensure adequate hydration  - Administer ordered medications as needed  - Offer frequent toileting  - Follow urinary retention protocol if ordered  Outcome: Progressing  Goal: Absence of urinary retention  Description: INTERVENTIONS:  - Assess patients ability to void and empty bladder  - Monitor I/O  - Bladder scan as needed  - Discuss with physician/AP medications to alleviate retention as needed  - Discuss catheterization for long term situations as appropriate  Outcome: Progressing  Goal: Urinary catheter remains patent  Description: INTERVENTIONS:  - Assess patency of urinary catheter  - If patient has a chronic ward, consider changing catheter if non-functioning  - Follow guidelines for intermittent irrigation of non-functioning urinary catheter  Outcome: Progressing     Problem: METABOLIC, FLUID AND ELECTROLYTES - ADULT  Goal: Electrolytes maintained within normal limits  Description: INTERVENTIONS:  - Monitor labs and assess patient for signs and symptoms of electrolyte imbalances  - Administer electrolyte replacement as ordered  - Monitor response to electrolyte replacements, including repeat lab results as appropriate  - Instruct patient on fluid and nutrition as appropriate  Outcome: Progressing  Goal: Fluid balance maintained  Description: INTERVENTIONS:  - Monitor labs   - Monitor I/O and WT  - Instruct patient on fluid and nutrition as appropriate  - Assess for signs & symptoms of volume excess or deficit  Outcome: Progressing  Goal: Glucose maintained within target range  Description: INTERVENTIONS:  - Monitor Blood Glucose as ordered  - Assess for signs and symptoms of hyperglycemia and hypoglycemia  - Administer ordered medications to maintain glucose within target range  - Assess nutritional intake and initiate nutrition service referral as needed  Outcome: Progressing     Problem: SKIN/TISSUE INTEGRITY - ADULT  Goal: Incision(s), wounds(s) or drain site(s) healing without S/S of infection  Description: INTERVENTIONS  - Assess and document risk factors for skin impairment   - Assess and document dressing, incision, wound bed, drain sites and surrounding tissue  - Consider nutrition services referral as needed  - Oral mucous membranes remain intact  - Provide patient/ family education  Outcome: Progressing

## 2021-05-19 NOTE — PLAN OF CARE
Problem: Potential for Falls  Goal: Patient will remain free of falls  Description: INTERVENTIONS:  - Assess patient frequently for physical needs  -  Identify cognitive and physical deficits and behaviors that affect risk of falls  -  [place] fall precautions as indicated by assessment   - Educate patient/family on patient safety including physical limitations  - Instruct patient to call for assistance with activity based on assessment  - Modify environment to reduce risk of injury  - Consider OT/PT consult to assist with strengthening/mobility  Outcome: Progressing     Problem: Prexisting or High Potential for Compromised Skin Integrity  Goal: Skin integrity is maintained or improved  Description: INTERVENTIONS:  - Identify patients at risk for skin breakdown  - Assess and monitor skin integrity  - Assess and monitor nutrition and hydration status  - Monitor labs   - Assess for incontinence   - Turn and reposition patient  - Assist with mobility/ambulation  - Relieve pressure over bony prominences  - Avoid friction and shearing  - Provide appropriate hygiene as needed including keeping skin clean and dry  - Evaluate need for skin moisturizer/barrier cream  - Collaborate with interdisciplinary team   - Patient/family teaching  - Consider wound care consult   Outcome: Progressing     Problem: Nutrition/Hydration-ADULT  Goal: Nutrient/Hydration intake appropriate for improving, restoring or maintaining nutritional needs  Description: Monitor and assess patient's nutrition/hydration status for malnutrition  Collaborate with interdisciplinary team and initiate plan and interventions as ordered  Monitor patient's weight and dietary intake as ordered or per policy  Utilize nutrition screening tool and intervene as necessary  Determine patient's food preferences and provide high-protein, high-caloric foods as appropriate       INTERVENTIONS:  - Monitor oral intake, urinary output, labs, and treatment plans  - Assess nutrition and hydration status and recommend course of action  - Evaluate amount of meals eaten  - Assist patient with eating if necessary   - Allow adequate time for meals  - Recommend/ encourage appropriate diets, oral nutritional supplements, and vitamin/mineral supplements  - Order, calculate, and assess calorie counts as needed  - Recommend, monitor, and adjust tube feedings and TPN/PPN based on assessed needs  - Assess need for intravenous fluids  - Provide specific nutrition/hydration education as appropriate  - Include patient/family/caregiver in decisions related to nutrition  Outcome: Progressing     Problem: PAIN - ADULT  Goal: Verbalizes/displays adequate comfort level or baseline comfort level  Description: Interventions:  - Encourage patient to monitor pain and request assistance  - Assess pain using appropriate pain scale  - Administer analgesics based on type and severity of pain and evaluate response  - Implement non-pharmacological measures as appropriate and evaluate response  - Consider cultural and social influences on pain and pain management  - Notify physician/advanced practitioner if interventions unsuccessful or patient reports new pain  Outcome: Progressing     Problem: INFECTION - ADULT  Goal: Absence or prevention of progression during hospitalization  Description: INTERVENTIONS:  - Assess and monitor for signs and symptoms of infection  - Monitor lab/diagnostic results  - Monitor all insertion sites, i e  indwelling lines, tubes, and drains  - Monitor endotracheal if appropriate and nasal secretions for changes in amount and color  - [place] appropriate cooling/warming therapies per order  - Administer medications as ordered  - Instruct and encourage patient and family to use good hand hygiene technique  - Identify and instruct in appropriate isolation precautions for identified infection/condition  Outcome: Progressing     Problem: SAFETY ADULT  Goal: Patient will remain free of falls  Description: INTERVENTIONS:  - Assess patient frequently for physical needs  -  Identify cognitive and physical deficits and behaviors that affect risk of falls    -  [place] fall precautions as indicated by assessment   - Educate patient/family on patient safety including physical limitations  - Instruct patient to call for assistance with activity based on assessment  - Modify environment to reduce risk of injury  - Consider OT/PT consult to assist with strengthening/mobility  Outcome: Progressing  Goal: Maintain or return to baseline ADL function  Description: INTERVENTIONS:  -  Assess patient's ability to carry out ADLs; assess patient's baseline for ADL function and identify physical deficits which impact ability to perform ADLs (bathing, care of mouth/teeth, toileting, grooming, dressing, etc )  - Assess/evaluate cause of self-care deficits   - Assess range of motion  - Assess patient's mobility; develop plan if impaired  - Assess patient's need for assistive devices and provide as appropriate  - Encourage maximum independence but intervene and supervise when necessary  - Involve family in performance of ADLs  - Assess for home care needs following discharge   - Consider OT consult to assist with ADL evaluation and planning for discharge  - Provide patient education as appropriate  Outcome: Progressing  Goal: Maintain or return mobility status to optimal level  Description: INTERVENTIONS:  - Assess patient's baseline mobility status (ambulation, transfers, stairs, etc )    - Identify cognitive and physical deficits and behaviors that affect mobility  - Identify mobility aids required to assist with transfers and/or ambulation (gait belt, sit-to-stand, lift, walker, cane, etc )  - [place] fall precautions as indicated by assessment  - Record patient progress and toleration of activity level on Mobility SBAR; progress patient to next Phase/Stage  - Instruct patient to call for assistance with activity based on assessment  - Consider rehabilitation consult to assist with strengthening/weightbearing, etc   Outcome: Progressing     Problem: DISCHARGE PLANNING  Goal: Discharge to home or other facility with appropriate resources  Description: INTERVENTIONS:  - Identify barriers to discharge w/patient and caregiver  - Arrange for needed discharge resources and transportation as appropriate  - Identify discharge learning needs (meds, wound care, etc )  - Arrange for interpretive services to assist at discharge as needed  - Refer to Case Management Department for coordinating discharge planning if the patient needs post-hospital services based on physician/advanced practitioner order or complex needs related to functional status, cognitive ability, or social support system  Outcome: Progressing     Problem: Knowledge Deficit  Goal: Patient/family/caregiver demonstrates understanding of disease process, treatment plan, medications, and discharge instructions  Description: Complete learning assessment and assess knowledge base    Interventions:  - Provide teaching at level of understanding  - Provide teaching via preferred learning methods  Outcome: Progressing     Problem: CARDIOVASCULAR - ADULT  Goal: Maintains optimal cardiac output and hemodynamic stability  Description: INTERVENTIONS:  - Monitor I/O, vital signs and rhythm  - Monitor for S/S and trends of decreased cardiac output  - Administer and titrate ordered vasoactive medications to optimize hemodynamic stability  - Assess quality of pulses, skin color and temperature  - Assess for signs of decreased coronary artery perfusion  - Instruct patient to report change in severity of symptoms  Outcome: Progressing  Goal: Absence of cardiac dysrhythmias or at baseline rhythm  Description: INTERVENTIONS:  - Continuous cardiac monitoring, vital signs, obtain 12 lead EKG if ordered  - Administer antiarrhythmic and heart rate control medications as ordered  - Monitor electrolytes and administer replacement therapy as ordered  Outcome: Progressing     Problem: GENITOURINARY - ADULT  Goal: Maintains or returns to baseline urinary function  Description: INTERVENTIONS:  - Assess urinary function  - Encourage oral fluids to ensure adequate hydration if ordered  - Administer IV fluids as ordered to ensure adequate hydration  - Administer ordered medications as needed  - Offer frequent toileting  - Follow urinary retention protocol if ordered  Outcome: Progressing  Goal: Absence of urinary retention  Description: INTERVENTIONS:  - Assess patients ability to void and empty bladder  - Monitor I/O  - Bladder scan as needed  - Discuss with physician/AP medications to alleviate retention as needed  - Discuss catheterization for long term situations as appropriate  Outcome: Progressing  Goal: Urinary catheter remains patent  Description: INTERVENTIONS:  - Assess patency of urinary catheter  - If patient has a chronic ward, consider changing catheter if non-functioning  - Follow guidelines for intermittent irrigation of non-functioning urinary catheter  Outcome: Progressing     Problem: METABOLIC, FLUID AND ELECTROLYTES - ADULT  Goal: Electrolytes maintained within normal limits  Description: INTERVENTIONS:  - Monitor labs and assess patient for signs and symptoms of electrolyte imbalances  - Administer electrolyte replacement as ordered  - Monitor response to electrolyte replacements, including repeat lab results as appropriate  - Instruct patient on fluid and nutrition as appropriate  Outcome: Progressing  Goal: Fluid balance maintained  Description: INTERVENTIONS:  - Monitor labs   - Monitor I/O and WT  - Instruct patient on fluid and nutrition as appropriate  - Assess for signs & symptoms of volume excess or deficit  Outcome: Progressing  Goal: Glucose maintained within target range  Description: INTERVENTIONS:  - Monitor Blood Glucose as ordered  - Assess for signs and symptoms of hyperglycemia and hypoglycemia  - Administer ordered medications to maintain glucose within target range  - Assess nutritional intake and initiate nutrition service referral as needed  Outcome: Progressing     Problem: SKIN/TISSUE INTEGRITY - ADULT  Goal: Incision(s), wounds(s) or drain site(s) healing without S/S of infection  Description: INTERVENTIONS  - Assess and document risk factors for skin impairment   - Assess and document dressing, incision, wound bed, drain sites and surrounding tissue  - Consider nutrition services referral as needed  - Oral mucous membranes remain intact  - Provide patient/ family education  Outcome: Progressing

## 2021-05-19 NOTE — PROGRESS NOTES
Progress Note - General Surgery   Verl Snare 70 y o  female MRN: [id number]  Unit/Bed#: -01 Encounter: [id number]    Assessment:  Right thigh wounds   Sepsis      Plan:  Continue current local wound care to the lower right leg and thigh and serial labs and IV antibiotics  Subjective/Objective       Subjective: The patient is seen and complains of right upper inner thigh pain  Denies any chest pain or shortness of breath  No nausea or abominal pain or vomiting  Objective:     Blood pressure 127/77, pulse 103, temperature 98 9 °F (37 2 °C), temperature source Temporal, resp  rate 19, height 5' 2" (1 575 m), weight (!) 158 kg (348 lb 5 2 oz), SpO2 97 %  ,Body mass index is 63 71 kg/m²  Intake/Output Summary (Last 24 hours) at 5/19/2021 0803  Last data filed at 5/19/2021 0556  Gross per 24 hour   Intake 1350 ml   Output 2240 ml   Net -890 ml       Invasive Devices     Peripheral Intravenous Line            Peripheral IV 05/16/21 Right Arm 3 days    Peripheral IV 05/16/21 Left;Ventral (anterior) Wrist 2 days          Drain            Urethral Catheter Double-lumen; Latex 16 Fr  2 days                Physical Exam:  The patient is awake alert and oriented x3 in no acute distress  Right lower extremity reveals edema  The right upper inner thigh reveals some ecchymosis and superficial blisters  There are also some excoriated areas noted  The lower in her distal thigh has an area of denuded tissue almost appearing like a burn wound  Both these areas are tender but the tissue surrounding them is soft  The right lower lateral leg wound is superficial with minimal drainage  The leg is warm  There is no evidence of neurovascular compromise      Lab, Imaging and other studies:  CBC:   Lab Results   Component Value Date    WBC 42 95 (HH) 05/19/2021    HGB 11 0 (L) 05/19/2021    HCT 34 8 05/19/2021    MCV 81 (L) 05/19/2021     05/19/2021    MCH 25 5 (L) 05/19/2021    MCHC 31 6 05/19/2021    RDW 16 9 (H) 05/19/2021    MPV 10 4 05/19/2021    NRBC 0 05/18/2021    NRBC 1 05/18/2021   , CMP:   Lab Results   Component Value Date    SODIUM 132 (L) 05/19/2021    K 4 3 05/19/2021    CL 99 (L) 05/19/2021    CO2 23 05/19/2021    BUN 68 (H) 05/19/2021    CREATININE 1 72 (H) 05/19/2021    CALCIUM 8 9 05/19/2021    AST 35 05/19/2021    ALT 21 05/19/2021    ALKPHOS 178 (H) 05/19/2021    EGFR 29 05/19/2021

## 2021-05-20 PROBLEM — E87.1 HYPONATREMIA: Status: RESOLVED | Noted: 2021-01-01 | Resolved: 2021-01-01

## 2021-05-20 PROBLEM — S81.809A WOUND OF LOWER EXTREMITY: Status: RESOLVED | Noted: 2021-01-01 | Resolved: 2021-01-01

## 2021-05-20 PROBLEM — I50.33 ACUTE ON CHRONIC DIASTOLIC HEART FAILURE (HCC): Status: ACTIVE | Noted: 2021-01-01

## 2021-05-20 PROBLEM — B95.5 STREPTOCOCCAL BACTEREMIA: Status: ACTIVE | Noted: 2021-01-01

## 2021-05-20 PROBLEM — E04.1 THYROID NODULE GREATER THAN OR EQUAL TO 1.5 CM IN DIAMETER INCIDENTALLY NOTED ON IMAGING STUDY: Status: RESOLVED | Noted: 2021-01-01 | Resolved: 2021-01-01

## 2021-05-20 PROBLEM — I63.9 CVA (CEREBRAL VASCULAR ACCIDENT) (HCC): Status: ACTIVE | Noted: 2021-01-01

## 2021-05-20 NOTE — ASSESSMENT & PLAN NOTE
· Hold in losartan until kidney injury has completely gotten to normal   Otherwise continue metoprolol blood pressure is stable

## 2021-05-20 NOTE — ASSESSMENT & PLAN NOTE
· Right lower extremity cellulitis actually has worsened today the swelling the redness discoloration of the white count came down to 39,000 there is no fever concern is for an underlying neck-a CT of the lower extremities done stat unable to do IV contrast secondary to anaphylaxis she is currently made NPO with Xarelto on hold for possible surgical intervention

## 2021-05-20 NOTE — PHYSICAL THERAPY NOTE
PHYSICAL THERAPY TREATMENT NOTE  NAME:  Temi Cavanaugh  DATE: 05/20/21    Length Of Stay: 4  Performed at least 2 patient identifiers during session: Name and Birthday    TREATMENT:      05/20/21 1232   PT Last Visit   PT Visit Date 05/20/21   Note Type   Note Type Treatment   Pain Assessment   Pain Assessment Tool 0-10   Pain Score No Pain   Effect of Pain on Daily Activities   (B/L LE tenderness with motion, R > L 2/2 wounds)   Restrictions/Precautions   Other Precautions Chair Alarm; Bed Alarm; Fall Risk   General   Chart Reviewed Yes   Response to Previous Treatment Patient with no complaints from previous session  Family/Caregiver Present No   Cognition   Orientation Level Oriented X4   Following Commands Follows one step commands without difficulty   Subjective   Subjective "It feels good to move "   Bed Mobility   Supine to Sit 2  Maximal assistance   Additional items Assist x 1; Increased time required;Verbal cues;LE management;HOB elevated; Bedrails  (Trunk management)   Sit to Supine 2  Maximal assistance   Additional items Assist x 2; Increased time required;Verbal cues;LE management  (Trunk management)   Additional Comments HOB slightly elevated  Max manual assistance provided for LE management and trunk management  Mod v/c provided to prevent trunk retropulsion to improve success of reaching EOB  Min v/c for use of UEs to assist in reaching EOB   Transfers   Sit to Stand   (Max x1 + mod x1)   Additional items Assist x 2; Increased time required;Verbal cues   Stand to Sit 3  Moderate assistance   Additional items Assist x 2; Increased time required;Verbal cues   Stand pivot   (Max x1 + min x1)   Additional items Assist x 2; Increased time required;Verbal cues   Additional Comments Pt performed all transfers with use of RW for UE support  Manual assist x2 required for sit<>stand transfers for LE stabilization, trunk positioning, anterior weight shift, and hip trunk/extension to reach full standing   Mod v/c provided to prevent trunk retropulsion to keep COM anterior to improve success of transfer  Manual assist x2 during SPT for weight shifting, trunk stabilization, and RW management  Mod v/c provided for LE advancement and RW management to maintain balance while improving success of reaching transfer surface safely   Ambulation/Elevation   Gait pattern Decreased foot clearance; Short stride; Steppage; Excessively slow; Step to; Improper Weight shift;Retropulsion; Wide [place]DS[place]  (Retropulsion)   Gait Assistance 2  Maximal assist   Additional items Assist x 2;Verbal cues   Assistive Device Rolling walker   Distance Pt able to take one small step forward with max manual assist x2 for weight shifting before requiring SPT to seat   Balance   Static Sitting Fair   Dynamic Sitting Poor +   Static Standing Poor   Dynamic Standing Poor -   Ambulatory Poor -   Activity Tolerance   Activity Tolerance Patient limited by fatigue;Patient limited by pain   Medical Staff Made Aware Spoke with Karen Foster and [address] with RN, Nicolasa Acuna   Assessment   Prognosis Fair   Problem List Decreased strength;Decreased endurance; Impaired balance;Decreased mobility;Obesity; Decreased skin integrity; Decreased cognition; Impaired judgement;Decreased safety awareness;Pain  (Pain with activity)   Barriers to Discharge Decreased caregiver support; Inaccessible home environment   Barriers to Discharge Comments Pt requiring significant manual assistance with all mobility   Goals   PT Treatment Day 2   Plan   Treatment/Interventions Functional transfer training;LE strengthening/ROM; Elevations; Therapeutic exercise; Endurance training;Cognitive reorientation;Patient/family training;Equipment eval/education; Bed mobility;Gait training;Spoke to nursing;OT   Progress Progressing toward goals   PT Frequency   (3-5x/week)   Recommendation   PT Discharge Recommendation Post acute rehabilitation services   Equipment Recommended   (tbd by rehab)   PT - OK to Discharge Yes  (Once medically cleared)   Additional Comments Pt supine in bed at end of session with bed alarm on and all needs within reach   [facility] 8 in Bed Without Bedrails 2   Lying on Back to Sitting on Edge of Flat Bed 2   Moving Bed to Chair 1   Standing Up From Chair 1   Walk in Room 1   Climb 3-5 Stairs 1   Basic Mobility Inpatient Raw Score 8   Turning Head Towards Sound 4   Follow Simple Instructions 4   Low Function Basic Mobility Raw Score 16   Low Function Basic Mobility Standardized Score 25 72     Assessment: Co-treatment performed with OT 2/2 pt's significant physical deficits, requiring substantial manual assistance of multiple individuals with all mobility  Co-treatment improving safety of pt  Pt displayed improvement during treatment session today  She was able to perform a SPT to recliner and bed  However, she still requires significant manual assistance and consistent verbal cuing for technique with all functional mobility  Pt only able to ambulate 1 step with significant assistance 2/2 reduced weight shifting, LE advancement, and quick onset of fatigue  Her exercise tolerance and functional mobility are currently limited by reduced strength, balance deficits, pain, and impaired skin integrity  Pt will continue to benefit from skilled physical therapy services to address these impairments  The patient's AM-PAC Basic Mobility Inpatient Short Form Low Function Raw Score 16 , Standardized Score is 25 72  A standardized score less than 42 9 suggests the patient may benefit from discharge to post-acute rehabilitation services  Please also refer to the recommendation of the Physical Therapist for safe discharge planning      NORMA Orellana

## 2021-05-20 NOTE — OCCUPATIONAL THERAPY NOTE
OccupationalTherapy Progress Note     Patient Name: Tate Young  Today's Date: 5/20/2021  Problem List  Principal Problem:    Severe sepsis [facility])  Active Problems:    AYDEE (acute kidney injury) ([facility] )    Atrial fibrillation with RVR ([facility] )    Type 2 diabetes mellitus, without long-term current use of insulin (HCC)    Acute on chronic diastolic heart failure (HCC)    CVA (cerebral vascular accident) ([facility] )    Class 3 severe obesity with serious comorbidity and body mass index (BMI) of 60 0 to 69 9 in adult [facility])    Hypertension    ARIAN on CPAP    Streptococcal bacteremia    Cellulitis of right lower extremity            05/20/21 1231   Note Type   Note Type Treatment   Restrictions/Precautions   Other Precautions Chair Alarm; Bed Alarm; Fall Risk   Pain Assessment   Pain Assessment Tool 0-10   Pain Score No Pain   ADL   Eating Assistance 5  Supervision/Setup   Eating Deficit Setup   Grooming Assistance 5  Supervision/Setup   Grooming Deficit Setup   UB Dressing Assistance 4  Minimal Assistance   UB Dressing Deficit Verbal cueing;Supervision/safety; Increased time to complete; Thread RUE; Thread LUE;Pull over head;Pull around back   UB Dressing Comments while seated at EOB with B knees blocked    LB Dressing Assistance 1  Total Assistance   LB Dressing Deficit Steadying; Requires assistive device for steadying;Verbal cueing;Supervision/safety; Increased time to complete; Don/doff R sock; Don/doff L sock; Thread RLE into pants; Thread LLE into pants;Pull up over hips   LB Dressing Comments Pt unable to actively participate in LB dressing at this time d/t girth, decreased balance and decreased unsupported sitting balance with limited BLE support  Pt's unsupported sitting balance decreased at this time d/t Pt sitting on high surface, Pt requiring to move to edge of bed for BLE to be flat on floor which then results in Pt sliding off bed   Pt provided with step stool to support BLE for better balance   Bed Mobility   Supine to Sit 2 Maximal assistance   Additional items Assist x 1;HOB elevated; Bedrails; Increased time required;Verbal cues;LE management  (trunk management)   Additional Comments HOB slightly elevated with use of bedrails PRN   Transfers   Sit to Stand   (Max x's 1 + Mod x's 1 )   Additional items Assist x 2; Increased time required;Verbal cues   Stand to Sit 3  Moderate assistance   Additional items Assist x 2; Increased time required;Verbal cues   Stand pivot   (Max x's 1 + Min x's 1)   Additional items Assist x 2; Increased time required;Verbal cues   Additional Comments Pt completing functional transfers with use of RW for UB support  STS from EOB>RW @ Max x's 1 + Min x's 1  Pt able to tolerating standing well with Min x's 2  Pt then requiring Max x's 1 + Min x's 1 for SPT to recliner chair  Pt's biggest limitations at this time are her short stature and difficulty boosting self onto surface  Pt requires A x's 4 to fully reposition to ensure safety and decrease risk for falls  Cognition   Overall Cognitive Status WFL   Arousal/Participation Responsive; Alert; Cooperative   Attention Attends with cues to redirect   Orientation Level Oriented X4   Memory Within functional limits   Following Commands Follows one step commands without difficulty   Activity Tolerance   Activity Tolerance Patient limited by fatigue   Medical Staff Made Aware Spoke with RN, Sofía López  Spoke with PT, [facility] and Ian GREEN   Assessment   Assessment Pt seen for treatment session #2 this date  Pt alert and agreeable to participate at this time  Pt states "I want to get out of bed today"  Pt continues to require significant assistance with functional transfers and bed mobility however with increased performance during date compared to previous OT session  Pt able to progress BLE in order to complete SPT to recliner chair and back to EOB with A x's 2  Per RN, Pt to be back into bed d/t MRI being ordered   At this time Pt demonstrates Good potential to continue to make progress towards goals although is limited by decrease activity tolerance, decrease standing balance, decrease sitting balance, decrease performance during ADL tasks, decrease safety awareness , decrease BUE ROM, decrease UB MS, decrease generalized strength, decrease activity engagement and decrease performance during functional transfers  Pt requires PT /OT co-treat due to signficant assistance with mobility and cognitive-behavioral impairments as well as to maximize Pt's performance, participation, and functional outcomes  Plan   Treatment Interventions ADL retraining;Functional transfer training;UE strengthening/ROM; Endurance training;Patient/family training;Equipment evaluation/education; Neuromuscular reeducation; Compensatory technique education;Continued evaluation; Energy conservation; Activityengagement   Goal Expiration Date 05/28/21   OT Treatment Day 2   OT Frequency 3-5x/wk   Recommendation   Recommendation   (post acute rehab)   OT Discharge Recommendation Post acute rehabilitation services   AM-PAC Daily Activity Inpatient   Lower Body Dressing 1   Bathing 1   Toileting 1   Upper Body Dressing 3   Grooming 3   Eating 3   Daily Activity Raw Score 12   Daily Activity Standardized Score (Calc for Raw Score >=11) 30 6     Pt goals to be met by 5/28/2021     1  Pt will demonstrate ability to complete grooming/hygiene tasks @ Mod I after set-up  2  Pt will demonstrate ability to complete supine<>sit @ S in order to increase safety and independence during ADL tasks  3  Pt will demonstrate ability to complete UB ADLs including washing/dressing @ Mod I in order to increase performance and participation during meaningful tasks  4  Pt will demonstrate ability to complete LB dressing @ Mod A in order to increase safety and independence during meaningful tasks     5  Pt will demonstrate ability to harjinder/doff socks/shoes while sitting EOB @ Mod A in order to increase safety and independence during meaningful tasks    6  Pt will demonstrate ability to complete toileting tasks including CM and pericare @ Mod A in order to increase safety and independence during meaningful tasks  7  Pt will demonstrate ability to complete EOB, chair, toilet/commode transfers @ Mod A in order to increase performance and participation during functional tasks  8  Pt will demonstrate ability to stand for 1-2 minutes while maintaining F+ balance with use of RW for UB support PRN  9  Pt will demonstrate ability to tolerate 30-35 minute OT session with no vc'ing for deep breathing or use of energy conservation techniques in order to increase activity tolerance during functional tasks  10  Pt will demonstrate Good carryover of use of energy conservation/compensatory strategies during ADLs and functional tasks in order to increase safety and reduce risk for falls  11  Pt will demonstrate Good attention and participation in continued evaluation of functional ambulation house hold distances in order to assist with safe d/c planning  12  Pt will attend to continued cognitive assessments 100% of the time in order to provide most appropriate d/c recommendations  13  Pt will follow 100% simple 2-step commands and be A&O x4 consistently with environmental cues to increase participation in functional activities  14  Pt will identify 3 areas of interest/hobbies and 1 intervention on how to incorporate into daily life in order to increase interaction with environment and peers as well as increase participation in meaningful tasks     15  Pt will demonstrate 100% carryover of BUE HEP in order to increase BUE MS and increase performance during functional tasks upon d/c home      Claudell Loyal Cwiertniewicz OTR/L

## 2021-05-20 NOTE — ASSESSMENT & PLAN NOTE
· Subacute to chronic evidenced on the CT she is on Xarelto currently will be held possible anticipation for surgery

## 2021-05-20 NOTE — PROGRESS NOTES
Progress Note - General Surgery   Roxy Benson 70 y o  female MRN: [id number]  Unit/Bed#: -01 Encounter: [id number]    Assessment:    Worsening erythema, increased right thigh circumference and superficial necrotic skin sloughing large area involving the medial-posterior right thigh, suspicion for evolving necrotizing fasciitis  White count this morning, 39 92 (42 95)  Hemoglobin today 11 2 (11 0), doubt hematoma of the right thigh as the patient had been on rivaroxaban now being held  Patient with g positive bacteremia, believed to be secondary to right lower leg cellulitis  Patient with a history of atrial fibrillation with rapid ventricular response, she had been restarted on rivaroxaban  Acute kidney injury improved, creatinine today 1 26  The patient had been given 20 mg of IV Lasix this morning  CT scan of the right lower extremity without IV contrast done on May 16th did not show any evidence of focal hematuria or descend oval abscess, there was asymmetrical diffuse fat stranding through the right thigh region becoming less pronounced below the level of the knee, findings likely secondary to edema or cellulitis  Venous Doppler of bilateral lower limbs done 4 days ago did not show any evidence of acute or chronic deep vein thrombosis or superficial thrombophlebitis  Chronic heart failure    Morbid obesity, BMI 61 67 (weight 153 kg)    Obstructive sleep apnea, patient on CPAP nightly    Constipation, patient does not think that she has had a bowel movement in almost 2 weeks  Plan:  -Discussed at length in person with Alaina Tolliver (surgery) and Debbie (hospitalist)  -The patient needs stat CT scan of the right lower extremity performed, the patient's creatinine this morning was 1 26 and all were in agreement to proceed with IV contrast if possible     -Hold rivaroxaban  -Patient made nothing by mouth sips with meds, anticipating that the patient may need surgical debridement   -Stat lactic acid and procalcitonin level ordered  -Should suspicion of necrotizing fasciitis or drainable abscess be discovered on CT scan imaging of the right thigh, the patient would likely need surgical debridement/possible fasciotomy if indicated  -Should emergent surgical intervention be required, the patient may transfer to a tertiary care center as the general surgeon here this morning is now starting a lengthy operative case expected to span several hours  -Bowel regimen, MiraLax daily p r n  and Senokot    Subjective/Objective      Chief Complaint:  Patient has yellow serous drainage noted on her entire right thigh dressings  She has increased pain and discomfort deep within the posterior medial right thigh  Subjective:  81-year-old white female seen in critical care unit  She has increased pain and discomfort in her right thigh  She has not had much relief with tramadol 50 mg q 12 hours p o   Nursing has noted yellow serous drainage soaking through her medial right thigh wound dressings  Patient remains afebrile  She had been seen earlier this morning by Dr Nile Holt, patient was noted to have some wheezing  She was given 20 mg of IV Lasix this morning  Portable chest x-ray was performed, results pending  Concern over the patient's white count which remains quite high, 39 92  She had venous duplex study of both lower extremities done on May 16th did not show any evidence of DVT  Also the patient notes constipation, she believes her last bowel movement was nearly 2 weeks ago  She has an indwelling Holden catheter draining approximately 300 mL of clear yellow urine  All dressings involving the entire right lower extremity were removed at bedside with nursing  Pictures will be downloaded to the media tab on Epic      Scheduled Meds:  Current Facility-Administered Medications   Medication Dose Route Frequency Provider Last Rate    acetaminophen  650 mg Oral Q6H PRN Salena A Nathanella, CRNP      cefazolin  2,000 mg Intravenous Q8H Salena A Nathanella, CRNP Stopped (05/20/21 0536)    collagenase   Topical Daily Salena A Nathanella, CRNP      insulin lispro  1-6 Units Subcutaneous HS Salena A Nathanella, CRNP      insulin lispro  2-12 Units Subcutaneous TID AC Salena A Nathanella, CRNP      levalbuterol  1 25 mg Nebulization Q4H PRN Salena A Ramella, CRNP      metoprolol  5 mg Intravenous Q4H PRN Salena A Ramella, CRNP      metoprolol tartrate  25 mg Oral Q12H [facility] Salena A Ramella, CRNP      miconazole   Topical BID Salena A Ramella, CRNP      phenol  1 spray Mouth/Throat Q2H PRN Salena A Ramella, CRNP      polyethylene glycol  17 g Oral Daily PRN Salena A Ramella, CRNP      pravastatin  20 mg Oral Daily With [place], CRNP      senna-docusate sodium  1 tablet Oral HS Salena A Nathanella, CRNP      traMADol  50 mg Oral Q12H PRN Salena A Ramella, CRNP       Continuous Infusions:   PRN Meds:   acetaminophen    levalbuterol    metoprolol    phenol    polyethylene glycol    traMADol    Objective:     Blood pressure 137/84, pulse 89, temperature 97 5 °F (36 4 °C), temperature source Temporal, resp  rate 21, height 5' 2" (1 575 m), weight (!) 153 kg (337 lb 11 9 oz), SpO2 95 %  ,Body mass index is 61 77 kg/m²  Intake/Output Summary (Last 24 hours) at 5/20/2021 1024  Last data filed at 5/20/2021 1002  Gross per 24 hour   Intake 890 ml   Output 1975 ml   Net -1085 ml       Invasive Devices     Peripheral Intravenous Line            Peripheral IV 05/20/21 Left;Ventral (anterior) Antecubital less than 1 day          Drain            Urethral Catheter Double-lumen; Latex 16 Fr  3 days                Physical Exam:     Patient is awake, appears anxious and uncomfortable  General body habitus is morbidly obese  She is lying flat on a bariatric bed  ENT clear  Neck without increased neck veins  Heart irregular rate and rhythm    Heart sounds are distant  Lungs some faint rales bilaterally  Abdomen large abdominal girth  Positive bowel sounds, soft  Indwelling Holden catheter with about 300 mL clear yellow urine  No necrotizing skin changes in the groin folds or labia  Extremities all right lower extremity dressings were removed for wound inspection  The right upper posterior-medial lower extremity shows a large area with significant ecchymosis and some necrotizing changes and multiple superficial blisters with yellow sanguinous drainage  The erythema blanches with palpation and is tender to touch  The patient has discomfort of the posterior mid right thigh with deep palpation  The distal posterior thigh shows an area of denuded skin without active draining, surrounded with erythema which blanches with palpation  The distal right anterior ankle and mid shin area shows a large area of superficial wounds also with some yellow serous drainage noted on the ABD dressings  No open areas or blister formation in the distal right lower extremity  The right calf muscles were soft and supple  Patient does have tenderness with compression of the right posterior medial thigh on exam   She has 1 to 2+ pitting edema of bilateral lower extremities, to the mid shin areas right greater than left  The skin in lower extremity on the right appears shiny and taut  Doppler was used to confirm arterial pulses bilaterally, bounding Doppler pulses PT and DP in both feet  She has adequate capillary refill noted in the foot and all toes on the right  Sensation to light touch is intact in the right lower extremity into the plantar aspect of the foot  Lab, Imaging and other studies:  I have personally reviewed pertinent lab results    , CBC:   Lab Results   Component Value Date    WBC 39 92 (HH) 05/20/2021    HGB 11 2 (L) 05/20/2021    HCT 35 5 05/20/2021    MCV 81 (L) 05/20/2021     05/20/2021    MCH 25 5 (L) 05/20/2021    MCHC 31 5 05/20/2021    RDW 17 1 (H) 05/20/2021    MPV 10 3 05/20/2021    NRBC 0 05/20/2021   , CMP:   Lab Results   Component Value Date    SODIUM 136 05/20/2021    K 3 8 05/20/2021     05/20/2021    CO2 26 05/20/2021    BUN 48 (H) 05/20/2021    CREATININE 1 26 05/20/2021    CALCIUM 9 1 05/20/2021    AST 58 (H) 05/20/2021    ALT 26 05/20/2021    ALKPHOS 213 (H) 05/20/2021    EGFR 42 05/20/2021   , Coagulation: No results found for: PT, INR, APTT  VTE Pharmacologic Prophylaxis: Reason for no pharmacologic prophylaxis Apixaban on hold  VTE Mechanical Prophylaxis: reason for no mechanical VTE prophylaxis Not applied to the right lower extremity, affected limb       Valentino Harold, PA-C

## 2021-05-20 NOTE — QUICK NOTE
INF DIS PLAN OF CARE NOTE    Patient is afebrile with markedly elevated but improving leukocytosis  No new complaints with slowly improving erythema  Repeat blood cultures negative  Surgery recommends conservative management, no clinical evidence of abscess  Assessment:  Group A strep bacteremia, right leg venous ulcers and cellulitis  Mildly elevated LFTs- possibly secondary to sepsis  Clinically improving    Recommendations:    · Continue iv cefazolin  · Continue wound care  · Serial extremity exams  · Monitor LFTs  · If patient continues to improve with decline in leukocytosis anticipate completing 2 weeks of therapy with po keflex 500 qid through 5/30    Reviewed case with primary team  Will sign off  Please call with concerns or any changes in clinical status or new test results

## 2021-05-20 NOTE — PLAN OF CARE
Problem: Potential for Falls  Goal: Patient will remain free of falls  Description: INTERVENTIONS:  - Assess patient frequently for physical needs  -  Identify cognitive and physical deficits and behaviors that affect risk of falls  -  [place] fall precautions as indicated by assessment   - Educate patient/family on patient safety including physical limitations  - Instruct patient to call for assistance with activity based on assessment  - Modify environment to reduce risk of injury  - Consider OT/PT consult to assist with strengthening/mobility  Outcome: Progressing     Problem: Prexisting or High Potential for Compromised Skin Integrity  Goal: Skin integrity is maintained or improved  Description: INTERVENTIONS:  - Identify patients at risk for skin breakdown  - Assess and monitor skin integrity  - Assess and monitor nutrition and hydration status  - Monitor labs   - Assess for incontinence   - Turn and reposition patient  - Assist with mobility/ambulation  - Relieve pressure over bony prominences  - Avoid friction and shearing  - Provide appropriate hygiene as needed including keeping skin clean and dry  - Evaluate need for skin moisturizer/barrier cream  - Collaborate with interdisciplinary team   - Patient/family teaching  - Consider wound care consult   Outcome: Progressing     Problem: Nutrition/Hydration-ADULT  Goal: Nutrient/Hydration intake appropriate for improving, restoring or maintaining nutritional needs  Description: Monitor and assess patient's nutrition/hydration status for malnutrition  Collaborate with interdisciplinary team and initiate plan and interventions as ordered  Monitor patient's weight and dietary intake as ordered or per policy  Utilize nutrition screening tool and intervene as necessary  Determine patient's food preferences and provide high-protein, high-caloric foods as appropriate       INTERVENTIONS:  - Monitor oral intake, urinary output, labs, and treatment plans  - Assess nutrition and hydration status and recommend course of action  - Evaluate amount of meals eaten  - Assist patient with eating if necessary   - Allow adequate time for meals  - Recommend/ encourage appropriate diets, oral nutritional supplements, and vitamin/mineral supplements  - Order, calculate, and assess calorie counts as needed  - Recommend, monitor, and adjust tube feedings and TPN/PPN based on assessed needs  - Assess need for intravenous fluids  - Provide specific nutrition/hydration education as appropriate  - Include patient/family/caregiver in decisions related to nutrition  Outcome: Progressing     Problem: PAIN - ADULT  Goal: Verbalizes/displays adequate comfort level or baseline comfort level  Description: Interventions:  - Encourage patient to monitor pain and request assistance  - Assess pain using appropriate pain scale  - Administer analgesics based on type and severity of pain and evaluate response  - Implement non-pharmacological measures as appropriate and evaluate response  - Consider cultural and social influences on pain and pain management  - Notify physician/advanced practitioner if interventions unsuccessful or patient reports new pain  Outcome: Progressing     Problem: SAFETY ADULT  Goal: Patient will remain free of falls  Description: INTERVENTIONS:  - Assess patient frequently for physical needs  -  Identify cognitive and physical deficits and behaviors that affect risk of falls    -  [place] fall precautions as indicated by assessment   - Educate patient/family on patient safety including physical limitations  - Instruct patient to call for assistance with activity based on assessment  - Modify environment to reduce risk of injury  - Consider OT/PT consult to assist with strengthening/mobility  Outcome: Progressing  Goal: Maintain or return to baseline ADL function  Description: INTERVENTIONS:  -  Assess patient's ability to carry out ADLs; assess patient's baseline for ADL function and identify physical deficits which impact ability to perform ADLs (bathing, care of mouth/teeth, toileting, grooming, dressing, etc )  - Assess/evaluate cause of self-care deficits   - Assess range of motion  - Assess patient's mobility; develop plan if impaired  - Assess patient's need for assistive devices and provide as appropriate  - Encourage maximum independence but intervene and supervise when necessary  - Involve family in performance of ADLs  - Assess for home care needs following discharge   - Consider OT consult to assist with ADL evaluation and planning for discharge  - Provide patient education as appropriate  Outcome: Progressing  Goal: Maintain or return mobility status to optimal level  Description: INTERVENTIONS:  - Assess patient's baseline mobility status (ambulation, transfers, stairs, etc )    - Identify cognitive and physical deficits and behaviors that affect mobility  - Identify mobility aids required to assist with transfers and/or ambulation (gait belt, sit-to-stand, lift, walker, cane, etc )  - [place] fall precautions as indicated by assessment  - Record patient progress and toleration of activity level on Mobility SBAR; progress patient to next Phase/Stage  - Instruct patient to call for assistance with activity based on assessment  - Consider rehabilitation consult to assist with strengthening/weightbearing, etc   Outcome: Progressing     Problem: DISCHARGE PLANNING  Goal: Discharge to home or other facility with appropriate resources  Description: INTERVENTIONS:  - Identify barriers to discharge w/patient and caregiver  - Arrange for needed discharge resources and transportation as appropriate  - Identify discharge learning needs (meds, wound care, etc )  - Arrange for interpretive services to assist at discharge as needed  - Refer to Case Management Department for coordinating discharge planning if the patient needs post-hospital services based on physician/advanced practitioner order or complex needs related to functional status, cognitive ability, or social support system  Outcome: Progressing     Problem: Knowledge Deficit  Goal: Patient/family/caregiver demonstrates understanding of disease process, treatment plan, medications, and discharge instructions  Description: Complete learning assessment and assess knowledge base    Interventions:  - Provide teaching at level of understanding  - Provide teaching via preferred learning methods  Outcome: Progressing     Problem: CARDIOVASCULAR - ADULT  Goal: Maintains optimal cardiac output and hemodynamic stability  Description: INTERVENTIONS:  - Monitor I/O, vital signs and rhythm  - Monitor for S/S and trends of decreased cardiac output  - Administer and titrate ordered vasoactive medications to optimize hemodynamic stability  - Assess quality of pulses, skin color and temperature  - Assess for signs of decreased coronary artery perfusion  - Instruct patient to report change in severity of symptoms  Outcome: Progressing  Goal: Absence of cardiac dysrhythmias or at baseline rhythm  Description: INTERVENTIONS:  - Continuous cardiac monitoring, vital signs, obtain 12 lead EKG if ordered  - Administer antiarrhythmic and heart rate control medications as ordered  - Monitor electrolytes and administer replacement therapy as ordered  Outcome: Progressing     Problem: GENITOURINARY - ADULT  Goal: Maintains or returns to baseline urinary function  Description: INTERVENTIONS:  - Assess urinary function  - Encourage oral fluids to ensure adequate hydration if ordered  - Administer IV fluids as ordered to ensure adequate hydration  - Administer ordered medications as needed  - Offer frequent toileting  - Follow urinary retention protocol if ordered  Outcome: Progressing  Goal: Absence of urinary retention  Description: INTERVENTIONS:  - Assess patients ability to void and empty bladder  - Monitor I/O  - Bladder scan as needed  - Discuss with physician/AP medications to alleviate retention as needed  - Discuss catheterization for long term situations as appropriate  Outcome: Progressing  Goal: Urinary catheter remains patent  Description: INTERVENTIONS:  - Assess patency of urinary catheter  - If patient has a chronic ward, consider changing catheter if non-functioning  - Follow guidelines for intermittent irrigation of non-functioning urinary catheter  Outcome: Progressing     Problem: METABOLIC, FLUID AND ELECTROLYTES - ADULT  Goal: Electrolytes maintained within normal limits  Description: INTERVENTIONS:  - Monitor labs and assess patient for signs and symptoms of electrolyte imbalances  - Administer electrolyte replacement as ordered  - Monitor response to electrolyte replacements, including repeat lab results as appropriate  - Instruct patient on fluid and nutrition as appropriate  Outcome: Progressing  Goal: Fluid balance maintained  Description: INTERVENTIONS:  - Monitor labs   - Monitor I/O and WT  - Instruct patient on fluid and nutrition as appropriate  - Assess for signs & symptoms of volume excess or deficit  Outcome: Progressing  Goal: Glucose maintained within target range  Description: INTERVENTIONS:  - Monitor Blood Glucose as ordered  - Assess for signs and symptoms of hyperglycemia and hypoglycemia  - Administer ordered medications to maintain glucose within target range  - Assess nutritional intake and initiate nutrition service referral as needed  Outcome: Progressing     Problem: SKIN/TISSUE INTEGRITY - ADULT  Goal: Incision(s), wounds(s) or drain site(s) healing without S/S of infection  Description: INTERVENTIONS  - Assess and document risk factors for skin impairment   - Assess and document dressing, incision, wound bed, drain sites and surrounding tissue  - Consider nutrition services referral as needed  - Oral mucous membranes remain intact  - Provide patient/ family education  Outcome: Progressing     Problem: INFECTION - ADULT  Goal: Absence or prevention of progression during hospitalization  Description: INTERVENTIONS:  - Assess and monitor for signs and symptoms of infection  - Monitor lab/diagnostic results  - Monitor all insertion sites, i e  indwelling lines, tubes, and drains  - Monitor endotracheal if appropriate and nasal secretions for changes in amount and color  - [place] appropriate cooling/warming therapies per order  - Administer medications as ordered  - Instruct and encourage patient and family to use good hand hygiene technique  - Identify and instruct in appropriate isolation precautions for identified infection/condition  Outcome: Not Progressing

## 2021-05-20 NOTE — PLAN OF CARE
Problem: PHYSICAL THERAPY ADULT  Goal: Performs mobility at highest level of function for planned discharge setting  See evaluation for individualized goals  Description: Treatment/Interventions: Functional transfer training, LE strengthening/ROM, Elevations, Therapeutic exercise, Endurance training, Patient/family training, Equipment eval/education, Bed mobility, Gait training, Compensatory technique education, Spoke to nursing, Spoke to case management, OT  Equipment Recommended: (TBD by rehab)       See flowsheet documentation for full assessment, interventions and recommendations  Note: Prognosis: Fair  Problem List: Decreased strength, Decreased endurance, Impaired balance, Decreased mobility, Obesity, Decreased skin integrity, Decreased cognition, Impaired judgement, Decreased safety awareness, Pain(Pain with activity)  Assessment: Co-treatment performed with OT 2/2 pt's significant physical deficits, requiring substantial manual assistance of multiple individuals with all mobility  Co-treatment improving safety of pt  Pt displayed improvement during treatment session today  She was able to perform a SPT to recliner and bed  However, she still requires significant manual assistance and consistent verbal cuing for technique with all functional mobility  Pt only able to ambulate 1 step with significant assistance 2/2 reduced weight shifting, LE advancement, and quick onset of fatigue  Her exercise tolerance and functional mobility are currently limited by reduced strength, balance deficits, pain, and impaired skin integrity  Pt will continue to benefit from skilled physical therapy services to address these impairments     Barriers to Discharge: Decreased caregiver support, Inaccessible home environment  Barriers to Discharge Comments: Pt requiring significant manual assistance with all mobility     PT Discharge Recommendation: Post acute rehabilitation services     PT - OK to Discharge: Yes(Once medically cleared)    See flowsheet documentation for full assessment

## 2021-05-20 NOTE — PROGRESS NOTES
[address]  Progress Note - Marilin Virk 1945, 70 y o  female MRN: [id number]  Unit/Bed#: -01 Encounter: [id number]  Primary Care Provider: Jagdeep Escobedo MD   Date and time admitted to hospital: 5/16/2021  1:33 PM    Cellulitis of right lower extremity  Assessment & Plan  · Right lower extremity cellulitis actually has worsened today the swelling the redness discoloration of the white count came down to 39,000 there is no fever concern is for an underlying neck-a CT of the lower extremities done stat unable to do IV contrast secondary to anaphylaxis she is currently made NPO with Xarelto on hold for possible surgical intervention    Streptococcal bacteremia  Assessment & Plan  · Which has cleared    Currently she is on Ancef     ARIAN on CPAP  Assessment & Plan  · Continue CPAP    Hypertension  Assessment & Plan  · Hold in losartan until kidney injury has completely gotten to normal   Otherwise continue metoprolol blood pressure is stable    Class 3 severe obesity with serious comorbidity and body mass index (BMI) of 60 0 to 69 9 in adult [facility])  Assessment & Plan  · Weight loss counseling    CVA (cerebral vascular accident) ([facility] [address] )  Assessment & Plan  · Subacute to chronic evidenced on the CT she is on Xarelto currently will be held possible anticipation for surgery    Acute on chronic diastolic heart failure (HCC)  Assessment & Plan  Wt Readings from Last 3 Encounters:   05/20/21 (!) 153 kg (337 lb 11 9 oz)   09/05/20 (!) 155 kg (341 lb)     Patient's Bumex has been held she does have severe pulmonary hypertension on previous echo had the RV dysfunction but normal LV  2D echo was repeated5/17Repeat Echo ordered - EF 55-60%, no RWMA identified, moderate concentric hypertrophy of LV, RV normal size and systolic function, LA markedly dilated, IVC markedly dilated with respirophasic changes blunted (< 50% variation)  Patient has expiratory wheezing today chest x-ray I ordered I looked at it looks like pulmonary edema will give her a dose of Lasix 20 mg IV x1        Type 2 diabetes mellitus, without long-term current use of insulin [facility])  Assessment & Plan  Lab Results   Component Value Date    HGBA1C 6 1 (H) 05/16/2021       Recent Labs     05/19/21  1640 05/19/21  2110 05/20/21  0707 05/20/21  1106   POCGLU 119 155* 129 160*       Blood Sugar Average: Last 72 hrs:  (P) 132 [id number]   · Controlled continue sliding scale    Atrial fibrillation with RVR (HCC)  Assessment & Plan  · RVR resolved patient is rate controlled  She has been restarted on Xarelto but I will hold it now with possible anticipation for surgery  Continue metoprolol tartrate 25 mg p o  B i d  AYDEE (acute kidney injury) ([facility] [address] )  Assessment & Plan  · Her baseline looks like 0 8-1 initially she presents secondary to severe sepsis with fluid hydrated and her Saira Cowper I has improved she is off the fluids  Patient is actually now fluid overloaded but will continue holding losartan will give a dose of Lasix will monitor closely    * Severe sepsis (HCC)  Assessment & Plan  · Secondary to right lower extremity cellulitis which is getting currently worse will need to rule out necrotizing fasciitis, also secondary to group a streptococcal group a bacteremia  · Severe sepsis actually has resolved the white count starting to come down but the leg is worsening        VTE Pharmacologic Prophylaxis:   Pharmacologic: Pharmacologic VTE Prophylaxis contraindicated due to It is currently on hold secondary to a possible surgery  Mechanical VTE Prophylaxis in Place: Yes    Patient Centered Rounds: I have performed bedside rounds with nursing staff today  Discussions with Specialists or Other Care Team Provider:  I have discussed the case with surgical team    Education and Discussions with Family / Patient:  Patient    Time Spent for Care: 30 minutes    More than 50% of total time spent on counseling and coordination of care as described above  Current Length of Stay: 4 day(s)    Current Patient Status: Inpatient   Certification Statement: The patient will continue to require additional inpatient hospital stay due to Secondary to cellulitis and is worsening with concern of necrotizing fasciitis    Discharge Plan:  Pending progress    Code Status: Level 1 - Full Code      Subjective:   Patient seen and examined she is slightly short of breath leg pain is okay no chest pain    Objective:     Vitals:   Temp (24hrs), Av 6 °F (36 4 °C), Min:97 °F (36 1 °C), Max:98 °F (36 7 °C)    Temp:  [97 °F (36 1 °C)-98 °F (36 7 °C)] 97 6 °F (36 4 °C)  HR:  [] 91  Resp:  [21-32] 21  BP: (131-155)/(72-93) 155/85  SpO2:  [92 %-97 %] 96 %  Body mass index is 61 77 kg/m²  Input and Output Summary (last 24 hours): Intake/Output Summary (Last 24 hours) at 2021 1207  Last data filed at 2021 1002  Gross per 24 hour   Intake 890 ml   Output 1975 ml   Net -1085 ml       Physical Exam:     Physical Exam  Vitals signs and nursing note reviewed  Constitutional:       General: She is not in acute distress  Appearance: She is well-developed  HENT:      Head: Normocephalic and atraumatic  Eyes:      Conjunctiva/sclera: Conjunctivae normal    Neck:      Musculoskeletal: Neck supple  Cardiovascular:      Rate and Rhythm: Normal rate and regular rhythm  Heart sounds: No murmur  Pulmonary:      Effort: Pulmonary effort is normal  No respiratory distress  Breath sounds: Wheezing present  Abdominal:      Palpations: Abdomen is soft  Tenderness: There is no abdominal tenderness  Musculoskeletal:         General: Swelling present  Comments: Her thigh cellulitis is worsening and so this discoloration the swelling erythema   Skin:     General: Skin is warm and dry  Neurological:      General: No focal deficit present  Mental Status: She is alert and oriented to person, place, and time     Psychiatric: Mood and Affect: Mood normal            Additional Data:     Labs:    Results from last 7 days   Lab Units 05/20/21  0512  05/18/21  1043   WBC Thousand/uL 39 92*   < > 44 03*   HEMOGLOBIN g/dL 11 2*   < > 10 6*   HEMATOCRIT % 35 5   < > 33 1*   PLATELETS Thousands/uL 240   < > 189   BANDS PCT %  --   --  3   LYMPHO PCT %  --   --  5*   MONO PCT %  --   --  0*   EOS PCT %  --   --  0    < > = values in this interval not displayed  Results from last 7 days   Lab Units 05/20/21  0452   SODIUM mmol/L 136   POTASSIUM mmol/L 3 8   CHLORIDE mmol/L 104   CO2 mmol/L 26   BUN mg/dL 48*   CREATININE mg/dL 1 26   ANION GAP mmol/L 6   CALCIUM mg/dL 9 1   ALBUMIN g/dL 1 6*   TOTAL BILIRUBIN mg/dL 2 30*   ALK PHOS U/L 213*   ALT U/L 26   AST U/L 58*   GLUCOSE RANDOM mg/dL 114     Results from last 7 days   Lab Units 05/18/21  0441   INR  2 18*     Results from last 7 days   Lab Units 05/20/21  1106 05/20/21  0707 05/19/21  2110 05/19/21  1640 05/19/21  1116 05/19/21  0512 05/18/21  2313 05/18/21  1759 05/18/21  1209 05/17/21  2257 05/17/21  1706 05/17/21  1231   POC GLUCOSE mg/dl 160* 129 155* 119 175* 115 121 187* 121 127 123 97     Results from last 7 days   Lab Units 05/16/21  1925   HEMOGLOBIN A1C % 6 1*     Results from last 7 days   Lab Units 05/20/21  1041 05/19/21  0354 05/18/21  0441 05/17/21  1653 05/16/21  2205 05/16/21  2040 05/16/21  1925   LACTIC ACID mmol/L 1 7  --   --  1 4 2 1*  --  2 9*   PROCALCITONIN ng/ml  --  6 08* 13 99*  --   --  11 82*  --            * I Have Reviewed All Lab Data Listed Above  * Additional Pertinent Lab Tests Reviewed:  All Labs Within Last 24 Hours Reviewed    Imaging:    Imaging Reports Reviewed Today Include:  Chest x-ray in the CT of the lower extremities I am awaiting to be read  Imaging Personally Reviewed by Myself Includes:      Recent Cultures (last 7 days):     Results from last 7 days   Lab Units 05/18/21  0442 05/16/21  1425 05/16/21  1416   BLOOD CULTURE  No Growth at 48 hrs  No Growth at 48 hrs  Streptococcus pyogenes (Group A)* Streptococcus pyogenes (Group A)*   GRAM STAIN RESULT   --  Gram positive cocci in pairs and chains* Gram positive cocci in pairs and chains*       Last 24 Hours Medication List:   Current Facility-Administered Medications   Medication Dose Route Frequency Provider Last Rate    acetaminophen  650 mg Oral Q6H PRN MELBA Merchant      cefazolin  2,000 mg Intravenous Q8H MELBA Merchant Stopped (05/20/21 0536)    collagenase   Topical Daily MELBA Merchant      HYDROmorphone  0 5 mg Intravenous Q3H PRN OZIEL Anderson-LANE      HYDROmorphone  1 mg Intravenous Q3H PRN Stephanie Mar PA-C      insulin lispro  1-6 Units Subcutaneous HS MELBA Merchant      insulin lispro  2-12 Units Subcutaneous TID AC MELBA Merchant      levalbuterol  1 25 mg Nebulization Q4H PRN MELBA Merchant      metoprolol  5 mg Intravenous Q4H PRN Salena Evansella, MELBA      metoprolol tartrate  25 mg Oral Q12H [address] Salena Morales, MELBA      miconazole   Topical BID MELBA Merchant      phenol  1 spray Mouth/Throat Q2H PRN Salena EvansellaMELBA      polyethylene glycol  17 g Oral Daily PRN Salena EvansellaMELBA      pravastatin  20 mg Oral Daily With [place] VarinaMELBA Ortzi      senna-docusate sodium  1 tablet Oral HS MELBA Merchant          Today, Patient Was Seen By: Ayanna Olson MD    ** Please Note: Dictation voice to text software may have been used in the creation of this document   **

## 2021-05-20 NOTE — ASSESSMENT & PLAN NOTE
Wt Readings from Last 3 Encounters:   05/20/21 (!) 153 kg (337 lb 11 9 oz)   09/05/20 (!) 155 kg (341 lb)     Patient's Bumex has been held she does have severe pulmonary hypertension on previous echo had the RV dysfunction but normal LV  2D echo was repeated5/17Repeat Echo ordered - EF 55-60%, no RWMA identified, moderate concentric hypertrophy of LV, RV normal size and systolic function, LA markedly dilated, IVC markedly dilated with respirophasic changes blunted (< 50% variation)  Patient has expiratory wheezing today chest x-ray I ordered I looked at it looks like pulmonary edema will give her a dose of Lasix 20 mg IV x1

## 2021-05-20 NOTE — ASSESSMENT & PLAN NOTE
· Her baseline looks like 0 8-1 initially she presents secondary to severe sepsis with fluid hydrated and her Cassandra Vasques I has improved she is off the fluids    Patient is actually now fluid overloaded but will continue holding losartan will give a dose of Lasix will monitor closely

## 2021-05-20 NOTE — ASSESSMENT & PLAN NOTE
· Secondary to right lower extremity cellulitis which is getting currently worse will need to rule out necrotizing fasciitis, also secondary to group a streptococcal group a bacteremia  · Severe sepsis actually has resolved the white count starting to come down but the leg is worsening

## 2021-05-20 NOTE — ASSESSMENT & PLAN NOTE
· RVR resolved patient is rate controlled  She has been restarted on Xarelto but I will hold it now with possible anticipation for surgery    Continue metoprolol tartrate 25 mg p o  B i d

## 2021-05-20 NOTE — PLAN OF CARE
Problem: OCCUPATIONAL THERAPY ADULT  Goal: Performs self-care activities at highest level of function for planned discharge setting  See evaluation for individualized goals  Description: Treatment Interventions: ADL retraining, Functional transfer training, UE strengthening/ROM, Endurance training, Cognitive reorientation, Patient/family training, Equipment evaluation/education, Neuromuscular reeducation, Compensatory technique education, Continued evaluation, Energy conservation, Activityengagement          See flowsheet documentation for full assessment, interventions and recommendations  Outcome: Progressing  Note: Limitation: Decreased ADL status, Decreased UE ROM, Decreased UE strength, Decreased Safe judgement during ADL, Decreased cognition, Decreased endurance, Decreased self-care trans, Decreased high-level ADLs  Prognosis: Fair  Assessment: Pt seen for treatment session #2 this date  Pt alert and agreeable to participate at this time  Pt states "I want to get out of bed today"  Pt continues to require significant assistance with functional transfers and bed mobility however with increased performance during date compared to previous OT session  Pt able to progress BLE in order to complete SPT to recliner chair and back to EOB with A x's 2  Per RN, Pt to be back into bed d/t MRI being ordered  At this time Pt demonstrates Good potential to continue to make progress towards goals although is limited by decrease activity tolerance, decrease standing balance, decrease sitting balance, decrease performance during ADL tasks, decrease safety awareness , decrease BUE ROM, decrease UB MS, decrease generalized strength, decrease activity engagement and decrease performance during functional transfers  Pt requires PT /OT co-treat due to signficant assistance with mobility and cognitive-behavioral impairments as well as to maximize Pt's performance, participation, and functional outcomes    Recommendation: (post acute rehab)  OT Discharge Recommendation: Post acute rehabilitation services

## 2021-05-21 PROBLEM — S81.801A WOUND OF RIGHT LEG: Status: ACTIVE | Noted: 2021-01-01

## 2021-05-21 NOTE — PROGRESS NOTES
[address]  Progress Note - Camryn Adame 1945, 70 y o  female MRN: [id number]  Unit/Bed#: -01 Encounter: [id number]  Primary Care Provider: Kate Angelo MD   Date and time admitted to hospital: 5/16/2021  1:33 PM    Cellulitis of right lower extremity  Assessment & Plan  · Right lower extremity cellulitis as discussed with surgery team actually has improved today she had an MRI with gadolinium done yesterday no abscess or necrotizing fasciitis just soft tissue swelling  White count continues to improve procalcitonin continue to improve discussed with surgery no plan for OR at this point will continue Ancef and if white count continues to improve was switched to Keflex will need antibiotics till 5/30 and local wound care    Streptococcal bacteremia  Assessment & Plan  · Which has cleared  Currently she is on Ancef white count continues to improve and procalcitonin continues to improve if tomorrow continues to improve will be switched to Keflex and discharged and will need antibiotics till 5/30    ARIAN on CPAP  Assessment & Plan  · Continue CPAP    Hypertension  Assessment & Plan  · Uncontrolled actually restart her Toprol  mg daily discontinue tartrate    Restart Bumex 2 mg daily tomorrow kidney function is stable and blood pressure still uncontrolled will restart her Cozaar    Class 3 severe obesity with serious comorbidity and body mass index (BMI) of 60 0 to 69 9 in adult [facility])  Assessment & Plan  · Weight loss counseling    CVA (cerebral vascular accident) ([facility] [address] )  Assessment & Plan  · Subacute to chronic evidenced on the CT she is on Xarelto     Acute on chronic diastolic heart failure [facility])  Assessment & Plan  Wt Readings from Last 3 Encounters:   05/21/21 (!) 151 kg (332 lb 0 2 oz)   05/20/21 (!) 153 kg (337 lb)   09/05/20 (!) 155 kg (341 lb)     Patient's Bumex has been held she does have severe pulmonary hypertension on previous echo had the RV dysfunction but normal LV  2D echo was repeated5/17Repeat Echo ordered - EF 55-60%, no RWMA identified, moderate concentric hypertrophy of LV, RV normal size and systolic function, LA markedly dilated, IVC markedly dilated with respirophasic changes blunted (< 50% variation)  Resolved post 1 dose of Lasix with good amount of weight loss no wheezing  Resume her Bumex 2 mg daily        Type 2 diabetes mellitus, without long-term current use of insulin [facility])  Assessment & Plan  Lab Results   Component Value Date    HGBA1C 6 1 (H) 05/16/2021       Recent Labs     05/20/21  1605 05/20/21  2122 05/21/21  0707 05/21/21  1125   POCGLU 118 136 152* 137       Blood Sugar Average: Last 72 hrs:  (P) 372 [id number]   · Controlled continue sliding scale    Atrial fibrillation with RVR ([facility])  Assessment & Plan  · Her rate is slightly up today  Patient is actually on Toprol  mg daily at home discontinue metoprolol tartrate place her back on Toprol  mg daily restart Xarelto 20 mg daily she is not going for surgery  AYDEE (acute kidney injury) ([facility] [address] )  Assessment & Plan  · Her baseline looks like 0 8-1 initially she presents secondary to severe sepsis with fluid hydrated and her Berneice Hick I has improved she is off the fluids  Resolved    * Severe sepsis (HCC)  Assessment & Plan  · Secondary to right lower extremity cellulitis - and strep bacteremia which has the cleared-> necrotizing fasciitis ruled out no abscess Severe sepsis actually has resolved the white count starting to come down but the leg is worsening      VTE Pharmacologic Prophylaxis:   Pharmacologic: Rivaroxaban (Xarelto)  Mechanical VTE Prophylaxis in Place: Yes    Patient Centered Rounds: I have performed bedside rounds with nursing staff today  Discussions with Specialists or Other Care Team Provider:  I have discussed with surgery team    Education and Discussions with Family / Patient:  Patient    Time Spent for Care: 30 minutes    More than 50% of total time spent on counseling and coordination of care as described above  Current Length of Stay: 5 day(s)    Current Patient Status: Inpatient   Certification Statement: The patient will continue to require additional inpatient hospital stay due to Secondary to cellulitis    Discharge Plan:  Anticipate discharge to SNF in 24 hours if all continues to improve    Code Status: Level 1 - Full Code      Subjective:   Patient seen and examined shortness of breath improved no chest pain  Objective:     Vitals:   Temp (24hrs), Av 7 °F (36 5 °C), Min:97 5 °F (36 4 °C), Max:98 1 °F (36 7 °C)    Temp:  [97 5 °F (36 4 °C)-98 1 °F (36 7 °C)] 97 5 °F (36 4 °C)  HR:  [] 104  Resp:  [20-22] 21  BP: (147-176)/() 176/86  SpO2:  [95 %-98 %] 98 %  Body mass index is 60 73 kg/m²  Input and Output Summary (last 24 hours): Intake/Output Summary (Last 24 hours) at 2021 1149  Last data filed at 2021 1101  Gross per 24 hour   Intake 50 ml   Output 2450 ml   Net -2400 ml       Physical Exam:     Physical Exam  Vitals signs and nursing note reviewed  Constitutional:       General: She is not in acute distress  Appearance: She is well-developed  HENT:      Head: Normocephalic and atraumatic  Eyes:      Conjunctiva/sclera: Conjunctivae normal    Neck:      Musculoskeletal: Neck supple  Cardiovascular:      Rate and Rhythm: Normal rate  Rhythm irregular  Heart sounds: No murmur  Pulmonary:      Effort: Pulmonary effort is normal  No respiratory distress  Breath sounds: Normal breath sounds  No wheezing or rales  Abdominal:      General: There is no distension  Palpations: Abdomen is soft  Tenderness: There is no abdominal tenderness  Musculoskeletal:         General: Swelling (Right lower extremity the erythema and the swelling has to use to improve) present  Skin:     General: Skin is warm and dry  Neurological:      General: No focal deficit present        Mental Status: She is alert and oriented to person, place, and time  Psychiatric:         Mood and Affect: Mood normal            Additional Data:     Labs:    Results from last 7 days   Lab Units 05/21/21  0551  05/18/21  1043   WBC Thousand/uL 32 60*   < > 44 03*   HEMOGLOBIN g/dL 11 0*   < > 10 6*   HEMATOCRIT % 35 6   < > 33 1*   PLATELETS Thousands/uL 239   < > 189   BANDS PCT %  --   --  3   LYMPHO PCT %  --   --  5*   MONO PCT %  --   --  0*   EOS PCT %  --   --  0    < > = values in this interval not displayed  Results from last 7 days   Lab Units 05/21/21  0551   SODIUM mmol/L 136   POTASSIUM mmol/L 4 0   CHLORIDE mmol/L 104   CO2 mmol/L 22   BUN mg/dL 35*   CREATININE mg/dL 0 88   ANION GAP mmol/L 10   CALCIUM mg/dL 8 7   ALBUMIN g/dL 1 6*   TOTAL BILIRUBIN mg/dL 2 36*   ALK PHOS U/L 198*   ALT U/L 20   AST U/L 44   GLUCOSE RANDOM mg/dL 131     Results from last 7 days   Lab Units 05/18/21  0441   INR  2 18*     Results from last 7 days   Lab Units 05/21/21  1125 05/21/21  0707 05/20/21  2122 05/20/21  1605 05/20/21  1106 05/20/21  0707 05/19/21  2110 05/19/21  1640 05/19/21  1116 05/19/21  0512 05/18/21  2313 05/18/21  1759   POC GLUCOSE mg/dl 137 152* 136 118 160* 129 155* 119 175* 115 121 187*     Results from last 7 days   Lab Units 05/16/21  1925   HEMOGLOBIN A1C % 6 1*     Results from last 7 days   Lab Units 05/21/21  0551 05/20/21  1041 05/19/21  0354 05/18/21  0441 05/17/21  1653 05/16/21  2205 05/16/21  2040 05/16/21  1925   LACTIC ACID mmol/L  --  1 7  --   --  1 4 2 1*  --  2 9*   PROCALCITONIN ng/ml 1 15* 2 30* 6 08* 13 99*  --   --  11 82*  --            * I Have Reviewed All Lab Data Listed Above  * Additional Pertinent Lab Tests Reviewed:  All Labs Within Last 24 Hours Reviewed    Imaging:    Imaging Reports Reviewed Today Include:  None today  Imaging Personally Reviewed by Myself Includes:      Recent Cultures (last 7 days):     Results from last 7 days   Lab Units 05/18/21  0442 05/16/21  1421 05/16/21  1416   BLOOD CULTURE  No Growth at 72 hrs  No Growth at 72 hrs  Streptococcus pyogenes (Group A)* Streptococcus pyogenes (Group A)*   GRAM STAIN RESULT   --  Gram positive cocci in pairs and chains* Gram positive cocci in pairs and chains*       Last 24 Hours Medication List:   Current Facility-Administered Medications   Medication Dose Route Frequency Provider Last Rate    acetaminophen  650 mg Oral Q6H PRN MELBA Merchant      bumetanide  2 mg Oral Daily Dat Wayne MD      cefazolin  2,000 mg Intravenous Q8H TAN MerchantNP 2,000 mg (05/21/21 0540)    collagenase   Topical Daily MELBA Merchant      HYDROmorphone  0 5 mg Intravenous Q3H PRN OZIEL Ayoub-LANE      HYDROmorphone  1 mg Intravenous Q3H PRN OZIEL Ayoub-LANE      insulin lispro  1-6 Units Subcutaneous HS MELBA Merchant      insulin lispro  2-12 Units Subcutaneous 4x Daily (with meals and at bedtime) Dat Wayne MD      levalbuterol  1 25 mg Nebulization Q4H PRN MELBA Smallwood      metoprolol  5 mg Intravenous Q4H PRN MELBA Merchant      metoprolol succinate  100 mg Oral Daily Dat Wayne MD      miconazole   Topical BID MELBA Merchant      phenol  1 spray Mouth/Throat Q2H PRN MELBA Merchant      polyethylene glycol  17 g Oral Daily PRN MELBA Merchant      pravastatin  20 mg Oral Daily With [place] MELBA Garg      rivaroxaban  20 mg Oral Daily With Breakfast Dat Wayne MD      senna-docusate sodium  1 tablet Oral HS MELBA Merchant          Today, Patient Was Seen By: Dat Wayne MD    ** Please Note: Dictation voice to text software may have been used in the creation of this document   **

## 2021-05-21 NOTE — PROGRESS NOTES
Progress Note - General Surgery   Monik Estrada 70 y o  female MRN: [id number]  Unit/Bed#: -01 Encounter: [id number]    Assessment:    Overall appearance of the right lower extremity wounds improving  Less erythema and skin blanching, still some yellow serous drainage noted on the dressings  The right thigh and calf muscles are soft to palpation  She has been nothing by mouth since yesterday anticipating the possible need for surgical debridement  CT scan of the right lower leg without IV contrast yesterday showed diffuse fat stranding in the right thigh region particularly posteromedial aspect slightly increased in the upper thigh region from comparison, no soft tissue gas was seen  Still concern for possible necrotizing fasciitis prompted MRI with and without IV  Gadobutrol contrast which demonstrated subcutaneous edema and stranding within the subcutaneous fat of the right thigh compatible with cellulitis, there was no well circumcised rim enhancing collection to suggest abscess  Leukocytosis slowly improving, today 32 60 (39 92, 42 95)  Patient remains afebrile      Patient with g positive bacteremia, believed to be secondary to right lower leg cellulitis      History of atrial fibrillation with rapid ventricular response, she had been held from rivaroxaban since yesterday      Acute kidney injury resolved, creatinine today 0 88 normal (1 26)  The patient had been given 20 mg of IV Lasix this morning       Chronic heart failure    History of prior CVA     Morbid obesity, BMI 61 67 (weight 153 kg)     Obstructive sleep apnea, patient on CPAP nightly     Constipation, unresolved  Patient does not think that she has had a bowel movement in almost 2 weeks  She did not have a bowel movement yesterday      Plan:  Patient was personally examined with Dr Yuni Mulligan, all dressings were removed to inspect the wounds    Continue local wound care as before  No plan for surgical debridement at this time  Advanced diabetic diet  Discussed with Dr Aman Marsh present IV antibiotics  Out of bed today, up in chair  Will evaluate tomorrow, possible plan for discharge to skilled nursing facility  May restart rivaroxaban    Subjective/Objective   Chief Complaint:  Right leg still offers her a lot of superficial discomfort, swelling is improved  Subjective:  She is still having yellow serous drainage from the right leg wounds  Most of her discomfort to her seems more superficial, she denies any deep muscle or bone pain in her right lower extremity  No report of any fever  She has been nothing by mouth since yesterday  There was concern over increased skin sloughing and erythema of the posterior medial right thigh pain yesterday which prompted CT scan without contrast which was nonconclusive for any gas formation  Her white count still remained quite high yesterday  MRI with contrast was performed with the below mentioned findings  Patient is having some breakthrough pain in the current medications were really helping  She is frustrated that she is supine in bed all the time, she would like to try to get up and sit up in a bedside chair  Also she remains constipated despite current bowel regimen, she does not think that she had a bowel movement now in about 2 weeks        Scheduled Meds:  Current Facility-Administered Medications   Medication Dose Route Frequency Provider Last Rate    acetaminophen  650 mg Oral Q6H PRN MELBA Merchant      cefazolin  2,000 mg Intravenous Q8H MELBA Merchant 2,000 mg (05/21/21 0540)    collagenase   Topical Daily MELBA Merchant      HYDROmorphone  0 5 mg Intravenous Q3H PRN Arsen Granados PA-C      HYDROmorphone  1 mg Intravenous Q3H PRN Arsen Granados PA-C      insulin lispro  1-6 Units Subcutaneous HS MELBA Merchant      insulin lispro  2-12 Units Subcutaneous TID AC Laureano Peña MD      levalbuterol  1 25 mg Nebulization Q4H PRN MELBA Connolly      metoprolol  5 mg Intravenous Q4H PRN Salenaliberty Evansella, MELBA      metoprolol tartrate  25 mg Oral Q12H [address] Salena EvansellaMELBA      miconazole   Topical BID Salena JAMEL EvansellaMELBA      phenol  1 spray Mouth/Throat Q2H PRN Salenaliberty Evansella, MELBA      polyethylene glycol  17 g Oral Daily PRN Salena JAMEL Evansella, MELBA      pravastatin  20 mg Oral Daily With [place] MELBA Garg      senna-docusate sodium  1 tablet Oral HS Salena JAMEL MELBA Morales       Continuous Infusions:   PRN Meds:   acetaminophen    HYDROmorphone    HYDROmorphone    levalbuterol    metoprolol    phenol    polyethylene glycol    Objective:     Blood pressure (!) 176/86, pulse 104, temperature 97 5 °F (36 4 °C), temperature source Temporal, resp  rate 21, height 5' 2" (1 575 m), weight (!) 151 kg (332 lb 0 2 oz), SpO2 98 %  ,Body mass index is 60 73 kg/m²  I/O       05/19 0701 - 05/20 0700 05/20 0701 - 05/21 0700 05/21 0701 - 05/22 0700    P  O  850 240     I V  (mL/kg) 60 (0 4)      IV Piggyback 100 50     Total Intake(mL/kg) 1010 (6 6) 290 (1 9)     Urine (mL/kg/hr) 2075 (0 6) 2600 (0 7)     Total Output 2075 2600     Net -1065 -2310                  Invasive Devices     Peripheral Intravenous Line            Peripheral IV 05/20/21 Left;Ventral (anterior) Antecubital 1 day          Drain            Urethral Catheter Double-lumen; Latex 16 Fr  4 days                Physical Exam:     Awake alert  No acute distress  She is lying supine in a bariatric bed  All dressings removed covering the entire right lower extremity  She has improved right lower extremity edema to the level of the knee  There is superficial skin sloughing noted of the wounds of the medial posterior aspect of the right thigh involved    There is a large area measuring approximately 9 x 15 cm wide involving the distal medial right posterior thigh with erythema that blanches with palpation, there is an area of full-thickness sloughing in the middle of the wound which appears superficial and right now does not need debridement  The right ankle area does improved with just a scant amount of clear serous fluid  The right lateral calf shows an area of eschar and tender, some erythema circumferential around the area measuring approximately 3 x 5 cm  The right medial upper thigh shows an area 8 cm by 10 cm wide with clean wound edges and some superficial skin sloughing  DP and PT pulses are readily palpable in both lower extremities  The right thigh and calf muscles are soft, most of the discomfort is superficial tenderness to touch  Lab, Imaging and other studies:  I have personally reviewed pertinent lab results  , CBC:   Lab Results   Component Value Date    WBC 32 60 (HH) 05/21/2021    HGB 11 0 (L) 05/21/2021    HCT 35 6 05/21/2021    MCV 81 (L) 05/21/2021     05/21/2021    MCH 24 9 (L) 05/21/2021    MCHC 30 9 (L) 05/21/2021    RDW 17 1 (H) 05/21/2021    MPV 10 0 05/21/2021    NRBC 0 05/21/2021   , CMP:   Lab Results   Component Value Date    SODIUM 136 05/21/2021    K 4 0 05/21/2021     05/21/2021    CO2 22 05/21/2021    BUN 35 (H) 05/21/2021    CREATININE 0 88 05/21/2021    CALCIUM 8 7 05/21/2021    AST 44 05/21/2021    ALT 20 05/21/2021    ALKPHOS 198 (H) 05/21/2021    EGFR 65 05/21/2021   , Coagulation: No results found for: PT, INR, APTT     CT RIGHT LEG WITHOUT IV CONTRAST     INDICATION: Necrotizing fasciitis Chiqui Thacker       IMPRESSION:  Soft tissue evaluation is limited without IV contrast   1   Diffuse fat stranding in the right thigh region particularly at the posterior medial aspect has slightly increased in the upper thigh region, nonspecific, may be related to cellulitis  No soft tissue gas    If there is persistent concern for   necrotizing fasciitis, consider follow-up with dedicated MRI with and without IV contrast      MRI RIGHT LOWER EXTREMITY WITH AND WITHOUT CONTRAST (Extremity)     INDICATION:   Right lower extremity cellulitis        COMPARISON:  CT right lower extremity 5/20/2021     IMPRESSION:  1  Subcutaneous edema and stranding within the subcutaneous fat of the right thigh compatible with cellulitis  There is no well-circumscribed rim-enhancing collection to suggest abscess  2   Advanced tricompartmental degenerative changes of the right knee      VTE Pharmacologic Prophylaxis: Reason for no pharmacologic prophylaxis Apixaban on hold  VTE Mechanical Prophylaxis: reason for no mechanical VTE prophylaxis Not on affected right lower extremity     Iqra Holly PA-C

## 2021-05-21 NOTE — CASE MANAGEMENT
CM spoke to Shereen Aguilera at [facility], as patient has her own CPAP they are willing to accept tomorrow  CM informed patient and her brother at the bedside  They were appreciative  Discussed with patient that transportation via [facility] is not covered by insurance and patient will be billed for this service  They verbalized understanding  CM requested MD to order COVID screen for tomorrow      [facility]_ transport to be set up tomorrow

## 2021-05-21 NOTE — ASSESSMENT & PLAN NOTE
Lab Results   Component Value Date    HGBA1C 6 1 (H) 05/16/2021       Recent Labs     05/20/21  1605 05/20/21  2122 05/21/21  0707 05/21/21  1125   POCGLU 118 136 152* 137       Blood Sugar Average: Last 72 hrs:  (P) 750 [id number]   · Controlled continue sliding scale

## 2021-05-21 NOTE — NUTRITION
05/21/21 1800   Recommendations/Interventions   Nutrition Recommendations Continue diet as ordered  (suggest add cardiac diet restrictions to current diet order )

## 2021-05-21 NOTE — CASE MANAGEMENT
Chart reviewed aware of medical management  Clinical information supporting hospitalization:   - bacteremia ( Strept)  - Cellulitis of right lower extremity  - Surgery following  - Ancef    _ WBC is decreasing per MD in AM MD liza feels patient will be ready for dc tomorrow  CM spoke to patient_   A post acute care recommendation was made by your care team for STR  Discussed Freedom of Choice with patient  List of agencies given to patient via in person  patient aware the list is custom filtered for them by zip code location and that  [facility] post acute providers are designated  - Her choice is [address] was reviewed  - Discussed with patient that transportation via [facility] is not covered by insurance and patient will be billed for this service  Verbalized understanding        Barriers to discharge:  - medical management  Discharge plan: STR  -- Pending acceptance of [facility]

## 2021-05-21 NOTE — ASSESSMENT & PLAN NOTE
· Which has cleared    Currently she is on Ancef white count continues to improve and procalcitonin continues to improve if tomorrow continues to improve will be switched to Keflex and discharged and will need antibiotics till 5/30

## 2021-05-21 NOTE — ASSESSMENT & PLAN NOTE
Wt Readings from Last 3 Encounters:   05/21/21 (!) 151 kg (332 lb 0 2 oz)   05/20/21 (!) 153 kg (337 lb)   09/05/20 (!) 155 kg (341 lb)     Patient's Bumex has been held she does have severe pulmonary hypertension on previous echo had the RV dysfunction but normal LV  2D echo was repeated5/17Repeat Echo ordered - EF 55-60%, no RWMA identified, moderate concentric hypertrophy of LV, RV normal size and systolic function, LA markedly dilated, IVC markedly dilated with respirophasic changes blunted (< 50% variation)  Resolved post 1 dose of Lasix with good amount of weight loss no wheezing    Resume her Bumex 2 mg daily

## 2021-05-21 NOTE — ASSESSMENT & PLAN NOTE
· Uncontrolled actually restart her Toprol  mg daily discontinue tartrate    Restart Bumex 2 mg daily tomorrow kidney function is stable and blood pressure still uncontrolled will restart her Cozaar

## 2021-05-21 NOTE — ASSESSMENT & PLAN NOTE
· Her rate is slightly up today  Patient is actually on Toprol  mg daily at home discontinue metoprolol tartrate place her back on Toprol  mg daily restart Xarelto 20 mg daily she is not going for surgery

## 2021-05-21 NOTE — ASSESSMENT & PLAN NOTE
· Right lower extremity cellulitis as discussed with surgery team actually has improved today she had an MRI with gadolinium done yesterday no abscess or necrotizing fasciitis just soft tissue swelling    White count continues to improve procalcitonin continue to improve discussed with surgery no plan for OR at this point will continue Ancef and if white count continues to improve was switched to Keflex will need antibiotics till 5/30 and local wound care

## 2021-05-21 NOTE — PLAN OF CARE
Problem: Potential for Falls  Goal: Patient will remain free of falls  Description: INTERVENTIONS:  - Assess patient frequently for physical needs  -  Identify cognitive and physical deficits and behaviors that affect risk of falls  -  [place] fall precautions as indicated by assessment   - Educate patient/family on patient safety including physical limitations  - Instruct patient to call for assistance with activity based on assessment  - Modify environment to reduce risk of injury  - Consider OT/PT consult to assist with strengthening/mobility  Outcome: Progressing     Problem: Prexisting or High Potential for Compromised Skin Integrity  Goal: Skin integrity is maintained or improved  Description: INTERVENTIONS:  - Identify patients at risk for skin breakdown  - Assess and monitor skin integrity  - Assess and monitor nutrition and hydration status  - Monitor labs   - Assess for incontinence   - Turn and reposition patient  - Assist with mobility/ambulation  - Relieve pressure over bony prominences  - Avoid friction and shearing  - Provide appropriate hygiene as needed including keeping skin clean and dry  - Evaluate need for skin moisturizer/barrier cream  - Collaborate with interdisciplinary team   - Patient/family teaching  - Consider wound care consult   Outcome: Progressing     Problem: Nutrition/Hydration-ADULT  Goal: Nutrient/Hydration intake appropriate for improving, restoring or maintaining nutritional needs  Description: Monitor and assess patient's nutrition/hydration status for malnutrition  Collaborate with interdisciplinary team and initiate plan and interventions as ordered  Monitor patient's weight and dietary intake as ordered or per policy  Utilize nutrition screening tool and intervene as necessary  Determine patient's food preferences and provide high-protein, high-caloric foods as appropriate       INTERVENTIONS:  - Monitor oral intake, urinary output, labs, and treatment plans  - Assess nutrition and hydration status and recommend course of action  - Evaluate amount of meals eaten  - Assist patient with eating if necessary   - Allow adequate time for meals  - Recommend/ encourage appropriate diets, oral nutritional supplements, and vitamin/mineral supplements  - Order, calculate, and assess calorie counts as needed  - Recommend, monitor, and adjust tube feedings and TPN/PPN based on assessed needs  - Assess need for intravenous fluids  - Provide specific nutrition/hydration education as appropriate  - Include patient/family/caregiver in decisions related to nutrition  Outcome: Progressing     Problem: PAIN - ADULT  Goal: Verbalizes/displays adequate comfort level or baseline comfort level  Description: Interventions:  - Encourage patient to monitor pain and request assistance  - Assess pain using appropriate pain scale  - Administer analgesics based on type and severity of pain and evaluate response  - Implement non-pharmacological measures as appropriate and evaluate response  - Consider cultural and social influences on pain and pain management  - Notify physician/advanced practitioner if interventions unsuccessful or patient reports new pain  Outcome: Progressing     Problem: INFECTION - ADULT  Goal: Absence or prevention of progression during hospitalization  Description: INTERVENTIONS:  - Assess and monitor for signs and symptoms of infection  - Monitor lab/diagnostic results  - Monitor all insertion sites, i e  indwelling lines, tubes, and drains  - Monitor endotracheal if appropriate and nasal secretions for changes in amount and color  - [place] appropriate cooling/warming therapies per order  - Administer medications as ordered  - Instruct and encourage patient and family to use good hand hygiene technique  - Identify and instruct in appropriate isolation precautions for identified infection/condition  Outcome: Progressing     Problem: SAFETY ADULT  Goal: Patient will remain free of falls  Description: INTERVENTIONS:  - Assess patient frequently for physical needs  -  Identify cognitive and physical deficits and behaviors that affect risk of falls    -  [place] fall precautions as indicated by assessment   - Educate patient/family on patient safety including physical limitations  - Instruct patient to call for assistance with activity based on assessment  - Modify environment to reduce risk of injury  - Consider OT/PT consult to assist with strengthening/mobility  Outcome: Progressing  Goal: Maintain or return to baseline ADL function  Description: INTERVENTIONS:  -  Assess patient's ability to carry out ADLs; assess patient's baseline for ADL function and identify physical deficits which impact ability to perform ADLs (bathing, care of mouth/teeth, toileting, grooming, dressing, etc )  - Assess/evaluate cause of self-care deficits   - Assess range of motion  - Assess patient's mobility; develop plan if impaired  - Assess patient's need for assistive devices and provide as appropriate  - Encourage maximum independence but intervene and supervise when necessary  - Involve family in performance of ADLs  - Assess for home care needs following discharge   - Consider OT consult to assist with ADL evaluation and planning for discharge  - Provide patient education as appropriate  Outcome: Progressing  Goal: Maintain or return mobility status to optimal level  Description: INTERVENTIONS:  - Assess patient's baseline mobility status (ambulation, transfers, stairs, etc )    - Identify cognitive and physical deficits and behaviors that affect mobility  - Identify mobility aids required to assist with transfers and/or ambulation (gait belt, sit-to-stand, lift, walker, cane, etc )  - [place] fall precautions as indicated by assessment  - Record patient progress and toleration of activity level on Mobility SBAR; progress patient to next Phase/Stage  - Instruct patient to call for assistance with activity based on assessment  - Consider rehabilitation consult to assist with strengthening/weightbearing, etc   Outcome: Progressing     Problem: DISCHARGE PLANNING  Goal: Discharge to home or other facility with appropriate resources  Description: INTERVENTIONS:  - Identify barriers to discharge w/patient and caregiver  - Arrange for needed discharge resources and transportation as appropriate  - Identify discharge learning needs (meds, wound care, etc )  - Arrange for interpretive services to assist at discharge as needed  - Refer to Case Management Department for coordinating discharge planning if the patient needs post-hospital services based on physician/advanced practitioner order or complex needs related to functional status, cognitive ability, or social support system  Outcome: Progressing     Problem: Knowledge Deficit  Goal: Patient/family/caregiver demonstrates understanding of disease process, treatment plan, medications, and discharge instructions  Description: Complete learning assessment and assess knowledge base    Interventions:  - Provide teaching at level of understanding  - Provide teaching via preferred learning methods  Outcome: Progressing     Problem: CARDIOVASCULAR - ADULT  Goal: Maintains optimal cardiac output and hemodynamic stability  Description: INTERVENTIONS:  - Monitor I/O, vital signs and rhythm  - Monitor for S/S and trends of decreased cardiac output  - Administer and titrate ordered vasoactive medications to optimize hemodynamic stability  - Assess quality of pulses, skin color and temperature  - Assess for signs of decreased coronary artery perfusion  - Instruct patient to report change in severity of symptoms  Outcome: Progressing  Goal: Absence of cardiac dysrhythmias or at baseline rhythm  Description: INTERVENTIONS:  - Continuous cardiac monitoring, vital signs, obtain 12 lead EKG if ordered  - Administer antiarrhythmic and heart rate control medications as ordered  - Monitor electrolytes and administer replacement therapy as ordered  Outcome: Progressing     Problem: GENITOURINARY - ADULT  Goal: Maintains or returns to baseline urinary function  Description: INTERVENTIONS:  - Assess urinary function  - Encourage oral fluids to ensure adequate hydration if ordered  - Administer IV fluids as ordered to ensure adequate hydration  - Administer ordered medications as needed  - Offer frequent toileting  - Follow urinary retention protocol if ordered  Outcome: Progressing  Goal: Absence of urinary retention  Description: INTERVENTIONS:  - Assess patients ability to void and empty bladder  - Monitor I/O  - Bladder scan as needed  - Discuss with physician/AP medications to alleviate retention as needed  - Discuss catheterization for long term situations as appropriate  Outcome: Progressing  Goal: Urinary catheter remains patent  Description: INTERVENTIONS:  - Assess patency of urinary catheter  - If patient has a chronic ward, consider changing catheter if non-functioning  - Follow guidelines for intermittent irrigation of non-functioning urinary catheter  Outcome: Progressing     Problem: METABOLIC, FLUID AND ELECTROLYTES - ADULT  Goal: Electrolytes maintained within normal limits  Description: INTERVENTIONS:  - Monitor labs and assess patient for signs and symptoms of electrolyte imbalances  - Administer electrolyte replacement as ordered  - Monitor response to electrolyte replacements, including repeat lab results as appropriate  - Instruct patient on fluid and nutrition as appropriate  Outcome: Progressing  Goal: Fluid balance maintained  Description: INTERVENTIONS:  - Monitor labs   - Monitor I/O and WT  - Instruct patient on fluid and nutrition as appropriate  - Assess for signs & symptoms of volume excess or deficit  Outcome: Progressing  Goal: Glucose maintained within target range  Description: INTERVENTIONS:  - Monitor Blood Glucose as ordered  - Assess for signs and symptoms of hyperglycemia and hypoglycemia  - Administer ordered medications to maintain glucose within target range  - Assess nutritional intake and initiate nutrition service referral as needed  Outcome: Progressing     Problem: SKIN/TISSUE INTEGRITY - ADULT  Goal: Incision(s), wounds(s) or drain site(s) healing without S/S of infection  Description: INTERVENTIONS  - Assess and document risk factors for skin impairment   - Assess and document dressing, incision, wound bed, drain sites and surrounding tissue  - Consider nutrition services referral as needed  - Oral mucous membranes remain intact  - Provide patient/ family education  Outcome: Progressing

## 2021-05-21 NOTE — ASSESSMENT & PLAN NOTE
· Her baseline looks like 0 8-1 initially she presents secondary to severe sepsis with fluid hydrated and her Onetha Reus I has improved she is off the fluids    Resolved

## 2021-05-21 NOTE — PLAN OF CARE
Problem: Potential for Falls  Goal: Patient will remain free of falls  Description: INTERVENTIONS:  - Assess patient frequently for physical needs  -  Identify cognitive and physical deficits and behaviors that affect risk of falls  -  [place] fall precautions as indicated by assessment   - Educate patient/family on patient safety including physical limitations  - Instruct patient to call for assistance with activity based on assessment  - Modify environment to reduce risk of injury  - Consider OT/PT consult to assist with strengthening/mobility  Outcome: Progressing     Problem: Prexisting or High Potential for Compromised Skin Integrity  Goal: Skin integrity is maintained or improved  Description: INTERVENTIONS:  - Identify patients at risk for skin breakdown  - Assess and monitor skin integrity  - Assess and monitor nutrition and hydration status  - Monitor labs   - Assess for incontinence   - Turn and reposition patient  - Assist with mobility/ambulation  - Relieve pressure over bony prominences  - Avoid friction and shearing  - Provide appropriate hygiene as needed including keeping skin clean and dry  - Evaluate need for skin moisturizer/barrier cream  - Collaborate with interdisciplinary team   - Patient/family teaching  - Consider wound care consult   Outcome: Progressing     Problem: Nutrition/Hydration-ADULT  Goal: Nutrient/Hydration intake appropriate for improving, restoring or maintaining nutritional needs  Description: Monitor and assess patient's nutrition/hydration status for malnutrition  Collaborate with interdisciplinary team and initiate plan and interventions as ordered  Monitor patient's weight and dietary intake as ordered or per policy  Utilize nutrition screening tool and intervene as necessary  Determine patient's food preferences and provide high-protein, high-caloric foods as appropriate       INTERVENTIONS:  - Monitor oral intake, urinary output, labs, and treatment plans  - Assess nutrition and hydration status and recommend course of action  - Evaluate amount of meals eaten  - Assist patient with eating if necessary   - Allow adequate time for meals  - Recommend/ encourage appropriate diets, oral nutritional supplements, and vitamin/mineral supplements  - Order, calculate, and assess calorie counts as needed  - Recommend, monitor, and adjust tube feedings and TPN/PPN based on assessed needs  - Assess need for intravenous fluids  - Provide specific nutrition/hydration education as appropriate  - Include patient/family/caregiver in decisions related to nutrition  Outcome: Progressing     Problem: PAIN - ADULT  Goal: Verbalizes/displays adequate comfort level or baseline comfort level  Description: Interventions:  - Encourage patient to monitor pain and request assistance  - Assess pain using appropriate pain scale  - Administer analgesics based on type and severity of pain and evaluate response  - Implement non-pharmacological measures as appropriate and evaluate response  - Consider cultural and social influences on pain and pain management  - Notify physician/advanced practitioner if interventions unsuccessful or patient reports new pain  Outcome: Progressing     Problem: INFECTION - ADULT  Goal: Absence or prevention of progression during hospitalization  Description: INTERVENTIONS:  - Assess and monitor for signs and symptoms of infection  - Monitor lab/diagnostic results  - Monitor all insertion sites, i e  indwelling lines, tubes, and drains  - Monitor endotracheal if appropriate and nasal secretions for changes in amount and color  - [place] appropriate cooling/warming therapies per order  - Administer medications as ordered  - Instruct and encourage patient and family to use good hand hygiene technique  - Identify and instruct in appropriate isolation precautions for identified infection/condition  Outcome: Progressing     Problem: SAFETY ADULT  Goal: Patient will remain free of falls  Description: INTERVENTIONS:  - Assess patient frequently for physical needs  -  Identify cognitive and physical deficits and behaviors that affect risk of falls    -  [place] fall precautions as indicated by assessment   - Educate patient/family on patient safety including physical limitations  - Instruct patient to call for assistance with activity based on assessment  - Modify environment to reduce risk of injury  - Consider OT/PT consult to assist with strengthening/mobility  Outcome: Progressing  Goal: Maintain or return to baseline ADL function  Description: INTERVENTIONS:  -  Assess patient's ability to carry out ADLs; assess patient's baseline for ADL function and identify physical deficits which impact ability to perform ADLs (bathing, care of mouth/teeth, toileting, grooming, dressing, etc )  - Assess/evaluate cause of self-care deficits   - Assess range of motion  - Assess patient's mobility; develop plan if impaired  - Assess patient's need for assistive devices and provide as appropriate  - Encourage maximum independence but intervene and supervise when necessary  - Involve family in performance of ADLs  - Assess for home care needs following discharge   - Consider OT consult to assist with ADL evaluation and planning for discharge  - Provide patient education as appropriate  Outcome: Progressing  Goal: Maintain or return mobility status to optimal level  Description: INTERVENTIONS:  - Assess patient's baseline mobility status (ambulation, transfers, stairs, etc )    - Identify cognitive and physical deficits and behaviors that affect mobility  - Identify mobility aids required to assist with transfers and/or ambulation (gait belt, sit-to-stand, lift, walker, cane, etc )  - [place] fall precautions as indicated by assessment  - Record patient progress and toleration of activity level on Mobility SBAR; progress patient to next Phase/Stage  - Instruct patient to call for assistance with activity based on assessment  - Consider rehabilitation consult to assist with strengthening/weightbearing, etc   Outcome: Progressing     Problem: DISCHARGE PLANNING  Goal: Discharge to home or other facility with appropriate resources  Description: INTERVENTIONS:  - Identify barriers to discharge w/patient and caregiver  - Arrange for needed discharge resources and transportation as appropriate  - Identify discharge learning needs (meds, wound care, etc )  - Arrange for interpretive services to assist at discharge as needed  - Refer to Case Management Department for coordinating discharge planning if the patient needs post-hospital services based on physician/advanced practitioner order or complex needs related to functional status, cognitive ability, or social support system  Outcome: Progressing     Problem: Knowledge Deficit  Goal: Patient/family/caregiver demonstrates understanding of disease process, treatment plan, medications, and discharge instructions  Description: Complete learning assessment and assess knowledge base    Interventions:  - Provide teaching at level of understanding  - Provide teaching via preferred learning methods  Outcome: Progressing     Problem: CARDIOVASCULAR - ADULT  Goal: Maintains optimal cardiac output and hemodynamic stability  Description: INTERVENTIONS:  - Monitor I/O, vital signs and rhythm  - Monitor for S/S and trends of decreased cardiac output  - Administer and titrate ordered vasoactive medications to optimize hemodynamic stability  - Assess quality of pulses, skin color and temperature  - Assess for signs of decreased coronary artery perfusion  - Instruct patient to report change in severity of symptoms  Outcome: Progressing  Goal: Absence of cardiac dysrhythmias or at baseline rhythm  Description: INTERVENTIONS:  - Continuous cardiac monitoring, vital signs, obtain 12 lead EKG if ordered  - Administer antiarrhythmic and heart rate control medications as ordered  - Monitor electrolytes and administer replacement therapy as ordered  Outcome: Progressing     Problem: GENITOURINARY - ADULT  Goal: Maintains or returns to baseline urinary function  Description: INTERVENTIONS:  - Assess urinary function  - Encourage oral fluids to ensure adequate hydration if ordered  - Administer IV fluids as ordered to ensure adequate hydration  - Administer ordered medications as needed  - Offer frequent toileting  - Follow urinary retention protocol if ordered  Outcome: Progressing  Goal: Absence of urinary retention  Description: INTERVENTIONS:  - Assess patients ability to void and empty bladder  - Monitor I/O  - Bladder scan as needed  - Discuss with physician/AP medications to alleviate retention as needed  - Discuss catheterization for long term situations as appropriate  Outcome: Progressing  Goal: Urinary catheter remains patent  Description: INTERVENTIONS:  - Assess patency of urinary catheter  - If patient has a chronic ward, consider changing catheter if non-functioning  - Follow guidelines for intermittent irrigation of non-functioning urinary catheter  Outcome: Progressing     Problem: METABOLIC, FLUID AND ELECTROLYTES - ADULT  Goal: Electrolytes maintained within normal limits  Description: INTERVENTIONS:  - Monitor labs and assess patient for signs and symptoms of electrolyte imbalances  - Administer electrolyte replacement as ordered  - Monitor response to electrolyte replacements, including repeat lab results as appropriate  - Instruct patient on fluid and nutrition as appropriate  Outcome: Progressing  Goal: Fluid balance maintained  Description: INTERVENTIONS:  - Monitor labs   - Monitor I/O and WT  - Instruct patient on fluid and nutrition as appropriate  - Assess for signs & symptoms of volume excess or deficit  Outcome: Progressing  Goal: Glucose maintained within target range  Description: INTERVENTIONS:  - Monitor Blood Glucose as ordered  - Assess for signs and symptoms of hyperglycemia and hypoglycemia  - Administer ordered medications to maintain glucose within target range  - Assess nutritional intake and initiate nutrition service referral as needed  Outcome: Progressing     Problem: SKIN/TISSUE INTEGRITY - ADULT  Goal: Incision(s), wounds(s) or drain site(s) healing without S/S of infection  Description: INTERVENTIONS  - Assess and document risk factors for skin impairment   - Assess and document dressing, incision, wound bed, drain sites and surrounding tissue  - Consider nutrition services referral as needed  - Oral mucous membranes remain intact  - Provide patient/ family education  Outcome: Progressing

## 2021-05-21 NOTE — ASSESSMENT & PLAN NOTE
· Secondary to right lower extremity cellulitis - and strep bacteremia which has the cleared-> necrotizing fasciitis ruled out no abscess Severe sepsis actually has resolved the white count starting to come down but the leg is worsening

## 2021-05-22 NOTE — ASSESSMENT & PLAN NOTE
· Her baseline looks like 0 8-1 initially she presents secondary to severe sepsis with fluid hydrated and her [place] I has improved she is off the fluids    Resolved

## 2021-05-22 NOTE — ASSESSMENT & PLAN NOTE
· Secondary to right lower extremity cellulitis - and strep bacteremia which has the cleared-> necrotizing fasciitis ruled out no abscess Severe sepsis actually has resolved patient continues to improve white count continues to come down without any fever will be switched to p o   Antibiotics to complete the course

## 2021-05-22 NOTE — DISCHARGE INSTRUCTIONS
Continuation of antibiotic treatment at the discretion of the attending hospitalist physician upon hospital discharge  Medicine to order follow-up labs as an outpatient next week at [facility]  Wound care right lower extremity as follows:    -Apply Dermagran gauze, cover with  [place] and ABD to anterior right tibia and lateral right tibia Change daily   -Adaptic, ABD [place] to medial/posterior right thigh hematoma, Change daily   -Santyl , Allevyn foam to inner right thigh wound  Change daily  -Do not apply Santyl to right anterior ankle wound  -Wash sacrum and buttocks with soap and water, apply Allevyn foam, change QOD  Monitor skin beneath foam every shift   -Apply hydroguard cream to heels and sacral buttocks Bid and prn  -Apply skin nourishing cream to body   Use waffle cushion when out of bed   -Turn patient every 2 hours if possible while the patient is supine in bed during the daytime while awake      Follow-up after hospital discharge as an outpatient with Tamie Patterson PA-C, with [address] [facility] in 2 weeks, [facility] will need to call to set up an appointment date and time at [address] PA-C

## 2021-05-22 NOTE — CASE MANAGEMENT
[facility] will be here at 2 PM for patient with a Luis WC to transport to [facility], nursing is aware

## 2021-05-22 NOTE — PLAN OF CARE
Problem: Potential for Falls  Goal: Patient will remain free of falls  Description: INTERVENTIONS:  - Assess patient frequently for physical needs  -  Identify cognitive and physical deficits and behaviors that affect risk of falls  -  [place] fall precautions as indicated by assessment   - Educate patient/family on patient safety including physical limitations  - Instruct patient to call for assistance with activity based on assessment  - Modify environment to reduce risk of injury  - Consider OT/PT consult to assist with strengthening/mobility  Outcome: Progressing     Problem: Prexisting or High Potential for Compromised Skin Integrity  Goal: Skin integrity is maintained or improved  Description: INTERVENTIONS:  - Identify patients at risk for skin breakdown  - Assess and monitor skin integrity  - Assess and monitor nutrition and hydration status  - Monitor labs   - Assess for incontinence   - Turn and reposition patient  - Assist with mobility/ambulation  - Relieve pressure over bony prominences  - Avoid friction and shearing  - Provide appropriate hygiene as needed including keeping skin clean and dry  - Evaluate need for skin moisturizer/barrier cream  - Collaborate with interdisciplinary team   - Patient/family teaching  - Consider wound care consult   Outcome: Progressing     Problem: Nutrition/Hydration-ADULT  Goal: Nutrient/Hydration intake appropriate for improving, restoring or maintaining nutritional needs  Description: Monitor and assess patient's nutrition/hydration status for malnutrition  Collaborate with interdisciplinary team and initiate plan and interventions as ordered  Monitor patient's weight and dietary intake as ordered or per policy  Utilize nutrition screening tool and intervene as necessary  Determine patient's food preferences and provide high-protein, high-caloric foods as appropriate       INTERVENTIONS:  - Monitor oral intake, urinary output, labs, and treatment plans  - Assess nutrition and hydration status and recommend course of action  - Evaluate amount of meals eaten  - Assist patient with eating if necessary   - Allow adequate time for meals  - Recommend/ encourage appropriate diets, oral nutritional supplements, and vitamin/mineral supplements  - Order, calculate, and assess calorie counts as needed  - Recommend, monitor, and adjust tube feedings and TPN/PPN based on assessed needs  - Assess need for intravenous fluids  - Provide specific nutrition/hydration education as appropriate  - Include patient/family/caregiver in decisions related to nutrition  Outcome: Progressing     Problem: PAIN - ADULT  Goal: Verbalizes/displays adequate comfort level or baseline comfort level  Description: Interventions:  - Encourage patient to monitor pain and request assistance  - Assess pain using appropriate pain scale  - Administer analgesics based on type and severity of pain and evaluate response  - Implement non-pharmacological measures as appropriate and evaluate response  - Consider cultural and social influences on pain and pain management  - Notify physician/advanced practitioner if interventions unsuccessful or patient reports new pain  Outcome: Progressing     Problem: INFECTION - ADULT  Goal: Absence or prevention of progression during hospitalization  Description: INTERVENTIONS:  - Assess and monitor for signs and symptoms of infection  - Monitor lab/diagnostic results  - Monitor all insertion sites, i e  indwelling lines, tubes, and drains  - Monitor endotracheal if appropriate and nasal secretions for changes in amount and color  - [place] appropriate cooling/warming therapies per order  - Administer medications as ordered  - Instruct and encourage patient and family to use good hand hygiene technique  - Identify and instruct in appropriate isolation precautions for identified infection/condition  Outcome: Progressing     Problem: SAFETY ADULT  Goal: Patient will remain free of falls  Description: INTERVENTIONS:  - Assess patient frequently for physical needs  -  Identify cognitive and physical deficits and behaviors that affect risk of falls    -  [place] fall precautions as indicated by assessment   - Educate patient/family on patient safety including physical limitations  - Instruct patient to call for assistance with activity based on assessment  - Modify environment to reduce risk of injury  - Consider OT/PT consult to assist with strengthening/mobility  Outcome: Progressing  Goal: Maintain or return to baseline ADL function  Description: INTERVENTIONS:  -  Assess patient's ability to carry out ADLs; assess patient's baseline for ADL function and identify physical deficits which impact ability to perform ADLs (bathing, care of mouth/teeth, toileting, grooming, dressing, etc )  - Assess/evaluate cause of self-care deficits   - Assess range of motion  - Assess patient's mobility; develop plan if impaired  - Assess patient's need for assistive devices and provide as appropriate  - Encourage maximum independence but intervene and supervise when necessary  - Involve family in performance of ADLs  - Assess for home care needs following discharge   - Consider OT consult to assist with ADL evaluation and planning for discharge  - Provide patient education as appropriate  Outcome: Progressing  Goal: Maintain or return mobility status to optimal level  Description: INTERVENTIONS:  - Assess patient's baseline mobility status (ambulation, transfers, stairs, etc )    - Identify cognitive and physical deficits and behaviors that affect mobility  - Identify mobility aids required to assist with transfers and/or ambulation (gait belt, sit-to-stand, lift, walker, cane, etc )  - [place] fall precautions as indicated by assessment  - Record patient progress and toleration of activity level on Mobility SBAR; progress patient to next Phase/Stage  - Instruct patient to call for assistance with activity based on assessment  - Consider rehabilitation consult to assist with strengthening/weightbearing, etc   Outcome: Progressing     Problem: DISCHARGE PLANNING  Goal: Discharge to home or other facility with appropriate resources  Description: INTERVENTIONS:  - Identify barriers to discharge w/patient and caregiver  - Arrange for needed discharge resources and transportation as appropriate  - Identify discharge learning needs (meds, wound care, etc )  - Arrange for interpretive services to assist at discharge as needed  - Refer to Case Management Department for coordinating discharge planning if the patient needs post-hospital services based on physician/advanced practitioner order or complex needs related to functional status, cognitive ability, or social support system  Outcome: Progressing     Problem: Knowledge Deficit  Goal: Patient/family/caregiver demonstrates understanding of disease process, treatment plan, medications, and discharge instructions  Description: Complete learning assessment and assess knowledge base    Interventions:  - Provide teaching at level of understanding  - Provide teaching via preferred learning methods  Outcome: Progressing     Problem: CARDIOVASCULAR - ADULT  Goal: Maintains optimal cardiac output and hemodynamic stability  Description: INTERVENTIONS:  - Monitor I/O, vital signs and rhythm  - Monitor for S/S and trends of decreased cardiac output  - Administer and titrate ordered vasoactive medications to optimize hemodynamic stability  - Assess quality of pulses, skin color and temperature  - Assess for signs of decreased coronary artery perfusion  - Instruct patient to report change in severity of symptoms  Outcome: Progressing  Goal: Absence of cardiac dysrhythmias or at baseline rhythm  Description: INTERVENTIONS:  - Continuous cardiac monitoring, vital signs, obtain 12 lead EKG if ordered  - Administer antiarrhythmic and heart rate control medications as ordered  - Monitor electrolytes and administer replacement therapy as ordered  Outcome: Progressing     Problem: GENITOURINARY - ADULT  Goal: Maintains or returns to baseline urinary function  Description: INTERVENTIONS:  - Assess urinary function  - Encourage oral fluids to ensure adequate hydration if ordered  - Administer IV fluids as ordered to ensure adequate hydration  - Administer ordered medications as needed  - Offer frequent toileting  - Follow urinary retention protocol if ordered  Outcome: Progressing  Goal: Absence of urinary retention  Description: INTERVENTIONS:  - Assess patients ability to void and empty bladder  - Monitor I/O  - Bladder scan as needed  - Discuss with physician/AP medications to alleviate retention as needed  - Discuss catheterization for long term situations as appropriate  Outcome: Progressing  Goal: Urinary catheter remains patent  Description: INTERVENTIONS:  - Assess patency of urinary catheter  - If patient has a chronic ward, consider changing catheter if non-functioning  - Follow guidelines for intermittent irrigation of non-functioning urinary catheter  Outcome: Progressing     Problem: METABOLIC, FLUID AND ELECTROLYTES - ADULT  Goal: Electrolytes maintained within normal limits  Description: INTERVENTIONS:  - Monitor labs and assess patient for signs and symptoms of electrolyte imbalances  - Administer electrolyte replacement as ordered  - Monitor response to electrolyte replacements, including repeat lab results as appropriate  - Instruct patient on fluid and nutrition as appropriate  Outcome: Progressing  Goal: Fluid balance maintained  Description: INTERVENTIONS:  - Monitor labs   - Monitor I/O and WT  - Instruct patient on fluid and nutrition as appropriate  - Assess for signs & symptoms of volume excess or deficit  Outcome: Progressing  Goal: Glucose maintained within target range  Description: INTERVENTIONS:  - Monitor Blood Glucose as ordered  - Assess for signs and symptoms of hyperglycemia and hypoglycemia  - Administer ordered medications to maintain glucose within target range  - Assess nutritional intake and initiate nutrition service referral as needed  Outcome: Progressing     Problem: SKIN/TISSUE INTEGRITY - ADULT  Goal: Incision(s), wounds(s) or drain site(s) healing without S/S of infection  Description: INTERVENTIONS  - Assess and document risk factors for skin impairment   - Assess and document dressing, incision, wound bed, drain sites and surrounding tissue  - Consider nutrition services referral as needed  - Oral mucous membranes remain intact  - Provide patient/ family education  Outcome: Progressing

## 2021-05-22 NOTE — ASSESSMENT & PLAN NOTE
Lab Results   Component Value Date    HGBA1C 6 1 (H) 05/16/2021       Recent Labs     05/21/21  1125 05/21/21  1654 05/21/21  2230 05/22/21  0821   POCGLU 137 134 165* 130       Blood Sugar Average: Last 72 hrs:  (P) 851 [id number]   · Patient's sugars are controlled may resume outpatient metformin

## 2021-05-22 NOTE — DISCHARGE SUMMARY
[address]  Discharge- Alisia Gunning 1945, 70 y o  female MRN: [id number]  Unit/Bed#: -01 Encounter: [id number]  Primary Care Provider: Dayana Trujillo MD   Date and time admitted to hospital: 5/16/2021  1:33 PM    Cellulitis of right lower extremity  Assessment & Plan  · Right lower extremity cellulitis as discussed with surgery team actually has improved today she had an MRI with gadolinium done yesterday no abscess or necrotizing fasciitis just soft tissue swelling  White count continues to improve procalcitonin continue to improve discussed with surgery no plan for OR at this point will continue Ancef and if white count continues to improve was switched to Keflex will need antibiotics till 5/30 and local wound care as per recommended and outlined by surgery team with follow-ups  · The patient's white count continues to improve she remains afebrile and stable she is medically clear to be discharged to SNF on p o  Antibiotics will repeat labs next week    Streptococcal bacteremia  Assessment & Plan  · Which has cleared  She is continuously improving with procalcitonin and WBCs trending down will switch to Keflex 500 mg p o  Q i d   As recommended by ID and she will be treated till 5/30 will have blood work drawn on 05/26    ARIAN on CPAP  Assessment & Plan  · Continue CPAP    Hypertension  Assessment & Plan  · Improved continue Toprol XL may restart Cozaar 100 mg daily and continue Bumex    Class 3 severe obesity with serious comorbidity and body mass index (BMI) of 60 0 to 69 9 in adult [facility])  Assessment & Plan  · Weight loss counseling    CVA (cerebral vascular accident) ([facility] [address] )  Assessment & Plan  · Subacute to chronic evidenced on the CT she is on Xarelto     Acute on chronic diastolic heart failure [facility])  Assessment & Plan  Wt Readings from Last 3 Encounters:   05/22/21 (!) 150 kg (331 lb 2 1 oz)   05/20/21 (!) 153 kg (337 lb)   09/05/20 (!) 155 kg (341 lb)     Patient's Bumex has been held she does have severe pulmonary hypertension on previous echo had the RV dysfunction but normal LV  2D echo was repeated5/17Repeat Echo ordered - EF 55-60%, no RWMA identified, moderate concentric hypertrophy of LV, RV normal size and systolic function, LA markedly dilated, IVC markedly dilated with respirophasic changes blunted (< 50% variation)  Resolved continue Bumex 2 mg p o  Daily with supplementation of potassium        Type 2 diabetes mellitus, without long-term current use of insulin [facility])  Assessment & Plan  Lab Results   Component Value Date    HGBA1C 6 1 (H) 05/16/2021       Recent Labs     05/21/21  1125 05/21/21  1654 05/21/21  2230 05/22/21  0821   POCGLU 137 134 165* 130       Blood Sugar Average: Last 72 hrs:  (P) 620 [id number]   · Patient's sugars are controlled may resume outpatient metformin    Atrial fibrillation with RVR (HCC)  Assessment & Plan  · Rate control continue Xarelto 20 mg daily and Toprol  mg daily    AYDEE (acute kidney injury) ([facility] [address] )  Assessment & Plan  · Her baseline looks like 0 8-1 initially she presents secondary to severe sepsis with fluid hydrated and her Julio Cesar Shoulder I has improved she is off the fluids  Resolved    * Severe sepsis (HCC)  Assessment & Plan  · Secondary to right lower extremity cellulitis - and strep bacteremia which has the cleared-> necrotizing fasciitis ruled out no abscess Severe sepsis actually has resolved patient continues to improve white count continues to come down without any fever will be switched to p o   Antibiotics to complete the course        Discharging Physician / Practitioner: Som Dickens MD  PCP: Olvin Parsons MD  Admission Date:   Admission Orders (From admission, onward)     Ordered        05/16/21 1716  Inpatient Admission  Once                   Discharge Date: 05/22/21    Resolved Problems  Date Reviewed: 5/22/2021          Resolved    Metabolic acidosis 0/35/8660     Resolved by  Jaime Erwin PA-C Elevated INR 5/18/2021     Resolved by  Ilana Graham PA-C    Acute respiratory failure with hypoxia ([address] ) 5/18/2021     Resolved by  Ilana Graham PA-C    Hyponatremia 5/20/2021     Resolved by  Shaan Almodovar MD    Thyroid nodule greater than or equal to 1 5 cm in diameter incidentally noted on imaging study 5/20/2021     Resolved by  Shaan Almodovar MD    Hyperkalemia 5/18/2021     Resolved by  Ilana Graham PA-C          Consultations During Hospital Stay:  · Critical care  · General surgery  · Infectious disease    Procedures Performed:   · None    Significant Findings / Test Results:   · Venous duplex of lower extremities negative for acute DVT  · X-ray of the right hip negative for any acute fractures  · X-ray of the right knee severe tricompartmental osteoarthritis  · CT head- There is a 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 (series 2 images 18-21 )  This was not visible on 9/5/2020 CT  · CT C-spine-No cervical spine fracture or traumatic malalignment      Incidental 2 9 cm right thyroid nodule for which nonemergent thyroid ultrasound is recommended  · 05/16/2021- CT lower extremity without contrast right- No evidence of focal hematoma or discernible abscess within limitations of noncontrast imaging      Asymmetric diffuse fat stranding throughout the right thigh region becoming less pronounced below the level of the knee  These findings could be secondary to edema and/or cellulitis  Some subcutaneous edema in the right flank would favor at least some   component of generalized edema      1 8 cm right external iliac chain lymph node, nonspecific, possibly reactive due to findings in the right lower extremity  CT follow-up is warranted in 3 months  ·  Ultrasound of the kidney-1  No hydronephrosis      2  Right midpole simple cyst      3  Hold[place] catheter in place though not well visualized  Bladder is slightly distended     · CT of the lower extremity without contrast right- Soft tissue evaluation is limited without IV contrast   1   Diffuse fat stranding in the right thigh region particularly at the posterior medial aspect has slightly increased in the upper thigh region, nonspecific, may be related to cellulitis  No soft tissue gas  If there is persistent concern for   necrotizing fasciitis, consider follow-up with dedicated MRI with and without IV contrast   · MRI of the femur right- 1  Subcutaneous edema and stranding within the subcutaneous fat of the right thigh compatible with cellulitis  There is no well-circumscribed rim-enhancing collection to suggest abscess  2   Advanced tricompartmental degenerative changes of the right knee  · 2D echo- Ejection fraction was estimated in the range of 55 % to 60 %  Although no diagnostic regional wall motion abnormality was identified, this possibility cannot be completely excluded on the basis of this study  There was moderate concentric hypertrophy  Incidental Findings:   · See above     Test Results Pending at Discharge (will require follow up): · None     Outpatient Tests Requested:  · CBC cmp next week    Complications:  None    Reason for Admission:  Generalized weakness    Hospital Course:     Yaritza Ramires is a 70 y o  female patient who originally presented to the hospital on 5/16/2021 due to generalized weakness, patient initially was admitted to critical care service secondary to severe sepsis acute kidney injury and right lower extremity cellulitis with CT being done without any evidence of an abscess or deep tissue infection patient was started on IV antibiotics IV fluids with consultation to Infectious Disease and General surgery  Eventually patient's acute kidney injury resolved and antibiotics which switched to Ancef and her white count started to come down she remained afebrile her vitals were stable all her antihypertensives and diuretics were able to be restarted    Patient is right lower extremity cellulitis continued to improve repeat CT and MRI were not negative for necrotizing fasciitis any abscess  White count continued to go down she was switched to Keflex 500 mg p o  Q i d  To complete till 5/30  As discussed with surgery team she will continue wound care an outpatient follow-up all wound care instructions were placed on discharge paperwork she will have left son next week  Medically clear to be discharged to SNF [facility] to remain in place to avoid any further contamination of the right lower extremity wound cellulitis        Please see above list of diagnoses and related plan for additional information  Condition at Discharge: stable     Discharge Day Visit / Exam:     Subjective:  Patient seen and examined denies any chest pain or shortness of breath  Vitals: Blood Pressure: 162/78 (05/22/21 0820)  Pulse: 94 (05/22/21 0820)  Temperature: 98 6 °F (37 °C) (05/22/21 0800)  Temp Source: Temporal (05/22/21 0800)  Respirations: 18 (05/22/21 0820)  Height: 5' 2" (157 5 cm) (05/17/21 0950)  Weight - Scale: (!) 150 kg (331 lb 2 1 oz) (05/22/21 0554)  SpO2: 97 % (05/22/21 0820)  Exam:   Physical Exam  Vitals signs and nursing note reviewed  Constitutional:       General: She is not in acute distress  Appearance: She is well-developed  She is obese  HENT:      Head: Normocephalic and atraumatic  Eyes:      Conjunctiva/sclera: Conjunctivae normal    Neck:      Musculoskeletal: Neck supple  Cardiovascular:      Rate and Rhythm: Normal rate and regular rhythm  Heart sounds: No murmur  Pulmonary:      Effort: Pulmonary effort is normal  No respiratory distress  Breath sounds: Normal breath sounds  No wheezing or rales  Abdominal:      General: Bowel sounds are normal  There is no distension  Palpations: Abdomen is soft  Tenderness: There is no abdominal tenderness  Musculoskeletal:         General: Swelling (Lower extremity) present     Skin:     General: Skin is warm and dry  Neurological:      General: No focal deficit present  Mental Status: She is alert and oriented to person, place, and time  Psychiatric:         Mood and Affect: Mood normal          Discussion with Family: no    Discharge instructions/Information to patient and family:   See after visit summary for information provided to patient and family  Provisions for Follow-Up Care:  See after visit summary for information related to follow-up care and any pertinent home health orders  Disposition:     Other [facility]    For Discharges to [facility] SNF:   · Not Applicable to this Patient - Not Applicable to this Patient    Planned Readmission: no     Discharge Statement:  I spent >35 minutes discharging the patient  This time was spent on the day of discharge  I had direct contact with the patient on the day of discharge  Greater than 50% of the total time was spent examining patient, answering all patient questions, arranging and discussing plan of care with patient as well as directly providing post-discharge instructions  Additional time then spent on discharge activities  Discharge Medications:  See after visit summary for reconciled discharge medications provided to patient and family        ** Please Note: This note has been constructed using a voice recognition system **

## 2021-05-22 NOTE — PROGRESS NOTES
Progress Note - General Surgery   [place] 70 y o  female MRN: [id number]  Unit/Bed#: -01 Encounter: [id number]    Assessment:  Leukocytosis continues decline, 25 50  Wounds continue to show improvement, superficial skin sloughing easily debrided  Constipation resolved, patient had 2 large bowel movements this morning  History of atrial fibrillation with rapid ventricular response, patient on rivaroxaban  Acute kidney injury resolved, creatinine 0 91  Old CVA  Obstructive sleep apnea  Chronic heart failure Morbid obesity, BMI 60 56    Plan:  Patient for hospital discharge today, plan for skilled nursing at [facility]    Continuation of antibiotic treatment at the discretion of the attending hospitalist physician upon hospital discharge  Medicine to order follow-up labs as an outpatient next week at [facility]  Wound care right lower extremity as follows:    -Apply Dermagran gauze, cover with  jaqui and ABD to anterior right tibia and lateral right tibia Change daily   -Adaptic, ABD Jaqui to medial/posterior right thigh hematoma, Change daily   -Santyl , Allevyn foam to inner right thigh wound  Change daily  -Do not apply Santyl to right anterior ankle wound  -Wash sacrum and buttocks with soap and water, apply Allevyn foam, change QOD  Monitor skin beneath foam every shift   -Apply hydroguard cream to heels and sacral buttocks Bid and prn  -Apply skin nourishing cream to body   Use waffle cushion when out of bed   -Turn patient every 2 hours if possible while the patient is supine in bed during the daytime while awake  Follow-up after hospital discharge as an outpatient with Evelin Casas PA-C, with [address] [facility] in 2 weeks, [facility] nursing facility will need to call to set up an appointment date and time at [phone]    Medical management and discharge medications per the attending hospitalist physician      Subjective/Objective   Chief Complaint:  Patient had 2 large bowel movements this morning  Subjective:  No new complaints at this time  Patient to be discharged from the hospital today to skilled nursing facility  Local wound care applied to right lower extremity wounds  She is afebrile  Leukocytosis continues to decline  Constipation now resolved  Scheduled Meds:  Current Facility-Administered Medications   Medication Dose Route Frequency Provider Last Rate    acetaminophen  650 mg Oral Q6H PRN MELBA Merchant      bumetanide  2 mg Oral Daily Laureano Peña MD      cefazolin  2,000 mg Intravenous Q8H MELBA Merchant 2,000 mg (05/22/21 0545)    collagenase   Topical Daily MELBA Merchant      hydrALAZINE  5 mg Intravenous Q6H PRN Laureano Peña MD      HYDROmorphone  1 mg Intravenous Q3H PRN Arsen Granados PA-C      insulin lispro  1-6 Units Subcutaneous HS MELBA Merchant      insulin lispro  2-12 Units Subcutaneous TID With Meals Laureano Peña MD      levalbuterol  1 25 mg Nebulization Q4H PRN Radha Paling, MELBA      metoprolol succinate  100 mg Oral Daily Laureano Peña MD      miconazole   Topical BID MELBA Merchant      phenol  1 spray Mouth/Throat Q2H PRN Salena Morales, MELBA      polyethylene glycol  17 g Oral Daily PRN Radha Paling, MELBA      pravastatin  20 mg Oral Daily With Gaston JAMEL Morales, MELBA      rivaroxaban  20 mg Oral Daily With Breakfast Laureano Peña MD      senna-docusate sodium  1 tablet Oral HS MELBA Merchant      traMADol  50 mg Oral Q6H PRN Laureano Peña MD       Continuous Infusions:   PRN Meds:   acetaminophen    hydrALAZINE    HYDROmorphone    levalbuterol    phenol    polyethylene glycol    traMADol    Objective:     Blood pressure 148/84, pulse 99, temperature 98 6 °F (37 °C), temperature source Temporal, resp  rate 20, height 5' 2" (1 575 m), weight (!) 150 kg (331 lb 2 1 oz), SpO2 96 %  ,Body mass index is 60 56 kg/m²  Intake/Output Summary (Last 24 hours) at 5/22/2021 0910  Last data filed at 5/22/2021 0801  Gross per 24 hour   Intake 720 ml   Output 3075 ml   Net -2355 ml       Invasive Devices     Peripheral Intravenous Line            Peripheral IV 05/20/21 Left;Ventral (anterior) Antecubital 2 days          Drain            Urethral Catheter Double-lumen; Latex 16 Fr  5 days                Physical Exam:     Right lower extremity inspected, all dressings were removed  Patient lying supine, bariatric bed  Patient has improvement with right thigh circumference, wounds proximally appear less erythematous, still edward with palpation  There is a large area of the medial distal right thigh still with some yellowish white exudate in the central part of the area which remains intact  Multiple wounds with superficial skin sloughing which was easily debrided  There is a large area measuring approximately 9 x 15 cm wide involving the distal medial right posterior thighthere is an area of full-thickness yellowish exudate in the middle of the wound which appears superficial and right now does not need debridement  The right ankle appears improved no serous drainage noted  The right lateral calf shows an area of eschar and tender, appears unchanged  Some erythema circumferential around the area measuring approximately 3 x 5 cm  The right medial upper thigh shows an area 8 cm by 10 cm wide with clean wound edges and some superficial skin sloughing  The right thigh circumference appears less swollen  DP and PT pulses are readily palpable in both lower extremities  The right thigh and calf muscles are soft, most of the discomfort is superficial tenderness to touch  Lab, Imaging and other studies:  I have personally reviewed pertinent lab results    , CBC:   Lab Results   Component Value Date    WBC 25 50 (H) 05/22/2021    HGB 10 6 (L) 05/22/2021    HCT 34 3 (L) 05/22/2021    MCV 81 (L) 05/22/2021     05/22/2021    MCH 25 1 (L) 05/22/2021    MCHC 30 9 (L) 05/22/2021    RDW 17 2 (H) 05/22/2021    MPV 10 0 05/22/2021   , CMP:   Lab Results   Component Value Date    SODIUM 138 05/22/2021    K 3 7 05/22/2021     05/22/2021    CO2 25 05/22/2021    BUN 26 (H) 05/22/2021    CREATININE 0 91 05/22/2021    CALCIUM 8 4 05/22/2021    EGFR 63 05/22/2021     VTE Pharmacologic Prophylaxis:  Rivaroxaban  VTE Mechanical Prophylaxis: sequential compression device contraindicated on affected right lower extremity due to open wounds      Beth Conway PA-C

## 2021-05-22 NOTE — ASSESSMENT & PLAN NOTE
Wt Readings from Last 3 Encounters:   05/22/21 (!) 150 kg (331 lb 2 1 oz)   05/20/21 (!) 153 kg (337 lb)   09/05/20 (!) 155 kg (341 lb)     Patient's Bumex has been held she does have severe pulmonary hypertension on previous echo had the RV dysfunction but normal LV  2D echo was repeated5/17Repeat Echo ordered - EF 55-60%, no RWMA identified, moderate concentric hypertrophy of LV, RV normal size and systolic function, LA markedly dilated, IVC markedly dilated with respirophasic changes blunted (< 50% variation)  Resolved continue Bumex 2 mg p o   Daily with supplementation of potassium

## 2021-05-22 NOTE — TRANSPORTATION MEDICAL NECESSITY
Section I - General Information    Name of Patient: Alisia Locke                 : 1945    Medicare #: [id number]  Transport Date: 21 (PCS is valid for round trips on this date and for all repetitive trips in the 60-day range as noted below )  Origin: [address] [place] [address] West:  [facility]  Is the pt's stay covered under Medicare Part A (PPS/DRG)   [x]     Closest appropriate facility? If no, why is transport to more distant facility required? Yes  If hospice pt, is this transport related to pt's terminal illness? No       Section II - Medical Necessity Questionnaire  Ambulance transportation is medically necessary only if other means of transport are contraindicated or would be potentially harmful to the patient  To meet this requirement, the patient must either be "bed confined" or suffer from a condition such that transport by means other than ambulance is contraindicated by the patient's condition  The following questions must be answered by the medical professional signing below for this form to be valid:    1)  Describe the MEDICAL CONDITION (physical and/or mental) of this patient AT [address] [place] [address] that requires the patient to be transported in an ambulance and why transport by other means is contraindicated by the patient's condition: sepsis    2) Is the patient "bed confined" as defined below? No  To be "be confined" the patient must satisfy all three of the following conditions: (1) unable to get up from bed without Assistance; AND (2) unable to ambulate; AND (3) unable to sit in a chair or wheelchair  3) Can this patient safely be transported by car or wheelchair van (i e , seated during transport without a medical attendant or monitoring)?    No    4) In addition to completing questions 1-3 above, please check any of the following conditions that apply*:   Medical attendant required Max assistance 2 to stand  Pt able to take one small step forward with max manual assist x2 for weight shifting before requiring SPT to seat  Sit to stand  Max Assist     *Note: supporting documentation for any boxes checked must be maintained in the patient's medical records  If hosp-hosp transfer, describe services needed at 2nd facility not available at 1st facility? N/A        Section III - Signature of Physician or Healthcare Professional  I certify that the above information is true and correct based on my evaluation of this patient, and represent that the patient requires transport by ambulance and that other forms of transport are contraindicated  I understand that this information will be used by the Centers for Medicare and Medicaid Services (CMS) to support the determination of medical necessity for ambulance services, and I represent that I have personal knowledge of the patient's condition at time of transport  []  If this box is checked, I also certify that the patient is physically or mentally incapable of signing the ambulance service's claim and that the institution with which I am affiliated has furnished care, services, or assistance to the patient  My signature below is made on behalf of the patient pursuant to 42 CFR §424 36(b)(4)  In accordance with 42 CFR §424 37, the specific reason(s) that the patient is physically or mentally incapable of signing the claim form is as follows: Jesse Pulido of Physician* or Healthcare Professional______________________________________________________________  Signature Date 05/22/21 (For scheduled repetitive transports, this form is not valid for transports performed more than 60 days after this date)    Printed Name & Credentials of Physician or Healthcare Professional (MD, DO, RN, etc )_Monserrat Dodge RN_______________________________  *Form must be signed by patient's attending physician for scheduled, repetitive transports   For non-repetitive, unscheduled ambulance transports, if unable to obtain the signature of the attending physician, any of the following may sign (choose appropriate option below)  [] Physician Assistant []  Clinical Nurse Specialist []  Registered Nurse  []  Nurse Practitioner  [x] Discharge Planner

## 2021-05-22 NOTE — CASE MANAGEMENT
Called for BLS transport to safely transport patient to [place]  Await a call back  Medical Necessity for transportation was completed  Copy available for transport team and a copy was placed in bin to be scanned into the chart  4PM  Confirmed transport will be 6:30 Nursing is aware and has been communicating with the facility

## 2021-05-22 NOTE — ASSESSMENT & PLAN NOTE
· Which has cleared  She is continuously improving with procalcitonin and WBCs trending down will switch to Keflex 500 mg p o  Q i d   As recommended by ID and she will be treated till 5/30 will have blood work drawn on 05/26

## 2021-05-22 NOTE — CASE MANAGEMENT
Dc today:  Plan is [facility]  Called [facility] and spoke to Bisi requesting WC transport about 1200-- await confirmation  Faxed COVID results to [facility]

## 2021-05-22 NOTE — ASSESSMENT & PLAN NOTE
· Right lower extremity cellulitis as discussed with surgery team actually has improved today she had an MRI with gadolinium done yesterday no abscess or necrotizing fasciitis just soft tissue swelling  White count continues to improve procalcitonin continue to improve discussed with surgery no plan for OR at this point will continue Ancef and if white count continues to improve was switched to Keflex will need antibiotics till 5/30 and local wound care as per recommended and outlined by surgery team with follow-ups  · The patient's white count continues to improve she remains afebrile and stable she is medically clear to be discharged to SNF on p o   Antibiotics will repeat labs next week

## 2021-05-22 NOTE — CASE MANAGEMENT
[facility] [place] transport arrived for patient without a WC  Staff notified me after she informed them that the transport [facility] will not fit in her [place]  CM called [facility] to report the above, as bariatric WC was requested this AM     Call placed back to [facility] to arrange transport to [facility], await call back

## 2021-05-31 NOTE — ED NOTES
Call placed to [facility] for transport back to [facility]  Waiting for  time        Gene Gonzalez RN  05/31/21 0117

## 2021-05-31 NOTE — ED PROVIDER NOTES
History  Chief Complaint   Patient presents with    Arm Pain     left arm pain  Nursing home states they did an xray which indicates a fracture     Patient complaining of left arm pain for the past 2 days  Patient denies any trauma  States that some was pulling on her arm 2 days ago  X-ray showed no fracture at the shoulder and wrist   There is a questionable age indeterminate fracture of the left radial head  Patient is tender at the elbow  No fevers or chills  No nausea vomiting or diarrhea  History provided by:  Patient   used: No    Elbow Pain  Location:  Elbow  Elbow location:  L elbow  Injury: yes    Time since incident:  2 days  Pain details:     Quality:  Aching    Radiates to:  Does not radiate    Severity:  Mild    Onset quality:  Sudden    Duration:  2 days    Timing:  Constant    Progression:  Unchanged  Foreign body present:  No foreign bodies  Prior injury to area:  No  Relieved by:  Nothing  Worsened by: Movement  Ineffective treatments:  None tried  Associated symptoms: no back pain, no fatigue, no fever, no muscle weakness, no neck pain and no numbness        Prior to Admission Medications   Prescriptions Last Dose Informant Patient Reported? Taking? Xarelto 20 MG tablet   No No   Sig: Take 1 tablet (20 mg total) by mouth daily with breakfast   acetaminophen (TYLENOL) 325 mg tablet   No No   Sig: Take 2 tablets (650 mg total) by mouth every 6 (six) hours as needed for mild pain or fever   bumetanide (Bumex) 2 mg tablet   No No   Sig: Take 1 tablet (2 mg total) by mouth daily   cephalexin (KEFLEX) 500 mg capsule   No No   Sig: Take 1 capsule (500 mg total) by mouth every 6 (six) hours for 8 days   collagenase (SANTYL) ointment   No No   Sig: Apply topically daily Apply to medial upper thigh wound bed and cover with Allevyn foam to protect surrounding skin  Change daily and prn  For external use only  Clean target area of all interfering agents   What is the location for this topical application? medial upper thigh wound bed   levalbuterol (XOPENEX) 1 25 mg/0 5 mL nebulizer solution   No No   Sig: Take 0 5 mL (1 25 mg total) by nebulization every 4 (four) hours as needed for wheezing   losartan (COZAAR) 100 MG tablet   Yes No   metFORMIN (GLUCOPHAGE) 500 mg tablet   Yes No   metoprolol succinate (TOPROL-XL) 100 mg 24 hr tablet   Yes No   Sig: Take 100 mg by mouth daily   miconazole 2 % cream   No No   Sig: Apply topically 2 (two) times a day Skin folds   polyethylene glycol (MIRALAX) 17 g packet   No No   Sig: Take 17 g by mouth daily as needed (constipation)   potassium chloride (Klor-Con M20) 20 mEq tablet   Yes No   Sig: Take by mouth   senna-docusate sodium (SENOKOT S) 8 6-50 mg per tablet   No No   Sig: Take 1 tablet by mouth daily at bedtime   simvastatin (ZOCOR) 10 mg tablet   Yes No   traMADol (ULTRAM) 50 mg tablet   No No   Sig: Take 1 tablet (50 mg total) by mouth every 6 (six) hours as needed for moderate pain for up to 10 days      Facility-Administered Medications: None       Past Medical History:   Diagnosis Date    Coronary artery disease     Diabetes mellitus ([facility] [address] )     Hypertension     Lymphedema     Sleep apnea        Past Surgical History:   Procedure Laterality Date    CHOLECYSTECTOMY      TONSILLECTOMY         History reviewed  No pertinent family history  I have reviewed and agree with the history as documented  E-Cigarette/Vaping    E-Cigarette Use Never User      E-Cigarette/Vaping Substances     Social History     Tobacco Use    Smoking status: Never Smoker    Smokeless tobacco: Never Used   Substance Use Topics    Alcohol use: Not Currently    Drug use: Not Currently       Review of Systems   Constitutional: Negative for chills, fatigue and fever  HENT: Negative for ear pain, hearing loss, sore throat, trouble swallowing and voice change  Eyes: Negative for pain and discharge     Respiratory: Negative for cough, shortness of breath and wheezing  Cardiovascular: Negative for chest pain and palpitations  Gastrointestinal: Negative for abdominal pain, blood in stool, constipation, diarrhea, nausea and vomiting  Genitourinary: Negative for dysuria, flank pain, frequency and hematuria  Musculoskeletal: Positive for arthralgias  Negative for back pain, joint swelling, neck pain and neck stiffness  Skin: Negative for rash and wound  Neurological: Negative for dizziness, seizures, syncope, facial asymmetry and headaches  Psychiatric/Behavioral: Negative for hallucinations, self-injury and suicidal ideas  All other systems reviewed and are negative  Physical Exam  Physical Exam  Constitutional:       General: She is not in acute distress  Appearance: Normal appearance  She is not ill-appearing  HENT:      Head: Normocephalic and atraumatic  Right Ear: External ear normal       Left Ear: External ear normal       Nose: Nose normal       Mouth/Throat:      Mouth: Mucous membranes are moist    Eyes:      Extraocular Movements: Extraocular movements intact  Pupils: Pupils are equal, round, and reactive to light  Neck:      Musculoskeletal: Normal range of motion and neck supple  Cardiovascular:      Rate and Rhythm: Normal rate and regular rhythm  Pulmonary:      Effort: Pulmonary effort is normal  No respiratory distress  Breath sounds: Normal breath sounds  Abdominal:      General: Abdomen is flat  Bowel sounds are normal  There is no distension  Palpations: Abdomen is soft  Tenderness: There is no abdominal tenderness  Musculoskeletal:         General: No swelling or tenderness  Comments: Left elbow diffusely tender palpation  Decreased range of motion secondary to pain  Mild swelling noted  Left hand shows swelling with erythema  Mild warmth  Full range of motion of fingers  Wrappings noted around the right lower leg  There is some foul-smelling drainage    Patient is currently on Keflex  Skin:     General: Skin is warm and dry  Capillary Refill: Capillary refill takes less than 2 seconds  Neurological:      General: No focal deficit present  Mental Status: She is alert and oriented to person, place, and time  Psychiatric:         Mood and Affect: Mood normal          Behavior: Behavior normal          Vital Signs  ED Triage Vitals   Temperature Pulse Respirations Blood Pressure SpO2   05/31/21 0713 05/31/21 0713 05/31/21 0713 05/31/21 0713 05/31/21 0713   (!) 96 9 °F (36 1 °C) (!) 108 16 134/82 93 %      Temp Source Heart Rate Source Patient Position - Orthostatic VS BP Location FiO2 (%)   05/31/21 0713 05/31/21 0713 05/31/21 0715 05/31/21 0713 --   Temporal Monitor Lying Right arm       Pain Score       --                  Vitals:    05/31/21 0715 05/31/21 0730 05/31/21 0745 05/31/21 0800   BP: 140/82 142/67 128/60 139/67   Pulse: 103 (!) 106 95 90   Patient Position - Orthostatic VS: Lying Lying Lying Lying         Visual Acuity  Visual Acuity      Most Recent Value   L Pupil Size (mm)  3   R Pupil Size (mm)  3          ED Medications  Medications - No data to display    Diagnostic Studies  Results Reviewed     None                 XR elbow 3+ vw LEFT   ED Interpretation by Yahir Boateng MD (05/31 3280)   Radial head fracture      Final Result by Cathie Durant MD (05/31 0098)         1  No definite acute fracture or dislocation involving the elbow  2   Considerable degenerative change with probable joint effusion and intra-articular loose bodies  The study was marked in [facility] for immediate notification  Workstation performed: [id number]                    Procedures  Splint application    Date/Time: 5/31/2021 7:36 AM  Performed by: Yahir Boateng MD  Authorized by: Yahir Boateng MD   Universal Protocol:  Consent: Verbal consent obtained    Consent given by: patient  Patient identity confirmed: verbally with patient      Pre-procedure details:     Sensation:  Normal  Procedure details:     Laterality:  Left    Location:  Elbow    Elbow:  L elbow    Strapping: no      Supplies:  Sling  Post-procedure details:     Pain:  Unchanged    Sensation:  Normal    Patient tolerance of procedure: Tolerated well, no immediate complications             ED Course  ED Course as of May 31 1329   Mon May 31, 2021   8103 X-ray shows a radial head fracture  Will place in a sling and have orthopedic follow-up as an outpatient  1327 Discussed with the [facility] about our reading of the x-ray here versus our radiologist reading about the x-ray here  The x-ray done at [facility] manner was read by an outside source as an age indeterminate radial head fracture  The patient at this time it does have pain at the elbow  Has decreased range of motion secondary pain  Is tender along the radial head  There is no surrounding erythema or warmth  I told the person at [facility] manner that given this ambiguous reading of the two conflicting x-rays I would err on the side of caution and keep the patient in a sling and have her follow-up with orthopedics this week  SBIRT 20yo+      Most Recent Value   SBIRT (24 yo +)   In order to provide better care to our patients, we are screening all of our patients for alcohol and drug use  Would it be okay to ask you these screening questions? Yes Filed at: 05/31/2021 6048   Initial Alcohol Screen: US AUDIT-C    1  How often do you have a drink containing alcohol?  0 Filed at: 05/31/2021 0717   2  How many drinks containing alcohol do you have on a typical day you are drinking? 0 Filed at: 05/31/2021 0717   3a  Male UNDER 65: How often do you have five or more drinks on one occasion? 0 Filed at: 05/31/2021 0717   3b  FEMALE Any Age, or MALE 65+: How often do you have 4 or more drinks on one occassion?   0 Filed at: 05/31/2021 0717   Audit-C Score  0 Filed at: 05/31/2021 8953 GOPAL: How many times in the past year have you    Used an illegal drug or used a prescription medication for non-medical reasons? Never Filed at: 05/31/2021 2440                    [facility]  Number of Diagnoses or Management Options  Closed displaced fracture of head of left radius, initial encounter:   Diagnosis management comments: X-ray shows a radial head fracture  The radial head fracture appears to be healing  This could be from the fall from a few weeks ago when she was admitted here  Will place left arm in a sling and have a patient orthopedic follow-up  Disposition  Final diagnoses:   Closed displaced fracture of head of left radius, initial encounter     Time reflects when diagnosis was documented in both MDM as applicable and the Disposition within this note     Time User Action Codes Description Comment    5/31/2021  7:35 AM Emily Pena Add [F60 [id number]] Closed displaced fracture of head of left radius, initial encounter       ED Disposition     ED Disposition Condition Date/Time Comment    Discharge Stable Mon May 31, 2021  7:35 AM Lisbet Austin discharge to home/self care  Follow-up Information     Follow up With Specialties Details Why [address] [facility] Orthopedic Surgery Call in 1 day  [facility] Leena Williamson  [phone]            Discharge Medication List as of 5/31/2021  7:35 AM      CONTINUE these medications which have NOT CHANGED    Details   acetaminophen (TYLENOL) 325 mg tablet Take 2 tablets (650 mg total) by mouth every 6 (six) hours as needed for mild pain or fever, Starting Sat 5/22/2021, No Print      bumetanide (Bumex) 2 mg tablet Take 1 tablet (2 mg total) by mouth daily, Starting Sat 5/22/2021, No Print      collagenase (SANTYL) ointment Apply topically daily Apply to medial upper thigh wound bed and cover with Allevyn foam to protect surrounding skin  Change daily and prn  For external use only   Clean target area of all interfering agents  What is the location for this topical applicatio n? medial upper thigh wound bed, Starting Sat 5/22/2021, No Print      levalbuterol (XOPENEX) 1 25 mg/0 5 mL nebulizer solution Take 0 5 mL (1 25 mg total) by nebulization every 4 (four) hours as needed for wheezing, Starting Sat 5/22/2021, No Print      losartan (COZAAR) 100 MG tablet Starting Tue 8/11/2020, Historical Med      metFORMIN (GLUCOPHAGE) 500 mg tablet Starting Tue 8/11/2020, Historical Med      metoprolol succinate (TOPROL-XL) 100 mg 24 hr tablet Take 100 mg by mouth daily, Historical Med      miconazole 2 % cream Apply topically 2 (two) times a day Skin folds, Starting Sat 5/22/2021, No Print      polyethylene glycol (MIRALAX) 17 g packet Take 17 g by mouth daily as needed (constipation), Starting Sat 5/22/2021, No Print      potassium chloride (Klor-Con M20) 20 mEq tablet Take by mouth, Historical Med      senna-docusate sodium (SENOKOT S) 8 6-50 mg per tablet Take 1 tablet by mouth daily at bedtime, Starting Sat 5/22/2021, No Print      simvastatin (ZOCOR) 10 mg tablet Starting Tue 8/11/2020, Historical Med      traMADol (ULTRAM) 50 mg tablet Take 1 tablet (50 mg total) by mouth every 6 (six) hours as needed for moderate pain for up to 10 days, Starting Sat 5/22/2021, Until Tue 6/1/2021, Print      Xarelto 20 MG tablet Take 1 tablet (20 mg total) by mouth daily with breakfast, Starting Sat 5/22/2021, No Print         STOP taking these medications       cephalexin (KEFLEX) 500 mg capsule Comments:   Reason for Stopping:             No discharge procedures on file      PDMP Review     None          ED Provider  Electronically Signed by           Anna Mcdowell MD  05/31/21 [facility] Melba Ngo MD  05/31/21 [address] [facility] Melba Ngo MD  05/31/21 5882

## 2021-05-31 NOTE — ED NOTES
Patient discharged and waiting for [facility] to pick her up        Teresa Tesfaye RN  05/31/21 5793

## 2021-06-01 PROBLEM — I50.32 CHRONIC DIASTOLIC HEART FAILURE (HCC): Status: ACTIVE | Noted: 2021-01-01

## 2021-06-01 PROBLEM — E83.42 HYPOMAGNESEMIA: Status: ACTIVE | Noted: 2021-01-01

## 2021-06-01 PROBLEM — I48.20 CHRONIC ATRIAL FIBRILLATION (HCC): Status: ACTIVE | Noted: 2021-01-01

## 2021-06-01 PROBLEM — N30.01 ACUTE CYSTITIS WITH HEMATURIA: Status: ACTIVE | Noted: 2021-01-01

## 2021-06-01 PROBLEM — M25.522 ELBOW PAIN, LEFT: Status: ACTIVE | Noted: 2021-01-01

## 2021-06-01 PROBLEM — N30.00 ACUTE CYSTITIS: Status: ACTIVE | Noted: 2021-01-01

## 2021-06-01 NOTE — ASSESSMENT & PLAN NOTE
· POA temperature 100 3°, tachycardia, suspected infection  · Lactic acid 1 1  · Blood cultures are pending  · Cefepime was started in the ER  · Check procalcitonin

## 2021-06-01 NOTE — CONSULTS
Orthopedics   Anni Seaman 70 y o  female MRN: [id number]  Unit/Bed#: -01      Chief Complaint:   Left elbow pain    HPI:  70 y o  female complaining of left elbow pain after a fall that occurred yesterday morning  The patient is currently residing at [facility]  As nursing staff were attempted to transfer her, she lost her foot  One of the nurses grasped her left elbow to steady her and to keep her from hitting the floor  After the incident, the patient noted significant left elbow pain  She denies history of trauma prior to yesterday's incident  She denies prior pain in the elbow before yesterday  Denies numbness/tingling  Review Of Systems:   · Skin: Normal  · Neuro: See HPI  · Musculoskeletal: See HPI  · 14 point review of systems negative except as stated above     Past Medical History:   Past Medical History:   Diagnosis Date    A-fib ([place] [address] )     CHF (congestive heart failure) ([place] [address] )     Coronary artery disease     Diabetes mellitus ([place] [address] )     Hypertension     Lymphedema     Lymphedema     Obesity     Sepsis ([place] [address] )     Sleep apnea        Past Surgical History:   Past Surgical History:   Procedure Laterality Date    CHOLECYSTECTOMY      TONSILLECTOMY         Family History:  Family history reviewed and non-contributory  History reviewed  No pertinent family history      Social History:  Social History     Socioeconomic History    Marital status: Single     Spouse name: None    Number of children: None    Years of education: None    Highest education level: None   Occupational History    None   Social Needs    Financial resource strain: None    Food insecurity     Worry: None     Inability: None    Transportation needs     Medical: None     Non-medical: None   Tobacco Use    Smoking status: Never Smoker    Smokeless tobacco: Never Used   Substance and Sexual Activity    Alcohol use: Not Currently    Drug use: Not Currently    Sexual activity: Not Currently   Lifestyle    Physical activity Days per week: None     Minutes per session: None    Stress: None   Relationships    Social connections     Talks on phone: None     Gets together: None     Attends Christian service: None     Active member of club or organization: None     Attends meetings of clubs or organizations: None     Relationship status: None    Intimate partner violence     Fear of current or ex partner: None     Emotionally abused: None     Physically abused: None     Forced sexual activity: None   Other Topics Concern    None   Social History Narrative    None       Allergies:    Allergies   Allergen Reactions    Iodinated Diagnostic Agents Anaphylaxis    Metronidazole Seizures           Cimetidine Anxiety    Diltiazem Rash    Penicillins Rash           Labs:  0   Lab Value Date/Time    HCT 33 6 (L) 06/01/2021 0553    HCT 33 9 (L) 05/31/2021 2359    HCT 34 3 (L) 05/22/2021 0545    HGB 10 0 (L) 06/01/2021 0553    HGB 10 4 (L) 05/31/2021 2359    HGB 10 6 (L) 05/22/2021 0545    INR 2 70 (H) 05/31/2021 2359    WBC 8 52 06/01/2021 0553    WBC 8 85 05/31/2021 2359    WBC 25 50 (H) 05/22/2021 0545     (H) 05/18/2021 1043     5 (H) 05/19/2021 0354       Meds:    Current Facility-Administered Medications:     acetaminophen (TYLENOL) tablet 650 mg, 650 mg, Oral, Q6H PRN, Jamison Krabbe, CRNP    bisacodyl (DULCOLAX) EC tablet 5 mg, 5 mg, Oral, Daily PRN, Jamison Krabbe, CRNP    bumetanide (BUMEX) tablet 2 mg, 2 mg, Oral, Daily, Jamison Krabbe, CRNP, 2 mg at 06/01/21 0829    insulin lispro (HumaLOG) 100 units/mL subcutaneous injection 2-12 Units, 2-12 Units, Subcutaneous, TID AC, 2 Units at 06/01/21 0828 **AND** Fingerstick Glucose (POCT), , , TID AC, Jamison Krabbe, CRNP    insulin lispro (HumaLOG) 100 units/mL subcutaneous injection 2-12 Units, 2-12 Units, Subcutaneous, HS, Jamison Krabbe, CRNP    levalbuterol [facility]) inhalation solution 1 25 mg, 1 25 mg, Nebulization, Q4H PRN, Jamison Krabbe, CRNP   losartan (COZAAR) tablet 100 mg, 100 mg, Oral, Daily, Ashley Pretzel, CRNP, 100 mg at 06/01/21 5142    magnesium hydroxide (MILK OF MAGNESIA) oral suspension 30 mL, 30 mL, Oral, Daily PRN, Ashley Pretzel, CRNP    metoprolol succinate (TOPROL-XL) 24 hr tablet 100 mg, 100 mg, Oral, Daily, Ashley Pretzel, CRNP, 100 mg at 06/01/21 6774    ondansetron (ZOFRAN) injection 4 mg, 4 mg, Intravenous, Q6H PRN, Ashley Pretzel, CRNP    polyethylene glycol (MIRALAX) packet 17 g, 17 g, Oral, Daily PRN, Ashley Pretzel, CRNP    pravastatin (PRAVACHOL) tablet 20 mg, 20 mg, Oral, Daily With Dinner, Ashley Pretzel, CRNP    rivaroxaban (XARELTO) tablet 20 mg, 20 mg, Oral, Daily With Breakfast, Ashley Pretarnaldo, CRNP, 20 mg at 06/01/21 9500    senna-docusate sodium (SENOKOT S) 8 6-50 mg per tablet 1 tablet, 1 tablet, Oral, HS, Ashley Pretzel, CRNP    Blood Culture:   Lab Results   Component Value Date    BLOODCX Received in Microbiology Lab  Culture in Progress  06/01/2021       Wound Culture:   No results found for: WOUNDCULT    Ins and Outs:  I/O last 24 hours: In: 48 [IV Piggyback:50]  Out: 375 [Urine:375]          Physical Exam:   /68   Pulse (!) 118   Temp (!) 97 2 °F (36 2 °C)   Resp 16   Ht 5' 2" (1 575 m)   Wt (!) 140 kg (309 lb 1 4 oz)   SpO2 90%   BMI 56 53 kg/m²   Gen: Alert and oriented to person, place, time  HEENT: EOMI, eyes clear, moist mucus membranes, hearing intact  Respiratory: Bilateral chest rise  No audible wheezing found  Cardiovascular: Regular Rate and Rhythm  Abdomen: soft nontender/nondistended  Musculoskeletal: left upper extremity  · Diffuse swelling and ecchymosis of the left elbow  Per patient, ecchymosis is from antecubital IV, not from incident yesterday  · Diffuse tenderness to palpation over radial head/neck, olecranon, medial and lateral epicondyle  Maximal tenderness over the soft tissues medially  · Patient unable to actively range the elbow secondary to pain   Resting with the elbow at 90 degrees of flexion  Passive flexion of 90 degrees-120 degrees with complaints of pain  Passive extension 90 degrees-100 degrees with complaints of severe pain and withdrawal response  Supination/pronation of the forearm again causes complaints of elbow pain  With the patient resting at 45 degrees of supination  · SILT m/r/u    · Motor intact ain/pin/m/r/u  · 2+ rad pulse, limb is warm and well perfused with good color and capillary refill distally  Radiology:   I personally reviewed the films  XRs of the left elbow performed yesterday morning and early this morning demonstrate no acute osseous abnormalities  Significant degenerative changes with osteophyte formation  Assessment:  70 y  o female with left elbow contusion and arthritic flare  Plan:   · WBAT LUE   · OT consult for gentle elbow PROM  · Discontinue sling  · Encourage ROM as tolerated   to avoid stiffness  · Pain control per primary team  · Apply ice prn    Tania Benson PA-C

## 2021-06-01 NOTE — PROGRESS NOTES
Patient is a readmission  Patient was readmitted with Sepsis/Wounds  Patients risk for unplanned readmission score is 19  Patient was here 5/16-5/22  Patient was admitted for cellulitis and a fall  CM met with patient at the bedside,baseline information  was obtained  CM discussed the role of CM in helping the patient develop a discharge plan and assist the patient in carry out their plan  06/01/21 1106   Readmission Root Cause   Who directed you to return to the hospital? Other (comment)  [facility])   Did you understand whom to contact if you had questions or problems? Yes   Did you get your prescriptions before you left the hospital?   (N/A)   Were you able to get your prescriptions filled when you left the hospital?   (N/A)   Did you take your medications as prescribed?   (N/A)   Were you able to get to your follow-up appointments?   (N/A)   Patient Information   Mental Status Alert   Primary Caregiver Other (Comment)  (SNF)   Support System Immediate family   Activities of Daily Living Prior to Admission   Functional Status Moderate assistance   Assistive Device Wheelchair   Living Arrangement Other (Comment)  ([place]/SNF)   Ambulation Moderate assistance   Income Information   Income Source Pension/skilled nursing   Means of Transportation   Means of Transport to Appts: Family       Patient has identified her brother, Mateus Woodall as their primary   Her brother, Charm Lefort,  is able to assist upon discharge  POA: Mateus Woodall  Has an advanced directive  Pt verbalized her brother, Charm Lefort, would be the person assisting with decisions with making if need be  Pt has an advance directive, requested patient to bring in a copy of their AD to be scanned into the chart  PCP: Michael Lenz        Pt has a prescription plan and uses [facility] on [address]  Medications are affordable  Patient is not a [place]  Pt denies SA/MH history  House set up: 3 SH with 2STE to enter   Bedroom and Bathroom are on the first floor so patient does not need to go up the stairs inside the home  Functional level PTA: Assistance from nursing staff at [facility]  DME use: Uses walker, wheel chair, Cane and CPAP  DME=Jaden Tapia  Prior use of Home Care or STR:  STR occurred started on 5/22 at [facility]  Transportation: Needs WC transport  Vito Puente, her brother, drives her  Community Resources: None  CM reviewed d/c planning process including the following: identifying help at home, patient preference for d/c planning needs, availability of treatment team to discuss questions or concerns patient and/or family may have regarding understanding medications and recognizing signs and symptoms once discharged  CM also encouraged patient to follow up with all recommended appointments after discharge  Patient advised of importance for patient and family to participate in managing patients medical well being  Patient is currently thinking about whether she wants to return to [facility] or another STR  She has been provided CM contact number  DC plan is SNF which is TBD

## 2021-06-01 NOTE — RESPIRATORY THERAPY NOTE
RT Protocol Note  Charlene Xavier 70 y o  female MRN: [id number]  Unit/Bed#: -01 Encounter: [id number]    Assessment    Active Problems:    Sepsis ([place] [address] )    Chronic atrial fibrillation (HCC)    Type 2 diabetes mellitus, without long-term current use of insulin (HCC)    Chronic diastolic heart failure (HCC)    Wound of right leg    Class 3 severe obesity with serious comorbidity and body mass index (BMI) of 60 0 to 69 9 in adult [facility])    Essential hypertension    ARIAN on CPAP    Elbow pain, left    Acute cystitis with hematuria    Hypomagnesemia      Home Pulmonary Medications:  Xopenex  Home Devices/Therapy: BiPAP/CPAP    Past Medical History:   Diagnosis Date    A-fib ([place] [address] )     CHF (congestive heart failure) ([place] [address] )     Coronary artery disease     Diabetes mellitus ([place] [address] )     Hypertension     Lymphedema     Lymphedema     Obesity     Sepsis ([place] [address] )     Sleep apnea      Social History     Socioeconomic History    Marital status: Single     Spouse name: None    Number of children: None    Years of education: None    Highest education level: None   Occupational History    None   Social Needs    Financial resource strain: None    Food insecurity     Worry: None     Inability: None    Transportation needs     Medical: None     Non-medical: None   Tobacco Use    Smoking status: Never Smoker    Smokeless tobacco: Never Used   Substance and Sexual Activity    Alcohol use: Not Currently    Drug use: Not Currently    Sexual activity: Not Currently   Lifestyle    Physical activity     Days per week: None     Minutes per session: None    Stress: None   Relationships    Social connections     Talks on phone: None     Gets together: None     Attends [employer] service: None     Active member of club or organization: None     Attends meetings of clubs or organizations: None     Relationship status: None    Intimate partner violence     Fear of current or ex partner: None     Emotionally abused: None Physically abused: None     Forced sexual activity: None   Other Topics Concern    None   Social History Narrative    None       Subjective         Objective    Physical Exam:   Assessment Type: Assess only  General Appearance: Alert, Awake  Respiratory Pattern: Normal  Chest Assessment: Chest expansion symmetrical  Bilateral Breath Sounds: Diminished  Cough: None  O2 Device: 2L nasal cannula    Vitals:  Blood pressure 132/67, pulse 99, temperature (!) 97 1 °F (36 2 °C), resp  rate 15, height 5' 2" (1 575 m), weight (!) 140 kg (309 lb 1 4 oz), SpO2 96 %            Imaging and other studies: Pending    O2 Device: 2L nasal cannula     Plan    Respiratory Plan: Mild Distress pathway        Resp Comments: Patient admitted for left arm pain and lower extremety infection

## 2021-06-01 NOTE — PLAN OF CARE
Problem: Potential for Falls  Goal: Patient will remain free of falls  Description: INTERVENTIONS:  - Assess patient frequently for physical needs  -  Identify cognitive and physical deficits and behaviors that affect risk of falls  -  [place] fall precautions as indicated by assessment   - Educate patient/family on patient safety including physical limitations  - Instruct patient to call for assistance with activity based on assessment  - Modify environment to reduce risk of injury  - Consider OT/PT consult to assist with strengthening/mobility  Outcome: Progressing     Problem: Prexisting or High Potential for Compromised Skin Integrity  Goal: Skin integrity is maintained or improved  Description: INTERVENTIONS:  - Identify patients at risk for skin breakdown  - Assess and monitor skin integrity  - Assess and monitor nutrition and hydration status  - Monitor labs   - Assess for incontinence   - Turn and reposition patient  - Assist with mobility/ambulation  - Relieve pressure over bony prominences  - Avoid friction and shearing  - Provide appropriate hygiene as needed including keeping skin clean and dry  - Evaluate need for skin moisturizer/barrier cream  - Collaborate with interdisciplinary team   - Patient/family teaching  - Consider wound care consult   Outcome: Progressing     Problem: Nutrition/Hydration-ADULT  Goal: Nutrient/Hydration intake appropriate for improving, restoring or maintaining nutritional needs  Description: Monitor and assess patient's nutrition/hydration status for malnutrition  Collaborate with interdisciplinary team and initiate plan and interventions as ordered  Monitor patient's weight and dietary intake as ordered or per policy  Utilize nutrition screening tool and intervene as necessary  Determine patient's food preferences and provide high-protein, high-caloric foods as appropriate       INTERVENTIONS:  - Monitor oral intake, urinary output, labs, and treatment plans  - Assess nutrition and hydration status and recommend course of action  - Evaluate amount of meals eaten  - Assist patient with eating if necessary   - Allow adequate time for meals  - Recommend/ encourage appropriate diets, oral nutritional supplements, and vitamin/mineral supplements  - Order, calculate, and assess calorie counts as needed  - Recommend, monitor, and adjust tube feedings and TPN/PPN based on assessed needs  - Assess need for intravenous fluids  - Provide specific nutrition/hydration education as appropriate  - Include patient/family/caregiver in decisions related to nutrition  Outcome: Progressing     Problem: PAIN - ADULT  Goal: Verbalizes/displays adequate comfort level or baseline comfort level  Description: Interventions:  - Encourage patient to monitor pain and request assistance  - Assess pain using appropriate pain scale  - Administer analgesics based on type and severity of pain and evaluate response  - Implement non-pharmacological measures as appropriate and evaluate response  - Consider cultural and social influences on pain and pain management  - Notify physician/advanced practitioner if interventions unsuccessful or patient reports new pain  Outcome: Progressing     Problem: INFECTION - ADULT  Goal: Absence or prevention of progression during hospitalization  Description: INTERVENTIONS:  - Assess and monitor for signs and symptoms of infection  - Monitor lab/diagnostic results  - Monitor all insertion sites, i e  indwelling lines, tubes, and drains  - Monitor endotracheal if appropriate and nasal secretions for changes in amount and color  - [place] appropriate cooling/warming therapies per order  - Administer medications as ordered  - Instruct and encourage patient and family to use good hand hygiene technique  - Identify and instruct in appropriate isolation precautions for identified infection/condition  Outcome: Progressing     Problem: SAFETY ADULT  Goal: Patient will remain free of falls  Description: INTERVENTIONS:  - Assess patient frequently for physical needs  -  Identify cognitive and physical deficits and behaviors that affect risk of falls  -  [place] fall precautions as indicated by assessment   - Educate patient/family on patient safety including physical limitations  - Instruct patient to call for assistance with activity based on assessment  - Modify environment to reduce risk of injury  - Consider OT/PT consult to assist with strengthening/mobility  Outcome: Progressing     Problem: DISCHARGE PLANNING  Goal: Discharge to home or other facility with appropriate resources  Description: INTERVENTIONS:  - Identify barriers to discharge w/patient and caregiver  - Arrange for needed discharge resources and transportation as appropriate  - Identify discharge learning needs (meds, wound care, etc )  - Arrange for interpretive services to assist at discharge as needed  - Refer to Case Management Department for coordinating discharge planning if the patient needs post-hospital services based on physician/advanced practitioner order or complex needs related to functional status, cognitive ability, or social support system  Outcome: Progressing     Problem: Knowledge Deficit  Goal: Patient/family/caregiver demonstrates understanding of disease process, treatment plan, medications, and discharge instructions  Description: Complete learning assessment and assess knowledge base    Interventions:  - Provide teaching at level of understanding  - Provide teaching via preferred learning methods  Outcome: Progressing     Problem: SKIN/TISSUE INTEGRITY - ADULT  Goal: Incision(s), wounds(s) or drain site(s) healing without S/S of infection  Description: INTERVENTIONS  - Assess and document risk factors for skin impairment   - Assess and document dressing, incision, wound bed, drain sites and surrounding tissue  - Consider nutrition services referral as needed  - Oral mucous membranes remain intact  - Provide patient/ family education  Outcome: Progressing     Problem: MUSCULOSKELETAL - ADULT  Goal: Maintain or return mobility to safest level of function  Description: INTERVENTIONS:  - Assess patient's ability to carry out ADLs; assess patient's baseline for ADL function and identify physical deficits which impact ability to perform ADLs (bathing, care of mouth/teeth, toileting, grooming, dressing, etc )  - Assess/evaluate cause of self-care deficits   - Assess range of motion  - Assess patient's mobility  - Assess patient's need for assistive devices and provide as appropriate  - Encourage maximum independence but intervene and supervise when necessary  - Involve family in performance of ADLs  - Assess for home care needs following discharge   - Consider OT consult to assist with ADL evaluation and planning for discharge  - Provide patient education as appropriate  Outcome: Progressing

## 2021-06-01 NOTE — H&P
[address] Shaylee Jorgensen  H&P- Lisbet Austin 1945, 70 y o  female MRN: [id number]  Unit/Bed#: -[place]ak Encounter: [id number]  Primary Care Provider: Ramone Gamboa MD   Date and time admitted to hospital: 5/31/2021 11:43 PM    * Sepsis ([address] )  Assessment & Plan  · POA temperature 100 3°, tachycardia, suspected infection  · Lactic acid 1 1  · Blood cultures are pending  · Cefepime was started in the ER  · Check procalcitonin    Wound of right leg  Assessment & Plan            · Presents for left elbow pain, worsening right leg wound  · Admitted 5/16/2021 for acute cellulitis, possible abscess  She was admitted to the ICU at that time  CT of right leg showed no evidence of hematoma or discernible abscess within limitations of noncontrast imaging  Asymmetric diffuse fat stranding throughout the right thigh region  Could be secondary to edema and/or cellulitis  · MRI with gadolinium done 05/21/2021 showed no evidence of abscess or necrotizing fasciitis just soft tissue swelling  · Patient was discharged on oral Keflex  · Blood and wound cultures are pending  · Patient was started on cefepime and vancomycin in the ER  · Wound care consult    Elbow pain, left  Assessment & Plan  · Presents with worsening of left elbow pain, was seen ER 5/30  · Left elbow x-ray 05/30/2021:No definite acute fracture or dislocation involving the elbow   Considerable degenerative change with probable joint effusion and intra-articular loose bodies  · Treated with sling, follow-up with ortho in 1 week  · Returns today for worsening pain  · Neurovascular checks  · Orthopedic consult  · Pain control if needed    Hypomagnesemia  Assessment & Plan  · Magnesium 1 5  · Give 2 g IV  · Recheck in the morning    Acute cystitis with hematuria  Assessment & Plan  · Urinalysis: innumerable bacteria, WBC 20-30, trace leukocytes  · Holden catheter in place, insertion date 05/21/2021-will request removal and replacement  · Cefepime started in the ER, deescalate as indicated     ARIAN on CPAP  Assessment & Plan  · Continue CPAP at bedtime    Essential hypertension  Assessment & Plan  · BP reviewed and acceptable  · Continue losartan, metoprolol  · Monitor blood pressure    Class 3 severe obesity with serious comorbidity and body mass index (BMI) of 60 0 to 69 9 in adult [facility])  Assessment & Plan  · Weight loss counseling    Chronic diastolic heart failure (HCC)  Assessment & Plan  Wt Readings from Last 3 Encounters:   05/31/21 (!) 143 kg (315 lb 7 7 oz)   05/31/21 (!) 152 kg (336 lb)   05/22/21 (!) 150 kg (331 lb 2 1 oz)       · Does not appear volume overloaded  · Daily weights and I&Os  · Low-salt diet  · Continue Bumex and metoprolol      Type 2 diabetes mellitus, without long-term current use of insulin ([facility])  Assessment & Plan    Lab Results   Component Value Date    HGBA1C 6 1 (H) 05/16/2021     · Diabetic diet  · Fingerstick blood sugar sliding scale coverage  · Hold metformin while in hospital    Chronic atrial fibrillation ([address] )  Assessment & Plan  · Currently controlled  · Continue metoprolol and Xarelto    VTE Prophylaxis: Rivaroxaban (Xarelto)  / sequential compression device   Code Status:  Full  POLST: POLST form is not discussed and not completed at this time  Discussion with family:  Patient    Anticipated Length of Stay:  Patient will be admitted on an Inpatient basis with an anticipated length of stay of  greater than 2 midnights  Justification for Hospital Stay:  Per plan above    Total Time for Visit, including Counseling / Coordination of Care: 30 minutes  Greater than 50% of this total time spent on direct patient counseling and coordination of care      Chief Complaint:   Left elbow pain and right thigh wound    History of Present Illness:    Scott Miguel is a 70 y o  female with history of atrial fibrillation on Xarelto, CAD, non-insulin-dependent type 2 diabetes, diastolic heart failure, ARIAN on CPAP, essential hypertension, and CVA, sepsis, and bacteremia who presents with left elbow pain and right thigh wound  She was seen here one day ago for same  Prior to that she had an admission May 16th for the right thigh wound, sepsis and bacteremia  She presents today for continued left elbow pain and worsening right thigh drainage with malodor  Left elbow x-ray performed on 05/30 did not show evidence of fracture per radiology read, the patient continues with pain  She was using a sling, she said it did not really help  She denies fever, congestion, sore throat, shortness of breath, chest pain, abdominal pain, vomiting, diarrhea, dysuria, back pain, or focal weakness  Review of Systems:    Review of Systems   Constitutional: Negative for chills and fever  HENT: Negative for congestion  Respiratory: Negative for cough and shortness of breath  Cardiovascular: Negative for chest pain, palpitations and leg swelling  Gastrointestinal: Negative for abdominal distention, abdominal pain, constipation, diarrhea, nausea and vomiting  Genitourinary: Negative for dysuria and frequency  Musculoskeletal: Positive for arthralgias  Negative for myalgias  Skin: Positive for wound  Neurological: Negative for dizziness, syncope, weakness and headaches  Psychiatric/Behavioral: Negative for dysphoric mood  All other systems reviewed and are negative  Past Medical and Surgical History:     Past Medical History:   Diagnosis Date    A-fib [facility])     CHF [facility]congestive heart failure) ([facility] )     Coronary artery disease     Diabetes mellitus ([facility] )     Hypertension     Lymphedema     Lymphedema     Obesity     Sepsis ([facility] )     Sleep apnea        Past Surgical History:   Procedure Laterality Date    CHOLECYSTECTOMY      TONSILLECTOMY         Meds/Allergies:    Prior to Admission medications    Medication Sig Start Date End Date Taking?  Authorizing Provider   acetaminophen (TYLENOL) 325 mg tablet Take 2 tablets (650 mg total) by mouth every 6 (six) hours as needed for mild pain or fever 5/22/21  Yes Zhen Estrada MD   aluminum-magnesium hydroxide-simethicone (MYLANTA) 384-417-45 mg/5 mL suspension Take 30 mL by mouth every 4 (four) hours as needed for indigestion or heartburn   Yes Historical Provider, MD   bisacodyl (bisacodyl) 5 mg EC tablet Take 5 mg by mouth daily as needed for constipation   Yes Historical Provider, MD   bumetanide (Bumex) 2 mg tablet Take 1 tablet (2 mg total) by mouth daily 5/22/21  Yes Zhen Estrada MD   collagenase (SANTYL) ointment Apply topically daily Apply to medial upper thigh wound bed and cover with Allevyn foam to protect surrounding skin  Change daily and prn  For external use only  Clean target area of all interfering agents   What is the location for this topical application? medial upper thigh wound bed 5/22/21  Yes Zhen Estrada MD   levalbuterol (XOPENEX) 1 25 mg/0 5 mL nebulizer solution Take 0 5 mL (1 25 mg total) by nebulization every 4 (four) hours as needed for wheezing 5/22/21  Yes Zhen Estrdaa MD   loperamide (IMODIUM) 2 mg capsule Take 2 mg by mouth 4 (four) times a day as needed for diarrhea   Yes Historical Provider, MD   losartan (COZAAR) 100 MG tablet  8/11/20  Yes Historical Provider, MD   magnesium hydroxide (MILK OF MAGNESIA) 400 mg/5 mL oral suspension Take 30 mL by mouth daily as needed for constipation   Yes Historical Provider, MD   metFORMIN (GLUCOPHAGE) 500 mg tablet 500 mg daily with breakfast  8/11/20  Yes Historical Provider, MD   metoprolol succinate (TOPROL-XL) 100 mg 24 hr tablet Take 100 mg by mouth daily   Yes Historical Provider, MD   miconazole 2 % cream Apply topically 2 (two) times a day Skin folds 5/22/21  Yes Zhen Estrada MD   polyethylene glycol (MIRALAX) 17 g packet Take 17 g by mouth daily as needed (constipation) 5/22/21  Yes Zhen Estrada MD   potassium chloride (Klor-Con M20) 20 mEq tablet Take 20 mEq by mouth daily    Yes Historical Provider, MD   promethazine (PHENERGAN) 12 5 MG tablet Take 25 mg by mouth every 6 (six) hours as needed for nausea or vomiting   Yes Historical Provider, MD   promethazine (PHENERGAN) 25 mg suppository Insert 25 mg into the rectum every 6 (six) hours as needed for nausea or vomiting   Yes Historical Provider, MD   senna-docusate sodium (SENOKOT S) 8 6-50 mg per tablet Take 1 tablet by mouth daily at bedtime 5/22/21  Yes Adrian Almodovar MD   simvastatin (ZOCOR) 10 mg tablet  8/11/20  Yes Historical Provider, MD   traMADol (ULTRAM) 50 mg tablet Take 1 tablet (50 mg total) by mouth every 6 (six) hours as needed for moderate pain for up to 10 days 5/22/21 6/1/21 Yes Adrian Almodovar MD   Xarelto 20 MG tablet Take 1 tablet (20 mg total) by mouth daily with breakfast 5/22/21  Yes Adrian Almodovar MD   LACTOBACILLUS PO Take by mouth    Historical Provider, MD   cephalexin (KEFLEX) 500 mg capsule Take 1 capsule (500 mg total) by mouth every 6 (six) hours for 8 days 5/22/21 6/1/21  Adrian Almodovar MD     I have reveiwed home medications using records provided by [facility]  Allergies:    Allergies   Allergen Reactions    Iodinated Diagnostic Agents Anaphylaxis    Metronidazole Seizures           Cimetidine Anxiety    Diltiazem Rash    Penicillins Rash       Social History:     Marital Status: Single   Occupation:  Not employed  Patient Pre-hospital Living Situation:  [address] [place] [address]  Patient Pre-hospital Level of Mobility:  Requires assist  Patient Pre-hospital Diet Restrictions:  Cardiac diabetic  Substance Use History:   Social History     Substance and Sexual Activity   Alcohol Use Not Currently     Social History     Tobacco Use   Smoking Status Never Smoker   Smokeless Tobacco Never Used     Social History     Substance and Sexual Activity   Drug Use Not Currently       Family History:    non-contributory    Physical Exam:     Vitals:   Blood Pressure: 132/67 (06/01/21 0146)  Pulse: 99 (06/01/21 0247)  Temperature: (!) 97 1 °F (36 2 °C) (06/01/21 0146)  Respirations: 15 (06/01/21 0247)  Height: 5' 2" (157 5 cm) (06/01/21 0154)  Weight - Scale: (!) 140 kg (309 lb 1 4 oz) (06/01/21 0553)  SpO2: 96 % (06/01/21 0247)    Physical Exam  Vitals signs and nursing note reviewed  Constitutional:       Appearance: She is obese  HENT:      Head: Normocephalic and atraumatic  Mouth/Throat:      Mouth: Mucous membranes are moist       Pharynx: Oropharynx is clear  Eyes:      Extraocular Movements: Extraocular movements intact  Pupils: Pupils are equal, round, and reactive to light  Neck:      Musculoskeletal: Normal range of motion and neck supple  Cardiovascular:      Rate and Rhythm: Normal rate and regular rhythm  Pulses: Normal pulses  Heart sounds: Normal heart sounds  Pulmonary:      Effort: Pulmonary effort is normal       Breath sounds: Normal breath sounds  Abdominal:      General: Abdomen is flat  Bowel sounds are normal       Palpations: Abdomen is soft  Genitourinary:     Comments: Holden catheter in place for clear yellow urine  Musculoskeletal: Normal range of motion  Skin:     General: Skin is warm and dry  Capillary Refill: Capillary refill takes less than 2 seconds  Findings: Wound present  Comments: Large wounds medial right thigh with eschar and slough as pictured with malodorous purulent drainage   Neurological:      General: No focal deficit present  Mental Status: She is alert and oriented to person, place, and time  Additional Data:     Lab Results: I have personally reviewed pertinent reports        Results from last 7 days   Lab Units 06/01/21  0553   WBC Thousand/uL 8 52   HEMOGLOBIN g/dL 10 0*   HEMATOCRIT % 33 6*   PLATELETS Thousands/uL 275   NEUTROS PCT % 75   LYMPHS PCT % 10*   MONOS PCT % 11   EOS PCT % 2     Results from last 7 days   Lab Units 06/01/21  0553   SODIUM mmol/L 134*   POTASSIUM mmol/L 3 5   CHLORIDE mmol/L 99* CO2 mmol/L 27   BUN mg/dL 21   CREATININE mg/dL 0 95   ANION GAP mmol/L 8   CALCIUM mg/dL 8 4   GLUCOSE RANDOM mg/dL 132     Results from last 7 days   Lab Units 05/31/21  2359   INR  2 70*             Results from last 7 days   Lab Units 05/31/21  2359   LACTIC ACID mmol/L 1 1       Imaging: I have personally reviewed pertinent reports  XR elbow 3+ vw LEFT   ED Interpretation by Arielle Adler DO (06/01 0050)   No fracture/dislocation noted on elbow x-ray      XR wrist 3+ views LEFT   ED Interpretation by Arielle Adler DO (06/01 0050)   No fracture/dislocation noted on wrist x-ray      XR chest 1 view portable   ED Interpretation by Arielle Adler DO (06/01 0049)   No acute process noted on chest x-ray          Allscripts / Epic Records Reviewed: Yes     ** Please Note: This note has been constructed using a voice recognition system   **

## 2021-06-01 NOTE — ED PROVIDER NOTES
History  Chief Complaint   Patient presents with    Arm Swelling     Patinet has possible fracture in left arm t hat has worsening pain and swelling  Patient has pre-existing wound on right leg that is worsening   Wound Check     HPI     82-year-old female  Past medical history of lymphedema, type 2 diabetes  Resides at an skilled nursing facility  She states that 2 days ago, her left elbow and wrist were grabbed at the time she developed LUE pain  X-ray was obtained negative for fracture  She was re-evaluated in the emergency department yesterday morning were repeat imaging was obtained negative for fracture  The patient continues to express left elbow, left wrist pain so patient sent back to the ED for repeat imaging  In addition to her left upper extremity pain, the patient has been having worsening pain, swelling, purulent discharge along the wounds of her right lower extremity  She recently completed a course of Keflex  Denies fevers but expresses chills  Prior to Admission Medications   Prescriptions Last Dose Informant Patient Reported? Taking? Xarelto 20 MG tablet   No No   Sig: Take 1 tablet (20 mg total) by mouth daily with breakfast   acetaminophen (TYLENOL) 325 mg tablet   No No   Sig: Take 2 tablets (650 mg total) by mouth every 6 (six) hours as needed for mild pain or fever   bumetanide (Bumex) 2 mg tablet   No No   Sig: Take 1 tablet (2 mg total) by mouth daily   cephalexin (KEFLEX) 500 mg capsule   No No   Sig: Take 1 capsule (500 mg total) by mouth every 6 (six) hours for 8 days   collagenase (SANTYL) ointment   No No   Sig: Apply topically daily Apply to medial upper thigh wound bed and cover with Allevyn foam to protect surrounding skin  Change daily and prn  For external use only  Clean target area of all interfering agents   What is the location for this topical application? medial upper thigh wound bed   levalbuterol (XOPENEX) 1 25 mg/0 5 mL nebulizer solution   No No   Sig: Take 0 5 mL (1 25 mg total) by nebulization every 4 (four) hours as needed for wheezing   losartan (COZAAR) 100 MG tablet   Yes No   metFORMIN (GLUCOPHAGE) 500 mg tablet   Yes No   metoprolol succinate (TOPROL-XL) 100 mg 24 hr tablet   Yes No   Sig: Take 100 mg by mouth daily   miconazole 2 % cream   No No   Sig: Apply topically 2 (two) times a day Skin folds   polyethylene glycol (MIRALAX) 17 g packet   No No   Sig: Take 17 g by mouth daily as needed (constipation)   potassium chloride (Klor-Con M20) 20 mEq tablet   Yes No   Sig: Take by mouth   senna-docusate sodium (SENOKOT S) 8 6-50 mg per tablet   No No   Sig: Take 1 tablet by mouth daily at bedtime   simvastatin (ZOCOR) 10 mg tablet   Yes No   traMADol (ULTRAM) 50 mg tablet   No No   Sig: Take 1 tablet (50 mg total) by mouth every 6 (six) hours as needed for moderate pain for up to 10 days      Facility-Administered Medications: None       Past Medical History:   Diagnosis Date    Coronary artery disease     Diabetes mellitus ([facility] [address] )     Hypertension     Lymphedema     Sleep apnea        Past Surgical History:   Procedure Laterality Date    CHOLECYSTECTOMY      TONSILLECTOMY         History reviewed  No pertinent family history  I have reviewed and agree with the history as documented  E-Cigarette/Vaping    E-Cigarette Use Never User      E-Cigarette/Vaping Substances     Social History     Tobacco Use    Smoking status: Never Smoker    Smokeless tobacco: Never Used   Substance Use Topics    Alcohol use: Not Currently    Drug use: Not Currently     Review of Systems   Constitutional: Positive for chills  Negative for fever  HENT: Negative for congestion, rhinorrhea and sinus pain  Eyes: Negative for pain and visual disturbance  Respiratory: Negative for chest tightness and shortness of breath  Cardiovascular: Negative for chest pain and palpitations  Gastrointestinal: Negative for abdominal pain, nausea and vomiting     Genitourinary: Negative for flank pain and pelvic pain  Musculoskeletal: Positive for arthralgias and gait problem  Negative for myalgias  Left elbow, left wrist pain   Skin: Positive for wound  Neurological: Negative for dizziness, weakness, light-headedness and headaches  Hematological: Does not bruise/bleed easily  Psychiatric/Behavioral: Negative for confusion and decreased concentration  All other systems reviewed and are negative  Physical Exam  Physical Exam  Vitals signs and nursing note reviewed  Constitutional:       General: She is not in acute distress  Appearance: She is obese  HENT:      Head: Normocephalic and atraumatic  Right Ear: External ear normal       Left Ear: External ear normal       Mouth/Throat:      Mouth: Mucous membranes are moist       Pharynx: Oropharynx is clear  No oropharyngeal exudate or posterior oropharyngeal erythema  Eyes:      Extraocular Movements: Extraocular movements intact  Neck:      Musculoskeletal: No muscular tenderness  Cardiovascular:      Rate and Rhythm: Regular rhythm  Tachycardia present  Pulses: Normal pulses  Pulmonary:      Effort: Pulmonary effort is normal       Breath sounds: Normal breath sounds  Abdominal:      Palpations: Abdomen is soft  Tenderness: There is no abdominal tenderness  Musculoskeletal:         General: Swelling and tenderness present  Skin:     Capillary Refill: Capillary refill takes less than 2 seconds  Findings: Lesion present  Comments: Multiple wounds along the medial right lower extremity with associated malodorous, purulent discharge  Neurological:      General: No focal deficit present  Mental Status: She is alert and oriented to person, place, and time     Psychiatric:         Mood and Affect: Mood normal          Behavior: Behavior normal          Vital Signs  ED Triage Vitals [05/31/21 2348]   Temperature Pulse Respirations Blood Pressure SpO2   100 3 °F (37 9 °C) (!) 119 18 153/75 91 %      Temp src Heart Rate Source Patient Position - Orthostatic VS BP Location FiO2 (%)   -- Monitor Lying Right arm --      Pain Score       8           Vitals:    05/31/21 2348   BP: 153/75   Pulse: (!) 119   Patient Position - Orthostatic VS: Lying     ED Medications  Medications   vancomycin (VANCOCIN) 2,000 mg in sodium chloride 0 9 % 500 mL IVPB (has no administration in time range)   cefepime (MAXIPIME) IVPB (premix in dextrose) 2,000 mg 50 mL (has no administration in time range)     Diagnostic Studies  Results Reviewed     Procedure Component Value Units Date/Time    Procalcitonin with AM Reflex [[id number]] Collected: 06/01/21 0110    Lab Status: In process Specimen: Blood from Arm, Right Updated: 06/01/21 0114    Urine Microscopic [[id number]]  (Abnormal) Collected: 06/01/21 0002    Lab Status: Final result Specimen: Urine, Indwelling Holden Catheter Updated: 06/01/21 0037     RBC, UA 20-30 /hpf      WBC, UA 20-30 /hpf      Epithelial Cells Moderate /hpf      Bacteria, UA Innumerable /hpf      Hyaline Casts, UA 1-2 /lpf      WBC Clumps Present    Urine culture [[id number]] Collected: 06/01/21 0002    Lab Status: In process Specimen: Urine, Indwelling Holden Catheter Updated: 06/01/21 0035    Lactic acid, plasma [[id number]]  (Normal) Collected: 05/31/21 2359    Lab Status: Final result Specimen: Blood from Arm, Right Updated: 06/01/21 0027     LACTIC ACID 1 1 mmol/L     Narrative:      Result may be elevated if tourniquet was used during collection      Protime-INR [[id number]]  (Abnormal) Collected: 05/31/21 2359    Lab Status: Final result Specimen: Blood from Arm, Right Updated: 06/01/21 0021     Protime 28 1 seconds      INR 7 55    Basic metabolic panel [[id number]]  (Abnormal) Collected: 05/31/21 2359    Lab Status: Final result Specimen: Blood from Arm, Right Updated: 06/01/21 0021     Sodium 133 mmol/L      Potassium 3 8 mmol/L      Chloride 98 mmol/L      CO2 26 mmol/L      ANION GAP 9 mmol/L      BUN 17 mg/dL      Creatinine 0 94 mg/dL      Glucose 108 mg/dL      Calcium 8 6 mg/dL      eGFR 60 ml/min/1 73sq m     Narrative:      [place] guidelines for Chronic Kidney Disease (CKD):     Stage 1 with normal or high GFR (GFR > 90 mL/min/1 73 square meters)    Stage 2 Mild CKD (GFR = 60-89 mL/min/1 73 square meters)    Stage 3A Moderate CKD (GFR = 45-59 mL/min/1 73 square meters)    Stage 3B Moderate CKD (GFR = 30-44 mL/min/1 73 square meters)    Stage 4 Severe CKD (GFR = 15-29 mL/min/1 73 square meters)    Stage 5 End Stage CKD (GFR <15 mL/min/1 73 square meters)  Note: GFR calculation is accurate only with a steady state creatinine    Magnesium [[id number]]  (Abnormal) Collected: 05/31/21 2359    Lab Status: Final result Specimen: Blood from Arm, Right Updated: 06/01/21 0021     Magnesium 1 5 mg/dL     Phosphorus [[id number]]  (Normal) Collected: 05/31/21 2359    Lab Status: Final result Specimen: Blood from Arm, Right Updated: 06/01/21 0021     Phosphorus 3 4 mg/dL     UA w Reflex to Microscopic w Reflex to Culture [[id number]]  (Abnormal) Collected: 06/01/21 0002    Lab Status: Final result Specimen: Urine, Indwelling Holden Catheter Updated: 06/01/21 0017     Color, UA Yellow     Clarity, UA Clear     Specific Gravity, UA 1 010     pH, UA 6 0     Leukocytes, UA Small     Nitrite, UA Negative     Protein, UA Negative mg/dl      Glucose, UA Negative mg/dl      Ketones, UA Negative mg/dl      Urobilinogen, UA 4 0 E U /dl      Bilirubin, UA Small     Blood, UA Large    Wound culture and Gram stain [[id number]] Collected: 06/01/21 0005    Lab Status: In process Specimen: Wound from Leg, Right Updated: 06/01/21 0012    Blood culture #2 [[id number]] Collected: 06/01/21 0005    Lab Status:  In process Specimen: Blood from Hand, Right Updated: 06/01/21 0011    Blood gas, venous [[id number]]  (Abnormal) Collected: 05/31/21 2359    Lab Status: Final result Specimen: Blood from Arm, Right Updated: 06/01/21 0008     pH, Fortun[place] 7 515     pCO2, Fortunato 30 7 mm Hg      pO2, Fortunato 68 2 mm Hg      HCO3, Fortun[place] 24 2 mmol/L      Base Excess, Fortunato 1 8 mmol/L      O2 Content, Fortun[place] 14 9 ml/dL      O2 HGB, VENOUS 91 9 %     CBC and differential [[id number]]  (Abnormal) Collected: 05/31/21 2359    Lab Status: Final result Specimen: Blood from Arm, Right Updated: 06/01/21 0008     WBC 8 85 Thousand/uL      RBC 4 05 Million/uL      Hemoglobin 10 4 g/dL      Hematocrit 33 9 %      MCV 84 fL      MCH 25 7 pg      MCHC 30 7 g/dL      RDW 17 0 %      MPV 9 5 fL      Platelets 815 Thousands/uL      nRBC 0 /100 WBCs      Neutrophils Relative 76 %      Immat GRANS % 1 %      Lymphocytes Relative 11 %      Monocytes Relative 11 %      Eosinophils Relative 1 %      Basophils Relative 0 %      Neutrophils Absolute 6 78 Thousands/µL      Immature Grans Absolute 0 07 Thousand/uL      Lymphocytes Absolute 0 94 Thousands/µL      Monocytes Absolute 0 94 Thousand/µL      Eosinophils Absolute 0 09 Thousand/µL      Basophils Absolute 0 03 Thousands/µL     Blood culture #1 [[id number]] Collected: 05/31/21 2359    Lab Status: In process Specimen: Blood from Arm, Right Updated: 06/01/21 0006             XR elbow 3+ vw LEFT   ED Interpretation by Mallorie Krishnamurthy DO (06/01 0050)   No fracture/dislocation noted on elbow x-ray      XR wrist 3+ views LEFT   ED Interpretation by Mallorie Krishnamurthy DO (06/01 0050)   No fracture/dislocation noted on wrist x-ray      XR chest 1 view portable   ED Interpretation by Mallorie Krishnamurthy DO (06/01 0049)   No acute process noted on chest x-ray             Procedures  Procedures     ED Course     MDM  Number of Diagnoses or Management Options  Complicated wound infection:   Elbow pain, left:   Left elbow pain:   Left wrist pain:   Diagnosis management comments: 30-year-old female    Presents to the emergency department with continued left elbow pain, left wrist pain and possible sepsis from multiple right lower extremity wounds  The patient was evaluated in the emergency department earlier for elbow pain  X-rays were obtained negative  She was subsequently discharged  She returns to the emergency department for continued elbow pain  Repeat imaging negative for acute fracture  Official radiology interpretation is pending  The patient has been having worsening right lower extremity pain, discharge from multiple wounds  She recently completed a course of Keflex  Upon arrival in the ED, she was tachycardic and had a low-grade fever  She was empirically administered vancomycin/cefepime  Blood cultures are pending  UA concerning for infection as well  Urine culture pending  Admitted to Internal Medicine  The patient was stable while under my care  Disposition  Final diagnoses:   Complicated wound infection   Left elbow pain   Left wrist pain   Elbow pain, left   Urinary tract infection associated with indwelling urethral catheter, initial encounter [facility])     Time reflects when diagnosis was documented in both MDM as applicable and the Disposition within this note     Time User Action Codes Description Comment    6/1/2021 12:51 AM Edna Blanco Fly  8XXA,  C98 3] Complicated wound infection     6/1/2021 12:51 AM Rox Hamilton Add [M25 522] Left elbow pain     6/1/2021 12:51 AM Rox Hamilton Add [M25 532] Left wrist pain     6/1/2021 12:52 AM Adan Lynchas Add [M25 522] Elbow pain, left       ED Disposition     ED Disposition Condition Date/Time Comment    Admit Stable Tue Jun 1, 2021 12:50 AM Case was discussed with Danni Fischer and the patient's admission status was agreed to be Admission Status: inpatient status to the service of Dr Jennifer Griggs          Follow-up Information    None         Patient's Medications   Discharge Prescriptions    No medications on file     No discharge procedures on file      PDMP Review     None          ED Provider  Electronically Signed by           Luci Rai [address] [facility],   06/01/21 6542

## 2021-06-01 NOTE — ASSESSMENT & PLAN NOTE
Lab Results   Component Value Date    HGBA1C 6 1 (H) 05/16/2021     · Diabetic diet  · Fingerstick blood sugar sliding scale coverage  · Hold metformin while in hospital

## 2021-06-01 NOTE — CONSULTS
Consultation - General Surgery   Chavez Music 70 y o  female MRN: [id number]  Unit/Bed#: -[facility] Encounter: [id number]    Assessment/Plan     Assessment:  Worsening right lower extremity cellulitis, wound cultures growing Gram-negative rods  Patient will likely need debridement of multiple large areas involving the right lower extremity, patient has been on Xarelto  Supratherapeutic INR, 2 7 05/31/2021  Morbid obesity, weight 140 kg (BMI 56 53)  History of chronic lymphedema  Left arm pain for the past 3 days, history of fall at SNF 2 days ago  Patient has complaints of pain left elbow,  x-ray of the left radius done at the [facility] showed a radial head fracture which appeared to be healing X-ray showed no fracture left shoulder or wrist   Orthopedics saw the patient in consultation, diagnosis left elbow contusion and arthritic flare-up  Hypomagnesemia, 1 5  Chronic atrial fibrillation, Xarelto on hold starting today  Chronic diastolic congestive heart failure  Obstructive sleep apnea, on CPAP  Type 2 diabetes mellitus  Acute cystitis with hematuria, urinalysis shows innumerable bacteria  She has an indwelling Holden catheter last exchange this morning in the ED  Hypertension, controlled  Old CVA  History of chronic constipation    Plan:  Hold Xarelto starting today, anticipate surgery for this Thursday  Patient will likely need right lower extremity debridement performed, will need to place nothing by mouth after midnight Thursday morning  Wound care consultation, for recommendations  Await wound and blood cultures  If Pseudomonas is present in the wound cultures, then may recommend Dakin's solution applied topically  Continue present IV antibiotics, cefepime  Continue Xeroform dressing right lower extremity  Infectious disease consultation  Repeat a m  labs, CBC, CMP, procalcitonin and uric acid level ordered by Medicine  Analgesics p r n    Maintain Holden catheter  Bowel regimen, laxatives and stool softener  Medical management per the attending hospitalist provider    History of Present Illness     HPI:  Yaritza Ramires is a 70 y o  diabetic, obese female who resides at [facility] and presents with worsening right medial lower extremity wounds with foul order and increased erythema  She was seen emergency department here yesterday  She is a resident of [facility] nursing facility  The patient had just been discharged here about 3 weeks ago as an inpatient with sepsis and significant leukocytosis, large areas of superficial skin sloughing right entire medial lower extremity  She underwent MRI with gadolinium on May 21st which showed no evidence of abscess or necrotizing fasciitis, just mostly superficial soft tissue swelling  She has a history of chronic atrial fibrillation with rapid ventricular response, she had been on rivaroxaban  Patient initially presented with left arm pain, 2 days ago she reported left elbow injury sustained when she was being transferred in bed at the [facility] nursing facility  Patient was seen in orthopedic consultation earlier today with consultation present on chart for review  The patient has been on daily Xarelto for chronic atrial fibrillation, Xarelto is now being held since this morning in anticipation of likely surgical debridement involving the right medial lower extremity  General surgery was asked to evaluate the patient regarding right lower extremity worsening necrotic appearing large superficial skin ulcerations and foul-smelling odor  Initial wound cultures are growing Gram-negative rods  The patient has been started on cefepime 2 g IV q 12 hours  She recently completed oral Keflex as an outpatient since hospital discharge, she had associated streptococcal bacteremia during the last hospital admission  Infectious disease consultation has been requested at this time by the hospitalist providers      Review of Systems   Constitutional: Negative for activity change, appetite change, fever and unexpected weight change  HENT:        Right upper lateral lip fever blister   Eyes: Negative  Respiratory: Negative for apnea, cough, shortness of breath, wheezing and stridor  Cardiovascular: Positive for chest pain and leg swelling  Negative for palpitations  She denies experiencing any palpitations  No chest pain  Gastrointestinal:        Last bowel movement was 2 days ago  She had chronic constipation on her last hospital stay here  Endocrine: Negative  Genitourinary: Positive for hematuria  Negative for decreased urine volume, dysuria, urgency, vaginal bleeding and vaginal discharge  Indwelling Holden catheter  Last exchange was this morning in the ED  Musculoskeletal: Positive for arthralgias, joint swelling and myalgias  Left elbow stiffness, decreased range of motion and  strength  Bruising noted  Skin:        Extensive areas of large superficial skin necrosis and skin sloughing involving the entire medial aspect into the groin of the right lower extremity with foul odor  Allergic/Immunologic: Negative  Neurological: Positive for weakness  Negative for dizziness, tremors, seizures, syncope, speech difficulty, light-headedness, numbness and headaches  Hematological: Bruises/bleeds easily  Psychiatric/Behavioral: Negative          Historical Information   Past Medical History:   Diagnosis Date    A-fib [facility])     CHF [facility]congestive [facility]) [facility]     Coronary artery disease     Diabetes mellitus ([facility] )     Hypertension     Lymphedema     Lymphedema     Obesity     Sepsis ([facility] )     Sleep apnea      Past Surgical History:   Procedure Laterality Date    CHOLECYSTECTOMY      TONSILLECTOMY       Social History   Social History     Substance and Sexual Activity   Alcohol Use Not Currently     Social History     Substance and Sexual Activity   Drug Use Not Currently     E-Cigarette/Vaping    E-Cigarette Use Never User      E-Cigarette/Vaping Substances     Social History     Tobacco Use   Smoking Status Never Smoker   Smokeless Tobacco Never Used     Family History: non-contributory    Meds/Allergies   current meds:   Current Facility-Administered Medications   Medication Dose Route Frequency    acetaminophen (TYLENOL) tablet 650 mg  650 mg Oral Q6H PRN    bisacodyl (DULCOLAX) EC tablet 5 mg  5 mg Oral Daily PRN    bumetanide (BUMEX) tablet 2 mg  2 mg Oral Daily    cefepime (MAXIPIME) IVPB (premix in dextrose) 2,000 mg 50 mL  2,000 mg Intravenous Q12H    insulin lispro (HumaLOG) 100 units/mL subcutaneous injection 2-12 Units  2-12 Units Subcutaneous TID AC    insulin lispro (HumaLOG) 100 units/mL subcutaneous injection 2-12 Units  2-12 Units Subcutaneous HS    levalbuterol (XOPENEX) inhalation solution 1 25 mg  1 25 mg Nebulization Q4H PRN    losartan (COZAAR) tablet 100 mg  100 mg Oral Daily    magnesium hydroxide (MILK OF MAGNESIA) oral suspension 30 mL  30 mL Oral Daily PRN    metoprolol succinate (TOPROL-XL) 24 hr tablet 100 mg  100 mg Oral Daily    ondansetron (ZOFRAN) injection 4 mg  4 mg Intravenous Q6H PRN    polyethylene glycol (MIRALAX) packet 17 g  17 g Oral Daily PRN    pravastatin (PRAVACHOL) tablet 20 mg  20 mg Oral Daily With Dinner    senna-docusate sodium (SENOKOT S) 8 6-50 mg per tablet 1 tablet  1 tablet Oral HS     Allergies   Allergen Reactions    Iodinated Diagnostic Agents Anaphylaxis    Metronidazole Seizures           Cimetidine Anxiety    Diltiazem Rash    Penicillins Rash       Objective   First Vitals:   Blood Pressure: 153/75 (05/31/21 2348)  Pulse: (!) 119 (05/31/21 2348)  Temperature: 100 3 °F (37 9 °C) (05/31/21 2348)  Respirations: 18 (05/31/21 2348)  Height: 5' 2" (157 5 cm) (06/01/21 0154)  Weight - Scale: (!) 143 kg (315 lb 7 7 oz) (05/31/21 2348)  SpO2: 91 % (05/31/21 2348)    Current Vitals:   Blood Pressure: 129/68 (06/01/21 0732)  Pulse: (!) 118 (06/01/21 0732)  Temperature: (!) 97 2 °F (36 2 °C) (06/01/21 0732)  Respirations: 16 (06/01/21 0732)  Height: 5' 2" (157 5 cm) (06/01/21 0154)  Weight - Scale: (!) 140 kg (309 lb 1 4 oz) (06/01/21 0553)  SpO2: 92 % (06/01/21 0900)      Intake/Output Summary (Last 24 hours) at 6/1/2021 1252  Last data filed at 6/1/2021 0336  Gross per 24 hour   Intake 50 ml   Output 375 ml   Net -325 ml       Invasive Devices     Peripheral Intravenous Line            Peripheral IV 05/31/21 Right Antecubital less than 1 day          Drain            Urethral Catheter 16 Fr  less than 1 day                Physical Exam     Awake alert  Significant obesity  Her brother is present here with her at this time  Speech is clear  She seems in good spirits  ENT shows a fever blister like lesion of her right upper lip  Oral mucosa shows no evidence of thrush or lesions  Chest symmetric  Heart irregular rate and rhythm, heart sounds are distant  Lungs clear to auscultation  No rales or rhonchi heard  Abdomen large girth  Normal bowel sounds  The abdomen is soft  Indwelling urinary catheter with 450 mL of darker yellow urine without sediment or hematuria  Extremities patient has large areas of significant skin eschar and superficial sloughing with some areas of necrosis and malodorous yellow purulent drainage noted on the medial right thigh  The thigh and calf muscles are soft and supple  She moves the toes well  There is good sensation light touch in the plantar aspect of the right foot  DP and PT pulses are present and palpable  Both lower extremities with 1 to 2+ edema to the mid shin area  Pictures are available for review on the media tab on epic for this patient  The left elbow is swollen with some mild erythema which blanches with palpation of the left wrist, decreased range of motion particularly the extension of the left elbow, diffusely tender to palpation without any bony abnormality    She has decreased  strength in left hand  Radial and ulnar pulses are palpable  No numbness in the left hand to light touch  No tremor  She has good range of motion left wrist   Neurological exam finds the patient fully oriented  Cranial nerves intact  No tremor noted  Mental status is appropriate  Ambulation not observed  Lab Results:   I have personally reviewed pertinent lab results  , CBC:   Lab Results   Component Value Date    WBC 8 52 06/01/2021    HGB 10 0 (L) 06/01/2021    HCT 33 6 (L) 06/01/2021    MCV 86 06/01/2021     06/01/2021    MCH 25 7 (L) 06/01/2021    MCHC 29 8 (L) 06/01/2021    RDW 17 1 (H) 06/01/2021    MPV 9 6 06/01/2021    NRBC 0 06/01/2021   , CMP:   Lab Results   Component Value Date    SODIUM 134 (L) 06/01/2021    K 3 5 06/01/2021    CL 99 (L) 06/01/2021    CO2 27 06/01/2021    BUN 21 06/01/2021    CREATININE 0 95 06/01/2021    CALCIUM 8 4 06/01/2021    EGFR 60 06/01/2021   , Coagulation:   Lab Results   Component Value Date    INR 2 70 (H) 05/31/2021   , Urinalysis:   Lab Results   Component Value Date    COLORU Yellow 06/01/2021    CLARITYU Clear 06/01/2021    SPECGRAV 1 010 06/01/2021    PHUR 6 0 06/01/2021    LEUKOCYTESUR Small (A) 06/01/2021    NITRITE Negative 06/01/2021    GLUCOSEU Negative 06/01/2021    KETONESU Negative 06/01/2021    BILIRUBINUR Small (A) 06/01/2021    BLOODU Large (A) 06/01/2021     Wound culture and Gram stain  Order: [id number]  Status:  Preliminary result   Visible to patient:  No (not released)   Next appt:  None  Specimen Information: Leg, Right; Wound        GRAM STAIN RESULT  Abnormal   No polys seen      3+ Gram negative rods                  Specimen Collected: 06/01/21 00:05   Last Resulted: 06/01/21 10:32               Imaging: I have personally reviewed pertinent films in PACS  EKG, Pathology, and Other Studies: I have personally reviewed pertinent reports  Counseling / Coordination of Care  Total floor / unit time spent today 45 minutes  Greater than 50% of total time was spent with the patient and / or family counseling and / or coordination of care  A description of the counseling / coordination of care:  Review of diagnostic imaging, laboratory studies, examination patient, discussion with the consulting general surgeon who also examine the patient all provided in the surgical evaluation of the patient at this time        Jun Velez PA-C

## 2021-06-01 NOTE — ASSESSMENT & PLAN NOTE
· Urinalysis: innumerable bacteria, WBC 20-30, trace leukocytes  · Holden catheter in place, insertion date 05/21/2021-will request removal and replacement  · Cefepime started in the ER, deescalate as indicated

## 2021-06-01 NOTE — ASSESSMENT & PLAN NOTE
· Presents with worsening of left elbow pain, was seen ER 5/30  · Left elbow x-ray 05/30/2021:No definite acute fracture or dislocation involving the elbow   Considerable degenerative change with probable joint effusion and intra-articular loose bodies  · Treated with sling, follow-up with ortho in 1 week  · Returns today for worsening pain  · Neurovascular checks  · Orthopedic consult  · Pain control if needed

## 2021-06-01 NOTE — PLAN OF CARE
Problem: Potential for Falls  Goal: Patient will remain free of falls  Description: INTERVENTIONS:  - Assess patient frequently for physical needs  -  Identify cognitive and physical deficits and behaviors that affect risk of falls  -  Larue fall precautions as indicated by assessment   - Educate patient/family on patient safety including physical limitations  - Instruct patient to call for assistance with activity based on assessment  - Modify environment to reduce risk of injury  - Consider OT/PT consult to assist with strengthening/mobility  Outcome: Progressing     Problem: Prexisting or High Potential for Compromised Skin Integrity  Goal: Skin integrity is maintained or improved  Description: INTERVENTIONS:  - Identify patients at risk for skin breakdown  - Assess and monitor skin integrity  - Assess and monitor nutrition and hydration status  - Monitor labs   - Assess for incontinence   - Turn and reposition patient  - Assist with mobility/ambulation  - Relieve pressure over bony prominences  - Avoid friction and shearing  - Provide appropriate hygiene as needed including keeping skin clean and dry  - Evaluate need for skin moisturizer/barrier cream  - Collaborate with interdisciplinary team   - Patient/family teaching  - Consider wound care consult   Outcome: Progressing     Problem: Nutrition/Hydration-ADULT  Goal: Nutrient/Hydration intake appropriate for improving, restoring or maintaining nutritional needs  Description: Monitor and assess patient's nutrition/hydration status for malnutrition  Collaborate with interdisciplinary team and initiate plan and interventions as ordered  Monitor patient's weight and dietary intake as ordered or per policy  Utilize nutrition screening tool and intervene as necessary  Determine patient's food preferences and provide high-protein, high-caloric foods as appropriate       INTERVENTIONS:  - Monitor oral intake, urinary output, labs, and treatment plans  - Assess nutrition and hydration status and recommend course of action  - Evaluate amount of meals eaten  - Assist patient with eating if necessary   - Allow adequate time for meals  - Recommend/ encourage appropriate diets, oral nutritional supplements, and vitamin/mineral supplements  - Order, calculate, and assess calorie counts as needed  - Recommend, monitor, and adjust tube feedings and TPN/PPN based on assessed needs  - Assess need for intravenous fluids  - Provide specific nutrition/hydration education as appropriate  - Include patient/family/caregiver in decisions related to nutrition  Outcome: Progressing     Problem: PAIN - ADULT  Goal: Verbalizes/displays adequate comfort level or baseline comfort level  Description: Interventions:  - Encourage patient to monitor pain and request assistance  - Assess pain using appropriate pain scale  - Administer analgesics based on type and severity of pain and evaluate response  - Implement non-pharmacological measures as appropriate and evaluate response  - Consider cultural and social influences on pain and pain management  - Notify physician/advanced practitioner if interventions unsuccessful or patient reports new pain  Outcome: Progressing     Problem: INFECTION - ADULT  Goal: Absence or prevention of progression during hospitalization  Description: INTERVENTIONS:  - Assess and monitor for signs and symptoms of infection  - Monitor lab/diagnostic results  - Monitor all insertion sites, i e  indwelling lines, tubes, and drains  - Monitor endotracheal if appropriate and nasal secretions for changes in amount and color  - [place] appropriate cooling/warming therapies per order  - Administer medications as ordered  - Instruct and encourage patient and family to use good hand hygiene technique  - Identify and instruct in appropriate isolation precautions for identified infection/condition  Outcome: Progressing     Problem: SAFETY ADULT  Goal: Patient will remain free of falls  Description: INTERVENTIONS:  - Assess patient frequently for physical needs  -  Identify cognitive and physical deficits and behaviors that affect risk of falls  -  Winifred fall precautions as indicated by assessment   - Educate patient/family on patient safety including physical limitations  - Instruct patient to call for assistance with activity based on assessment  - Modify environment to reduce risk of injury  - Consider OT/PT consult to assist with strengthening/mobility  Outcome: Progressing     Problem: DISCHARGE PLANNING  Goal: Discharge to home or other facility with appropriate resources  Description: INTERVENTIONS:  - Identify barriers to discharge w/patient and caregiver  - Arrange for needed discharge resources and transportation as appropriate  - Identify discharge learning needs (meds, wound care, etc )  - Arrange for interpretive services to assist at discharge as needed  - Refer to Case Management Department for coordinating discharge planning if the patient needs post-hospital services based on physician/advanced practitioner order or complex needs related to functional status, cognitive ability, or social support system  Outcome: Progressing     Problem: Knowledge Deficit  Goal: Patient/family/caregiver demonstrates understanding of disease process, treatment plan, medications, and discharge instructions  Description: Complete learning assessment and assess knowledge base    Interventions:  - Provide teaching at level of understanding  - Provide teaching via preferred learning methods  Outcome: Progressing     Problem: SKIN/TISSUE INTEGRITY - ADULT  Goal: Incision(s), wounds(s) or drain site(s) healing without S/S of infection  Description: INTERVENTIONS  - Assess and document risk factors for skin impairment   - Assess and document dressing, incision, wound bed, drain sites and surrounding tissue  - Consider nutrition services referral as needed  - Oral mucous membranes remain intact  - Provide patient/ family education  Outcome: Progressing     Problem: MUSCULOSKELETAL - ADULT  Goal: Maintain or return mobility to safest level of function  Description: INTERVENTIONS:  - Assess patient's ability to carry out ADLs; assess patient's baseline for ADL function and identify physical deficits which impact ability to perform ADLs (bathing, care of mouth/teeth, toileting, grooming, dressing, etc )  - Assess/evaluate cause of self-care deficits   - Assess range of motion  - Assess patient's mobility  - Assess patient's need for assistive devices and provide as appropriate  - Encourage maximum independence but intervene and supervise when necessary  - Involve family in performance of ADLs  - Assess for home care needs following discharge   - Consider OT consult to assist with ADL evaluation and planning for discharge  - Provide patient education as appropriate  Outcome: Progressing

## 2021-06-01 NOTE — ASSESSMENT & PLAN NOTE
Wt Readings from Last 3 Encounters:   05/31/21 (!) 143 kg (315 lb 7 7 oz)   05/31/21 (!) 152 kg (336 lb)   05/22/21 (!) 150 kg (331 lb 2 1 oz)       · Does not appear volume overloaded  · Daily weights and I&Os  · Low-salt diet  · Continue Bumex and metoprolol

## 2021-06-01 NOTE — ASSESSMENT & PLAN NOTE
· Presents for left elbow pain, worsening right leg wound  · Admitted 5/16/2021 for acute cellulitis, possible abscess  She was admitted to the ICU at that time  CT of right leg showed no evidence of hematoma or discernible abscess within limitations of noncontrast imaging  Asymmetric diffuse fat stranding throughout the right thigh region  Could be secondary to edema and/or cellulitis    · MRI with gadolinium done 05/21/2021 showed no evidence of abscess or necrotizing fasciitis just soft tissue swelling  · Patient was discharged on oral Keflex  · Blood and wound cultures are pending  · Patient was started on cefepime and vancomycin in the ER  · Wound care consult

## 2021-06-02 PROBLEM — C44.712: Status: RESOLVED | Noted: 2021-01-01 | Resolved: 2021-01-01

## 2021-06-02 PROBLEM — C44.712: Status: ACTIVE | Noted: 2021-01-01

## 2021-06-02 NOTE — PROGRESS NOTES
[address]ay[address] Jorgensen  Progress Note - Jose Orozco 1945, 70 y o  female MRN: [id number]  Unit/Bed#: -01 Encounter: [id number]  Primary Care Provider: Ariel Rowan MD   Date and time admitted to hospital: 5/31/2021 11:43 PM    Hypomagnesemia  Assessment & Plan  · Magnesium 1 5  · Resolved    Acute cystitis with hematuria  Assessment & Plan  · Urinalysis: innumerable bacteria, WBC 20-30, trace leukocytes  · Holden catheter in place, insertion date 05/21/2021-will request removal and replacement  · On antibiotics urine cultures growing Pseudomonas    Elbow pain, left  Assessment & Plan  · Presents with worsening of left elbow pain, was seen ER 5/30 also I did do an x-ray of the shoulder which is not show any fracture but severe arthritis as discussed with ortho this suspected is all secondary to the contusion she would need OT therapy  · Pain control    Cellulitis of right lower extremity  Assessment & Plan  · With necrosis of the wound that is getting worse she recently completed course of antibiotics as she had also associated streptococcal bacteremia  Very foul odor consistent with Pseudomonas wound cultures preliminary is Pseudomonas and Proteus  Continue cefepime and Vanco discussed with infectious disease patient is to be placed NPO for debridement tomorrow    Final antibiotics can be determined post that and culture results    ARIAN on CPAP  Assessment & Plan  · Continue CPAP at bedtime    Essential hypertension  Assessment & Plan  · BP reviewed and acceptable  · Continue losartan, metoprolol  · Monitor blood pressure    Chronic diastolic heart failure (HCC)  Assessment & Plan  Wt Readings from Last 3 Encounters:   06/02/21 (!) 140 kg (308 lb 10 3 oz)   05/31/21 (!) 152 kg (336 lb)   05/22/21 (!) 150 kg (331 lb 2 1 oz)       · Does not appear volume overloaded  · Daily weights and I&Os  · Low-salt diet  · I think she is actually over diuresing with potassium low and chloride being alone she has lost lot of weight since previous decrease her Bumex to 1 mg daily      Type 2 diabetes mellitus, without long-term current use of insulin ([facility])  Assessment & Plan    Lab Results   Component Value Date    HGBA1C 6 1 (H) 05/16/2021     · Diabetic diet  · Fingerstick blood sugar sliding scale coverage  · Hold metformin while in hospital    Chronic atrial fibrillation ([facility] [address] )  Assessment & Plan  · Patient has some RVR today in the morning as thing she is over diuresing as well decrease Bumex to 1 mg daily she has lost weight  I gave her an extra dose of Lopressor and actually continue her Toprol  mg daily in the morning will add a Toprol XL 25 mg at night  Xarelto on hold currently secondary to going to the OR tomorrow    * Sepsis [facility])  Assessment & Plan  · Ruled out she has not met any SIRS criteria        VTE Pharmacologic Prophylaxis:   Pharmacologic: Pharmacologic VTE Prophylaxis contraindicated due to On hold secondary to holding for surgery tomorrow  Mechanical VTE Prophylaxis in Place: Yes    Patient Centered Rounds: I have performed bedside rounds with nursing staff today  Discussions with Specialists or Other Care Team Provider:  I have discussed with General surgery and Infectious Disease    Education and Discussions with Family / Patient:  Patient    Time Spent for Care: 30 minutes  More than 50% of total time spent on counseling and coordination of care as described above      Current Length of Stay: 1 day(s)    Current Patient Status: Inpatient   Certification Statement: The patient will continue to require additional inpatient hospital stay due to Secondary to in right lower extremity infection    Discharge Plan:  Pending progress    Code Status: Level 1 - Full Code      Subjective:   Patient seen and examined complaining of pain in the right lower extremity no chest pain or shortness of breath she is just feeling down because of everything that is happening also still unable to move her left upper extremity    Objective:     Vitals:   Temp (24hrs), Av 8 °F (36 6 °C), Min:97 6 °F (36 4 °C), Max:98 °F (36 7 °C)    Temp:  [97 6 °F (36 4 °C)-98 °F (36 7 °C)] 98 °F (36 7 °C)  HR:  [] 96  Resp:  [16-22] 22  BP: (125-139)/(60-82) 126/60  SpO2:  [88 %-97 %] 88 %  Body mass index is 56 45 kg/m²  Input and Output Summary (last 24 hours): Intake/Output Summary (Last 24 hours) at 2021 1406  Last data filed at 2021 0900  Gross per 24 hour   Intake 210 ml   Output 1300 ml   Net -1090 ml       Physical Exam:     Physical Exam  Vitals signs and nursing note reviewed  Constitutional:       General: She is not in acute distress  Appearance: She is well-developed  She is obese  HENT:      Head: Normocephalic and atraumatic  Eyes:      Conjunctiva/sclera: Conjunctivae normal    Neck:      Musculoskeletal: Neck supple  Cardiovascular:      Rate and Rhythm: Normal rate  Rhythm irregular  Heart sounds: No murmur  Pulmonary:      Effort: Pulmonary effort is normal  No respiratory distress  Breath sounds: Normal breath sounds  No wheezing or rales  Abdominal:      Palpations: Abdomen is soft  Tenderness: There is no abdominal tenderness  Musculoskeletal:         General: Swelling (Right lower extremity necrotic infected wounds) and tenderness (Left AC joint and also mid humerus  Painful and active range of motion) present  Skin:     General: Skin is warm and dry  Neurological:      General: No focal deficit present  Mental Status: She is alert and oriented to person, place, and time  Mental status is at baseline     Psychiatric:         Mood and Affect: Mood normal          Additional Data:     Labs:    Results from last 7 days   Lab Units 21  0634   WBC Thousand/uL 6 82   HEMOGLOBIN g/dL 9 8*   HEMATOCRIT % 32 3*   PLATELETS Thousands/uL 247   NEUTROS PCT % 75   LYMPHS PCT % 10*   MONOS PCT % 9   EOS PCT % 4     Results from last 7 days Lab Units 06/02/21  0634   SODIUM mmol/L 132*   POTASSIUM mmol/L 3 1*   CHLORIDE mmol/L 98*   CO2 mmol/L 26   BUN mg/dL 19   CREATININE mg/dL 0 83   ANION GAP mmol/L 8   CALCIUM mg/dL 8 3   GLUCOSE RANDOM mg/dL 112     Results from last 7 days   Lab Units 05/31/21  2359   INR  2 70*     Results from last 7 days   Lab Units 06/02/21  1118 06/02/21  0738 06/01/21  2046 06/01/21  1602 06/01/21  1130 06/01/21  0731   POC GLUCOSE mg/dl 155* 125 111 113 147* 153*         Results from last 7 days   Lab Units 06/02/21  0634 06/01/21  0110 05/31/21  2359   LACTIC ACID mmol/L  --   --  1 1   PROCALCITONIN ng/ml 0 09 0 09  --            * I Have Reviewed All Lab Data Listed Above  * Additional Pertinent Lab Tests Reviewed: All Labs Within Last 24 Hours Reviewed    Imaging:    Imaging Reports Reviewed Today Include:  Shoulder x-ray  Imaging Personally Reviewed by Myself Includes:      Recent Cultures (last 7 days):     Results from last 7 days   Lab Units 06/01/21  0005 06/01/21  0002 05/31/21  2359   BLOOD CULTURE  No Growth at 24 hrs   --  No Growth at 24 hrs     GRAM STAIN RESULT  No polys seen*  3+ Gram negative rods*  --   --    URINE CULTURE   --  >100,000 cfu/ml Pseudomonas aeruginosa*  --    WOUND CULTURE  4+ Growth of Pseudomonas aeruginosa*  4+ Growth of Proteus species*  --   --        Last 24 Hours Medication List:   Current Facility-Administered Medications   Medication Dose Route Frequency Provider Last Rate    acetaminophen  650 mg Oral Q6H PRN [place] Spikes, CRNP      bisacodyl  5 mg Oral Daily PRN Jose Spikes, CRNP      [START ON 6/3/2021] bumetanide  1 mg Oral Daily Lary Noland MD      cefepime  2,000 mg Intravenous Q12H Lary oNland MD 2,000 mg (06/02/21 1213)    insulin lispro  2-12 Units Subcutaneous TID AC [place] Spikes, CRNP      insulin lispro  2-12 Units Subcutaneous HS [place] Spikes, CRNP      levalbuterol  1 25 mg Nebulization Q4H PRN Jose Spikes, CRNP      losartan 100 mg Oral Daily MELBA Barakat      magnesium hydroxide  30 mL Oral Daily PRN MELBA Barakat      metoprolol succinate  100 mg Oral Daily MELBA Barakat      metoprolol succinate  25 mg Oral QPM Julse Villatoro MD      ondansetron  4 mg Intravenous Q6H PRN MELBA Barakat      polyethylene glycol  17 g Oral Daily PRN MELBA Barakat      pravastatin  20 mg Oral Daily With Dinner MELBA Barakat      senna-docusate sodium  1 tablet Oral HS MELBA Barakat          Today, Patient Was Seen By: Jules Villatoro MD    ** Please Note: Dictation voice to text software may have been used in the creation of this document   **

## 2021-06-02 NOTE — CASE MANAGEMENT
Discussed case in rounds_  Pt is for OR tomorrow for wound debridements  CM spoke to patient today, + support provided  Pt did not decide at this time whether she wants to return to [facility]  RAMIREZ will continue to follow care and will need to follow up with Joan Lala for dc needs and choice as she will need to continue STR

## 2021-06-02 NOTE — OCCUPATIONAL THERAPY NOTE
Occupational Therapy Cancellation Note     Patient Name: Everton Delong  Today's Date: 6/2/2021  Problem List  Principal Problem:    Sepsis [facility])  Active Problems:    Chronic atrial fibrillation ([address] )    Type 2 diabetes mellitus, without long-term current use of insulin (HCC)    Chronic diastolic heart failure (HCC)    Wound of right leg    Class 3 severe obesity with serious comorbidity and body mass index (BMI) of 60 0 to 69 9 in adult [facility])    Essential hypertension    ARIAN on CPAP    Elbow pain, left    Acute cystitis with hematuria    Hypomagnesemia            06/02/21 1322   Note Type   Note type Evaluation   Cancel Reasons Other       OT orders received  Chart review completed  Pt referred to OT services for PROM to L elbow d/t new onset of pain  Spoke with Dr Sara Flores who reports at this time, Pt is not experiencing pain in L elbow and request that OT evaluation is held until post surgical procedure tomorrow (6/3)  Will continue to follow and address as medically appropriate and as schedule allows       Mary Leigh, OTR/L

## 2021-06-02 NOTE — ASSESSMENT & PLAN NOTE
Wt Readings from Last 3 Encounters:   06/02/21 (!) 140 kg (308 lb 10 3 oz)   05/31/21 (!) 152 kg (336 lb)   05/22/21 (!) 150 kg (331 lb 2 1 oz)       · Does not appear volume overloaded  · Daily weights and I&Os  · Low-salt diet  · I think she is actually over diuresing with potassium low and chloride being alone she has lost lot of weight since previous decrease her Bumex to 1 mg daily

## 2021-06-02 NOTE — DISCHARGE INSTR - OTHER ORDERS
Skin care plans:  1-Hydraguard to bilateral sacrum, buttock and heels BID and PRN  2-Elevate heels to offload pressure  3-Ehob cushion in chair when out of bed  4-Moisturize skin daily with skin nourishing cream   5-Turn/reposition q2h or when medically stable for pressure re-distribution on skin  6-surgical debridement 6/3 right lower extremity wounds      Wound care:   Virgil Manjarrez with General surgery in 1-2 weeks as an outpatient in the office   - Continue daily dressing change, xeroform cover with dry sterile gauze 4x4s, and wrapped with hamilton dressing   right lower extremity

## 2021-06-02 NOTE — ASSESSMENT & PLAN NOTE
· Urinalysis: innumerable bacteria, WBC 20-30, trace leukocytes  · Holden catheter in place, insertion date 05/21/2021-will request removal and replacement  · On antibiotics urine cultures growing Pseudomonas

## 2021-06-02 NOTE — ASSESSMENT & PLAN NOTE
· Patient has some RVR today in the morning as thing she is over diuresing as well decrease Bumex to 1 mg daily she has lost weight  I gave her an extra dose of Lopressor and actually continue her Toprol  mg daily in the morning will add a Toprol XL 25 mg at night    Xarelto on hold currently secondary to going to the OR tomorrow

## 2021-06-02 NOTE — ASSESSMENT & PLAN NOTE
· Presents with worsening of left elbow pain, was seen ER 5/30 also I did do an x-ray of the shoulder which is not show any fracture but severe arthritis as discussed with ortho this suspected is all secondary to the contusion she would need OT therapy  · Pain control

## 2021-06-02 NOTE — ASSESSMENT & PLAN NOTE
· With necrosis of the wound that is getting worse she recently completed course of antibiotics as she had also associated streptococcal bacteremia  Very foul odor consistent with Pseudomonas wound cultures preliminary is Pseudomonas and Proteus  Continue cefepime and Vanco discussed with infectious disease patient is to be placed NPO for debridement tomorrow    Final antibiotics can be determined post that and culture results

## 2021-06-02 NOTE — PROGRESS NOTES
Progress Note - General Surgery   Corinne Hoskins 70 y o  female MRN: [id number]  Unit/Bed#: -01 Encounter: [id number]    Assessment:  - Right lower extremity cellulitis  - Tachycardic 126bpm, otherwise VS WNL  Afebrile  - WBC 6 82  - Hgb 9 8 (10 0)  - Hypokalemia 3 1 (3 5)  - Urinalysis shows blood, leukocytes, and innumerable bacteria  - Wound cultures showing gram negative rods  - Urine culture showing Pseudomonas aeruginosa  - Chronic afib, Xarelto on hold  Plan:  - Plan for surgical admit tomorrow in the operating room  - NPO at midnight   - Continue to hold Xarelto  - IV antibiotics  - Maintain Holden  - Pain/nausea control prn  - Awaiting ID recommendations  - Wound care consulted  - Management of medical conditions as per hospitalist    Subjective/Objective   Subjective: No acute events overnight  Pain in the leg is minimal  Has some pain in the left shoulder  Bowels okay today  Denies n/v/d/c, abdominal pain, fever, chills, CP, or SOB  She states she just wants to get better  Objective:     Blood pressure 139/82, pulse (!) 126, temperature 98 °F (36 7 °C), resp  rate 22, height 5' 2" (1 575 m), weight (!) 140 kg (308 lb 10 3 oz), SpO2 92 %  ,Body mass index is 56 45 kg/m²  Intake/Output Summary (Last 24 hours) at 6/2/2021 0833  Last data filed at 6/1/2021 2359  Gross per 24 hour   Intake 210 ml   Output 1300 ml   Net -1090 ml       Invasive Devices     Peripheral Intravenous Line            Peripheral IV 05/31/21 Right Antecubital 1 day          Drain            Urethral Catheter 16 Fr  1 day                Physical Exam: /82   Pulse (!) 126   Temp 98 °F (36 7 °C)   Resp 22   Ht 5' 2" (1 575 m)   Wt (!) 140 kg (308 lb 10 3 oz)   SpO2 92%   BMI 56 45 kg/m²   General appearance: alert and oriented, in no acute distress  Lungs: CTA bilaterally  No wheezes, rales, rhonchi    Heart: irregularly irregular rhythm and S1, S2 normal  Extremities: Right leg wrapped in gauze dressing from ankle to groin  Some yellow drainage staining the dressing  Patient refused dressing change and wound examination today, stating she is having surgery tomorrow anyways  Left leg mild venous stasis change on anterior surface of lower leg  DP and PT pulses 2+ in both lower extremities  Lab, Imaging and other studies:  I have personally reviewed pertinent lab results      CBC:   Lab Results   Component Value Date    WBC 6 82 06/02/2021    HGB 9 8 (L) 06/02/2021    HCT 32 3 (L) 06/02/2021    MCV 84 06/02/2021     06/02/2021    MCH 25 6 (L) 06/02/2021    MCHC 30 3 (L) 06/02/2021    RDW 16 6 (H) 06/02/2021    MPV 9 1 06/02/2021    NRBC 0 06/02/2021   CMP:   Lab Results   Component Value Date    SODIUM 132 (L) 06/02/2021    K 3 1 (L) 06/02/2021    CL 98 (L) 06/02/2021    CO2 26 06/02/2021    BUN 19 06/02/2021    CREATININE 0 83 06/02/2021    CALCIUM 8 3 06/02/2021    EGFR 70 06/02/2021     VTE Pharmacologic Prophylaxis: Heparin  VTE Mechanical Prophylaxis: sequential compression device    Carmelo Lemus PA-C

## 2021-06-02 NOTE — WOUND OSTOMY CARE
Consult Note - Wound   Prabhakar Clancy 70 y o  female MRN: [id number]  Unit/Bed#: -01 Encounter: [id number]      History and Present Illness:Pt admitted with purulent foul smelling discharge from right leg wounds  Thigh wound is necrotic  Pt is scheduled for lower extremity wound debridement tomorrow  Right lower extremity dressing clean dry intact  H/O severe morbid obesity, chronic lymphedema and recurrent infection type 2 diabetes,Max assist of 2 to turn and reposition  Holden catheter in place        Assessment Findings:   1)Bilateral heels intact  2)Sacrum buttocks intact  3)Right lower extremity recurrent cellulitis non healing wounds  Dressing in place from thigh to ankle  for surgery in am,  4)UNstageable Right posterior buttock wound   Yellow slough 60% brown black tissue 40%  For debridement in or tomorrow  Skin care plans:  1-Hydraguard to bilateral sacrum, buttock and heels BID and PRN  2-Elevate heels to offload pressure  3-Ehob cushion in chair when out of bed  4-Moisturize skin daily with skin nourishing cream   5-Turn/reposition q2h or when medically stable for pressure re-distribution on skin  6-surgical debridement 6/3 right lower extremity wounds    Call or tigertext with any questions  Wound Care will continue to follow  Evan Hudson        Wound 05/16/21 Other (comment) Thigh Right; Inner (Active)   Wound Image   06/01/21 0154   Wound Description SANDER 06/02/21 1323   Vickie-wound Assessment SANDER 06/01/21 0900   Dressing Changed New 06/02/21 1200   Patient Tolerance Tolerated well 06/02/21 1200   Dressing Status Clean;Dry; Intact 06/02/21 1200       Wound 05/16/21 Venous Ulcer Pretibial Distal;Right (Active)   Wound Image   06/01/21 0455   Wound Description SANDER 06/02/21 1323   Vickie-wound Assessment SANDER 06/01/21 0900   Drainage Amount None 06/01/21 0455   Dressing Gauze; Other (Comment) 06/01/21 2330   Dressing Changed New 06/02/21 1200   Patient Tolerance Tolerated well 06/02/21 1200   Dressing Status Clean;Dry; Intact 06/02/21 1200       Wound 05/16/21 Pressure Injury Pretibial Proximal;Right (Active)   Wound Image   06/01/21 0453   Wound Description SANDER 06/02/21 1323   Vickie-wound Assessment SANDER 06/01/21 0900   Drainage Amount None 06/01/21 0453   Treatments Other (Comment) 06/01/21 0453   Dressing Xeroform;ABD;Other (Comment) 06/01/21 2330   Dressing Changed New 06/02/21 1200   Patient Tolerance Tolerated well 06/02/21 1200   Dressing Status Dry; Intact 06/02/21 1200       Wound 05/16/21 MASD Abdomen Lower (Active)       Wound 05/16/21 Other (comment) Thigh Medial;Right;Upper (Active)       Wound 06/01/21 Venous Ulcer Pretibial Right (Active)   Wound Image   06/01/21 0457   Wound Description SANDER 06/02/21 0900   Vickie-wound Assessment SANDER 06/01/21 0900   Drainage Description Serosanguineous 06/01/21 0457   Treatments Irrigation with NSS 06/01/21 0457   Dressing Xeroform;Gauze; Other (Comment) 06/01/21 2330   Dressing Changed New 06/02/21 1200   Patient Tolerance Tolerated well 06/02/21 1200   Dressing Status Dry; Intact 06/02/21 1200       Wound 06/02/21 Thigh Posterior;Right (Active)   Wound Image   06/02/21 1323   Wound Description Slough; Yellow 06/02/21 1323   Pressure Injury Stage U 06/02/21 1323   Vickie-wound Assessment Fragile 06/02/21 1323   Wound Length (cm) 1 cm 06/02/21 1323   Wound Width (cm) 1 2 cm 06/02/21 1323   Wound Surface Area (cm^2) 1 2 cm^2 06/02/21 1323   Treatments Cleansed;Site care;Elevated 06/02/21 1323

## 2021-06-02 NOTE — PLAN OF CARE
Problem: Potential for Falls  Goal: Patient will remain free of falls  Description: INTERVENTIONS:  - Assess patient frequently for physical needs  -  Identify cognitive and physical deficits and behaviors that affect risk of falls  -  [place] fall precautions as indicated by assessment   - Educate patient/family on patient safety including physical limitations  - Instruct patient to call for assistance with activity based on assessment  - Modify environment to reduce risk of injury  - Consider OT/PT consult to assist with strengthening/mobility  Outcome: Progressing     Problem: Prexisting or High Potential for Compromised Skin Integrity  Goal: Skin integrity is maintained or improved  Description: INTERVENTIONS:  - Identify patients at risk for skin breakdown  - Assess and monitor skin integrity  - Assess and monitor nutrition and hydration status  - Monitor labs   - Assess for incontinence   - Turn and reposition patient  - Assist with mobility/ambulation  - Relieve pressure over bony prominences  - Avoid friction and shearing  - Provide appropriate hygiene as needed including keeping skin clean and dry  - Evaluate need for skin moisturizer/barrier cream  - Collaborate with interdisciplinary team   - Patient/family teaching  - Consider wound care consult   Outcome: Progressing     Problem: Nutrition/Hydration-ADULT  Goal: Nutrient/Hydration intake appropriate for improving, restoring or maintaining nutritional needs  Description: Monitor and assess patient's nutrition/hydration status for malnutrition  Collaborate with interdisciplinary team and initiate plan and interventions as ordered  Monitor patient's weight and dietary intake as ordered or per policy  Utilize nutrition screening tool and intervene as necessary  Determine patient's food preferences and provide high-protein, high-caloric foods as appropriate       INTERVENTIONS:  - Monitor oral intake, urinary output, labs, and treatment plans  - Assess nutrition and hydration status and recommend course of action  - Evaluate amount of meals eaten  - Assist patient with eating if necessary   - Allow adequate time for meals  - Recommend/ encourage appropriate diets, oral nutritional supplements, and vitamin/mineral supplements  - Order, calculate, and assess calorie counts as needed  - Recommend, monitor, and adjust tube feedings and TPN/PPN based on assessed needs  - Assess need for intravenous fluids  - Provide specific nutrition/hydration education as appropriate  - Include patient/family/caregiver in decisions related to nutrition  Outcome: Progressing     Problem: PAIN - ADULT  Goal: Verbalizes/displays adequate comfort level or baseline comfort level  Description: Interventions:  - Encourage patient to monitor pain and request assistance  - Assess pain using appropriate pain scale  - Administer analgesics based on type and severity of pain and evaluate response  - Implement non-pharmacological measures as appropriate and evaluate response  - Consider cultural and social influences on pain and pain management  - Notify physician/advanced practitioner if interventions unsuccessful or patient reports new pain  Outcome: Progressing     Problem: INFECTION - ADULT  Goal: Absence or prevention of progression during hospitalization  Description: INTERVENTIONS:  - Assess and monitor for signs and symptoms of infection  - Monitor lab/diagnostic results  - Monitor all insertion sites, i e  indwelling lines, tubes, and drains  - Monitor endotracheal if appropriate and nasal secretions for changes in amount and color  - [place] appropriate cooling/warming therapies per order  - Administer medications as ordered  - Instruct and encourage patient and family to use good hand hygiene technique  - Identify and instruct in appropriate isolation precautions for identified infection/condition  Outcome: Progressing     Problem: SAFETY ADULT  Goal: Patient will remain free of falls  Description: INTERVENTIONS:  - Assess patient frequently for physical needs  -  Identify cognitive and physical deficits and behaviors that affect risk of falls  -  [place] fall precautions as indicated by assessment   - Educate patient/family on patient safety including physical limitations  - Instruct patient to call for assistance with activity based on assessment  - Modify environment to reduce risk of injury  - Consider OT/PT consult to assist with strengthening/mobility  Outcome: Progressing     Problem: DISCHARGE PLANNING  Goal: Discharge to home or other facility with appropriate resources  Description: INTERVENTIONS:  - Identify barriers to discharge w/patient and caregiver  - Arrange for needed discharge resources and transportation as appropriate  - Identify discharge learning needs (meds, wound care, etc )  - Arrange for interpretive services to assist at discharge as needed  - Refer to Case Management Department for coordinating discharge planning if the patient needs post-hospital services based on physician/advanced practitioner order or complex needs related to functional status, cognitive ability, or social support system  Outcome: Progressing     Problem: Knowledge Deficit  Goal: Patient/family/caregiver demonstrates understanding of disease process, treatment plan, medications, and discharge instructions  Description: Complete learning assessment and assess knowledge base    Interventions:  - Provide teaching at level of understanding  - Provide teaching via preferred learning methods  Outcome: Progressing     Problem: SKIN/TISSUE INTEGRITY - ADULT  Goal: Incision(s), wounds(s) or drain site(s) healing without S/S of infection  Description: INTERVENTIONS  - Assess and document risk factors for skin impairment   - Assess and document dressing, incision, wound bed, drain sites and surrounding tissue  - Consider nutrition services referral as needed  - Oral mucous membranes remain intact  - Provide patient/ family education  Outcome: Progressing     Problem: MUSCULOSKELETAL - ADULT  Goal: Maintain or return mobility to safest level of function  Description: INTERVENTIONS:  - Assess patient's ability to carry out ADLs; assess patient's baseline for ADL function and identify physical deficits which impact ability to perform ADLs (bathing, care of mouth/teeth, toileting, grooming, dressing, etc )  - Assess/evaluate cause of self-care deficits   - Assess range of motion  - Assess patient's mobility  - Assess patient's need for assistive devices and provide as appropriate  - Encourage maximum independence but intervene and supervise when necessary  - Involve family in performance of ADLs  - Assess for home care needs following discharge   - Consider OT consult to assist with ADL evaluation and planning for discharge  - Provide patient education as appropriate  Outcome: Progressing

## 2021-06-02 NOTE — PROGRESS NOTES
Pt with RLE cellulitis, plans for surgery tomorrow  Pt with poor appetite x 3 weeks  Reports looking at food and not having any desire to eat  Would only try to eat small portions at her meals  Noting wt loss per chart review though noted to mostly be fluid related, noting hx of CHF  Will liberalize diet to CCD 3, 4 gm PER  Will order Virgil BID and ensure max protein BID  Will follow up

## 2021-06-02 NOTE — TELEMEDICINE
Consultation - Infectious Disease   Alisia Locke 70 y o  female MRN: [id number]  Unit/Bed#: -01 Encounter: [id number]      Inpatient consult to Infectious Diseases  Consult performed by: Janie Hough MD  Consult ordered by: Elmer Iqbal MD            REQUIRED DOCUMENTATION:     1  This service was provided via Telemedicine  2  Provider located at [facility]  3  TeleMed provider: Janie Hough MD   4  Identify all parties in room with patient during tele consult:RN  5  After connecting through [employer], patient was identified by name and date of birth and assistant checked wristband  Patient was then informed that this was a Telemedicine visit and that the exam was being conducted confidentially over secure lines  My office door was closed  No one else was in the room  Patient acknowledged consent and understanding of privacy and security of the Telemedicine visit, and gave us permission to have the assistant stay in the room in order to assist with the history and to conduct the exam   I informed the patient that I have reviewed their record in Epic and presented the opportunity for them to ask any questions regarding the visit today  The patient agreed to participate  Assessment/Recommendations     1  Right leg cellulitis, recurrent  - diagnosis suspected clinically with erythema and purulent foul-smelling discharge from open wounds, thigh wound is necrotic  - Gram stain from wound with gram negative rods, suspect Pseudomonas  - no systemic signs of infection     · Continue Vancomycin, cefepime  · Continue wound care  · FU blood and wound cultures  · Serial extremity exams  · Agree with operative plan for debridement tomorrow, additional management per General surgery  · Final choice and duration of antibiotics to be determined by cultures and operative course    2   Chronic right leg wounds, chronic venous insufficiency  - Non healing wounds in the setting of morbid obesity, chronic lymphedema and recurrent infection  - Previous dopplers noted short segment reflux below knee in the great and small saphenous vein, LEADs was wnl    · Continue supportive wound care, debridement as noted above  · Discussed lower extremity skin care, management of venous edema with oral diuretics, compression stockings, limb elevation  · Recommend vascular surgery evaluation to consider ablation for venous insufficiency  · Discussed natural history of cellulitis and discussed with patient that she is at risk for recurrent cellulitis/bacteremia if underlying risk factors cannot be modified    3  Left elbow, shoulder pain, post traumatic  - No signs of acute infection     · Management per ortho     4  Recent group a strep bacteremia in the setting of right leg cellulitis  - completed course of Keflex    · Follow-up blood cultures    5  Asymptomatic bacteriuria due to Pseudomonas  - patient has pyuria and positive urine culture but no localized symptoms    · No targeted therapy    6  Obesity, chronic lymphedema, pre diabetes  - A1C 6 1  - Risk factor for immunosuppression, poor wound healing     · Management per primary team and as above    Will follow  Thank you for involving me in the care of your patient  Please call with questions, change in clinical status or if tests recommended above are abnormal      Discussed with the primary service  History     Reason for Consult: Cellulitis  HPI: Erinn Perry is a 70y o  year old female with morbid obesity, type 2 diabetes, lymphedema  She has had chronic non healing venous ulcers over her right leg for the apst year and was previously followed by [facility]  She was recently admitted on 5/16 with sepsis, Group A Strep bacteremia in the setting of right leg cellulitis  She was discharged to complete 14 days of Keflex  She returned to the ER on 5/31 with 2 days of left arm pain after her left elbow and arm were pulled   Xray was negative for fracture and she was discharged back to her snf  She returned the next day with worsening left arm pain and acute onset worsening pain, swelling, purulent discharge from the wounds on her right lower extremity  In the ER she was tachycardic with a low-grade fever  Labs noted pyuria, normal white count  Repeat x-ray left upper extremity was negative  She was admitted and started on vancomycin cefepime  General surgery has been consulted and lower extremity debridement is planned tomorrow  She has remained afebrile without leukocytosis  Blood and wound cultures are pending  Urine cultures growing Pseudomonas  Denies nausea, vomiting, diarrhea or rash and is tolerating antibiotics well  Infectious disease is being consulted for diagnostic work up and antibiotic management  Review of Systems  Pertinent positives and negatives as noted in HPI  Rest complete 12 point system-based review of systems is otherwise negative  PAST MEDICAL HISTORY:  Past Medical History:   Diagnosis Date    A-fib [facility])     [facility]congest[facility] failure) ([facility] )     Coronary artery disease     Diabetes mellitus ([facility] )     Hypertension     Lymphedema     Lymphedema     Obesity     Sepsis ([place] [address] )     Sleep apnea      Past Surgical History:   Procedure Laterality Date    CHOLECYSTECTOMY      TONSILLECTOMY         FAMILY HISTORY:  Non-contributory    SOCIAL HISTORY:  Social History   Single  Social History     Substance and Sexual Activity   Alcohol Use Not Currently     Social History     Substance and Sexual Activity   Drug Use Not Currently     Social History     Tobacco Use   Smoking Status Never Smoker   Smokeless Tobacco Never Used       ALLERGIES:  Allergies   Allergen Reactions    Iodinated Diagnostic Agents Anaphylaxis    Metronidazole Seizures           Cimetidine Anxiety    Diltiazem Rash    Penicillins Rash       MEDICATIONS:  All current active medications have been reviewed      Physical Exam     Temp:  [97 6 °F (36 4 °C)-98 °F (36 7 °C)] 98 °F (36 7 °C)  HR:  [] 126  Resp:  [16-22] 22  BP: (125-139)/(63-82) 139/82  SpO2:  [92 %-97 %] 92 %  Temp (24hrs), Av 8 °F (36 6 °C), Min:97 6 °F (36 4 °C), Max:98 °F (36 7 °C)  Current: Temperature: 98 °F (36 7 °C)    Intake/Output Summary (Last 24 hours) at 2021 4463  Last data filed at 2021 2359  Gross per 24 hour   Intake 210 ml   Output 1300 ml   Net -1090 ml         Physical exam findings reported by bedside and primary medical team staff    General Appearance:  Appearing chronically ill, in no distress, appears stated age   Head:  Normocephalic, without obvious abnormality, atraumatic   Eyes:  PERRL, conjunctiva pink and sclera anicteric, both eyes   Nose: Nares normal, mucosa normal, no drainage   Throat: Oropharynx moist without lesions; lips, mucosa, and tongue normal; teeth and gums normal   Neck: Supple, symmetrical, trachea midline, no adenopathy, no tenderness/mass/nodules   Back:   Symmetric, no curvature, ROM normal, no CVA tenderness   Lungs:   Clear to auscultation bilaterally, no audible wheezes, rhonchi and rales, respirations unlabored   Chest Wall:  No tenderness or deformity   Heart:  Regular rate and rhythm, S1, S2 normal, no murmur, rub or gallop   Abdomen:   Soft, non-tender, non-distended, positive bowel sounds, no masses, no organomegaly    No CVA tenderness   Extremities: Bilateral lymphedema  Left elbow tenderness and effusion   Skin: Multiple open wounds over right leg, largest wound on the right thigh with necrotic areas large eschar, sloughing and purulent discharge   Lymph nodes: Cervical, supraclavicular, and axillary nodes normal   Neurologic: Alert and oriented times 3, extremity strength 5/5 and symmetric       Invasive Devices:   Peripheral IV 21 Right Antecubital (Active)   Site Assessment Clean;Dry; Intact 21   Dressing Type Transparent 21   Line Status Saline locked; Flushed 21   Dressing Status Clean;Dry; Intact 06/01/21 2330   Dressing Change Due 06/04/21 06/01/21 2330   Reason Not Rotated Not due 06/01/21 0900       Urethral Catheter 16 Fr  (Active)   Reasons to continue Urinary Catheter  Chronic urinary catheter 06/01/21 2330   Goal for Removal N/A- chronic ward 06/01/21 2330   Site Assessment Clean;Skin intact 06/01/21 2330   Collection Container Standard drainage bag 06/01/21 2330   Securement Method Securing device (Describe) 06/01/21 0900   Output (mL) 350 mL 06/01/21 1943       Labs, Imaging, & Other Studies     Lab Results:    I have personally reviewed pertinent labs  Results from last 7 days   Lab Units 06/02/21  0634 06/01/21  0553 05/31/21  2359   WBC Thousand/uL 6 82 8 52 8 85   HEMOGLOBIN g/dL 9 8* 10 0* 10 4*   PLATELETS Thousands/uL 247 275 313     Results from last 7 days   Lab Units 06/02/21  0634   POTASSIUM mmol/L 3 1*   CHLORIDE mmol/L 98*   CO2 mmol/L 26   BUN mg/dL 19   CREATININE mg/dL 0 83   EGFR ml/min/1 73sq m 70   CALCIUM mg/dL 8 3     Results from last 7 days   Lab Units 06/01/21  0005 06/01/21  0002 05/31/21  2359   BLOOD CULTURE  Received in Microbiology Lab  Culture in Progress  --  Received in Microbiology Lab  Culture in Progress  GRAM STAIN RESULT  No polys seen*  3+ Gram negative rods*  --   --    URINE CULTURE   --  >100,000 cfu/ml Pseudomonas aeruginosa*  --        Imaging Studies:   I have personally reviewed pertinent imaging study reports and images in PACS  EKG, Pathology, and Other Studies:   I have personally reviewed pertinent reports  Counseling/Coordination of care: Total 70 minutes communication with the patient via telehealth  Labs, medical tests and imaging studies were independently and extensively reviewed by me as noted above in HPI and old records were obtained and summarized as noted above in HPI  My recommendations were discussed with the patient in detail who verbalized understanding

## 2021-06-03 PROBLEM — A41.9 SEPSIS (HCC): Status: RESOLVED | Noted: 2021-01-01 | Resolved: 2021-01-01

## 2021-06-03 PROBLEM — E83.42 HYPOMAGNESEMIA: Status: RESOLVED | Noted: 2021-01-01 | Resolved: 2021-01-01

## 2021-06-03 PROBLEM — D64.9 ANEMIA: Status: ACTIVE | Noted: 2021-01-01

## 2021-06-03 NOTE — PROGRESS NOTES
Progress Note - General Surgery   Stefano Reed 70 y o  female MRN: [id number]  Unit/Bed#: OR [place] Encounter: [id number]    Assessment:  Right lower extremity cellulitis, wounds  Morbid obesity (BMI 56 53)  History of chronic lymphedema  Chronic atrial fibrillation, Xarelto on hold starting today  Chronic diastolic congestive heart failure  Obstructive sleep apnea, on CPAP  Type 2 diabetes mellitus  Acute cystitis with hematuria, urinalysis shows innumerable bacteria  She has an indwelling Ward catheter last exchange this morning in the ED  Hypertension, controlled  Old CVA  Hyponatremia  Hypokalemia      Plan:  NPO  Hold Xarelto for surgery  OR today for debridement of right leg wounds  Follow qAM CBC and CMP  Follow wound cultures  Maintain ward  Electrolyte replenishment  Management of comorbidities per primary team    Subjective/Objective     Subjective: Resting this morning, CPAP in place  Denies fevers/chills  Denies chest pains  Objective:     Blood pressure 153/84, pulse 94, temperature (!) 96 1 °F (35 6 °C), resp  rate 17, height 5' 2" (1 575 m), weight (!) 139 kg (307 lb 1 6 oz), SpO2 92 %  ,Body mass index is 56 17 kg/m²  Intake/Output Summary (Last 24 hours) at 6/3/2021 8206  Last data filed at 6/2/2021 2150  Gross per 24 hour   Intake 0 ml   Output 1550 ml   Net -1550 ml       Invasive Devices     Peripheral Intravenous Line            Peripheral IV 05/31/21 Right Antecubital 2 days          Drain            Urethral Catheter 16 Fr  2 days                Physical Exam:   Gen: AxOx3  Ext:  Right leg dressing in place  Dressing to be removed in OR  Lab, Imaging and other studies:  I have personally reviewed pertinent lab results    , CBC: No results found for: WBC, HGB, HCT, MCV, PLT, ADJUSTEDWBC, MCH, MCHC, RDW, MPV, NRBC, CMP:   Lab Results   Component Value Date    SODIUM 133 (L) 06/03/2021    K 3 2 (L) 06/03/2021     06/03/2021    CO2 27 06/03/2021    BUN 27 (H) 06/03/2021 CREATININE 0 80 06/03/2021    CALCIUM 8 6 06/03/2021    EGFR 73 06/03/2021   , Coagulation: No results found for: PT, INR, APTT     VTE Pharmacologic Prophylaxis:  Reason for no pharmacologic prophylaxis Xarelto on hold for O R   VTE Mechanical Prophylaxis: sequential compression device     Kimberly Gutierrez PA-C

## 2021-06-03 NOTE — ANESTHESIA PREPROCEDURE EVALUATION
Procedure:  DEBRIDEMENT WOUND [facility] OUT) (Left Leg Upper)    Relevant Problems   CARDIO   (+) Chronic atrial fibrillation (HCC)   (+) Essential hypertension      ENDO   (+) Type 2 diabetes mellitus, without long-term current use of insulin (HCC)      /RENAL   (+) AYDEE (acute kidney injury) (HCC)      HEMATOLOGY   (+) Anemia      NEURO/PSYCH   (+) CVA (cerebral vascular accident) ([address] )      PULMONARY   (+) ARIAN on CPAP      Other   (+) Acute cystitis with hematuria   (+) BMI 50 0-59 9, adult (HCC)   (+) Cellulitis of right lower extremity   (+) Chronic diastolic heart failure (HCC)   (+) Elbow pain, left   (+) Sepsis ([facility])      Physical Exam    Airway    Mallampati score: II  TM Distance: >3 FB  Neck ROM: full     Dental   Comment: Caps upper incisors, secure, No notable dental hx     Cardiovascular      Pulmonary      Other Findings       Lab Results   Component Value Date    WBC 6 82 06/02/2021    HGB 9 8 (L) 06/02/2021     06/02/2021     Lab Results   Component Value Date    SODIUM 133 (L) 06/03/2021    K 3 2 (L) 06/03/2021    BUN 27 (H) 06/03/2021    CREATININE 0 80 06/03/2021    EGFR 73 06/03/2021     Lab Results   Component Value Date    INR 2 70 (H) 05/31/2021     Lab Results   Component Value Date    HGBA1C 6 1 (H) 05/16/2021         Anesthesia Plan  ASA Score- 4     Anesthesia Type- general with ASA Monitors  Additional Monitors:   Airway Plan: ETT  Plan Factors-Exercise tolerance (METS): <4 METS  Chart reviewed  Imaging results reviewed  Existing labs reviewed  Patient summary reviewed  Patient is not a current smoker  Induction- intravenous  Postoperative Plan-     Informed Consent- Anesthetic plan and risks discussed with patient  I personally reviewed this patient with the CRNA  Discussed and agreed on the Anesthesia Plan with the CRNA  Elba Andrews

## 2021-06-03 NOTE — PLAN OF CARE
Problem: Potential for Falls  Goal: Patient will remain free of falls  Description: INTERVENTIONS:  - Assess patient frequently for physical needs  -  Identify cognitive and physical deficits and behaviors that affect risk of falls  -  [place] fall precautions as indicated by assessment   - Educate patient/family on patient safety including physical limitations  - Instruct patient to call for assistance with activity based on assessment  - Modify environment to reduce risk of injury  - Consider OT/PT consult to assist with strengthening/mobility  Outcome: Progressing     Problem: Prexisting or High Potential for Compromised Skin Integrity  Goal: Skin integrity is maintained or improved  Description: INTERVENTIONS:  - Identify patients at risk for skin breakdown  - Assess and monitor skin integrity  - Assess and monitor nutrition and hydration status  - Monitor labs   - Assess for incontinence   - Turn and reposition patient  - Assist with mobility/ambulation  - Relieve pressure over bony prominences  - Avoid friction and shearing  - Provide appropriate hygiene as needed including keeping skin clean and dry  - Evaluate need for skin moisturizer/barrier cream  - Collaborate with interdisciplinary team   - Patient/family teaching  - Consider wound care consult   Outcome: Progressing     Problem: Nutrition/Hydration-ADULT  Goal: Nutrient/Hydration intake appropriate for improving, restoring or maintaining nutritional needs  Description: Monitor and assess patient's nutrition/hydration status for malnutrition  Collaborate with interdisciplinary team and initiate plan and interventions as ordered  Monitor patient's weight and dietary intake as ordered or per policy  Utilize nutrition screening tool and intervene as necessary  Determine patient's food preferences and provide high-protein, high-caloric foods as appropriate       INTERVENTIONS:  - Monitor oral intake, urinary output, labs, and treatment plans  - Assess nutrition and hydration status and recommend course of action  - Evaluate amount of meals eaten  - Assist patient with eating if necessary   - Allow adequate time for meals  - Recommend/ encourage appropriate diets, oral nutritional supplements, and vitamin/mineral supplements  - Order, calculate, and assess calorie counts as needed  - Recommend, monitor, and adjust tube feedings and TPN/PPN based on assessed needs  - Assess need for intravenous fluids  - Provide specific nutrition/hydration education as appropriate  - Include patient/family/caregiver in decisions related to nutrition  Outcome: Progressing     Problem: PAIN - ADULT  Goal: Verbalizes/displays adequate comfort level or baseline comfort level  Description: Interventions:  - Encourage patient to monitor pain and request assistance  - Assess pain using appropriate pain scale  - Administer analgesics based on type and severity of pain and evaluate response  - Implement non-pharmacological measures as appropriate and evaluate response  - Consider cultural and social influences on pain and pain management  - Notify physician/advanced practitioner if interventions unsuccessful or patient reports new pain  Outcome: Progressing     Problem: INFECTION - ADULT  Goal: Absence or prevention of progression during hospitalization  Description: INTERVENTIONS:  - Assess and monitor for signs and symptoms of infection  - Monitor lab/diagnostic results  - Monitor all insertion sites, i e  indwelling lines, tubes, and drains  - Monitor endotracheal if appropriate and nasal secretions for changes in amount and color  - [place] appropriate cooling/warming therapies per order  - Administer medications as ordered  - Instruct and encourage patient and family to use good hand hygiene technique  - Identify and instruct in appropriate isolation precautions for identified infection/condition  Outcome: Progressing     Problem: SAFETY ADULT  Goal: Patient will remain free of falls  Description: INTERVENTIONS:  - Assess patient frequently for physical needs  -  Identify cognitive and physical deficits and behaviors that affect risk of falls  -  [place] fall precautions as indicated by assessment   - Educate patient/family on patient safety including physical limitations  - Instruct patient to call for assistance with activity based on assessment  - Modify environment to reduce risk of injury  - Consider OT/PT consult to assist with strengthening/mobility  Outcome: Progressing     Problem: DISCHARGE PLANNING  Goal: Discharge to home or other facility with appropriate resources  Description: INTERVENTIONS:  - Identify barriers to discharge w/patient and caregiver  - Arrange for needed discharge resources and transportation as appropriate  - Identify discharge learning needs (meds, wound care, etc )  - Arrange for interpretive services to assist at discharge as needed  - Refer to Case Management Department for coordinating discharge planning if the patient needs post-hospital services based on physician/advanced practitioner order or complex needs related to functional status, cognitive ability, or social support system  Outcome: Progressing     Problem: Knowledge Deficit  Goal: Patient/family/caregiver demonstrates understanding of disease process, treatment plan, medications, and discharge instructions  Description: Complete learning assessment and assess knowledge base    Interventions:  - Provide teaching at level of understanding  - Provide teaching via preferred learning methods  Outcome: Progressing     Problem: SKIN/TISSUE INTEGRITY - ADULT  Goal: Incision(s), wounds(s) or drain site(s) healing without S/S of infection  Description: INTERVENTIONS  - Assess and document risk factors for skin impairment   - Assess and document dressing, incision, wound bed, drain sites and surrounding tissue  - Consider nutrition services referral as needed  - Oral mucous membranes remain intact  - Provide patient/ family education  Outcome: Progressing     Problem: MUSCULOSKELETAL - ADULT  Goal: Maintain or return mobility to safest level of function  Description: INTERVENTIONS:  - Assess patient's ability to carry out ADLs; assess patient's baseline for ADL function and identify physical deficits which impact ability to perform ADLs (bathing, care of mouth/teeth, toileting, grooming, dressing, etc )  - Assess/evaluate cause of self-care deficits   - Assess range of motion  - Assess patient's mobility  - Assess patient's need for assistive devices and provide as appropriate  - Encourage maximum independence but intervene and supervise when necessary  - Involve family in performance of ADLs  - Assess for home care needs following discharge   - Consider OT consult to assist with ADL evaluation and planning for discharge  - Provide patient education as appropriate  Outcome: Progressing

## 2021-06-03 NOTE — PROGRESS NOTES
Progress Note - Orthopedics   Jose Orozco 70 y o  female MRN: [id number]  Unit/Bed#: OR POOL Encounter: [id number]    Assessment:  Primary osteoarthritis left shoulder, pain left upper extremity    Plan: At this time, there would be no specific intervention for her shoulder  She is scheduled to undergo debridement of her left lower extremity tomorrow and at this time I would recommend avoiding corticosteroid injection  This certainly can be performed once she has improved in regards to her left lower extremity  This would likely need to wait until she has been discharged and can be performed as an outpatient  Weight bearing:  WBAT through left upper extremity      Subjective:  Quan Styles continues to complain of left shoulder pain  She does not believe there has been any significant change in symptoms since her admission  She notes pain radiating distally toward her elbow and does have some primary elbow pain  Her primary concern, however, is the left lower extremity and her surgery scheduled for 06/03/2021  Vitals: Blood pressure 140/73, pulse 89, temperature 98 °F (36 7 °C), temperature source Oral, resp  rate 18, height 5' 2" (1 575 m), weight (!) 140 kg (308 lb), SpO2 94 %  ,Body mass index is 56 33 kg/m²  Intake/Output Summary (Last 24 hours) at 6/3/2021 5414  Last data filed at 6/2/2021 2150  Gross per 24 hour   Intake 0 ml   Output 1550 ml   Net -1550 ml       Invasive Devices     Peripheral Intravenous Line            Peripheral IV 05/31/21 Right Antecubital 2 days          Drain            Urethral Catheter 16 Fr  2 days                Physical Exam:  The left shoulder exam demonstrates very limited active range of motion  Passively, she does have some mild pain with passive motion but I am able to range the shoulder through a fairly good arc of motion  There is no specific tenderness    Elbow range of motion is somewhat limited, consistent with her complaint of pain and degenerative changes noted on x-ray  Lab, Imaging and other studies:  X-rays of the left shoulder demonstrate advanced degenerative disease with proximal migration of the humeral head, osteophytes, subchondral sclerosis and probable cyst formation

## 2021-06-03 NOTE — ASSESSMENT & PLAN NOTE
· Urinalysis: innumerable bacteria, WBC 20-30, trace leukocytes  · Holden catheter in place, insertion date 05/21/2021-will request removal and replacement  · Changed to Levaquin

## 2021-06-03 NOTE — ASSESSMENT & PLAN NOTE
· With necrosis of the wound that is getting worse she recently completed course of antibiotics as she had also associated streptococcal bacteremia      · Patient's wound cultures are positive for Pseudomonas and Proteus both sensitive to Levaquin  · Patient is status post debridement today of the right lower extremity necrotic wounds pod #0  · Id will follow up tomorrow in terms of duration surgery to follow-up in terms of wound care

## 2021-06-03 NOTE — ANESTHESIA POSTPROCEDURE EVALUATION
Post-Op Assessment Note    CV Status:  Stable  Pain Score: 0    Pain management: adequate     Mental Status:  Alert and awake   Hydration Status:  Euvolemic   PONV Controlled:  Controlled   Airway Patency:  Patent      Post Op Vitals Reviewed: Yes      Staff: CRNA         No complications documented      BP (P) 111/67 (06/03/21 1200)    Temp (P) 98 7 °F (37 1 °C) (06/03/21 1200)    Pulse (P) 102 (06/03/21 1200)   Resp (P) 18 (06/03/21 1200)    SpO2 (P) 99 % (06/03/21 1200)

## 2021-06-03 NOTE — PROGRESS NOTES
[address]  Progress Note - Marilin Virk 1945, 70 y o  female MRN: [id number]  Unit/Bed#: -[facility] Encounter: [id number]  Primary Care Provider: Jagdeep Escobedo MD   Date and time admitted to hospital: 5/31/2021 11:43 PM    * Cellulitis of right lower extremity  Assessment & Plan  · With necrosis of the wound that is getting worse she recently completed course of antibiotics as she had also associated streptococcal bacteremia      · Patient's wound cultures are positive for Pseudomonas and Proteus both sensitive to Levaquin  · Patient is status post debridement today of the right lower extremity necrotic wounds pod #0  · Id will follow up tomorrow in terms of duration surgery to follow-up in terms of wound care    Acute cystitis with hematuria  Assessment & Plan  · Urinalysis: innumerable bacteria, WBC 20-30, trace leukocytes  · Holden catheter in place, insertion date 05/21/2021-will request removal and replacement  · Changed to Levaquin    Elbow pain, left  Assessment & Plan  · Presents with worsening of left elbow pain, was seen ER 5/30 also I did do an x-ray of the shoulder which is not show any fracture but severe arthritis as discussed with ortho this suspected is all secondary to the contusion she would need OT therapy  · Pain control    ARIAN on CPAP  Assessment & Plan  · Continue CPAP at bedtime    Essential hypertension  Assessment & Plan  · BP reviewed and acceptable  · Continue losartan, metoprolol  · Monitor blood pressure    BMI 50 0-59 9, adult (HCC)  Assessment & Plan  · Weight loss counseling    Chronic diastolic heart failure (HCC)  Assessment & Plan  Wt Readings from Last 3 Encounters:   06/03/21 (!) 140 kg (308 lb)   05/31/21 (!) 152 kg (336 lb)   05/22/21 (!) 150 kg (331 lb 2 1 oz)       · Does not appear volume overloaded  · Daily weights and I&Os  · Low-salt diet  · I think she is actually over diuresing with potassium low and chloride being alone she has lost lot of weight since previous decrease her Bumex to 1 mg daily- sodium improving will supplement potassium doing well on Bumex      Type 2 diabetes mellitus, without long-term current use of insulin (HCC)  Assessment & Plan    Lab Results   Component Value Date    HGBA1C 6 1 (H) 2021     · Diabetic diet  · Fingerstick blood sugar sliding scale coverage  · Hold metformin while in hospital    Chronic atrial fibrillation (HCC)  Assessment & Plan  · Continue Toprol  the morning and 25 at night RVR was controlled yesterday  Will discuss with surgery if of able to restart Xarelto tomorrow    Sepsis (HCC)-resolved as of 6/3/2021  Assessment & Plan  · Ruled out she has not met any SIRS criteria      VTE Pharmacologic Prophylaxis:   Pharmacologic: Pharmacologic VTE Prophylaxis contraindicated due to She undergone surgery  Mechanical VTE Prophylaxis in Place: Yes    Patient Centered Rounds: I have performed bedside rounds with nursing staff today  Discussions with Specialists or Other Care Team Provider:  I discussed with surgery    Education and Discussions with Family / Patient:  Patient    Time Spent for Care: 30 minutes  More than 50% of total time spent on counseling and coordination of care as described above  Current Length of Stay: 2 day(s)    Current Patient Status: Inpatient   Certification Statement: The patient will continue to require additional inpatient hospital stay due to Right lower extremity chronic wounds    Discharge Plan:  Pending progress    Code Status: Level 1 - Full Code      Subjective:   Patient seen and examined just got back from surgery tired    Objective:     Vitals:   Temp (24hrs), Av °F (36 7 °C), Min:96 1 °F (35 6 °C), Max:98 7 °F (37 1 °C)    Temp:  [96 1 °F (35 6 °C)-98 7 °F (37 1 °C)] 97 1 °F (36 2 °C)  HR:  [] 110  Resp:  [16-21] 16  BP: (111-153)/(67-84) 127/79  SpO2:  [81 %-99 %] 98 %  Body mass index is 56 33 kg/m²       Input and Output Summary (last 24 hours): Intake/Output Summary (Last 24 hours) at 6/3/2021 1437  Last data filed at 6/2/2021 2150  Gross per 24 hour   Intake --   Output 1550 ml   Net -1550 ml       Physical Exam:     Physical Exam  Vitals signs and nursing note reviewed  Constitutional:       General: She is not in acute distress  Appearance: She is well-developed  She is obese  HENT:      Head: Normocephalic and atraumatic  Eyes:      Conjunctiva/sclera: Conjunctivae normal    Neck:      Musculoskeletal: Neck supple  Cardiovascular:      Rate and Rhythm: Normal rate and regular rhythm  Heart sounds: No murmur  Pulmonary:      Effort: Pulmonary effort is normal  No respiratory distress  Breath sounds: Normal breath sounds  No wheezing or rales  Abdominal:      Palpations: Abdomen is soft  Tenderness: There is no abdominal tenderness  Musculoskeletal:         General: No swelling  Skin:     General: Skin is warm and dry  Neurological:      General: No focal deficit present  Mental Status: She is alert and oriented to person, place, and time     Psychiatric:         Mood and Affect: Mood normal            Additional Data:     Labs:    Results from last 7 days   Lab Units 06/02/21  0634   WBC Thousand/uL 6 82   HEMOGLOBIN g/dL 9 8*   HEMATOCRIT % 32 3*   PLATELETS Thousands/uL 247   NEUTROS PCT % 75   LYMPHS PCT % 10*   MONOS PCT % 9   EOS PCT % 4     Results from last 7 days   Lab Units 06/03/21  0608   SODIUM mmol/L 133*   POTASSIUM mmol/L 3 2*   CHLORIDE mmol/L 101   CO2 mmol/L 27   BUN mg/dL 27*   CREATININE mg/dL 0 80   ANION GAP mmol/L 5   CALCIUM mg/dL 8 6   GLUCOSE RANDOM mg/dL 114     Results from last 7 days   Lab Units 05/31/21  2359   INR  2 70*     Results from last 7 days   Lab Units 06/03/21  1324 06/03/21  0833 06/03/21  0747 06/02/21  2110 06/02/21  1557 06/02/21  1118 06/02/21  0738 06/01/21  2046 06/01/21  1602 06/01/21  1130 06/01/21  0731   POC GLUCOSE mg/dl 120 123 104 127 122 155* 125 111 113 147* 153*         Results from last 7 days   Lab Units 06/02/21  0634 06/01/21  0110 05/31/21  2359   LACTIC ACID mmol/L  --   --  1 1   PROCALCITONIN ng/ml 0 09 0 09  --            * I Have Reviewed All Lab Data Listed Above  * Additional Pertinent Lab Tests Reviewed: All Labs Within Last 24 Hours Reviewed    Imaging:    Imaging Reports Reviewed Today Include: nonr todday  Imaging Personally Reviewed by Myself Includes:      Recent Cultures (last 7 days):     Results from last 7 days   Lab Units 06/01/21  0005 06/01/21  0002 05/31/21  2359   BLOOD CULTURE  No Growth at 48 hrs  --  No Growth at 48 hrs     GRAM STAIN RESULT  No polys seen*  3+ Gram negative rods*  --   --    URINE CULTURE   --  >100,000 cfu/ml Pseudomonas aeruginosa*  --    WOUND CULTURE  4+ Growth of Pseudomonas aeruginosa*  4+ Growth of Proteus mirabilis*  2+ Growth of Staphylococcus coagulase negative*  --   --        Last 24 Hours Medication List:   Current Facility-Administered Medications   Medication Dose Route Frequency Provider Last Rate    acetaminophen  650 mg Oral Q6H PRN Helane Corpus, DO      bisacodyl  5 mg Oral Daily PRN Helane Corpus, DO      bumetanide  1 mg Oral Daily Helane Corpus, DO      insulin lispro  2-12 Units Subcutaneous TID  Mayra Tolliver, DO      insulin lispro  2-12 Units Subcutaneous HS Helane Corpus, DO      levalbuterol  1 25 mg Nebulization Q4H PRN Helane Corpus, DO      levofloxacin  750 mg Intravenous Q24H Desiree Gomez MD      losartan  100 mg Oral Daily Helane Corpus, DO      magnesium hydroxide  30 mL Oral Daily PRN Helane Corpus, DO      metoprolol succinate  100 mg Oral Daily Helane Corpus, DO      metoprolol succinate  25 mg Oral QPM Helane Corpus, DO      morphine injection  4 mg Intravenous Q4H PRN Helane Corpus, DO      ondansetron  4 mg Intravenous Q6H PRN Helane Corpus, DO      oxyCODONE  5 mg Oral Q4H PRN Emma Streeter DO      polyethylene glycol  17 g Oral Daily PRN Emma Streeter DO      potassium chloride  40 mEq Oral Once Jules Villatoro MD      pravastatin  20 mg Oral Daily With [employer]DO      senna-docusate sodium  1 tablet Oral HS Emma Streeter DO          Today, Patient Was Seen By: Jules Villatoro MD    ** Please Note: Dictation voice to text software may have been used in the creation of this document   **

## 2021-06-03 NOTE — ASSESSMENT & PLAN NOTE
· Continue Toprol  the morning and 25 at night RVR was controlled yesterday    Will discuss with surgery if of able to restart Xarelto tomorrow

## 2021-06-03 NOTE — OCCUPATIONAL THERAPY NOTE
Occupational Therapy Cancellation Note     Patient Name: Boni Farfan  Today's Date: 6/3/2021  Problem List  Principal Problem:    Sepsis [facility])  Active Problems:    Chronic atrial fibrillation ([facility] )    Type 2 diabetes mellitus, without long-term current use of insulin (HCC)    Chronic diastolic heart failure ([facility])    BMI 50 0-59 9, adult ([facility] [address] )    Essential hypertension    ARIAN on CPAP    Cellulitis of right lower extremity    Elbow pain, left    Acute cystitis with hematuria    Hypomagnesemia        06/03/21 0849   Note Type   Note type Evaluation   Cancel Reasons Patient to operating room         Chart review completed  Pt currently in OR for debridement of wound on LLE and is unavailable for therapy services at this time  Will continue to follow and address as medically appropriate and as schedule allows       Negar Escobedo, OTR/L

## 2021-06-03 NOTE — OP NOTE
OPERATIVE REPORT  PATIENT NAME: Temi Cavanaugh    :  1945  MRN: [id number]  Pt Location: OW OR ROOM 01    SURGERY DATE: 6/3/2021    Surgeon(s) and Role:     * Ellen Fuller DO - Primary     * Babita Denis PA-C - Assisting    Preop Diagnosis:  Necrotic wounds right upper leg    Post-Op Diagnosis Codes:    Same    Procedure(s) (LRB):  DEBRIDEMENT WOUND ([facility]) (Left)    Specimen(s):  ID Type Source Tests Collected by Time Destination   1 : right leg wound debridement Tissue Wound TISSUE EXAM Ellen Fuller DO 6/3/2021 11:09 AM        Estimated Blood Loss:   Minimal    Drains:  Urethral Catheter 16 Fr  (Active)   Reasons to continue Urinary Catheter  Chronic urinary catheter 21 0816   Goal for Removal N/A- chronic ward 21   Site Assessment Clean;Skin intact 21 0816   Collection Container Standard drainage bag 21   Securement Method Securing device (Describe) 21   Output (mL) 1550 mL 210   Number of days: 2       Anesthesia Type:   General    Operative Indications:  Multiple necrotic wounds of the right lower extremity    Operative Findings:  4 wounds of the upper right lower extremity are noted  1   Right medial posterior leg -18 x 10 x 2 cm  2  Upper medial right thigh 13 x 12 x 1 cm  3  Posterior upper right thigh 6 by by 1 cm  4  Posterior right thigh 4 x 2 x 1 cm    Complications:   None    Procedure and Technique:  Patient was brought to the operating room  A time-out was held the patient identified  She placed in the supine position  General anesthesia was administered  The area of the right lower extremity was prepped draped in a sterile fashion using Betadine solution  Multiple wounds of the medial posterior aspect of right thigh were noted  All wounds were covered with superficial necrosis    Sharp dissection using a 15 blade scalpel was used to perform a sharp excisional debridement of all necrotic areas over each as listed above   Dissection was carried down to subcutaneous tissue  The fascia was not found to be involved  Hemostasis achieved using electrocautery  The Pulsavac was then used to further debride the wounds  All wounds then appeared to be healthy and viable  There may be a slight superficial layer of fat necrosis present in some locations  The wounds were then measured as listed above  Each area was covered with Xeroform and a clean sterile dressing  The patient tolerated the procedure well transferred to recovery in stable condition  At the end the procedure all needle counts were correct x2     I was present for the entire procedure and A physician assistant was required during the procedure for retraction tissue handling,dissection and suturing    Patient Disposition:  PACU     SIGNATURE: Demetrio Lee DO  DATE: Astrid 3, 2021  TIME: 11:52 AM

## 2021-06-04 NOTE — PROGRESS NOTES
[address] Jorgensen  Progress Note - Alisia Locke 1945, 70 y o  female MRN: [id number]  Unit/Bed#: -01 Encounter: [id number]  Primary Care Provider: Dayana Trujillo MD   Date and time admitted to hospital: 5/31/2021 11:43 PM    * Cellulitis of right lower extremity  Assessment & Plan  · With necrosis of the wound that is getting worse she recently completed course of antibiotics as she had also associated streptococcal bacteremia  · Patient's wound cultures are positive for Pseudomonas and Proteus both sensitive to Levaquin discussed with infectious disease patient can not be on Levaquin for 7-10 days will plan for 10 days    · Patient is status post debridement today of the right lower extremity necrotic wounds pod #1  · Will discuss with surgery in terms of the duration of stay in the hospital or when can be clear to be discharged to [facility]    Acute cystitis with hematuria  Assessment & Plan  · Urinalysis: innumerable bacteria, WBC 20-30, trace leukocytes  · Holden catheter in place, insertion date 05/21/2021-will request removal and replacement  · Changed to Levaquin    Elbow pain, left  Assessment & Plan  · Presents with worsening of left elbow pain, was seen ER 5/30 also I did do an x-ray of the shoulder which is not show any fracture but severe arthritis as discussed with ortho this suspected is all secondary to the contusion she would need OT therapy  · Pain control  · Pain improving and she is able to move it     ARIAN on CPAP  Assessment & Plan  · Continue CPAP at bedtime    Essential hypertension  Assessment & Plan  · BP reviewed and acceptable  · Continue losartan, metoprolol  · Monitor blood pressure    BMI 50 0-59 9, adult (HCC)  Assessment & Plan  · Weight loss counseling    Chronic diastolic heart failure [facility])  Assessment & Plan  Wt Readings from Last 3 Encounters:   06/04/21 (!) 139 kg (305 lb 8 9 oz)   05/31/21 (!) 152 kg (336 lb)   05/22/21 (!) 150 kg (331 lb 2 1 oz) · Does not appear volume overloaded  · Daily weights and I&Os  · Low-salt diet  · I think she is actually over diuresing with potassium low and chloride being alone she has lost lot of weight since previous decrease her Bumex to 1 mg daily- sodium improving will supplement potassium doing well on Bumex      Type 2 diabetes mellitus, without long-term current use of insulin (HCC)  Assessment & Plan    Lab Results   Component Value Date    HGBA1C 6 1 (H) 2021     · Diabetic diet  · Fingerstick blood sugar sliding scale coverage  · Hold metformin while in hospital    Chronic atrial fibrillation (HCC)  Assessment & Plan  · Continue Toprol  the morning and 25 at night her rate is controlled restart Xarelto I did discuss with surgery        VTE Pharmacologic Prophylaxis:   Pharmacologic: Rivaroxaban (Xarelto)  Mechanical VTE Prophylaxis in Place: Yes    Patient Centered Rounds: I have performed bedside rounds with nursing staff today  Discussions with Specialists or Other Care Team Provider:  I have discussed with infectious disease and surgery    Education and Discussions with Family / Patient:  Patient    Time Spent for Care: 30 minutes  More than 50% of total time spent on counseling and coordination of care as described above      Current Length of Stay: 3 day(s)    Current Patient Status: Inpatient   Certification Statement: The patient will continue to require additional inpatient hospital stay due to Placement    Discharge Plan:  Patient is medically cleared also surgically and ID cleared will discuss with Case Management regarding placement    Code Status: Level 1 - Full Code      Subjective:   Patient seen and examined just very down about everything that is going on but states that the left elbow she is moving better and pain is improved    Objective:     Vitals:   Temp (24hrs), Av 7 °F (36 5 °C), Min:97 1 °F (36 2 °C), Max:98 3 °F (36 8 °C)    Temp:  [97 1 °F (36 2 °C)-98 3 °F (36 8 °C)] 97 6 °F (36 4 °C)  HR:  [] 99  Resp:  [16-19] 19  BP: (123-135)/(69-80) 135/73  SpO2:  [92 %-99 %] 94 %  Body mass index is 55 89 kg/m²  Input and Output Summary (last 24 hours): Intake/Output Summary (Last 24 hours) at 6/4/2021 1247  Last data filed at 6/3/2021 2309  Gross per 24 hour   Intake 480 ml   Output 725 ml   Net -245 ml       Physical Exam:     Physical Exam  Vitals signs and nursing note reviewed  Constitutional:       General: She is not in acute distress  Appearance: She is well-developed  HENT:      Head: Normocephalic and atraumatic  Eyes:      Conjunctiva/sclera: Conjunctivae normal    Neck:      Musculoskeletal: Neck supple  Cardiovascular:      Rate and Rhythm: Normal rate and regular rhythm  Heart sounds: No murmur  Pulmonary:      Effort: Pulmonary effort is normal  No respiratory distress  Breath sounds: Normal breath sounds  No wheezing or rales  Abdominal:      General: There is no distension  Palpations: Abdomen is soft  Tenderness: There is no abdominal tenderness  Musculoskeletal:         General: No swelling  Skin:     General: Skin is warm and dry  Neurological:      General: No focal deficit present  Mental Status: She is alert and oriented to person, place, and time     Psychiatric:         Mood and Affect: Mood normal            Additional Data:     Labs:    Results from last 7 days   Lab Units 06/04/21  0551 06/02/21  0634   WBC Thousand/uL 6 41 6 82   HEMOGLOBIN g/dL 9 9* 9 8*   HEMATOCRIT % 33 8* 32 3*   PLATELETS Thousands/uL 262 247   BANDS PCT % 2  --    NEUTROS PCT %  --  75   LYMPHS PCT %  --  10*   LYMPHO PCT % 11*  --    MONOS PCT %  --  9   MONO PCT % 6  --    EOS PCT % 5 4     Results from last 7 days   Lab Units 06/04/21  0551   SODIUM mmol/L 130*   POTASSIUM mmol/L 4 6   CHLORIDE mmol/L 99*   CO2 mmol/L 27   BUN mg/dL 22   CREATININE mg/dL 0 75   ANION GAP mmol/L 4   CALCIUM mg/dL 8 4   GLUCOSE RANDOM mg/dL 117     Results from last 7 days   Lab Units 05/31/21  2359   INR  2 70*     Results from last 7 days   Lab Units 06/04/21  1121 06/04/21  0733 06/04/21  0723 06/03/21  2111 06/03/21  1627 06/03/21  1324 06/03/21  0833 06/03/21  0747 06/02/21  2110 06/02/21  1557 06/02/21  1118 06/02/21  0738   POC GLUCOSE mg/dl 119 117 122 93 135 120 123 104 127 122 155* 125         Results from last 7 days   Lab Units 06/02/21  0634 06/01/21  0110 05/31/21  2359   LACTIC ACID mmol/L  --   --  1 1   PROCALCITONIN ng/ml 0 09 0 09  --            * I Have Reviewed All Lab Data Listed Above  * Additional Pertinent Lab Tests Reviewed: All Labs Within Last 24 Hours Reviewed    Imaging:    Imaging Reports Reviewed Today Include: none today  Imaging Personally Reviewed by Myself Includes:      Recent Cultures (last 7 days):     Results from last 7 days   Lab Units 06/01/21  0005 06/01/21  0002 05/31/21  2359   BLOOD CULTURE  No Growth at 72 hrs   --  No Growth at 72 hrs     GRAM STAIN RESULT  No polys seen*  3+ Gram negative rods*  --   --    URINE CULTURE   --  >100,000 cfu/ml Pseudomonas aeruginosa*  10,000-19,000 cfu/ml Klebsiella pneumoniae*  --    WOUND CULTURE  4+ Growth of Pseudomonas aeruginosa*  4+ Growth of Proteus mirabilis*  2+ Growth of Staphylococcus coagulase negative*  --   --        Last 24 Hours Medication List:   Current Facility-Administered Medications   Medication Dose Route Frequency Provider Last Rate    acetaminophen  650 mg Oral Q6H PRN Cinthya Danker, DO      bisacodyl  5 mg Oral Daily PRN Cinthya Danjoyce, DO      bumetanide  1 mg Oral Daily Cinthya Colten, DO      insulin lispro  2-12 Units Subcutaneous TID KATELYN Tolliver, DO      insulin lispro  2-12 Units Subcutaneous HS Cinthya Colten, DO      levalbuterol  1 25 mg Nebulization Q4H PRN Cinthya Colten, DO      levofloxacin  750 mg Oral Q24H Shaan Almodovar MD      losartan  100 mg Oral Daily Cinthya Danker, DO  magnesium hydroxide  30 mL Oral Daily PRN May Beata, DO      metoprolol succinate  100 mg Oral Daily May Beata, DO      metoprolol succinate  25 mg Oral QPM May Beata, DO      morphine injection  4 mg Intravenous Q4H PRN May Beata, DO      ondansetron  4 mg Intravenous Q6H PRN May Beata, DO      oxyCODONE  5 mg Oral Q4H PRN May Beata, DO      polyethylene glycol  17 g Oral Daily PRN May Beata, DO      pravastatin  20 mg Oral Daily With [employer], DO      rivaroxaban  20 mg Oral Daily With Breakfast Sujey Garza MD      senna-docusate sodium  1 tablet Oral HS May Beata, DO          Today, Patient Was Seen By: Sujey Garza MD    ** Please Note: Dictation voice to text software may have been used in the creation of this document   **

## 2021-06-04 NOTE — ASSESSMENT & PLAN NOTE
· Continue Toprol  the morning and 25 at night her rate is controlled restart Xarelto I did discuss with surgery

## 2021-06-04 NOTE — OCCUPATIONAL THERAPY NOTE
Occupational Therapy Evaluation     Patient Name: Scott Miguel  Today's Date: 6/4/2021  Problem List  Principal Problem:    Cellulitis of right lower extremity  Active Problems:    Chronic atrial fibrillation (HCC)    Type 2 diabetes mellitus, without long-term current use of insulin (HCC)    Chronic diastolic heart failure (HCC)    BMI 50 0-59 9, adult ([address] )    Essential hypertension    ARIAN on CPAP    Elbow pain, left    Acute cystitis with hematuria    Past Medical History  Past Medical History:   Diagnosis Date    Coronary artery disease     Lymphedema     Lymphedema     Obesity     Sepsis ([address] )      Past Surgical History  Past Surgical History:   Procedure Laterality Date    CHOLECYSTECTOMY      TONSILLECTOMY      WOUND DEBRIDEMENT Left 6/3/2021    Procedure: DEBRIDEMENT WOUND [facility] OUT); Surgeon: Kristen Na DO;  Location:  MAIN OR;  Service: General           06/04/21 1340   Note Type   Note type Evaluation   Restrictions/Precautions   Other Precautions Chair Alarm; Bed Alarm;O2;Fall Risk  (2L of O2)   Pain Assessment   Pain Assessment Tool 0-10   Pain Score No Pain   Home Living   Type of Home House  (2 JADE no HR)   Home Layout Two level  (6-7 steps to 2nd floor)   Bathroom Shower/Tub   (Pt reports sponge bathing)   Home Equipment Walker; Wheelchair-manual;Electric scooter   Additional Comments Pt reports Eric Fallon in a 2 story home with 2 JADE  Pt previously ambulating short distances with RW @ Mod I   Pt most recently has been at [facility] for rehab and plans to go back upon d/c from [address] [place] [address]   Prior Function   Level of [place] Independent with ADLs and functional mobility   Lives With Alone   Receives Help From Family   ADL Assistance Independent   IADLs Needs assistance   Falls in the last 6 months 1 to 4   Comments Pt reports prior to hospitalization on 5/15/2021 she was able to complete ADLs, toileting and short distance ambulation with RW   ADL   Where Assessed Edge of bed   Grooming Assistance 5  Supervision/Setup   Grooming Deficit Setup   UB Dressing Assistance 4  Minimal Assistance   UB Dressing Deficit Setup;Supervision/safety;Verbal cueing; Increased time to complete; Thread LUE;Pull over head;Pull around back   LB Dressing Assistance 1  Total Assistance   LB Dressing Deficit Steadying; Requires assistive device for steadying;Verbal cueing;Supervision/safety; Increased time to complete; Don/doff R sock; Don/doff L sock; Thread RLE into pants; Thread LLE into pants;Pull up over hips   Additional Comments Pt completing ADL tasks while seated at EOB  UB dressing @ Min A for threading LUE and pulling short down in back  LB Dressing @ Total A  Pt unable to complete sock donning or threading pants/underwear around feet  Pt then requiring Total A for CM around waist while standing at RW d/t requiring BUE supported at all times  Pt completing grooming tasks @ S after set-up   Bed Mobility   Supine to Sit 3  Moderate assistance   Additional items Assist x 2;HOB elevated; Bedrails; Increased time required;Verbal cues;LE management  (trunk management)   Additional Comments Pt completing supine>sit @ Mod x's 2 with HOB elevated and use of bedrails  Transfers   Sit to Stand 3  Moderate assistance   Additional items Assist x 2; Increased time required;Verbal cues   Stand to Sit 3  Moderate assistance   Additional items Increased time required;Verbal cues; Assist x 1   Stand pivot   (Mod A x's 1 + Steadying Assist x's 1)   Additional items Assist x 2; Increased time required;Verbal cues   Additional Comments Pt completing functional transfers with use of RW for UB support  Mod x's 2 for STS from EOB>RW  increased time and encouragement required although Pt demonstrating Good participation  Pt then requiring Mod x's 1 + Steadying A x's 1 for SPT to recliner chair  increased time and vc'ing for safety and technique and RW management   Pt then requirign Max x's 2 to safely boost back into chair d/t increased chair height   Balance   Static Sitting Good   Dynamic Sitting Fair +   Static Standing Fair -   Dynamic Standing Poor +   Activity Tolerance   Activity Tolerance Patient limited by fatigue  (limited by anxiety)   Medical Staff Made Aware Spoke with PT, Radha Pearl and SPT, [address] with RN, Bj Engle Assessment   RUE Assessment WFL   LUE Assessment   LUE Assessment WFL   Hand Function   Gross Motor Coordination Functional   Fine Motor Coordination Functional   Sensation   Light Touch No apparent deficits   Additional Comments Pt initially consulted to OT services d/t LUE pain  Pt at this time reports no pain and that "i have been working on while laying here" Pt demonstrates able to complete full AROM in shoulder and elbow LUE ROM with minimal pain noted during movement   Cognition   Overall Cognitive Status [facility]   Arousal/Participation Alert; Responsive; Cooperative   Attention Attends with cues to redirect   Orientation Level Oriented X4   Memory Within functional limits   Following Commands Follows one step commands without difficulty   Assessment   Limitation Decreased ADL status; Decreased UE strength;Decreased Safe judgement during ADL;Decreased endurance;Decreased self-care trans;Decreased high-level ADLs   Prognosis Good   Assessment Pt is a 71 y/o F admitted to [address] [place] [address] 5/31/2021 d/t experiencing L elbow pain and R thigh wound  Pt was recently admitted to [address] [place] [address] 5/16/2021 and was d/c'ed to SNF for rehab which is where Pt was admitted from  Pt's imaging for LUE was negative for acute abnormally  Pt on 2L of O2 at time of eval satting in mid 90's throughout therapy session  Pt with PMHx impacting performance during functional tasks including: a-fib, CHF, CAD, DM, HTN, lymphedema, obesity, sepsis, sleep apnea  Pt reports prior to initial hospitalization Pt was ambulating short distances with RW and completing ADLs @ Mod I  On evaluation, Pt A&Ox4   Pt completing supine>sit @ Mod x's 2  UB dressing @ Min A  LB Dressing @ total A  Pt completing STS from EOB>RW @ Mod x's 2  SPT to recliner chair @ Mod x's 1 + Steadying A x's 1  Pt BUE ROM and MS WFL  Pt requires PT /OT co-eval due to signficant assistance with mobility and cognitive-behavioral impairments as well as to maximize Pt's performance, participation, and functional outcomes  Pt's barriers to d/c include: decrease activity tolerance, decrease standing balance, decrease sitting balance, decrease performance during ADL tasks, decrease safety awareness , decrease UB MS, increased pain, decrease generalized strength, decrease activity engagement and decrease performance during functional transfers  Pt would benefit from continued acute OT services to address deficits as well as post acute rehab upon d/c from [address] [place] [address]   Treatment Interventions ADL retraining;Functional transfer training;UE strengthening/ROM; Endurance training;Patient/family training;Neuromuscular reeducation;Equipment evaluation/education; Compensatory technique education;Continued evaluation; Energy conservation; Activityengagement   Goal Expiration Date 06/14/21   OT Frequency 3-5x/wk   Recommendation   Recommendation   (post acute rehab)   OT Discharge Recommendation Post acute rehabilitation services   [facility] Daily Activity Inpatient   Lower Body Dressing 1   Bathing 1   Toileting 1   Upper Body Dressing 3   Grooming 3   Eating 4   Daily Activity Raw Score 13   Daily Activity Standardized Score (Calc for Raw Score >=11) 32 03   AM-PAC Applied Cognition Inpatient   Following a Speech/Presentation 4   Understanding Ordinary Conversation 4   Taking Medications 4   Remembering Where Things Are Placed or Put Away 4   Remembering List of 4-5 Errands 4   Taking Care of Complicated Tasks 4   Applied Cognition Raw Score 24   Applied Cognition Standardized Score 62 21        The patient's raw score on the AM-PAC Daily Activity inpatient short form is 13, standardized score is 32 03, less than 39 4   Patients at this level are likely to benefit from DC to post-acute rehabilitation services  Please refer to the recommendation of the Occupational Therapist for safe DC planning  Pt goals to be met by 6/14/2021    1  Pt will demonstrate ability to complete grooming/hygiene tasks @ Mod I after set-up  2  Pt will demonstrate ability to complete supine<>sit @ S in order to increase safety and independence during ADL tasks  3  Pt will demonstrate ability to complete UB ADLs including washing/dressing @ Mod I in order to increase performance and participation during meaningful tasks  4  Pt will demonstrate ability to complete LB dressing @ Min A in order to increase safety and independence during meaningful tasks  5  Pt will demonstrate ability to complete toileting tasks including CM and pericare @ Min A in order to increase safety and independence during meaningful tasks  6  Pt will demonstrate ability to complete EOB, chair, toilet/commode transfers @ Min A in order to increase performance and participation during functional tasks  7  Pt will demonstrate ability to stand for 1-2 minutes while maintaining F+ balance with use of RW for UB support PRN  8  Pt will demonstrate ability to tolerate 30-35 minute OT session with no vc'ing for deep breathing or use of energy conservation techniques in order to increase activity tolerance during functional tasks  9  Pt will demonstrate Good carryover of use of energy conservation/compensatory strategies during ADLs and functional tasks in order to increase safety and reduce risk for falls  10  Pt will demonstrate Good attention and participation in continued evaluation of functional ambulation house hold distances in order to assist with safe d/c planning  11  Pt will attend to continued cognitive assessments 100% of the time in order to provide most appropriate d/c recommendations     12  Pt will follow 100% simple 2-step commands and be A&O x4 consistently with environmental cues to increase participation in functional activities  13  Pt will identify 3 areas of interest/hobbies and 1 intervention on how to incorporate into daily life in order to increase interaction with environment and peers as well as increase participation in meaningful tasks  14  Pt will demonstrate 100% carryover of BUE HEP in order to increase BUE MS and increase performance during functional tasks upon d/c home      Chana Wilson OTR/L

## 2021-06-04 NOTE — PLAN OF CARE
Problem: OCCUPATIONAL THERAPY ADULT  Goal: Performs self-care activities at highest level of function for planned discharge setting  See evaluation for individualized goals  Description: Treatment Interventions: ADL retraining, Functional transfer training, UE strengthening/ROM, Endurance training, Patient/family training, Neuromuscular reeducation, Equipment evaluation/education, Compensatory technique education, Continued evaluation, Energy conservation, Activityengagement          See flowsheet documentation for full assessment, interventions and recommendations  Note: Limitation: Decreased ADL status, Decreased UE strength, Decreased Safe judgement during ADL, Decreased endurance, Decreased self-care trans, Decreased high-level ADLs  Prognosis: Good  Assessment: Pt is a 69 y/o F admitted to [address] [place] [address] 5/31/2021 d/t experiencing L elbow pain and R thigh wound  Pt was recently admitted to [address] [place] [address] 5/16/2021 and was d/c'ed to SNF for rehab which is where Pt was admitted from  Pt's imaging for LUE was negative for acute abnormally  Pt on 2L of O2 at time of eval satting in mid 90's throughout therapy session  Pt with PMHx impacting performance during functional tasks including: a-fib, CHF, CAD, DM, HTN, lymphedema, obesity, sepsis, sleep apnea  Pt reports prior to initial hospitalization Pt was ambulating short distances with RW and completing ADLs @ Mod I  On evaluation, Pt A&Ox4  Pt completing supine>sit @ Mod x's 2  UB dressing @ Min A  LB Dressing @ total A  Pt completing STS from EOB>RW @ Mod x's 2  SPT to recliner chair @ Mod x's 1 + Steadying A x's 1  Pt BUE ROM and MS WFL  Pt requires PT /OT co-eval due to signficant assistance with mobility and cognitive-behavioral impairments as well as to maximize Pt's performance, participation, and functional outcomes   Pt's barriers to d/c include: decrease activity tolerance, decrease standing balance, decrease sitting balance, decrease performance during ADL tasks, decrease safety awareness , decrease UB MS, increased pain, decrease generalized strength, decrease activity engagement and decrease performance during functional transfers  Pt would benefit from continued acute OT services to address deficits as well as post acute rehab upon d/c from [facility]    Recommendation: (post acute rehab)  OT Discharge Recommendation: Post acute rehabilitation services

## 2021-06-04 NOTE — PLAN OF CARE
Problem: Potential for Falls  Goal: Patient will remain free of falls  Description: INTERVENTIONS:  - Assess patient frequently for physical needs  -  Identify cognitive and physical deficits and behaviors that affect risk of falls  -  [place] fall precautions as indicated by assessment   - Educate patient/family on patient safety including physical limitations  - Instruct patient to call for assistance with activity based on assessment  - Modify environment to reduce risk of injury  - Consider OT/PT consult to assist with strengthening/mobility  Outcome: Progressing     Problem: Prexisting or High Potential for Compromised Skin Integrity  Goal: Skin integrity is maintained or improved  Description: INTERVENTIONS:  - Identify patients at risk for skin breakdown  - Assess and monitor skin integrity  - Assess and monitor nutrition and hydration status  - Monitor labs   - Assess for incontinence   - Turn and reposition patient  - Assist with mobility/ambulation  - Relieve pressure over bony prominences  - Avoid friction and shearing  - Provide appropriate hygiene as needed including keeping skin clean and dry  - Evaluate need for skin moisturizer/barrier cream  - Collaborate with interdisciplinary team   - Patient/family teaching  - Consider wound care consult   Outcome: Progressing     Problem: Nutrition/Hydration-ADULT  Goal: Nutrient/Hydration intake appropriate for improving, restoring or maintaining nutritional needs  Description: Monitor and assess patient's nutrition/hydration status for malnutrition  Collaborate with interdisciplinary team and initiate plan and interventions as ordered  Monitor patient's weight and dietary intake as ordered or per policy  Utilize nutrition screening tool and intervene as necessary  Determine patient's food preferences and provide high-protein, high-caloric foods as appropriate       INTERVENTIONS:  - Monitor oral intake, urinary output, labs, and treatment plans  - Assess nutrition and hydration status and recommend course of action  - Evaluate amount of meals eaten  - Assist patient with eating if necessary   - Allow adequate time for meals  - Recommend/ encourage appropriate diets, oral nutritional supplements, and vitamin/mineral supplements  - Order, calculate, and assess calorie counts as needed  - Recommend, monitor, and adjust tube feedings and TPN/PPN based on assessed needs  - Assess need for intravenous fluids  - Provide specific nutrition/hydration education as appropriate  - Include patient/family/caregiver in decisions related to nutrition  Outcome: Progressing     Problem: PAIN - ADULT  Goal: Verbalizes/displays adequate comfort level or baseline comfort level  Description: Interventions:  - Encourage patient to monitor pain and request assistance  - Assess pain using appropriate pain scale  - Administer analgesics based on type and severity of pain and evaluate response  - Implement non-pharmacological measures as appropriate and evaluate response  - Consider cultural and social influences on pain and pain management  - Notify physician/advanced practitioner if interventions unsuccessful or patient reports new pain  Outcome: Progressing     Problem: INFECTION - ADULT  Goal: Absence or prevention of progression during hospitalization  Description: INTERVENTIONS:  - Assess and monitor for signs and symptoms of infection  - Monitor lab/diagnostic results  - Monitor all insertion sites, i e  indwelling lines, tubes, and drains  - Monitor endotracheal if appropriate and nasal secretions for changes in amount and color  - [place] appropriate cooling/warming therapies per order  - Administer medications as ordered  - Instruct and encourage patient and family to use good hand hygiene technique  - Identify and instruct in appropriate isolation precautions for identified infection/condition  Outcome: Progressing     Problem: SAFETY ADULT  Goal: Patient will remain free of falls  Description: INTERVENTIONS:  - Assess patient frequently for physical needs  -  Identify cognitive and physical deficits and behaviors that affect risk of falls  -  [place] fall precautions as indicated by assessment   - Educate patient/family on patient safety including physical limitations  - Instruct patient to call for assistance with activity based on assessment  - Modify environment to reduce risk of injury  - Consider OT/PT consult to assist with strengthening/mobility  Outcome: Progressing     Problem: DISCHARGE PLANNING  Goal: Discharge to home or other facility with appropriate resources  Description: INTERVENTIONS:  - Identify barriers to discharge w/patient and caregiver  - Arrange for needed discharge resources and transportation as appropriate  - Identify discharge learning needs (meds, wound care, etc )  - Arrange for interpretive services to assist at discharge as needed  - Refer to Case Management Department for coordinating discharge planning if the patient needs post-hospital services based on physician/advanced practitioner order or complex needs related to functional status, cognitive ability, or social support system  Outcome: Progressing     Problem: Knowledge Deficit  Goal: Patient/family/caregiver demonstrates understanding of disease process, treatment plan, medications, and discharge instructions  Description: Complete learning assessment and assess knowledge base    Interventions:  - Provide teaching at level of understanding  - Provide teaching via preferred learning methods  Outcome: Progressing     Problem: SKIN/TISSUE INTEGRITY - ADULT  Goal: Incision(s), wounds(s) or drain site(s) healing without S/S of infection  Description: INTERVENTIONS  - Assess and document risk factors for skin impairment   - Assess and document dressing, incision, wound bed, drain sites and surrounding tissue  - Consider nutrition services referral as needed  - Oral mucous membranes remain intact  - Provide patient/ family education  Outcome: Progressing     Problem: MUSCULOSKELETAL - ADULT  Goal: Maintain or return mobility to safest level of function  Description: INTERVENTIONS:  - Assess patient's ability to carry out ADLs; assess patient's baseline for ADL function and identify physical deficits which impact ability to perform ADLs (bathing, care of mouth/teeth, toileting, grooming, dressing, etc )  - Assess/evaluate cause of self-care deficits   - Assess range of motion  - Assess patient's mobility  - Assess patient's need for assistive devices and provide as appropriate  - Encourage maximum independence but intervene and supervise when necessary  - Involve family in performance of ADLs  - Assess for home care needs following discharge   - Consider OT consult to assist with ADL evaluation and planning for discharge  - Provide patient education as appropriate  Outcome: Progressing

## 2021-06-04 NOTE — PROGRESS NOTES
Pt with improved appetite, now eating 100% of meals and supplements  Prefers fruit punch flavored Virgil, will adjust to pt preference  Maintain ensure max protein BID as ordered  Relayed to MD, recommend continuation of ensure max and Virgil BID after d/c  Currently on CCD 2, will adjust to CCD 3 to match estimated energy needs, will add 4 gm PER given hx of CHF

## 2021-06-04 NOTE — TELEMEDICINE
Progress Note - Infectious Disease   Jose Orozco 70 y o  female MRN: [id number]  Unit/Bed#: -01 Encounter: [id number]        REQUIRED DOCUMENTATION:     1  This service was provided via Telemedicine  2  Provider located at [facility]  3  TeleMed provider: Rosibel Henao MD   4  Identify all parties in room with patient during tele consult:RN  5  After connecting through [employer], patient was identified by name and date of birth and assistant checked wristband  Patient was then informed that this was a Telemedicine visit and that the exam was being conducted confidentially over secure lines  My office door was closed  No one else was in the room  Patient acknowledged consent and understanding of privacy and security of the Telemedicine visit, and gave us permission to have the assistant stay in the room in order to assist with the history and to conduct the exam   I informed the patient that I have reviewed their record in Epic and presented the opportunity for them to ask any questions regarding the visit today  The patient agreed to participate  Assessment/Recommendations     1  Right leg cellulitis, recurrent  - portal of entry was multiple necrotic right upper leg wounds  - diagnosis suspected clinically with erythema and purulent foul-smelling discharge from open wounds, thigh wound is necrotic  - Superficial wound cultures have grown pseudomonas and proteus and 2+ coagulase negative staph  - no systemic signs of infection     · Patient can be discharged on PO levaquin 750 daily to complete 7-10 days of therapy  · Continue wound care and outpatient fu with surgery     2   Chronic right necrotic leg wounds, chronic venous insufficiency  - Non healing wounds in the setting of morbid obesity, chronic lymphedema and recurrent infection  - Previous dopplers noted short segment reflux below knee in the great and small saphenous vein, LEADs was wnl     · Continue supportive wound care, antibiotics as noted above  · Discussed lower extremity skin care, management of venous edema with oral diuretics, compression stockings, limb elevation  · Recommend outpatient vascular surgery evaluation to consider ablation for venous insufficiency  · Discussed natural history of cellulitis and discussed with patient that she is at risk for recurrent cellulitis/bacteremia if underlying risk factors cannot be modified     3  Left elbow, shoulder pain, post traumatic  - No signs of acute infection     · Management per ortho     4  Recent group a strep bacteremia in the setting of right leg cellulitis  - completed course of Keflex     · Follow-up blood cultures negative     5  Asymptomatic bacteriuria due to Pseudomonas  - patient has pyuria and positive urine culture but no localized symptoms     · No targeted therapy     6  Obesity, chronic lymphedema, pre diabetes  - A1C 6 1  - Risk factor for immunosuppression, poor wound healing     · Management per primary team and as above    Will sign off ,    Please call with questions, change in clinical status or if tests recommended above are abnormal      Discussed with the primary service  History       Subjective: The patient has no complaints  Underwent debridement of multiple right lower extremity wounds yesterday  Overnight had large amount of drainage that has since improved  Denies fevers, chills, or sweats  Denies nausea, vomiting, or diarrhea      Antibiotics:  Levofloxacin D2      Physical Exam     Temp:  [97 1 °F (36 2 °C)-98 7 °F (37 1 °C)] 97 6 °F (36 4 °C)  HR:  [] 99  Resp:  [16-19] 19  BP: (111-135)/(67-80) 135/73  SpO2:  [81 %-99 %] 94 %  Temp (24hrs), Av 2 °F (36 8 °C), Min:97 1 °F (36 2 °C), Max:98 7 °F (37 1 °C)  Current: Temperature: 97 6 °F (36 4 °C)    Intake/Output Summary (Last 24 hours) at 2021 1011  Last data filed at 6/3/2021 2309  Gross per 24 hour   Intake 480 ml   Output 725 ml   Net -245 ml       Physical exam findings reported by bedside and primary medical team staff    General Appearance:  Appearing well, nontoxic, and in no distress, appears stated age   Head:  Normocephalic, without obvious abnormality, atraumatic   Eyes:  PERRL, conjunctiva pink and sclera anicteric, both eyes   Nose: Nares normal, mucosa normal, no drainage   Throat: Oropharynx moist without lesions; lips, mucosa, and tongue normal; teeth and gums normal   Neck: Supple, symmetrical, trachea midline, no adenopathy, no tenderness/mass/nodules   Back:   Symmetric, no curvature, ROM normal, no CVA tenderness   Lungs:   Clear to auscultation bilaterally, no audible wheezes, rhonchi and rales, respirations unlabored   Chest Wall:  No tenderness or deformity   Heart:  Regular rate and rhythm, S1, S2 normal, no murmur, rub or gallop   Abdomen:   Soft, non-tender, non-distended, positive bowel sounds, no masses, no organomegaly    No CVA tenderness   Extremities: Extremities normal, atraumatic, no cyanosis, clubbing or edema   Skin: R leg in surgical dressing   Lymph nodes: Cervical, supraclavicular, and axillary nodes normal   Neurologic: Alert and oriented times 3, extremity strength 5/5 and symmetric       Invasive Devices:   Peripheral IV 05/31/21 Right Antecubital (Active)   Site Assessment WDL; Clean;Dry; Intact 06/04/21 0736   Dressing Type Transparent 06/04/21 0736   Line Status Flushed 06/04/21 0736   Dressing Status Clean;Dry; Intact 06/04/21 0736   Dressing Change Due 06/04/21 06/03/21 1330   Reason Not Rotated Not due 06/03/21 1330       Urethral Catheter 16 Fr   (Active)   Reasons to continue Urinary Catheter  Chronic urinary catheter 06/04/21 0735   Goal for Removal N/A- chronic ward 06/04/21 0735   Site Assessment Clean 06/04/21 0735   Collection Container Standard drainage bag 06/04/21 0735   Securement Method Leg strap 06/04/21 0735   Output (mL) 125 mL 06/03/21 2309       Labs, Imaging, & Other Studies     Lab Results:    I have personally reviewed pertinent labs     Results from last 7 days   Lab Units 06/04/21  0551 06/02/21  0634 06/01/21  0553   WBC Thousand/uL 6 41 6 82 8 52   HEMOGLOBIN g/dL 9 9* 9 8* 10 0*   PLATELETS Thousands/uL 262 247 275     Results from last 7 days   Lab Units 06/04/21  0551   POTASSIUM mmol/L 4 6   CHLORIDE mmol/L 99*   CO2 mmol/L 27   BUN mg/dL 22   CREATININE mg/dL 0 75   EGFR ml/min/1 73sq m 79   CALCIUM mg/dL 8 4     Results from last 7 days   Lab Units 06/01/21  0005 06/01/21  0002 05/31/21  3289   BLOOD CULTURE  No Growth at 72 hrs   --  No Growth at 72 hrs  GRAM STAIN RESULT  No polys seen*  3+ Gram negative rods*  --   --    URINE CULTURE   --  >100,000 cfu/ml Pseudomonas aeruginosa*  --    WOUND CULTURE  4+ Growth of Pseudomonas aeruginosa*  4+ Growth of Proteus mirabilis*  2+ Growth of Staphylococcus coagulase negative*  --   --        Imaging Studies:   I have personally reviewed pertinent imaging study reports and images in PACS  EKG, Pathology, and Other Studies:   I have personally reviewed pertinent reports  Counseling/Coordination of care: Total 30 minutes communication with the patient via telehealth  Labs, medical tests and imaging studies were independently reviewed by me as noted above

## 2021-06-04 NOTE — ASSESSMENT & PLAN NOTE
Wt Readings from Last 3 Encounters:   06/04/21 (!) 139 kg (305 lb 8 9 oz)   05/31/21 (!) 152 kg (336 lb)   05/22/21 (!) 150 kg (331 lb 2 1 oz)       · Does not appear volume overloaded  · Daily weights and I&Os  · Low-salt diet  · I think she is actually over diuresing with potassium low and chloride being alone she has lost lot of weight since previous decrease her Bumex to 1 mg daily- sodium improving will supplement potassium doing well on Bumex

## 2021-06-04 NOTE — PHYSICAL THERAPY NOTE
PHYSICAL THERAPY EVALUATION  NAME:  Scott Miguel  DATE: 06/04/21    AGE:   70 y o  Mrn:   [id number]  ADMIT DX:  Left elbow pain [M25 522]  Left wrist pain [M25 532]  Elbow pain, left [W93 809]  Complicated wound infection [T14  8XXA, L08 9]    Past Medical History:   Diagnosis Date    Coronary artery disease     Lymphedema     Lymphedema     Obesity     Sepsis ([facility] [address] )      Length Of Stay: 3  Performed at least 2 patient identifiers during session: Name and Birthday  PHYSICAL THERAPY EVALUATION :      06/04/21 1341   PT Last Visit   PT Visit Date 06/04/21   Note Type   Note type Evaluation   Pain Assessment   Pain Assessment Tool FLACC   Pain Rating: FLACC (Rest) - Face 0   Pain Rating: FLACC (Rest) - Legs 0   Pain Rating: FLACC (Rest) - Activity 0   Pain Rating: FLACC (Rest) - Cry 0   Pain Rating: FLACC (Rest) - Consolability 0   Score: FLACC (Rest) 0   Pain Rating: FLACC (Activity) - Face 1   Pain Rating: FLACC (Activity) - Legs 0   Pain Rating: FLACC (Activity) - Activity 0   Pain Rating: FLACC (Activity) - Cry 1   Pain Rating: FLACC (Activity) - Consolability 1   Score: FLACC (Activity) 3   Home Living   Type of Home House  (2 JADE no HR)   Home Layout Two level  (7 steps to second floor)   Bathroom Shower/Tub   (pt sponge bathes)   Bathroom Toilet Standard   Bathroom Equipment Shower chair;Grab bars around toilet   Home Equipment Electric scooter; Wheelchair-manual;Walker   Prior Function   Level of [place] Independent with ADLs and functional mobility   Lives With Alone   Receives Help From Family   ADL Assistance Independent   IADLs Needs assistance   Falls in the last 6 months 1 to 4   Comments Prior to fall on 5/13/2021, pt was ambulating and [place] [facility] [place]  She was since hospitalized and upon previous d/c went to [facility] as she required assistance with all mobility   Restrictions/Precautions   Other Precautions Chair Alarm; Bed Alarm; Fall Risk;Pain;O2  (2 L O2)   Cognition Orientation Level Oriented X4   Following Commands Follows one step commands without difficulty   RLE Assessment   RLE Assessment WFL   RLE Overall AROM   R Hip Flexion Approx 70 deg in sitting  (2/5)   R Knee Extension Approx -20 deg  (2+/5)   R Ankle Dorsiflexion Approx neutral  (2/5)   LLE Assessment   LLE Assessment WFL   LLE Overall AROM   L Hip Flexion Approx 80 deg in sitting  (2/5)   L Knee Extension Approx -20 deg  (2+/5)   L Ankle Dorsiflexion Approx neutral  (2/5)   Coordination   Movements are Fluid and Coordinated 1  (functional movements)   Light Touch   RLE Light Touch Grossly intact   LLE Light Touch Grossly intact   Bed Mobility   Supine to Sit 3  Moderate assistance   Additional items Assist x 2; Increased time required;HOB elevated;Verbal cues;LE management  (trunk management)   Additional Comments HOB elevated approx 45 deg  Manual assistance required for LE and trunk management to reach EOB  Mod v/c for anterior weight shift to prevent trunk retropulsion upon reaching EOB for improved balance and safety   Transfers   Sit to Stand 3  Moderate assistance   Additional items Assist x 2; Increased time required;Verbal cues   Stand to Sit 3  Moderate assistance   Additional items Assist x 1; Increased time required;Verbal cues   Stand pivot   (modA x1 + steadying assistance x1)   Additional items Increased time required;Verbal cues; Assist x 1   Additional Comments Pt performed all transfers with use of RW for UE support  Min v/c for foot placement for improved technique, balance, and success reaching full standing  Mod v/c for controlled descent into seat to avoid injury or LOB  Manual assistance required for proper weight shifting to position COM for success with standing  Min v/c for RW management and LE advancement during SPT  Manual assistance required during SPT to prevent knee buckling, and for RW management   Manual assistance also provided for improved weight shifting and LE advancement Ambulation/Elevation   Distance Ambulation deferred 2/2 difficulty weight shifting and advancing LEs during SPT  Pt also declined ambulation   Balance   Static Sitting Fair   Dynamic Sitting Fair -   Static Standing Poor +   Dynamic Standing Poor   Activity Tolerance   Activity Tolerance Patient limited by pain; Patient limited by fatigue   Medical Staff Made Aware Spoke with PT, Joshua Barrios and [address] with RN   Assessment   Prognosis Fair   Problem List Decreased strength;Decreased endurance;Decreased range of motion; Impaired balance;Decreased mobility;Pain;Obesity; Decreased skin integrity   Barriers to Discharge Inaccessible home environment;Decreased caregiver support   Barriers to Discharge Comments Pt currently requires significant manual assistance with all mobility   Goals   Patient Goals To go home   STG Expiration Date 06/14/21   Plan   Treatment/Interventions Functional transfer training;LE strengthening/ROM; Elevations; Therapeutic exercise; Endurance training;Patient/family training;Equipment eval/education; Bed mobility;Gait training;Spoke to nursing;Spoke to case management;OT   PT Frequency   (3-5x/week)   Recommendation   PT Discharge Recommendation Post acute rehabilitation services  (return to rehab)   Equipment Recommended   (tbd by rehab)   PT - OK to Discharge Yes  (Once medically cleared)   Additional Comments Pt sitting in recliner chair at end of session with posey alarm on and all needs within reach   [facility] 8 in Bed Without Bedrails 2   Lying on Back to Sitting on Edge of Flat Bed 1   Moving Bed to Chair 1   Standing Up From Chair 1   Walk in Room 1   Climb 3-5 Stairs 1   Basic Mobility Inpatient Raw Score 7   Turning Head Towards Sound 4   Follow Simple Instructions 4   Low Function Basic Mobility Raw Score 15   Low Function Basic Mobility Standardized Score 23 9     (Please find full objective findings from PT assessment regarding body systems outlined above)  Assessment: Pt is a 70 y o  female seen for PT evaluation s/p admission to [address] [place] [address] on 5/31/2021 with Cellulitis of right lower extremity  Pt s/p wound debridement  Order placed for PT services  Upon evaluation: Pt is presenting with impaired functional mobility due to pain, decreased strength, decreased ROM, decreased endurance, impaired balance, gait deviations, fall risk, LE edema and impaired skin integrity requiring modA x2 for bed mobility and sit<>stand transfers, modA x1 and steadying assistance x1 for SPT with RW  Pt's clinical presentation is currently unpredictable given the functional mobility deficits above, especially weakness, decreased ROM, edema of extremities, decreased skin integrity, decreased endurance, gait deviations, pain, decreased activity tolerance and decreased functional mobility tolerance, coupled with fall risks as indicated by low functioning AM-PAC: 15/32 as well as hx of falls, impaired balance and polypharmacy and combined with medical complications of R LE cellulitis, acute cystitis with hematuria, L elbow pain, abnormal CBC values, abnormal sodium, and abnormal chloride  Pt's PMHx and comorbidities that may affect physical performance and progress include: CAD, lymphedema, sepsis, sleep apnea, CPAP use, HTN, obesity, CHF, DM, and a-fib  Personal factors affecting pt at time of IE include: inaccessible home environment, multi-level environment, availability as recommended, inability to perform ADLs, inability to navigate level surfaces without external assistance, inability to ambulate household distances and recent fall(s)/fall history  Pt will benefit from continued skilled PT services to address deficits as defined above and to maximize level of functional mobility to facilitate return toward PLOF and improved QOL   From PT/mobility standpoint, recommendation at time of d/c would be Short term rehab pending progress in order to reduce fall risk and maximize pt's functional independence and consistency with mobility in order to facilitate return to PLOF  Recommend ther ex next 1-2 sessions  The patient's AM-PAC Basic Mobility Inpatient Short Form Low Function Raw Score 15 , Standardized Score is 23 9  A standardized score less than 42 9 suggests the patient may benefit from discharge to post-acute rehabilitation services  Please also refer to the recommendation of the Physical Therapist for safe discharge planning  Goals: Pt will: Perform bed mobility tasks with Jackie x1 to reposition in bed and prepare for transfers  Pt will perform sit<>stand transfers with Jackie x1 to decrease risk for falls, improve ease of transfers and improve activity tolerance and prepare for ambulation  Pt will perform SPTs with RW and steadying assistance to improve activity tolerance, reduce risk for falls, and prepare for ambulation  Pt will ambulate with RW for >/= 10' with  Jackie x1  to decrease risk for falls, improve activity tolerance, improve gait quality and promote proper use of assistive device and to access home environment  Pt will increase B LE strength >/= 1/2 MMT grade to facilitate functional mobility       Samira Amado, SPT

## 2021-06-04 NOTE — ASSESSMENT & PLAN NOTE
· With necrosis of the wound that is getting worse she recently completed course of antibiotics as she had also associated streptococcal bacteremia  · Patient's wound cultures are positive for Pseudomonas and Proteus both sensitive to Levaquin discussed with infectious disease patient can not be on Levaquin for 7-10 days will plan for 10 days    · Patient is status post debridement today of the right lower extremity necrotic wounds pod #1  · Will discuss with surgery in terms of the duration of stay in the hospital or when can be clear to be discharged to SNF

## 2021-06-04 NOTE — CASE MANAGEMENT
Discussed in rounds  POD 1 wound debridement  Per ID plan is PO antibiotics  Surgery cleared for dc  CM spoke to patient and brother who was on the phone at this time about choice of SNF for dc  After much discussion  Pt's choice is to go back to [facility]   - Referral was made and pending acceptance back to [facility]  Requested a COVID test   CM provided patient with a list of SNFs in the area for future reference  Transport will be BLS  DC IMM completed      Await acceptance of facility_ [facility]

## 2021-06-04 NOTE — RESPIRATORY THERAPY NOTE
RT Protocol Note  Erinn Perry 70 y o  female MRN: [id number]  Unit/Bed#: -01 Encounter: [id number]    Assessment    Principal Problem:    Cellulitis of right lower extremity  Active Problems:    Chronic atrial fibrillation (HCC)    Type 2 diabetes mellitus, without long-term current use of insulin (HCC)    Chronic diastolic heart failure (HCC)    BMI 50 0-59 9, adult (HCC)    Essential hypertension    ARIAN on CPAP    Elbow pain, left    Acute cystitis with hematuria      Home Pulmonary Medications:    Home Devices/Therapy: BiPAP/CPAP    Past Medical History:   Diagnosis Date    Coronary artery disease     Lymphedema     Lymphedema     Obesity     Sepsis ([facility] [address] )      Social History     Socioeconomic History    Marital status: Single     Spouse name: None    Number of children: None    Years of education: None    Highest education level: None   Occupational History    None   Social Needs    Financial resource strain: None    Food insecurity     Worry: None     Inability: None    Transportation needs     Medical: None     Non-medical: None   Tobacco Use    Smoking status: Never Smoker    Smokeless tobacco: Never Used   Substance and Sexual Activity    Alcohol use: Not Currently    Drug use: Not Currently    Sexual activity: Not Currently   Lifestyle    Physical activity     Days per week: None     Minutes per session: None    Stress: None   Relationships    Social connections     Talks on phone: None     Gets together: None     Attends Jew service: None     Active member of club or organization: None     Attends meetings of clubs or organizations: None     Relationship status: None    Intimate partner violence     Fear of current or ex partner: None     Emotionally abused: None     Physically abused: None     Forced sexual activity: None   Other Topics Concern    None   Social History Narrative    None       Subjective         Objective    Physical Exam:   Assessment Type: (P) Assess only  General Appearance: (P) Alert, Awake  Respiratory Pattern: (P) Normal  Chest Assessment: (P) Chest expansion symmetrical  Bilateral Breath Sounds: (P) Clear, Diminished  O2 Device: (P) roomair    Vitals:  Blood pressure 135/73, pulse (P) 99, temperature 97 6 °F (36 4 °C), resp  rate (P) 18, height 5' 2" (1 575 m), weight (!) 139 kg (305 lb 8 9 oz), SpO2 (P) 94 %  Imaging and other studies: I have personally reviewed pertinent reports        O2 Device: (P) roomair     Plan    Respiratory Plan: Mild Distress pathway        Resp Comments: (P) stable on room air  no distress or need for prn

## 2021-06-04 NOTE — PLAN OF CARE
Problem: PHYSICAL THERAPY ADULT  Goal: Performs mobility at highest level of function for planned discharge setting  See evaluation for individualized goals  Description: Treatment/Interventions: Functional transfer training, LE strengthening/ROM, Elevations, Therapeutic exercise, Endurance training, Patient/family training, Equipment eval/education, Bed mobility, Gait training, Spoke to nursing, Spoke to case management, OT  Equipment Recommended: (tbd by rehab)       See flowsheet documentation for full assessment, interventions and recommendations  Note: Prognosis: Fair  Problem List: Decreased strength, Decreased endurance, Decreased range of motion, Impaired balance, Decreased mobility, Pain, Obesity, Decreased skin integrity  Assessment: Pt is a 70 y o  female seen for PT evaluation s/p admission to [address] [place] [address] on 5/31/2021 with Cellulitis of right lower extremity  Pt s/p wound debridement  Order placed for PT services  Upon evaluation: Pt is presenting with impaired functional mobility due to pain, decreased strength, decreased ROM, decreased endurance, impaired balance, gait deviations, fall risk, LE edema and impaired skin integrity requiring modA x2 for bed mobility and sit<>stand transfers, modA x1 and steadying assistance x1 for SPT with RW  Pt's clinical presentation is currently unpredictable given the functional mobility deficits above, especially weakness, decreased ROM, edema of extremities, decreased skin integrity, decreased endurance, gait deviations, pain, decreased activity tolerance and decreased functional mobility tolerance, coupled with fall risks as indicated by low functioning AM-PAC: 15/32 as well as hx of falls, impaired balance and polypharmacy and combined with medical complications of R LE cellulitis, acute cystitis with hematuria, L elbow pain, abnormal CBC values, abnormal sodium, and abnormal chloride    Pt's PMHx and comorbidities that may affect physical performance and progress include: CAD, lymphedema, sepsis, sleep apnea, CPAP use, HTN, obesity, CHF, DM, and a-fib  Personal factors affecting pt at time of IE include: inaccessible home environment, multi-level environment, availability as recommended, inability to perform ADLs, inability to navigate level surfaces without external assistance, inability to ambulate household distances and recent fall(s)/fall history  Pt will benefit from continued skilled PT services to address deficits as defined above and to maximize level of functional mobility to facilitate return toward PLOF and improved QOL  From PT/mobility standpoint, recommendation at time of d/c would be Short term rehab pending progress in order to reduce fall risk and maximize pt's functional independence and consistency with mobility in order to facilitate return to PLOF  Recommend ther ex next 1-2 sessions  Barriers to Discharge: Inaccessible home environment, Decreased caregiver support  Barriers to Discharge Comments: Pt currently requires significant manual assistance with all mobility     PT Discharge Recommendation: Post acute rehabilitation services(return to rehab)     PT - OK to Discharge: Yes(Once medically cleared)    See flowsheet documentation for full assessment

## 2021-06-04 NOTE — ASSESSMENT & PLAN NOTE
· Presents with worsening of left elbow pain, was seen ER 5/30 also I did do an x-ray of the shoulder which is not show any fracture but severe arthritis as discussed with ortho this suspected is all secondary to the contusion she would need OT therapy  · Pain control  · Pain improving and she is able to move it

## 2021-06-04 NOTE — PROGRESS NOTES
Progress Note - General Surgery   Charlene Xavier 70 y o  female MRN: [id number]  Unit/Bed#: -01 Encounter: [id number]    Assessment:  - POD#1 s/p debridement of right leg wounds  - Pt reports significant drainage, dressing changed once last night  No pain in leg  - WBC 6 41  - Hgb 9 9  - Wound cultures from 6/1 show 4+ pseudomonas aeruginosa, 4+ proteus mirabilis, 2+ staphylococcus coagulase negative, sensitivities available in chart    Plan:  - Daily dressing change, xeroform with gauze and hamilton  - Maintain urinary catheter  - Antibiotics as per ID  - Diet as tolerated  - CPAP  - Management of medical conditions as per primary    Subjective/Objective   Subjective: Pt reports she had so much drainage last night that the dressing had to be changed  This has improved since dressing change  Reports no pain in the right leg today  Denies fever, chills, CP, SOB  Pt reports she is distraught regarding her health and just wants this wound to heal     Objective:     Blood pressure 135/73, pulse 99, temperature 97 6 °F (36 4 °C), resp  rate 19, height 5' 2" (1 575 m), weight (!) 139 kg (305 lb 8 9 oz), SpO2 94 %  ,Body mass index is 55 89 kg/m²  Intake/Output Summary (Last 24 hours) at 6/4/2021 0749  Last data filed at 6/3/2021 2309  Gross per 24 hour   Intake 480 ml   Output 725 ml   Net -245 ml       Invasive Devices     Peripheral Intravenous Line            Peripheral IV 05/31/21 Right Antecubital 3 days          Drain            Urethral Catheter 16 Fr  3 days                Physical Exam: /73   Pulse 99   Temp 97 6 °F (36 4 °C)   Resp 19   Ht 5' 2" (1 575 m)   Wt (!) 139 kg (305 lb 8 9 oz)   SpO2 94%   BMI 55 89 kg/m²   General appearance: alert and oriented, in no acute distress  Lungs: clear to auscultation bilaterally  Heart: irregularly irregular rhythm and S1, S2 normal  Extremities: Venous stasis changes bilateral lower legs  Scabbed wound medial lower right leg   Right leg bandages clean and dry  No bleeding/draining through the bandage  Dressing left in place as they were changed recently  Lab, Imaging and other studies:  I have personally reviewed pertinent lab results      CBC:   Lab Results   Component Value Date    WBC 6 41 06/04/2021    HGB 9 9 (L) 06/04/2021    HCT 33 8 (L) 06/04/2021    MCV 86 06/04/2021     06/04/2021    MCH 25 2 (L) 06/04/2021    MCHC 29 3 (L) 06/04/2021    RDW 16 3 (H) 06/04/2021    MPV 9 7 06/04/2021    NRBC 0 06/04/2021   CMP:   Lab Results   Component Value Date    SODIUM 130 (L) 06/04/2021    K 4 6 06/04/2021    CL 99 (L) 06/04/2021    CO2 27 06/04/2021    BUN 22 06/04/2021    CREATININE 0 75 06/04/2021    CALCIUM 8 4 06/04/2021    EGFR 79 06/04/2021     Microbiology Results (last 21 days)    Collected Updated Procedure Result Status Patient Facility Result Comment    06/01/2021 0005 06/03/2021 1031 Wound culture and Gram stain [[id number]]    (Abnormal)   Wound from Leg, Right    Final result [address]  Component Value   Wound Culture 4+ Growth of Pseudomonas aeruginosaAbnormal     4+ Growth of Proteus mirabilisAbnormal     2+ Growth of Staphylococcus coagulase negativeAbnormal    GRAM STAIN RESULT No polys seenAbnormal     3+ Gram negative rodsAbnormal        Susceptibility    Pseudomonas aeruginosa (1)    Antibiotic Interpretation Microscan Method Status    ZID Performed  Yes  SONIA Final    Aztreonam ($$$)  Susceptible <=4 ug/ml SONIA Final    Cefepime ($) Susceptible <=2 00 ug/ml SONIA Final    Ceftazidime ($$) Susceptible 4 ug/ml SONIA Final    Ciprofloxacin ($)  Susceptible <=1 00 ug/ml SONIA Final    Gentamicin ($$) Susceptible 2 ug/ml SONIA Final    Imipenem Susceptible 1 ug/ml SONIA Final    Levofloxacin ($) Susceptible 0 50 ug/ml SONIA Final    Meropenem ($$) Susceptible <=1 00 ug/ml SONIA Final    Piperacillin + Tazobactam ($$$) Susceptible <=4 ug/ml SONIA Final    Tobramycin ($) Susceptible <=1 ug/ml SONIA Final    Proteus mirabilis (2)    Antibiotic Interpretation Microscan Method Status    ZID Performed  Yes  SONIA Final    Ampicillin ($$) Susceptible <=8 00 ug/ml SONIA Final    Aztreonam ($$$)  Susceptible <=4 ug/ml SONIA Final    Cefazolin ($) Susceptible 4 00 ug/ml SONIA Final    Ciprofloxacin ($)  Susceptible <=1 00 ug/ml SONIA Final    Gentamicin ($$) Susceptible 2 ug/ml SONIA Final    Levofloxacin ($) Susceptible <=0 25 ug/ml SONIA Final    Tetracycline Resistant >8 ug/ml SONIA Final    Tobramycin ($) Susceptible 2 ug/ml SONIA Final    Trimethoprim + Sulfamethoxazole ($$$) Susceptible <=2/38 ug/ml SONIA Final     Condensed View                Carmelo Lemus PA-C

## 2021-06-04 NOTE — PLAN OF CARE
Problem: Potential for Falls  Goal: Patient will remain free of falls  Description: INTERVENTIONS:  - Assess patient frequently for physical needs  -  Identify cognitive and physical deficits and behaviors that affect risk of falls  -  [place] fall precautions as indicated by assessment   - Educate patient/family on patient safety including physical limitations  - Instruct patient to call for assistance with activity based on assessment  - Modify environment to reduce risk of injury  - Consider OT/PT consult to assist with strengthening/mobility  Outcome: Progressing     Problem: Prexisting or High Potential for Compromised Skin Integrity  Goal: Skin integrity is maintained or improved  Description: INTERVENTIONS:  - Identify patients at risk for skin breakdown  - Assess and monitor skin integrity  - Assess and monitor nutrition and hydration status  - Monitor labs   - Assess for incontinence   - Turn and reposition patient  - Assist with mobility/ambulation  - Relieve pressure over bony prominences  - Avoid friction and shearing  - Provide appropriate hygiene as needed including keeping skin clean and dry  - Evaluate need for skin moisturizer/barrier cream  - Collaborate with interdisciplinary team   - Patient/family teaching  - Consider wound care consult   Outcome: Progressing     Problem: Nutrition/Hydration-ADULT  Goal: Nutrient/Hydration intake appropriate for improving, restoring or maintaining nutritional needs  Description: Monitor and assess patient's nutrition/hydration status for malnutrition  Collaborate with interdisciplinary team and initiate plan and interventions as ordered  Monitor patient's weight and dietary intake as ordered or per policy  Utilize nutrition screening tool and intervene as necessary  Determine patient's food preferences and provide high-protein, high-caloric foods as appropriate       INTERVENTIONS:  - Monitor oral intake, urinary output, labs, and treatment plans  - Assess nutrition and hydration status and recommend course of action  - Evaluate amount of meals eaten  - Assist patient with eating if necessary   - Allow adequate time for meals  - Recommend/ encourage appropriate diets, oral nutritional supplements, and vitamin/mineral supplements  - Order, calculate, and assess calorie counts as needed  - Recommend, monitor, and adjust tube feedings and TPN/PPN based on assessed needs  - Assess need for intravenous fluids  - Provide specific nutrition/hydration education as appropriate  - Include patient/family/caregiver in decisions related to nutrition  Outcome: Progressing     Problem: PAIN - ADULT  Goal: Verbalizes/displays adequate comfort level or baseline comfort level  Description: Interventions:  - Encourage patient to monitor pain and request assistance  - Assess pain using appropriate pain scale  - Administer analgesics based on type and severity of pain and evaluate response  - Implement non-pharmacological measures as appropriate and evaluate response  - Consider cultural and social influences on pain and pain management  - Notify physician/advanced practitioner if interventions unsuccessful or patient reports new pain  Outcome: Progressing     Problem: INFECTION - ADULT  Goal: Absence or prevention of progression during hospitalization  Description: INTERVENTIONS:  - Assess and monitor for signs and symptoms of infection  - Monitor lab/diagnostic results  - Monitor all insertion sites, i e  indwelling lines, tubes, and drains  - Monitor endotracheal if appropriate and nasal secretions for changes in amount and color  - [place] appropriate cooling/warming therapies per order  - Administer medications as ordered  - Instruct and encourage patient and family to use good hand hygiene technique  - Identify and instruct in appropriate isolation precautions for identified infection/condition  Outcome: Progressing     Problem: SAFETY ADULT  Goal: Patient will remain free of falls  Description: INTERVENTIONS:  - Assess patient frequently for physical needs  -  Identify cognitive and physical deficits and behaviors that affect risk of falls  -  [place] fall precautions as indicated by assessment   - Educate patient/family on patient safety including physical limitations  - Instruct patient to call for assistance with activity based on assessment  - Modify environment to reduce risk of injury  - Consider OT/PT consult to assist with strengthening/mobility  Outcome: Progressing     Problem: DISCHARGE PLANNING  Goal: Discharge to home or other facility with appropriate resources  Description: INTERVENTIONS:  - Identify barriers to discharge w/patient and caregiver  - Arrange for needed discharge resources and transportation as appropriate  - Identify discharge learning needs (meds, wound care, etc )  - Arrange for interpretive services to assist at discharge as needed  - Refer to Case Management Department for coordinating discharge planning if the patient needs post-hospital services based on physician/advanced practitioner order or complex needs related to functional status, cognitive ability, or social support system  Outcome: Progressing     Problem: Knowledge Deficit  Goal: Patient/family/caregiver demonstrates understanding of disease process, treatment plan, medications, and discharge instructions  Description: Complete learning assessment and assess knowledge base    Interventions:  - Provide teaching at level of understanding  - Provide teaching via preferred learning methods  Outcome: Progressing     Problem: SKIN/TISSUE INTEGRITY - ADULT  Goal: Incision(s), wounds(s) or drain site(s) healing without S/S of infection  Description: INTERVENTIONS  - Assess and document risk factors for skin impairment   - Assess and document dressing, incision, wound bed, drain sites and surrounding tissue  - Consider nutrition services referral as needed  - Oral mucous membranes remain intact  - Provide patient/ family education  Outcome: Progressing     Problem: MUSCULOSKELETAL - ADULT  Goal: Maintain or return mobility to safest level of function  Description: INTERVENTIONS:  - Assess patient's ability to carry out ADLs; assess patient's baseline for ADL function and identify physical deficits which impact ability to perform ADLs (bathing, care of mouth/teeth, toileting, grooming, dressing, etc )  - Assess/evaluate cause of self-care deficits   - Assess range of motion  - Assess patient's mobility  - Assess patient's need for assistive devices and provide as appropriate  - Encourage maximum independence but intervene and supervise when necessary  - Involve family in performance of ADLs  - Assess for home care needs following discharge   - Consider OT consult to assist with ADL evaluation and planning for discharge  - Provide patient education as appropriate  Outcome: Progressing

## 2021-06-05 NOTE — CASE MANAGEMENT
CM spoke to patient as [facility]-- declined patient _ "can't accommodate at this time " Pt shared someone was here from [facility] to speak with her yesterday  Requested additional referral_ Her choice is [facility] referral made at this time  ( Pt had her COVID Vaccines March 16 and the 2nd in April  Was at [facility] From 5/22- 5/31      Pending acceptance at this time to [facility]-   Transportation_ TBD

## 2021-06-05 NOTE — PROGRESS NOTES
[address] Shaylee Jorgensen  Progress Note - Grey Music 1945, 70 y o  female MRN: [id number]  Unit/Bed#: -01 Encounter: [id number]  Primary Care Provider: Mana Hernandez MD   Date and time admitted to hospital: 5/31/2021 11:43 PM    * Cellulitis of right lower extremity  Assessment & Plan  · With necrosis of the wound that is getting worse she recently completed course of antibiotics as she had also associated streptococcal bacteremia  · Patient's wound cultures are positive for Pseudomonas and Proteus both sensitive to Levaquin discussed with infectious disease patient can not be on Levaquin for 7-10 days will plan for 10 days    · Patient is status post debridement today of the right lower extremity necrotic wounds pod #2  · Surgery following regarding dressing changes awaiting placement to [facility]    Acute cystitis with hematuria  Assessment & Plan  · Urinalysis: innumerable bacteria, WBC 20-30, trace leukocytes  · Holden catheter in place, insertion date 05/21/2021-will request removal and replacement  · Changed to Levaquin    Elbow pain, left  Assessment & Plan  · Presents with worsening of left elbow pain, was seen ER 5/30 also I did do an x-ray of the shoulder which is not show any fracture but severe arthritis as discussed with ortho this suspected is all secondary to the contusion she would need OT therapy  · Pain control  · Pain improving and she is able to move it     ARIAN on CPAP  Assessment & Plan  · Continue CPAP at bedtime    Essential hypertension  Assessment & Plan  · BP reviewed and acceptable  · Continue losartan, metoprolol  · Monitor blood pressure    BMI 50 0-59 9, adult (HCC)  Assessment & Plan  · Weight loss counseling    Chronic diastolic heart failure [facility])  Assessment & Plan  Wt Readings from Last 3 Encounters:   06/05/21 (!) 138 kg (305 lb 1 9 oz)   05/31/21 (!) 152 kg (336 lb)   05/22/21 (!) 150 kg (331 lb 2 1 oz)       · Does not appear volume overloaded  · Daily weights and I&Os  · Low-salt diet  · I think she is actually over diuresing with potassium low and chloride being alone she has lost lot of weight since previous decrease her Bumex to 1 mg daily- sodium improving will supplement potassium doing well on Bumex      Type 2 diabetes mellitus, without long-term current use of insulin (HCC)  Assessment & Plan    Lab Results   Component Value Date    HGBA1C 6 1 (H) 2021     · Diabetic diet  · Fingerstick blood sugar sliding scale coverage  · Hold metformin while in hospital    Chronic atrial fibrillation (HCC)  Assessment & Plan  · Continue Toprol  the morning rate uncontrolled actually as the patient and she is supposed to be on Toprol  mg p o  B i d  Which I will switch to and continue her Xarelto        VTE Pharmacologic Prophylaxis:   Pharmacologic: Rivaroxaban (Xarelto)  Mechanical VTE Prophylaxis in Place: Yes    Patient Centered Rounds: I have performed bedside rounds with nursing staff today  Discussions with Specialists or Other Care Team Provider:  I have discussed with surgery    Education and Discussions with Family / Patient:  Patient    Time Spent for Care: 30 minutes  More than 50% of total time spent on counseling and coordination of care as described above  Current Length of Stay: 4 day(s)    Current Patient Status: Inpatient   Certification Statement: The patient will continue to require additional inpatient hospital stay due to Secondary to placement, afib with rvr     Discharge Plan:  48 hours    Code Status: Level 1 - Full Code      Subjective:   Patient seen and examined no complaints    Objective:     Vitals:   Temp (24hrs), Av 1 °F (36 7 °C), Min:97 4 °F (36 3 °C), Max:99 °F (37 2 °C)    Temp:  [97 4 °F (36 3 °C)-99 °F (37 2 °C)] 97 4 °F (36 3 °C)  HR:  [] 111  Resp:  [18-21] 21  BP: (103-140)/(70-85) 140/85  SpO2:  [94 %-97 %] 97 %  Body mass index is 55 81 kg/m²       Input and Output Summary (last 24 hours): Intake/Output Summary (Last 24 hours) at 6/5/2021 1157  Last data filed at 6/5/2021 1136  Gross per 24 hour   Intake 480 ml   Output 2250 ml   Net -1770 ml       Physical Exam:     Physical Exam  Vitals signs and nursing note reviewed  Constitutional:       General: She is not in acute distress  Appearance: She is well-developed  She is obese  HENT:      Head: Normocephalic and atraumatic  Eyes:      Conjunctiva/sclera: Conjunctivae normal    Neck:      Musculoskeletal: Neck supple  Cardiovascular:      Rate and Rhythm: Normal rate  Rhythm irregular  Heart sounds: No murmur  Pulmonary:      Effort: Pulmonary effort is normal  No respiratory distress  Breath sounds: Normal breath sounds  No wheezing or rales  Abdominal:      General: There is no distension  Palpations: Abdomen is soft  Tenderness: There is no abdominal tenderness  Musculoskeletal:         General: Swelling present  Skin:     General: Skin is warm and dry  Neurological:      General: No focal deficit present  Mental Status: She is alert and oriented to person, place, and time  Mental status is at baseline             Additional Data:     Labs:    Results from last 7 days   Lab Units 06/05/21  0553 06/04/21  0551 06/02/21  0634   WBC Thousand/uL 5 89 6 41 6 82   HEMOGLOBIN g/dL 9 9* 9 9* 9 8*   HEMATOCRIT % 33 9* 33 8* 32 3*   PLATELETS Thousands/uL 266 262 247   BANDS PCT %  --  2  --    NEUTROS PCT %  --   --  75   LYMPHS PCT %  --   --  10*   LYMPHO PCT %  --  11*  --    MONOS PCT %  --   --  9   MONO PCT %  --  6  --    EOS PCT %  --  5 4     Results from last 7 days   Lab Units 06/05/21  0553   SODIUM mmol/L 135*   POTASSIUM mmol/L 3 5   CHLORIDE mmol/L 103   CO2 mmol/L 28   BUN mg/dL 29*   CREATININE mg/dL 0 84   ANION GAP mmol/L 4   CALCIUM mg/dL 8 9   GLUCOSE RANDOM mg/dL 117     Results from last 7 days   Lab Units 05/31/21  2359   INR  2 70*     Results from last 7 days   Lab Units 06/05/21  1120 06/05/21  0737 06/04/21  2046 06/04/21  1600 06/04/21  1121 06/04/21  0733 06/04/21  0723 06/03/21  2111 06/03/21  1627 06/03/21  1324 06/03/21  0833 06/03/21  0747   POC GLUCOSE mg/dl 151* 127 151* 198* 119 117 122 93 135 120 123 104         Results from last 7 days   Lab Units 06/02/21  0634 06/01/21  0110 05/31/21  2359   LACTIC ACID mmol/L  --   --  1 1   PROCALCITONIN ng/ml 0 09 0 09  --            * I Have Reviewed All Lab Data Listed Above  * Additional Pertinent Lab Tests Reviewed: All Labs Within Last 24 Hours Reviewed    Imaging:    Imaging Reports Reviewed Today Include: none today  Imaging Personally Reviewed by Myself Includes:      Recent Cultures (last 7 days):     Results from last 7 days   Lab Units 06/01/21  0005 06/01/21  0002 05/31/21  2359   BLOOD CULTURE  No Growth After 4 Days  --  No Growth After 4 Days     GRAM STAIN RESULT  No polys seen*  3+ Gram negative rods*  --   --    URINE CULTURE   --  >100,000 cfu/ml Pseudomonas aeruginosa*  10,000-19,000 cfu/ml Klebsiella pneumoniae*  --    WOUND CULTURE  4+ Growth of Pseudomonas aeruginosa*  4+ Growth of Proteus mirabilis*  2+ Growth of Staphylococcus coagulase negative*  --   --        Last 24 Hours Medication List:   Current Facility-Administered Medications   Medication Dose Route Frequency Provider Last Rate    acetaminophen  650 mg Oral Q6H PRN Mitch Julien DO      bisacodyl  5 mg Oral Daily PRN Mitch Julien DO      bumetanide  1 mg Oral Daily Mitch Julien DO      insulin lispro  2-12 Units Subcutaneous TID AC Mayra Tolliver, DO      insulin lispro  2-12 Units Subcutaneous HS Mitch Julien DO      levalbuterol  1 25 mg Nebulization Q4H PRN Mitch Julien DO      levofloxacin  750 mg Oral Q24H Anup Espinosa MD      losartan  100 mg Oral Daily Mitch Julien DO      magnesium hydroxide  30 mL Oral Daily PRN Mitch Julien DO      metoprolol succinate  100 mg Oral BID Pro Ontiveros MD      morphine injection  4 mg Intravenous Q4H PRN Manford Lincoln, DO      ondansetron  4 mg Intravenous Q6H PRN Manford Lincoln, DO      oxyCODONE  5 mg Oral Q4H PRN Manford Lincoln, DO      polyethylene glycol  17 g Oral Daily PRN [facility], DO      pravastatin  20 mg Oral Daily With [email or web address], DO      rivaroxaban  20 mg Oral Daily With Breakfast Pro Ontiveros MD      senna-docusate sodium  1 tablet Oral HS [facility], DO          Today, Patient Was Seen By: Pro Ontiveros MD    ** Please Note: Dictation voice to text software may have been used in the creation of this document   **

## 2021-06-05 NOTE — ASSESSMENT & PLAN NOTE
Wt Readings from Last 3 Encounters:   06/05/21 (!) 138 kg (305 lb 1 9 oz)   05/31/21 (!) 152 kg (336 lb)   05/22/21 (!) 150 kg (331 lb 2 1 oz)       · Does not appear volume overloaded  · Daily weights and I&Os  · Low-salt diet  · I think she is actually over diuresing with potassium low and chloride being alone she has lost lot of weight since previous decrease her Bumex to 1 mg daily- sodium improving will supplement potassium doing well on Bumex

## 2021-06-05 NOTE — RESPIRATORY THERAPY NOTE
RT Protocol Note  Emily Hollingsworth 70 y o  female MRN: [id number]  Unit/Bed#: -01 Encounter: [id number]    Assessment    Principal Problem:    Cellulitis of right lower extremity  Active Problems:    Chronic atrial fibrillation (HCC)    Type 2 diabetes mellitus, without long-term current use of insulin (HCC)    Chronic diastolic heart failure (HCC)    BMI 50 0-59 9, adult (HCC)    Essential hypertension    ARIAN on CPAP    Elbow pain, left    Acute cystitis with hematuria      Home Pulmonary Medications:    Home Devices/Therapy: BiPAP/CPAP    Past Medical History:   Diagnosis Date    Coronary artery disease     Lymphedema     Lymphedema     Obesity     Sepsis ([facility] [address] )      Social History     Socioeconomic History    Marital status: Single     Spouse name: None    Number of children: None    Years of education: None    Highest education level: None   Occupational History    None   Social Needs    Financial resource strain: None    Food insecurity     Worry: None     Inability: None    Transportation needs     Medical: None     Non-medical: None   Tobacco Use    Smoking status: Never Smoker    Smokeless tobacco: Never Used   Substance and Sexual Activity    Alcohol use: Not Currently    Drug use: Not Currently    Sexual activity: Not Currently   Lifestyle    Physical activity     Days per week: None     Minutes per session: None    Stress: None   Relationships    Social connections     Talks on phone: None     Gets together: None     Attends [employer] service: None     Active member of club or organization: None     Attends meetings of clubs or organizations: None     Relationship status: None    Intimate partner violence     Fear of current or ex partner: None     Emotionally abused: None     Physically abused: None     Forced sexual activity: None   Other Topics Concern    None   Social History Narrative    None       Subjective         Objective    Physical Exam:   Assessment Type: (P) Assess only  General Appearance: (P) Awake, Alert  Respiratory Pattern: (P) Normal  Chest Assessment: (P) Chest expansion symmetrical  Bilateral Breath Sounds: (P) Clear, Diminished  O2 Device: (P) 3L    Vitals:  Blood pressure 119/59, pulse (P) 100, temperature 98 2 °F (36 8 °C), temperature source Oral, resp  rate (P) 20, height 5' 2" (1 575 m), weight (!) 138 kg (305 lb 1 9 oz), SpO2 93 %  Imaging and other studies: I have personally reviewed pertinent reports  O2 Device: (P) 3L     Plan    Respiratory Plan: Mild Distress pathway        Resp Comments: (P) O2 titrated to 3L  Pt states no home O2 use  Pt only complaint is increase secretions after removeal of bipap   Feel it may be due to nasal congestion

## 2021-06-05 NOTE — PLAN OF CARE
Problem: Potential for Falls  Goal: Patient will remain free of falls  Description: INTERVENTIONS:  - Assess patient frequently for physical needs  -  Identify cognitive and physical deficits and behaviors that affect risk of falls    -  [place] fall precautions as indicated by assessment   - Educate patient/family on patient safety including physical limitations  - Instruct patient to call for assistance with activity based on assessment  - Modify environment to reduce risk of injury  - Consider OT/PT consult to assist with strengthening/mobility  Outcome: Progressing

## 2021-06-05 NOTE — ASSESSMENT & PLAN NOTE
· With necrosis of the wound that is getting worse she recently completed course of antibiotics as she had also associated streptococcal bacteremia  · Patient's wound cultures are positive for Pseudomonas and Proteus both sensitive to Levaquin discussed with infectious disease patient can not be on Levaquin for 7-10 days will plan for 10 days    · Patient is status post debridement today of the right lower extremity necrotic wounds pod #2  · Surgery following regarding dressing changes awaiting placement to SNF

## 2021-06-05 NOTE — PROGRESS NOTES
Progress Note - General Surgery   Chavez Music 70 y o  female MRN: [id number]  Unit/Bed#: -01 Encounter: [id number]    Assessment:    - POD #2 s/p debridement of right leg wounds  - Pt reports significant drainage, dressing changed once last night  No pain in leg  - WBC normal 5 89 (6 41)  - Hgb 9 9 remains the same, stable  - Wound cultures from 6/1 show 4+ pseudomonas aeruginosa, 4+ proteus mirabilis, 2+ staphylococcus coagulase               negative, sensitivities available in chart  - Constipation     Plan:  - Awaiting SNF placement, medicine may discharge when available  - Follow up with General surgery in 1-2 weeks as an outpatient in the office   - Daily dressing change, xeroform cover with dry sterile gauze 4x4s, and wrapped with hamilton dressing right lower extremity  - Maintain urinary catheter   - Continue antibiotic selection per recommendation of ID  - Bowel regimen  - Diabetic diet as tolerated  - CPAP  - Medical management per the attending hospitalist provider     Subjective/Objective   Chief Complaint:  No new overnight events  Less drainage right lower extremity wounds  Subjective:  74-year-old obese, diabetic female underwent surgical debridement of necrotic wounds involving the right upper leg in the OR 2 days ago  No report of any fever or chill  She does not really admit any pain to the right lower extremity  Patient is frustrated concerning skilled nursing facility placement  She denies any new complaints at this time  She has not had a bowel movement in several days      Scheduled Meds:  Current Facility-Administered Medications   Medication Dose Route Frequency Provider Last Rate    acetaminophen  650 mg Oral Q6H PRN Peterson Na, DO      bisacodyl  5 mg Oral Daily PRN Peterson Na, DO      bumetanide  1 mg Oral Daily Peterson Na, DO      insulin lispro  2-12 Units Subcutaneous TID KATELYN Tolliver, DO      insulin lispro  2-12 Units Subcutaneous HS Justin Potter, DO      levalbuterol  1 25 mg Nebulization Q4H PRN Justin Potter, DO      levofloxacin  750 mg Oral Q24H Haim Lambert MD      losartan  100 mg Oral Daily Juvenal Potter, DO      magnesium hydroxide  30 mL Oral Daily PRN Justin Potter, DO      metoprolol succinate  100 mg Oral Daily Justin Potter, DO      metoprolol succinate  25 mg Oral QPM Juvenal Potter, DO      morphine injection  4 mg Intravenous Q4H PRN Justin Potter, DO      ondansetron  4 mg Intravenous Q6H PRN Justin Potter, DO      oxyCODONE  5 mg Oral Q4H PRN Justin Potter, DO      polyethylene glycol  17 g Oral Daily PRN Juvenal Potter, DO      pravastatin  20 mg Oral Daily With [employer], DO      rivaroxaban  20 mg Oral Daily With Breakfast Haim Lambert MD      senna-docusate sodium  1 tablet Oral HS Juvenal Potter, DO       Continuous Infusions:   PRN Meds:   acetaminophen    bisacodyl    levalbuterol    magnesium hydroxide    morphine injection    ondansetron    oxyCODONE    polyethylene glycol      Objective:     Blood pressure 140/85, pulse (!) 111, temperature (!) 97 4 °F (36 3 °C), resp  rate 21, height 5' 2" (1 575 m), weight (!) 138 kg (305 lb 1 9 oz), SpO2 97 %  ,Body mass index is 55 81 kg/m²  I/O       06/03 0701 - 06/04 0700 06/04 0701 - 06/05 0700 06/05 0701 - 06/06 0700    P  O  480 480     Total Intake(mL/kg) 480 (3 5) 480 (3 5)     Urine (mL/kg/hr) 725 (0 2) 1500 (0 5)     Total Output 725 1500     Net -245 -1020                Invasive Devices     Peripheral Intravenous Line            Peripheral IV 05/31/21 Right Antecubital 4 days          Drain            Urethral Catheter 16 Fr  4 days                Physical Exam:     /85   Pulse (!) 111   Temp (!) 97 4 °F (36 3 °C)   Resp 21   Ht 5' 2" (1 575 m)   Wt (!) 138 kg (305 lb 1 9 oz)   SpO2 97%   BMI 55 81 kg/m²     General appearance:  supine in bed,obese    She is alert and oriented, in no apparent distress  ENT clear  Oral mucosa pink and moist   Chest symmetric expansion  Lungs: clear to auscultation bilaterally  Heart: irregularly irregular rhythm and S1, S2 normal  Abdomen wide girth  Positive bowel sounds  Soft  Indwelling Holden catheter draining clear yellow urine  Extremities:  right lower extremity dressings were lifted for wound inspection  Wounds appear clean with minimal light yellow drainage  The dressings are all clean without any saturated drainage  Venous stasis changes bilateral lower legs  Scabbed wound medial lower right leg just above the knee joint  The right calf muscles are soft and supple  Edema bilateral lower extremities to the mid shin  Moves the toes well  She is confined to her bed  Lab, Imaging and other studies:  I have personally reviewed pertinent lab results    , CBC:   Lab Results   Component Value Date    WBC 5 89 06/05/2021    HGB 9 9 (L) 06/05/2021    HCT 33 9 (L) 06/05/2021    MCV 87 06/05/2021     06/05/2021    MCH 25 4 (L) 06/05/2021    MCHC 29 2 (L) 06/05/2021    RDW 16 5 (H) 06/05/2021    MPV 9 6 06/05/2021    NRBC 0 06/05/2021   , CMP:   Lab Results   Component Value Date    SODIUM 135 (L) 06/05/2021    K 3 5 06/05/2021     06/05/2021    CO2 28 06/05/2021    BUN 29 (H) 06/05/2021    CREATININE 0 84 06/05/2021    CALCIUM 8 9 06/05/2021    EGFR 69 06/05/2021     VTE Pharmacologic Prophylaxis:  Rivaroxaban  VTE Mechanical Prophylaxis: reason for no mechanical VTE prophylaxis Contraindicated in affected extremity     Carlos Eduardo Eddy PA-C

## 2021-06-05 NOTE — PLAN OF CARE
Problem: Potential for Falls  Goal: Patient will remain free of falls  Description: INTERVENTIONS:  - Assess patient frequently for physical needs  -  Identify cognitive and physical deficits and behaviors that affect risk of falls  -  [place] fall precautions as indicated by assessment   - Educate patient/family on patient safety including physical limitations  - Instruct patient to call for assistance with activity based on assessment  - Modify environment to reduce risk of injury  - Consider OT/PT consult to assist with strengthening/mobility  Outcome: Progressing     Problem: Prexisting or High Potential for Compromised Skin Integrity  Goal: Skin integrity is maintained or improved  Description: INTERVENTIONS:  - Identify patients at risk for skin breakdown  - Assess and monitor skin integrity  - Assess and monitor nutrition and hydration status  - Monitor labs   - Assess for incontinence   - Turn and reposition patient  - Assist with mobility/ambulation  - Relieve pressure over bony prominences  - Avoid friction and shearing  - Provide appropriate hygiene as needed including keeping skin clean and dry  - Evaluate need for skin moisturizer/barrier cream  - Collaborate with interdisciplinary team   - Patient/family teaching  - Consider wound care consult   Outcome: Progressing     Problem: Nutrition/Hydration-ADULT  Goal: Nutrient/Hydration intake appropriate for improving, restoring or maintaining nutritional needs  Description: Monitor and assess patient's nutrition/hydration status for malnutrition  Collaborate with interdisciplinary team and initiate plan and interventions as ordered  Monitor patient's weight and dietary intake as ordered or per policy  Utilize nutrition screening tool and intervene as necessary  Determine patient's food preferences and provide high-protein, high-caloric foods as appropriate       INTERVENTIONS:  - Monitor oral intake, urinary output, labs, and treatment plans  - Assess nutrition and hydration status and recommend course of action  - Evaluate amount of meals eaten  - Assist patient with eating if necessary   - Allow adequate time for meals  - Recommend/ encourage appropriate diets, oral nutritional supplements, and vitamin/mineral supplements  - Order, calculate, and assess calorie counts as needed  - Recommend, monitor, and adjust tube feedings and TPN/PPN based on assessed needs  - Assess need for intravenous fluids  - Provide specific nutrition/hydration education as appropriate  - Include patient/family/caregiver in decisions related to nutrition  Outcome: Progressing     Problem: PAIN - ADULT  Goal: Verbalizes/displays adequate comfort level or baseline comfort level  Description: Interventions:  - Encourage patient to monitor pain and request assistance  - Assess pain using appropriate pain scale  - Administer analgesics based on type and severity of pain and evaluate response  - Implement non-pharmacological measures as appropriate and evaluate response  - Consider cultural and social influences on pain and pain management  - Notify physician/advanced practitioner if interventions unsuccessful or patient reports new pain  Outcome: Progressing     Problem: INFECTION - ADULT  Goal: Absence or prevention of progression during hospitalization  Description: INTERVENTIONS:  - Assess and monitor for signs and symptoms of infection  - Monitor lab/diagnostic results  - Monitor all insertion sites, i e  indwelling lines, tubes, and drains  - Monitor endotracheal if appropriate and nasal secretions for changes in amount and color  - [place] appropriate cooling/warming therapies per order  - Administer medications as ordered  - Instruct and encourage patient and family to use good hand hygiene technique  - Identify and instruct in appropriate isolation precautions for identified infection/condition  Outcome: Progressing     Problem: SAFETY ADULT  Goal: Patient will remain free of falls  Description: INTERVENTIONS:  - Assess patient frequently for physical needs  -  Identify cognitive and physical deficits and behaviors that affect risk of falls  -  [place] fall precautions as indicated by assessment   - Educate patient/family on patient safety including physical limitations  - Instruct patient to call for assistance with activity based on assessment  - Modify environment to reduce risk of injury  - Consider OT/PT consult to assist with strengthening/mobility  Outcome: Progressing     Problem: DISCHARGE PLANNING  Goal: Discharge to home or other facility with appropriate resources  Description: INTERVENTIONS:  - Identify barriers to discharge w/patient and caregiver  - Arrange for needed discharge resources and transportation as appropriate  - Identify discharge learning needs (meds, wound care, etc )  - Arrange for interpretive services to assist at discharge as needed  - Refer to Case Management Department for coordinating discharge planning if the patient needs post-hospital services based on physician/advanced practitioner order or complex needs related to functional status, cognitive ability, or social support system  Outcome: Progressing     Problem: Knowledge Deficit  Goal: Patient/family/caregiver demonstrates understanding of disease process, treatment plan, medications, and discharge instructions  Description: Complete learning assessment and assess knowledge base    Interventions:  - Provide teaching at level of understanding  - Provide teaching via preferred learning methods  Outcome: Progressing     Problem: SKIN/TISSUE INTEGRITY - ADULT  Goal: Incision(s), wounds(s) or drain site(s) healing without S/S of infection  Description: INTERVENTIONS  - Assess and document risk factors for skin impairment   - Assess and document dressing, incision, wound bed, drain sites and surrounding tissue  - Consider nutrition services referral as needed  - Oral mucous membranes remain intact  - Provide patient/ family education  Outcome: Progressing     Problem: MUSCULOSKELETAL - ADULT  Goal: Maintain or return mobility to safest level of function  Description: INTERVENTIONS:  - Assess patient's ability to carry out ADLs; assess patient's baseline for ADL function and identify physical deficits which impact ability to perform ADLs (bathing, care of mouth/teeth, toileting, grooming, dressing, etc )  - Assess/evaluate cause of self-care deficits   - Assess range of motion  - Assess patient's mobility  - Assess patient's need for assistive devices and provide as appropriate  - Encourage maximum independence but intervene and supervise when necessary  - Involve family in performance of ADLs  - Assess for home care needs following discharge   - Consider OT consult to assist with ADL evaluation and planning for discharge  - Provide patient education as appropriate  Outcome: Progressing

## 2021-06-05 NOTE — DISCHARGE INSTRUCTIONS
Wound care upon discharge, apply Xeroform to wounds involving right lower extremity, cover with sterile dry 4 x 4 dressings and Jaqui wrap or gauze daily  Skin care plans:  1-Hydraguard to bilateral sacrum, buttock and heels BID and PRN  2-Elevate heels to offload pressure  3-Ehob cushion in chair when out of bed  4-Moisturize skin daily with skin nourishing cream   5-Turn/reposition q2h or when medically stable for pressure re-distribution on skin  6-surgical debridement 6/3 right lower extremity wounds    Follow-up as an outpatient after hospital discharge with General surgery in the office in 1-2 weeks  Skilled nursing facility will need to call for appointment [phone] and also patient will need transportation arrangements          Eileen Garcia PA-C

## 2021-06-05 NOTE — ASSESSMENT & PLAN NOTE
· Continue Toprol  the morning rate uncontrolled actually as the patient and she is supposed to be on Toprol  mg p o  B i d   Which I will switch to and continue her Xarelto

## 2021-06-06 NOTE — ASSESSMENT & PLAN NOTE
Lab Results   Component Value Date    HGBA1C 6 1 (H) 05/16/2021     · Patient's diabetes control her sugars are controlled discontinue his sugar checks and sliding scale and place patient back on her home dose of metformin 500 mg daily

## 2021-06-06 NOTE — PROGRESS NOTES
Progress Note - General Surgery   Marcelo Estrada 70 y o  female MRN: [id number]  Unit/Bed#: -01 Encounter: [id number]    Assessment:    - POD #3 s/p surgical debridement of right leg wounds  - Pt reports significant drainage, dressing changed once last night  No pain in leg  - WBC normal 5 89 yesterday, not repeated today  - Hgb 9 9 stable, CBC last performed yesterday  - Wound cultures from 6/1 show 4+ pseudomonas aeruginosa, 4+ proteus mirabilis, 2+ staphylococcus coagulase negative, sensitivities available in chart  - Constipation, patient feels that she has the urge to move her bowels but still has not     Plan:  - Awaiting SNF placement, medicine may discharge when available  - Follow up with General surgery in 1-2 weeks as an outpatient in the office   - Continue daily dressing change, xeroform cover with dry sterile gauze 4x4s, and wrapped with hamilton dressing   right lower extremity  - Maintain urinary catheter   - Continue antibiotic selection per recommendation of ID  - Bowel regimen  - Diabetic diet as tolerated  - CPAP  - Medical management per the attending hospitalist provider       Subjective/Objective   Chief Complaint:  Patient offers no new complaints at this time  Subjective:  26-year-old white female underwent extensive surgical debridement of necrotic wounds of the right lower extremity now 3 days ago  Daily wound dressing changes provided, covered with Xeroform and dry gauze dressings  Currently awaiting skilled nursing facility placement  She still has not moved her bowels, she has the urge for bowel movement and is passing gas  Currently on bowel regimen      Scheduled Meds:  Current Facility-Administered Medications   Medication Dose Route Frequency Provider Last Rate    acetaminophen  650 mg Oral Q6H PRN Balderas Yadira, DO      bisacodyl  5 mg Oral Daily PRN Balderas Yadira, DO      bumetanide  1 mg Oral Daily Balderas Yadira, DO      insulin lispro  2-12 Units Subcutaneous TID AC Mayra Tolliver, DO      insulin lispro  2-12 Units Subcutaneous HS Energytte Siemens, DO      levalbuterol  1 25 mg Nebulization Q4H PRN Energytte Siemens, DO      levofloxacin  750 mg Oral Q24H Lizz Mullen MD      losartan  100 mg Oral Daily Margurette Siemens, DO      magnesium hydroxide  30 mL Oral Daily PRN Energytte Siemens, DO      metoprolol succinate  100 mg Oral BID Lizz Mullen MD      morphine injection  4 mg Intravenous Q4H PRN Algal Scientificurette Siemens, DO      ondansetron  4 mg Intravenous Q6H PRN Margurette Siemens, DO      oxyCODONE  5 mg Oral Q4H PRN Energytte Siemens, DO      polyethylene glycol  17 g Oral Daily PRN Margurette Siemens, DO      pravastatin  20 mg Oral Daily With eTobb, DO      rivaroxaban  20 mg Oral Daily With Breakfast Lizz Mullen MD      senna-docusate sodium  1 tablet Oral HS Margurette Siemens, DO       Continuous Infusions:   PRN Meds:   acetaminophen    bisacodyl    levalbuterol    magnesium hydroxide    morphine injection    ondansetron    oxyCODONE    polyethylene glycol    Objective:     Blood pressure 138/93, pulse 91, temperature 97 9 °F (36 6 °C), resp  rate 21, height 5' 2" (1 575 m), weight (!) 138 kg (303 lb 9 2 oz), SpO2 96 %  ,Body mass index is 55 52 kg/m²  Intake/Output Summary (Last 24 hours) at 6/6/2021 1014  Last data filed at 6/6/2021 0541  Gross per 24 hour   Intake 240 ml   Output 1600 ml   Net -1360 ml       Invasive Devices     Peripheral Intravenous Line            Peripheral IV 06/05/21 Dorsal (posterior); Right Wrist less than 1 day          Drain            Urethral Catheter 16 Fr  5 days                Physical Exam:     Awake, fully oriented  General body habitus is significantly obese  Skin warm dry to touch  ENT clear  Oral mucosa pink and moist   Heart regular rate and rhythm  Lungs clear    Extremities right lower extremity dressings were lifted for wound inspection this morning  Minimal light yellow serous drainage noted on dressings  Thigh and calf muscles are soft and supple  Bilateral lower extremity edema 1 to 2+ present  Moves the toes well  Venous stasis discoloration noted in both lower extremities  Lab, Imaging and other studies:I have personally reviewed pertinent lab results  , CBC: No results found for: WBC, HGB, HCT, MCV, PLT, ADJUSTEDWBC, MCH, MCHC, RDW, MPV, NRBC, CMP: No results found for: SODIUM, K, CL, CO2, ANIONGAP, BUN, CREATININE, GLUCOSE, CALCIUM, AST, ALT, ALKPHOS, PROT, BILITOT, EGFR  VTE Pharmacologic Prophylaxis:  Rivaroxaban  VTE Mechanical Prophylaxis: reason for no mechanical VTE prophylaxis Not on affected right lower extremity because of extensive wounds       Maxwell Rhodes PA-C

## 2021-06-06 NOTE — RESPIRATORY THERAPY NOTE
RT Protocol Note  Boni Farfan 70 y o  female MRN: [id number]  Unit/Bed#: -01 Encounter: [id number]    Assessment    Principal Problem:    Cellulitis of right lower extremity  Active Problems:    Chronic atrial fibrillation (HCC)    Type 2 diabetes mellitus, without long-term current use of insulin (HCC)    Chronic diastolic heart failure (HCC)    BMI 50 0-59 9, adult ([facility])    Essential hypertension    ARIAN on CPAP    Elbow pain, left    Acute cystitis with hematuria      Home Pulmonary Medications:  None  Home Devices/Therapy: BiPAP/CPAP    Past Medical History:   Diagnosis Date    Coronary artery disease     Lymphedema     Lymphedema     Obesity     Sepsis ([facility] [address] )      Social History     Socioeconomic History    Marital status: Single     Spouse name: None    Number of children: None    Years of education: None    Highest education level: None   Occupational History    None   Social Needs    Financial resource strain: None    Food insecurity     Worry: None     Inability: None    Transportation needs     Medical: None     Non-medical: None   Tobacco Use    Smoking status: Never Smoker    Smokeless tobacco: Never Used   Substance and Sexual Activity    Alcohol use: Not Currently    Drug use: Not Currently    Sexual activity: Not Currently   Lifestyle    Physical activity     Days per week: None     Minutes per session: None    Stress: None   Relationships    Social connections     Talks on phone: None     Gets together: None     Attends Worship service: None     Active member of club or organization: None     Attends meetings of clubs or organizations: None     Relationship status: None    Intimate partner violence     Fear of current or ex partner: None     Emotionally abused: None     Physically abused: None     Forced sexual activity: None   Other Topics Concern    None   Social History Narrative    None       Subjective    Subjective Data: Pt resting at this time with no signs of respiratory distress noted  Objective    Physical Exam:   O2 Device: 2LNC    Vitals:  Blood pressure 113/73, pulse 100, temperature 98 6 °F (37 °C), temperature source Oral, resp  rate 16, height 5' 2" (1 575 m), weight (!) 138 kg (303 lb 9 2 oz), SpO2 96 %  Imaging and other studies: I have personally reviewed pertinent reports  O2 Device: 2LNC     Plan    Respiratory Plan: No distress/Pulmonary history, Discontinue Protocol        Resp Comments: Pt stable on nasal cannula  No neb treatments given in the past 48 hours  Will discontinue respiratory protocol

## 2021-06-06 NOTE — PLAN OF CARE
Problem: Potential for Falls  Goal: Patient will remain free of falls  Description: INTERVENTIONS:  - Assess patient frequently for physical needs  -  Identify cognitive and physical deficits and behaviors that affect risk of falls  -  [place] fall precautions as indicated by assessment   - Educate patient/family on patient safety including physical limitations  - Instruct patient to call for assistance with activity based on assessment  - Modify environment to reduce risk of injury  - Consider OT/PT consult to assist with strengthening/mobility  Outcome: Progressing     Problem: Prexisting or High Potential for Compromised Skin Integrity  Goal: Skin integrity is maintained or improved  Description: INTERVENTIONS:  - Identify patients at risk for skin breakdown  - Assess and monitor skin integrity  - Assess and monitor nutrition and hydration status  - Monitor labs   - Assess for incontinence   - Turn and reposition patient  - Assist with mobility/ambulation  - Relieve pressure over bony prominences  - Avoid friction and shearing  - Provide appropriate hygiene as needed including keeping skin clean and dry  - Evaluate need for skin moisturizer/barrier cream  - Collaborate with interdisciplinary team   - Patient/family teaching  - Consider wound care consult   Outcome: Progressing     Problem: Nutrition/Hydration-ADULT  Goal: Nutrient/Hydration intake appropriate for improving, restoring or maintaining nutritional needs  Description: Monitor and assess patient's nutrition/hydration status for malnutrition  Collaborate with interdisciplinary team and initiate plan and interventions as ordered  Monitor patient's weight and dietary intake as ordered or per policy  Utilize nutrition screening tool and intervene as necessary  Determine patient's food preferences and provide high-protein, high-caloric foods as appropriate       INTERVENTIONS:  - Monitor oral intake, urinary output, labs, and treatment plans  - Assess nutrition and hydration status and recommend course of action  - Evaluate amount of meals eaten  - Assist patient with eating if necessary   - Allow adequate time for meals  - Recommend/ encourage appropriate diets, oral nutritional supplements, and vitamin/mineral supplements  - Order, calculate, and assess calorie counts as needed  - Recommend, monitor, and adjust tube feedings and TPN/PPN based on assessed needs  - Assess need for intravenous fluids  - Provide specific nutrition/hydration education as appropriate  - Include patient/family/caregiver in decisions related to nutrition  Outcome: Progressing     Problem: PAIN - ADULT  Goal: Verbalizes/displays adequate comfort level or baseline comfort level  Description: Interventions:  - Encourage patient to monitor pain and request assistance  - Assess pain using appropriate pain scale  - Administer analgesics based on type and severity of pain and evaluate response  - Implement non-pharmacological measures as appropriate and evaluate response  - Consider cultural and social influences on pain and pain management  - Notify physician/advanced practitioner if interventions unsuccessful or patient reports new pain  Outcome: Progressing     Problem: INFECTION - ADULT  Goal: Absence or prevention of progression during hospitalization  Description: INTERVENTIONS:  - Assess and monitor for signs and symptoms of infection  - Monitor lab/diagnostic results  - Monitor all insertion sites, i e  indwelling lines, tubes, and drains  - Monitor endotracheal if appropriate and nasal secretions for changes in amount and color  - [place] appropriate cooling/warming therapies per order  - Administer medications as ordered  - Instruct and encourage patient and family to use good hand hygiene technique  - Identify and instruct in appropriate isolation precautions for identified infection/condition  Outcome: Progressing     Problem: SAFETY ADULT  Goal: Patient will remain free of falls  Description: INTERVENTIONS:  - Assess patient frequently for physical needs  -  Identify cognitive and physical deficits and behaviors that affect risk of falls  -  [place] fall precautions as indicated by assessment   - Educate patient/family on patient safety including physical limitations  - Instruct patient to call for assistance with activity based on assessment  - Modify environment to reduce risk of injury  - Consider OT/PT consult to assist with strengthening/mobility  Outcome: Progressing     Problem: DISCHARGE PLANNING  Goal: Discharge to home or other facility with appropriate resources  Description: INTERVENTIONS:  - Identify barriers to discharge w/patient and caregiver  - Arrange for needed discharge resources and transportation as appropriate  - Identify discharge learning needs (meds, wound care, etc )  - Arrange for interpretive services to assist at discharge as needed  - Refer to Case Management Department for coordinating discharge planning if the patient needs post-hospital services based on physician/advanced practitioner order or complex needs related to functional status, cognitive ability, or social support system  Outcome: Progressing     Problem: Knowledge Deficit  Goal: Patient/family/caregiver demonstrates understanding of disease process, treatment plan, medications, and discharge instructions  Description: Complete learning assessment and assess knowledge base    Interventions:  - Provide teaching at level of understanding  - Provide teaching via preferred learning methods  Outcome: Progressing     Problem: SKIN/TISSUE INTEGRITY - ADULT  Goal: Incision(s), wounds(s) or drain site(s) healing without S/S of infection  Description: INTERVENTIONS  - Assess and document risk factors for skin impairment   - Assess and document dressing, incision, wound bed, drain sites and surrounding tissue  - Consider nutrition services referral as needed  - Oral mucous membranes remain intact  - Provide patient/ family education  Outcome: Progressing     Problem: MUSCULOSKELETAL - ADULT  Goal: Maintain or return mobility to safest level of function  Description: INTERVENTIONS:  - Assess patient's ability to carry out ADLs; assess patient's baseline for ADL function and identify physical deficits which impact ability to perform ADLs (bathing, care of mouth/teeth, toileting, grooming, dressing, etc )  - Assess/evaluate cause of self-care deficits   - Assess range of motion  - Assess patient's mobility  - Assess patient's need for assistive devices and provide as appropriate  - Encourage maximum independence but intervene and supervise when necessary  - Involve family in performance of ADLs  - Assess for home care needs following discharge   - Consider OT consult to assist with ADL evaluation and planning for discharge  - Provide patient education as appropriate  Outcome: Progressing

## 2021-06-06 NOTE — ASSESSMENT & PLAN NOTE
Wt Readings from Last 3 Encounters:   06/06/21 (!) 138 kg (303 lb 9 2 oz)   05/31/21 (!) 152 kg (336 lb)   05/22/21 (!) 150 kg (331 lb 2 1 oz)       · Does not appear volume overloaded  · Daily weights and I&Os  · Low-salt diet  · I think she is actually over diuresing with potassium low and chloride being alone she has lost lot of weight since previous decrease her Bumex to 1 mg daily- sodium improving will supplement potassium doing well on Bumex

## 2021-06-06 NOTE — ASSESSMENT & PLAN NOTE
· Rate is controlled to continue her Toprol  mg p o  B i d  Which I will switch to and continue her Xarelto

## 2021-06-06 NOTE — ASSESSMENT & PLAN NOTE
· With necrosis of the wound that is getting worse she recently completed course of antibiotics as she had also associated streptococcal bacteremia  · Patient's wound cultures are positive for Pseudomonas and Proteus both sensitive to Levaquin discussed with infectious disease patient can not be on Levaquin for 7-10 days will plan for 10 days    · Patient is status post debridement today of the right lower extremity necrotic wounds pod #3  · Surgery following regarding dressing changes awaiting placement to SNF

## 2021-06-06 NOTE — PROGRESS NOTES
[address]  Progress Note - Carly Spicer 1945, 70 y o  female MRN: [id number]  Unit/Bed#: -01 Encounter: [id number]  Primary Care Provider: Kwame Yadav MD   Date and time admitted to hospital: 5/31/2021 11:43 PM    * Cellulitis of right lower extremity  Assessment & Plan  · With necrosis of the wound that is getting worse she recently completed course of antibiotics as she had also associated streptococcal bacteremia  · Patient's wound cultures are positive for Pseudomonas and Proteus both sensitive to Levaquin discussed with infectious disease patient can not be on Levaquin for 7-10 days will plan for 10 days    · Patient is status post debridement today of the right lower extremity necrotic wounds pod #3  · Surgery following regarding dressing changes awaiting placement to [facility]    Acute cystitis with hematuria  Assessment & Plan  · Urinalysis: innumerable bacteria, WBC 20-30, trace leukocytes  · Holden catheter in place, insertion date 05/21/2021-will request removal and replacement  · Changed to Levaquin    Elbow pain, left  Assessment & Plan  · Presents with worsening of left elbow pain, was seen ER 5/30 also I did do an x-ray of the shoulder which is not show any fracture but severe arthritis as discussed with ortho this suspected is all secondary to the contusion she would need OT therapy  · Pain control  · Pain improving and she is able to move it     ARIAN on CPAP  Assessment & Plan  · Continue CPAP at bedtime    Essential hypertension  Assessment & Plan  · BP reviewed and acceptable  · Continue losartan, metoprolol  · Monitor blood pressure    BMI 50 0-59 9, adult (HCC)  Assessment & Plan  · Weight loss counseling    Chronic diastolic heart failure [facility])  Assessment & Plan  Wt Readings from Last 3 Encounters:   06/06/21 (!) 138 kg (303 lb 9 2 oz)   05/31/21 (!) 152 kg (336 lb)   05/22/21 (!) 150 kg (331 lb 2 1 oz)       · Does not appear volume overloaded  · Daily weights and I&Os  · Low-salt diet  · I think she is actually over diuresing with potassium low and chloride being alone she has lost lot of weight since previous decrease her Bumex to 1 mg daily- sodium improving will supplement potassium doing well on Bumex      Type 2 diabetes mellitus, without long-term current use of insulin (HCC)  Assessment & Plan    Lab Results   Component Value Date    HGBA1C 6 1 (H) 2021     · Patient's diabetes control her sugars are controlled discontinue his sugar checks and sliding scale and place patient back on her home dose of metformin 500 mg daily    Chronic atrial fibrillation (HCC)  Assessment & Plan  · Rate is controlled to continue her Toprol  mg p o  B i d  Which I will switch to and continue her Xarelto      VTE Pharmacologic Prophylaxis:   Pharmacologic: Rivaroxaban (Xarelto)  Mechanical VTE Prophylaxis in Place: Yes    Patient Centered Rounds: I have performed bedside rounds with nursing staff today  Discussions with Specialists or Other Care Team Provider:  I discussed with surgery team    Education and Discussions with Family / Patient:  Patient    Time Spent for Care: 30 minutes  More than 50% of total time spent on counseling and coordination of care as described above  Current Length of Stay: 5 day(s)    Current Patient Status: Inpatient   Certification Statement: The patient will continue to require additional inpatient hospital stay due to Placement    Discharge Plan:  Patient is medically clear to be discharged awaiting placement    Code Status: Level 1 - Full Code      Subjective:   Patient seen and examined no complaints    Objective:     Vitals:   Temp (24hrs), Av 6 °F (36 4 °C), Min:97 °F (36 1 °C), Max:98 2 °F (36 8 °C)    Temp:  [97 °F (36 1 °C)-98 2 °F (36 8 °C)] 97 9 °F (36 6 °C)  HR:  [] 87  Resp:  [20-21] 21  BP: (113-138)/(59-93) 138/93  SpO2:  [91 %-97 %] 97 %  Body mass index is 55 52 kg/m²       Input and Output Summary (last 24 hours): Intake/Output Summary (Last 24 hours) at 6/6/2021 1233  Last data filed at 6/6/2021 0541  Gross per 24 hour   Intake 240 ml   Output 850 ml   Net -610 ml       Physical Exam:     Physical Exam  Vitals signs and nursing note reviewed  Constitutional:       General: She is not in acute distress  Appearance: She is well-developed  She is obese  HENT:      Head: Normocephalic and atraumatic  Eyes:      Conjunctiva/sclera: Conjunctivae normal    Neck:      Musculoskeletal: Neck supple  Cardiovascular:      Rate and Rhythm: Normal rate and regular rhythm  Heart sounds: No murmur  Pulmonary:      Effort: Pulmonary effort is normal  No respiratory distress  Breath sounds: Normal breath sounds  No wheezing or rales  Abdominal:      General: There is no distension  Palpations: Abdomen is soft  Tenderness: There is no abdominal tenderness  Musculoskeletal:         General: Swelling (At baseline) present  Skin:     General: Skin is warm and dry  Neurological:      General: No focal deficit present  Mental Status: She is alert and oriented to person, place, and time  Mental status is at baseline             Additional Data:     Labs:    Results from last 7 days   Lab Units 06/05/21  0553 06/04/21  0551 06/02/21  0634   WBC Thousand/uL 5 89 6 41 6 82   HEMOGLOBIN g/dL 9 9* 9 9* 9 8*   HEMATOCRIT % 33 9* 33 8* 32 3*   PLATELETS Thousands/uL 266 262 247   BANDS PCT %  --  2  --    NEUTROS PCT %  --   --  75   LYMPHS PCT %  --   --  10*   LYMPHO PCT %  --  11*  --    MONOS PCT %  --   --  9   MONO PCT %  --  6  --    EOS PCT %  --  5 4     Results from last 7 days   Lab Units 06/05/21  0553   SODIUM mmol/L 135*   POTASSIUM mmol/L 3 5   CHLORIDE mmol/L 103   CO2 mmol/L 28   BUN mg/dL 29*   CREATININE mg/dL 0 84   ANION GAP mmol/L 4   CALCIUM mg/dL 8 9   GLUCOSE RANDOM mg/dL 117     Results from last 7 days   Lab Units 05/31/21  2359   INR  2 70*     Results from last 7 days   Lab Units 06/06/21  1054 06/06/21  0722 06/05/21  2057 06/05/21  1552 06/05/21  1120 06/05/21  0737 06/04/21  2046 06/04/21  1600 06/04/21  1121 06/04/21  0733 06/04/21  0723 06/03/21  2111   POC GLUCOSE mg/dl 139 115 138 135 151* 127 151* 198* 119 117 122 93         Results from last 7 days   Lab Units 06/02/21  0634 06/01/21  0110 05/31/21  2359   LACTIC ACID mmol/L  --   --  1 1   PROCALCITONIN ng/ml 0 09 0 09  --            * I Have Reviewed All Lab Data Listed Above  * Additional Pertinent Lab Tests Reviewed: No New Labs Available For Today    Imaging:    Imaging Reports Reviewed Today Include: none today  Imaging Personally Reviewed by Myself Includes:      Recent Cultures (last 7 days):     Results from last 7 days   Lab Units 06/01/21  0005 06/01/21  0002 05/31/21  2359   BLOOD CULTURE  No Growth After 5 Days  --  No Growth After 5 Days     GRAM STAIN RESULT  No polys seen*  3+ Gram negative rods*  --   --    URINE CULTURE   --  >100,000 cfu/ml Pseudomonas aeruginosa*  10,000-19,000 cfu/ml Klebsiella pneumoniae*  --    WOUND CULTURE  4+ Growth of Pseudomonas aeruginosa*  4+ Growth of Proteus mirabilis*  2+ Growth of Staphylococcus coagulase negative*  --   --        Last 24 Hours Medication List:   Current Facility-Administered Medications   Medication Dose Route Frequency Provider Last Rate    acetaminophen  650 mg Oral Q6H PRN Caesarene Binning, DO      bisacodyl  5 mg Oral Daily PRN Thomasene Binning, DO      bumetanide  1 mg Oral Daily Thomasene Binning, DO      levalbuterol  1 25 mg Nebulization Q4H PRN Priti Landers, DO      levofloxacin  750 mg Oral Q24H Charanjit Soto MD      losartan  100 mg Oral Daily Thomasene Binning, DO      magnesium hydroxide  30 mL Oral Daily PRN Caesarene Binning, DO      [START ON 6/7/2021] metFORMIN  500 mg Oral Daily With Breakfast Charanjit Soto MD      metoprolol succinate  100 mg Oral BID Charanjit Soto MD      morphine injection  4 mg Intravenous Q4H PRN [place], DO      ondansetron  4 mg Intravenous Q6H PRN [facility], DO      oxyCODONE  5 mg Oral Q4H PRN [facility], DO      polyethylene glycol  17 g Oral Daily PRN [facility] [place], DO      pravastatin  20 mg Oral Daily With [employer], DO      rivaroxaban  20 mg Oral Daily With Breakfast Pro Ontiveros MD      senna-docusate sodium  1 tablet Oral HS [facility], DO          Today, Patient Was Seen By: Pro Ontiveros MD    ** Please Note: Dictation voice to text software may have been used in the creation of this document   **

## 2021-06-07 NOTE — DISCHARGE SUMMARY
[address]  Discharge- Everton Bars 1945, 70 y o  female MRN: [id number]  Unit/Bed#: -01 Encounter: [id number]  Primary Care Provider: Valery Fall MD   Date and time admitted to hospital: 5/31/2021 11:43 PM    * Cellulitis of right lower extremity  Assessment & Plan  · With necrosis of the wound that is getting worse she recently completed course of antibiotics as she had also associated streptococcal bacteremia  · Patient's wound cultures are positive for Pseudomonas and Proteus both sensitive to Levaquin discussed with infectious disease patient can not be on Levaquin for 7-10 days will plan for 10 days    · Patient is status post debridement today of the right lower extremity necrotic wounds pod #4  · Surgery following regarding dressing changes awaiting placement to SNF patient will be discharged to [facility] today  · Dressing was changed today by General surgery the wound looks good    Acute cystitis with hematuria  Assessment & Plan  · Urinalysis: innumerable bacteria, WBC 20-30, trace leukocytes  · Holden catheter in place, insertion date 05/21/2021-will request removal and replacement  · Changed to Levaquin    Elbow pain, left  Assessment & Plan  · Presents with worsening of left elbow pain, was seen ER 5/30 also I did do an x-ray of the shoulder which is not show any fracture but severe arthritis as discussed with ortho this suspected is all secondary to the contusion she would need OT therapy  · Pain control  · Pain improving and she is able to move it     ARIAN on CPAP  Assessment & Plan  · Continue CPAP at bedtime    Essential hypertension  Assessment & Plan  · Her blood pressure is on soft side times a discontinue losartan so the Toprol XL COVID twice a day    BMI 50 0-59 9, adult ([facility] [address] )  Assessment & Plan  · Weight loss counseling    Chronic diastolic heart failure ([facility] [address] )  Assessment & Plan  Wt Readings from Last 3 Encounters:   06/07/21 (!) 138 kg (303 lb 9 2 oz)   05/31/21 (!) 152 kg (336 lb)   05/22/21 (!) 150 kg (331 lb 2 1 oz)       · Does not appear volume overloaded  · Daily weights and I&Os  · Low-salt diet  · I think she is actually over diuresing with potassium low and chloride being alone she has lost lot of weight since previous decrease her Bumex to 1 mg daily- sodium improving will supplement potassium doing well on Bumex doing well      Type 2 diabetes mellitus, without long-term current use of insulin (HCC)  Assessment & Plan    Lab Results   Component Value Date    HGBA1C 6 1 (H) 05/16/2021     · Patient's sugars are controlled continue her metformin 500 mg daily no need for Accu-Cheks    Chronic atrial fibrillation (HCC)  Assessment & Plan  · Rate is controlled to continue her Toprol  mg p o  B i d    and continue her Xarelto unfortunately the patient did not receive a Toprol XL last night secondary to suboptimal blood pressure will discontinue losartan to allow Toprol XL to go in twice a day        Discharging Physician / Practitioner: Dary Skinner MD  PCP: Viji Ceron MD  Admission Date:   Admission Orders (From admission, onward)     Ordered        06/01/21 0051  Inpatient Admission  Once                   Discharge Date: 06/07/21    Resolved Problems  Date Reviewed: 6/7/2021          Resolved    Sepsis ([address] ) 6/3/2021     Resolved by  Dary Skinner MD    Basal cell carcinoma [facility]) of pretibial region of right lower extremity 6/2/2021     Resolved by  Dary Skinner MD    Hypomagnesemia 6/3/2021     Resolved by  Dary Skinner MD          Consultations During Hospital Stay:  · Infectious disease  · General surgery  · Orthopedic surgery    Procedures Performed:     · S/p 6/3/21-DEBRIDEMENT WOUND ([address]) (Left)    Significant Findings / Test Results:     · X-ray of the elbow left- negative for any fracture just DJD and intra-articular loose bodies are noted  · X-ray left wrist- DJD  · Chest x-ray negative  · X-ray of the left shoulder- severe DJD    Incidental Findings:   · None     Test Results Pending at Discharge (will require follow up): · None     Outpatient Tests Requested:  · None    Complications:  None    Reason for Admission:  Left elbow pain and right thigh wound    Hospital Course:     Brenda Dwyer is a 70 y o  female patient who originally presented to the hospital on 5/31/2021 due to her left elbow contusion status post the being pulled at the nursing home  She also has ongoing right lower extremity thigh necrotic wound is getting worsen growing Pseudomonas  Re-evaluation of surgery done that she needs to go for debridement for which she did on 06/03 initially started on antibiotics IV with Infectious Disease consultation antibiotics were tailored down to Levaquin for which she was continued for total of 10 days  Patient Bumex has been decreased to 1 mg daily she was over diuresing as she lost a lot a weight from previous  Labs were stable and she is medically clear to be discharged to SNF  With discharge instructions provided by General surgery with follow-ups and dressing changes  Please see above list of diagnoses and related plan for additional information  Condition at Discharge: stable     Discharge Day Visit / Exam:     * Please refer to separate progress note for these details *    Discussion with Family: no    Discharge instructions/Information to patient and family:   See after visit summary for information provided to patient and family  Provisions for Follow-Up Care:  See after visit summary for information related to follow-up care and any pertinent home health orders  Disposition:     [address] SNF:   · Not Applicable to this Patient - Not Applicable to this Patient    Planned Readmission: no      Discharge Statement:  I spent >35 minutes discharging the patient  This time was spent on the day of discharge   I had direct contact with the patient on the day of discharge  Greater than 50% of the total time was spent examining patient, answering all patient questions, arranging and discussing plan of care with patient as well as directly providing post-discharge instructions  Additional time then spent on discharge activities  Discharge Medications:  See after visit summary for reconciled discharge medications provided to patient and family        ** Please Note: This note has been constructed using a voice recognition system **

## 2021-06-07 NOTE — ASSESSMENT & PLAN NOTE
Lab Results   Component Value Date    HGBA1C 6 1 (H) 05/16/2021     · Patient's sugars are controlled continue her metformin 500 mg daily no need for Accu-Cheks

## 2021-06-07 NOTE — ASSESSMENT & PLAN NOTE
· Her blood pressure is on soft side times a discontinue losartan so the Toprol XL COVID twice a day

## 2021-06-07 NOTE — ASSESSMENT & PLAN NOTE
· With necrosis of the wound that is getting worse she recently completed course of antibiotics as she had also associated streptococcal bacteremia  · Patient's wound cultures are positive for Pseudomonas and Proteus both sensitive to Levaquin discussed with infectious disease patient can not be on Levaquin for 7-10 days will plan for 10 days    · Patient is status post debridement today of the right lower extremity necrotic wounds pod #4  · Surgery following regarding dressing changes awaiting placement to SNF  · Dressing was changed today by General surgery the wound looks good

## 2021-06-07 NOTE — PHYSICAL THERAPY NOTE
PHYSICAL THERAPY TREATMENT NOTE  NAME:  Anni Seaman  DATE: 06/07/21    Length Of Stay: 6  Performed at least 2 patient identifiers during session: Name and Birthday    TREATMENT:    06/07/21 1022   PT Last Visit   PT Visit Date 06/07/21   Note Type   Note Type Treatment   Pain Assessment   Pain Assessment Tool FLACC   Pain Score No Pain   Pain Rating: FLACC (Rest) - Face 0   Pain Rating: FLACC (Rest) - Legs 0   Pain Rating: FLACC (Rest) - Activity 0   Pain Rating: FLACC (Rest) - Cry 0   Pain Rating: FLACC (Rest) - Consolability 0   Score: FLACC (Rest) 0   Pain Rating: FLACC (Activity) - Face 1   Pain Rating: FLACC (Activity) - Legs 0   Pain Rating: FLACC (Activity) - Activity 1   Pain Rating: FLACC (Activity) - Cry 0   Pain Rating: FLACC (Activity) - Consolability 0   Score: FLACC (Activity) 2   Restrictions/Precautions   Other Precautions Chair Alarm; Bed Alarm; Fall Risk;Pain;O2   General   Chart Reviewed Yes   Family/Caregiver Present No   Cognition   Overall Cognitive Status WFL   Arousal/Participation Alert; Responsive   Attention Attends with cues to redirect   Orientation Level Oriented X4   Memory Within functional limits   Following Commands Follows multistep commands without difficulty   Subjective   Subjective "I'll try to walk today but I get tired quick"   Bed Mobility   Supine to Sit 3  Moderate assistance   Additional items Assist x 2;HOB elevated; Increased time required;LE management   Additional Comments HOB elevated approx 45 degrees with modA x2 assistance with trunk and LE management     Transfers   Sit to Stand   (Stand by)   Additional items Increased time required;Verbal cues   Stand to Sit   (Stand by)   Additional items Increased time required;Verbal cues   Stand pivot   (Stand by)   Additional items Increased time required;Verbal cues   Additional Comments Pt performed all transfers with RW for UE support and steadying assist and v/c for correct hand placement on all transfers to improve safety and balance  Ambulation/Elevation   Gait pattern Improper Weight shift;Narrow EDSON; Forward Flexion; Short stride; Step to;Excessively slow   Gait Assistance   (Stand by assist )   Additional items Verbal cues   Assistive Device Rolling walker   Distance Pt ambulated with RW and stand by assist with chair follow for 6'x1 with v/c for correct RW management, increase stride length, and appropriate gait pattern  Pt limited 2/2 to decreased endurance, fatigue, and SOB that dissipates with rest breaks  Balance   Static Sitting Fair +   Dynamic Sitting Fair   Static Standing Fair   Dynamic Standing Fair -   Ambulatory Fair -   Endurance Deficit   Endurance Deficit Yes   Endurance Deficit Description Pt on 2L of O2   Activity Tolerance   Activity Tolerance Patient limited by fatigue;Patient limited by pain   Medical Staff Made Aware PT Jakob Cha, FRANCISCA Rosas   Nurse Made Aware RN Oziel Smith   Exercises   Balance training    (Sit<>stand x1 )   Assessment   Prognosis Fair   Problem List Decreased strength;Decreased range of motion;Decreased endurance; Impaired balance;Decreased coordination;Decreased skin integrity;Obesity;Pain   Barriers to Discharge Inaccessible home environment   Barriers to Discharge Comments Pt currently requires some assistance with bed mobility   Goals   Patient Goals Return home   PT Treatment Day 1   Plan   Treatment/Interventions Functional transfer training;LE strengthening/ROM; Elevations; Therapeutic exercise; Endurance training;Patient/family training;Bed mobility;Gait training   Progress Progressing toward goals   PT Frequency Other (Comment)   Recommendation   PT Discharge Recommendation Post acute rehabilitation services   Equipment Recommended Other (Comment)  (TBD by rehab)   PT - OK to Discharge Yes   Additional Comments When medically cleared   Additional Comments 2 Pt completed one sit<>stand as per flowsheet due to increased fatigue by end of treatment with v/c for hand placement while attempting to sit  Pt seated in recliner with all needs within reach   [address] Mobility Inpatient   Turning in Bed Without Bedrails 3   Lying on Back to Sitting on Edge of Flat Bed 3   Moving Bed to Chair 3   Standing Up From Chair 3   Walk in Room 3   Climb 3-5 Stairs 2   Basic Mobility Inpatient Raw Score 17   Basic Mobility Standardized Score 39 67       The patient's AM-PAC Basic Mobility Inpatient Short Form Raw Score is 17, Standardized Score is 39 67  A standardized score less than 42 9 suggests the patient may benefit from discharge to post-acute rehabilitation services  Please also refer to the recommendation of the Physical Therapist for safe discharge planning  Pt seen for PT treatment session this date with interventions consisting of bed mobility, transfers, ambulation, and ther ex  Pt agreeable to PT treatment session upon arrival  In comparison to previous session, pt with improvements in ambulation distance and assistance level with all functional mobility but continues to be limited 2/2 to SOB, and endurance deficits that resolve with frequent rest breaks  Frequent v/c for hand placement with all transfers and ambulation needed for increased safety and balance  Continue to recommend STR at time of d/c in order to maximize pt's functional independence and safety w/ mobility  Pt continues to be functioning below baseline level, and remains limited 2* factors listed above  PT will continue to see pt while here in order to address the deficits listed above and provide interventions consistent w/ POC in effort to achieve STGs      Sandip Pereyra PTA

## 2021-06-07 NOTE — CASE MANAGEMENT
[facility] today  RAMIREZ spoke to patient and brother at the bedside  Needs COVID testing to be done today  [facility] is currently speaking to patient on the phone via the phone  Plan is [facility] [employer] [facility] (spoke to Javid) Requesting 4 PM transport time  Await confirmation for  to [facility] and COVID screen  Medical Necessity for transportation was completed  Copy available for transport team and a copy was placed in bin to be scanned into the chart  Ivelisse will be here at 1600 for transport- Nursing and Facility are aware

## 2021-06-07 NOTE — PLAN OF CARE
Problem: Potential for Falls  Goal: Patient will remain free of falls  Description: INTERVENTIONS:  - Assess patient frequently for physical needs  -  Identify cognitive and physical deficits and behaviors that affect risk of falls  -  [place] fall precautions as indicated by assessment   - Educate patient/family on patient safety including physical limitations  - Instruct patient to call for assistance with activity based on assessment  - Modify environment to reduce risk of injury  - Consider OT/PT consult to assist with strengthening/mobility  Outcome: Progressing     Problem: Prexisting or High Potential for Compromised Skin Integrity  Goal: Skin integrity is maintained or improved  Description: INTERVENTIONS:  - Identify patients at risk for skin breakdown  - Assess and monitor skin integrity  - Assess and monitor nutrition and hydration status  - Monitor labs   - Assess for incontinence   - Turn and reposition patient  - Assist with mobility/ambulation  - Relieve pressure over bony prominences  - Avoid friction and shearing  - Provide appropriate hygiene as needed including keeping skin clean and dry  - Evaluate need for skin moisturizer/barrier cream  - Collaborate with interdisciplinary team   - Patient/family teaching  - Consider wound care consult   Outcome: Progressing     Problem: Nutrition/Hydration-ADULT  Goal: Nutrient/Hydration intake appropriate for improving, restoring or maintaining nutritional needs  Description: Monitor and assess patient's nutrition/hydration status for malnutrition  Collaborate with interdisciplinary team and initiate plan and interventions as ordered  Monitor patient's weight and dietary intake as ordered or per policy  Utilize nutrition screening tool and intervene as necessary  Determine patient's food preferences and provide high-protein, high-caloric foods as appropriate       INTERVENTIONS:  - Monitor oral intake, urinary output, labs, and treatment plans  - Assess nutrition and hydration status and recommend course of action  - Evaluate amount of meals eaten  - Assist patient with eating if necessary   - Allow adequate time for meals  - Recommend/ encourage appropriate diets, oral nutritional supplements, and vitamin/mineral supplements  - Order, calculate, and assess calorie counts as needed  - Recommend, monitor, and adjust tube feedings and TPN/PPN based on assessed needs  - Assess need for intravenous fluids  - Provide specific nutrition/hydration education as appropriate  - Include patient/family/caregiver in decisions related to nutrition  Outcome: Progressing     Problem: PAIN - ADULT  Goal: Verbalizes/displays adequate comfort level or baseline comfort level  Description: Interventions:  - Encourage patient to monitor pain and request assistance  - Assess pain using appropriate pain scale  - Administer analgesics based on type and severity of pain and evaluate response  - Implement non-pharmacological measures as appropriate and evaluate response  - Consider cultural and social influences on pain and pain management  - Notify physician/advanced practitioner if interventions unsuccessful or patient reports new pain  Outcome: Progressing     Problem: INFECTION - ADULT  Goal: Absence or prevention of progression during hospitalization  Description: INTERVENTIONS:  - Assess and monitor for signs and symptoms of infection  - Monitor lab/diagnostic results  - Monitor all insertion sites, i e  indwelling lines, tubes, and drains  - Monitor endotracheal if appropriate and nasal secretions for changes in amount and color  - [place] appropriate cooling/warming therapies per order  - Administer medications as ordered  - Instruct and encourage patient and family to use good hand hygiene technique  - Identify and instruct in appropriate isolation precautions for identified infection/condition  Outcome: Progressing     Problem: SAFETY ADULT  Goal: Patient will remain free of falls  Description: INTERVENTIONS:  - Assess patient frequently for physical needs  -  Identify cognitive and physical deficits and behaviors that affect risk of falls  -  [place] fall precautions as indicated by assessment   - Educate patient/family on patient safety including physical limitations  - Instruct patient to call for assistance with activity based on assessment  - Modify environment to reduce risk of injury  - Consider OT/PT consult to assist with strengthening/mobility  Outcome: Progressing     Problem: DISCHARGE PLANNING  Goal: Discharge to home or other facility with appropriate resources  Description: INTERVENTIONS:  - Identify barriers to discharge w/patient and caregiver  - Arrange for needed discharge resources and transportation as appropriate  - Identify discharge learning needs (meds, wound care, etc )  - Arrange for interpretive services to assist at discharge as needed  - Refer to Case Management Department for coordinating discharge planning if the patient needs post-hospital services based on physician/advanced practitioner order or complex needs related to functional status, cognitive ability, or social support system  Outcome: Progressing     Problem: Knowledge Deficit  Goal: Patient/family/caregiver demonstrates understanding of disease process, treatment plan, medications, and discharge instructions  Description: Complete learning assessment and assess knowledge base    Interventions:  - Provide teaching at level of understanding  - Provide teaching via preferred learning methods  Outcome: Progressing     Problem: SKIN/TISSUE INTEGRITY - ADULT  Goal: Incision(s), wounds(s) or drain site(s) healing without S/S of infection  Description: INTERVENTIONS  - Assess and document risk factors for skin impairment   - Assess and document dressing, incision, wound bed, drain sites and surrounding tissue  - Consider nutrition services referral as needed  - Oral mucous membranes remain intact  - Provide patient/ family education  Outcome: Progressing     Problem: MUSCULOSKELETAL - ADULT  Goal: Maintain or return mobility to safest level of function  Description: INTERVENTIONS:  - Assess patient's ability to carry out ADLs; assess patient's baseline for ADL function and identify physical deficits which impact ability to perform ADLs (bathing, care of mouth/teeth, toileting, grooming, dressing, etc )  - Assess/evaluate cause of self-care deficits   - Assess range of motion  - Assess patient's mobility  - Assess patient's need for assistive devices and provide as appropriate  - Encourage maximum independence but intervene and supervise when necessary  - Involve family in performance of ADLs  - Assess for home care needs following discharge   - Consider OT consult to assist with ADL evaluation and planning for discharge  - Provide patient education as appropriate  Outcome: Progressing

## 2021-06-07 NOTE — TRANSPORTATION MEDICAL NECESSITY
Section I - General Information    Name of Patient: Temi Cavanaugh                 : 1945    Medicare #: [id number]  Transport Date: 21 (PCS is valid for round trips on this date and for all repetitive trips in the 60-day range as noted below )  Origin: [address]: [place]    Is the pt's stay covered under Medicare Part A (PPS/DRG)   [x]     Closest appropriate facility? If no, why is transport to more distant facility required? Yes  If hospice pt, is this transport related to pt's terminal illness? No       Section II - Medical Necessity Questionnaire  Ambulance transportation is medically necessary only if other means of transport are contraindicated or would be potentially harmful to the patient  To meet this requirement, the patient must either be "bed confined" or suffer from a condition such that transport by means other than ambulance is contraindicated by the patient's condition  The following questions must be answered by the medical professional signing below for this form to be valid:    1)  Describe the MEDICAL CONDITION (physical and/or mental) of this patient AT [address] [place] [address] that requires the patient to be transported in an ambulance and why transport by other means is contraindicated by the patient's condition: draining lower legs,     2) Is the patient "bed confined" as defined below? No  To be "be confined" the patient must satisfy all three of the following conditions: (1) unable to get up from bed without Assistance; AND (2) unable to ambulate; AND (3) unable to sit in a chair or wheelchair  3) Can this patient safely be transported by car or wheelchair van (i e , seated during transport without a medical attendant or monitoring)?    No    4) In addition to completing questions 1-3 above, please check any of the following conditions that apply*:   *Note: supporting documentation for any boxes checked must be maintained in the patient's medical records  If hosp-hosp transfer, describe services needed at 2nd facility not available at 1st facility? Attendent required, ^ weight  Bilateral draining extremities  High risk patient for fall  Safety issue related to weight         Section III - Signature of Physician or Healthcare Professional  I certify that the above information is true and correct based on my evaluation of this patient, and represent that the patient requires transport by ambulance and that other forms of transport are contraindicated  I understand that this information will be used by the Centers for Medicare and Medicaid Services (CMS) to support the determination of medical necessity for ambulance services, and I represent that I have personal knowledge of the patient's condition at time of transport  []  If this box is checked, I also certify that the patient is physically or mentally incapable of signing the ambulance service's claim and that the institution with which I am affiliated has furnished care, services, or assistance to the patient  My signature below is made on behalf of the patient pursuant to 42 CFR §424 36(b)(4)  In accordance with 42 CFR §424 37, the specific reason(s) that the patient is physically or mentally incapable of signing the claim form is as follows: Evelina Aguilar of Physician* or Healthcare Professional______________________________________________________________  Signature Date 06/07/21 (For scheduled repetitive transports, this form is not valid for transports performed more than 60 days after this date)    Printed Name & Credentials of Physician or Healthcare Professional (MD, DO, RN, etc )________________________________  *Form must be signed by patient's attending physician for scheduled, repetitive transports   For non-repetitive, unscheduled ambulance transports, if unable to obtain the signature of the attending physician, any of the following may sign (choose appropriate option below)  [] Physician Assistant []  Clinical Nurse Specialist []  Registered Nurse  []  Nurse Practitioner  [x] Discharge Planner

## 2021-06-07 NOTE — PROGRESS NOTES
Progress Note - General Surgery   Temi Cavanaugh 70 y o  female MRN: [id number]  Unit/Bed#: -01 Encounter: [id number]    Assessment:  - POD#4 s/p debridement of multiple right leg wounds  - Drainage appears to have decreased  No pain in the leg, except with dressing changes  - Constipation persists  Passing flatus  - WBC 6 97  - Hgb 10 2  - Afebrile  - Wound cultures from 6/1 show 4+ pseudomonas aeruginosa, 4+ proteus mirabilis, 2+ staphylococcus coagulase negative, sensitivities available in chart    Plan:  - Awaiting placement in SNF, medicine may discharge when available  - Follow up with [address] in 1-2 weeks  - Continue daily dressing change, xeroform cover with dry sterile gauze 4x4s, and wrapped with [place] dressing   right lower extremity  - Maintain urinary catheter   - Continue antibiotic selection per recommendation of ID  - Bowel regimen  - Diabetic diet as tolerated  - CPAP  - Medical management per the attending hospitalist provider    Subjective/Objective   Subjective: No acute events overnight  Pt reports no pain in the right leg  No excess drainage last night  Reports she does have constipation and has not had a bowel movement recently  She believes Dulcolax will help  She is passing flatus  Does have some bloating  Denies abdominal pain, n/v/d, fever, chills, chest pain, or shortness of breath  Objective:     Blood pressure 111/68, pulse 101, temperature 97 8 °F (36 6 °C), resp  rate 18, height 5' 2" (1 575 m), weight (!) 138 kg (303 lb 9 2 oz), SpO2 92 %  ,Body mass index is 55 52 kg/m²  Intake/Output Summary (Last 24 hours) at 6/7/2021 0805  Last data filed at 6/7/2021 0000  Gross per 24 hour   Intake 480 ml   Output 900 ml   Net -420 ml       Invasive Devices     Peripheral Intravenous Line            Peripheral IV 06/05/21 Dorsal (posterior); Right Wrist 1 day          Drain            Urethral Catheter 16 Fr  6 days                Physical Exam: BP 111/68   Pulse 101   Temp 97 8 °F (36 6 °C)   Resp 18   Ht 5' 2" (1 575 m)   Wt (!) 138 kg (303 lb 9 2 oz)   SpO2 92%   BMI 55 52 kg/m²   General appearance: alert and oriented, in no acute distress  Lungs: clear to auscultation bilaterally  Heart: irregularly irregular rhythm and S1, S2 normal  Abdomen: soft, non-tender; bowel sounds normal; no masses,  no organomegaly  Extremities: Right leg wounds present with mixture of healthy, pink granulation tissue and slough  Tissue is mildly friable  Mild amount of blood/clear yellow drainage on the bandages  Wounds are tender to direct palpation  Dressings were reapplied this morning  Venous stasis changes with pitting edema of bilateral lower legs  Moves feet well  Good PROM of right leg    Lab, Imaging and other studies:  I have personally reviewed pertinent lab results      CBC:   Lab Results   Component Value Date    WBC 6 97 06/07/2021    HGB 10 2 (L) 06/07/2021    HCT 34 3 (L) 06/07/2021    MCV 85 06/07/2021     06/07/2021    MCH 25 2 (L) 06/07/2021    MCHC 29 7 (L) 06/07/2021    RDW 16 4 (H) 06/07/2021    MPV 9 3 06/07/2021    NRBC 0 06/07/2021   CMP:   Lab Results   Component Value Date    SODIUM 137 06/07/2021    K 3 6 06/07/2021     06/07/2021    CO2 28 06/07/2021    BUN 36 (H) 06/07/2021    CREATININE 0 96 06/07/2021    CALCIUM 8 7 06/07/2021    EGFR 59 06/07/2021     VTE Pharmacologic Prophylaxis: rivaroxaban (Xarelto)  VTE Mechanical Prophylaxis: sequential compression device     Carmelo Lemus PA-C

## 2021-06-07 NOTE — ASSESSMENT & PLAN NOTE
Wt Readings from Last 3 Encounters:   06/07/21 (!) 138 kg (303 lb 9 2 oz)   05/31/21 (!) 152 kg (336 lb)   05/22/21 (!) 150 kg (331 lb 2 1 oz)       · Does not appear volume overloaded  · Daily weights and I&Os  · Low-salt diet  · I think she is actually over diuresing with potassium low and chloride being alone she has lost lot of weight since previous decrease her Bumex to 1 mg daily- sodium improving will supplement potassium doing well on Bumex doing well

## 2021-06-07 NOTE — PLAN OF CARE
Problem: Potential for Falls  Goal: Patient will remain free of falls  Description: INTERVENTIONS:  - Assess patient frequently for physical needs  -  Identify cognitive and physical deficits and behaviors that affect risk of falls  -  [place] fall precautions as indicated by assessment   - Educate patient/family on patient safety including physical limitations  - Instruct patient to call for assistance with activity based on assessment  - Modify environment to reduce risk of injury  - Consider OT/PT consult to assist with strengthening/mobility  Outcome: Progressing     Problem: Prexisting or High Potential for Compromised Skin Integrity  Goal: Skin integrity is maintained or improved  Description: INTERVENTIONS:  - Identify patients at risk for skin breakdown  - Assess and monitor skin integrity  - Assess and monitor nutrition and hydration status  - Monitor labs   - Assess for incontinence   - Turn and reposition patient  - Assist with mobility/ambulation  - Relieve pressure over bony prominences  - Avoid friction and shearing  - Provide appropriate hygiene as needed including keeping skin clean and dry  - Evaluate need for skin moisturizer/barrier cream  - Collaborate with interdisciplinary team   - Patient/family teaching  - Consider wound care consult   Outcome: Progressing     Problem: Nutrition/Hydration-ADULT  Goal: Nutrient/Hydration intake appropriate for improving, restoring or maintaining nutritional needs  Description: Monitor and assess patient's nutrition/hydration status for malnutrition  Collaborate with interdisciplinary team and initiate plan and interventions as ordered  Monitor patient's weight and dietary intake as ordered or per policy  Utilize nutrition screening tool and intervene as necessary  Determine patient's food preferences and provide high-protein, high-caloric foods as appropriate       INTERVENTIONS:  - Monitor oral intake, urinary output, labs, and treatment plans  - Assess nutrition and hydration status and recommend course of action  - Evaluate amount of meals eaten  - Assist patient with eating if necessary   - Allow adequate time for meals  - Recommend/ encourage appropriate diets, oral nutritional supplements, and vitamin/mineral supplements  - Order, calculate, and assess calorie counts as needed  - Recommend, monitor, and adjust tube feedings and TPN/PPN based on assessed needs  - Assess need for intravenous fluids  - Provide specific nutrition/hydration education as appropriate  - Include patient/family/caregiver in decisions related to nutrition  Outcome: Progressing     Problem: PAIN - ADULT  Goal: Verbalizes/displays adequate comfort level or baseline comfort level  Description: Interventions:  - Encourage patient to monitor pain and request assistance  - Assess pain using appropriate pain scale  - Administer analgesics based on type and severity of pain and evaluate response  - Implement non-pharmacological measures as appropriate and evaluate response  - Consider cultural and social influences on pain and pain management  - Notify physician/advanced practitioner if interventions unsuccessful or patient reports new pain  Outcome: Progressing     Problem: INFECTION - ADULT  Goal: Absence or prevention of progression during hospitalization  Description: INTERVENTIONS:  - Assess and monitor for signs and symptoms of infection  - Monitor lab/diagnostic results  - Monitor all insertion sites, i e  indwelling lines, tubes, and drains  - Monitor endotracheal if appropriate and nasal secretions for changes in amount and color  - [place] appropriate cooling/warming therapies per order  - Administer medications as ordered  - Instruct and encourage patient and family to use good hand hygiene technique  - Identify and instruct in appropriate isolation precautions for identified infection/condition  Outcome: Progressing     Problem: SAFETY ADULT  Goal: Patient will remain free of falls  Description: INTERVENTIONS:  - Assess patient frequently for physical needs  -  Identify cognitive and physical deficits and behaviors that affect risk of falls  -  [place] fall precautions as indicated by assessment   - Educate patient/family on patient safety including physical limitations  - Instruct patient to call for assistance with activity based on assessment  - Modify environment to reduce risk of injury  - Consider OT/PT consult to assist with strengthening/mobility  Outcome: Progressing     Problem: DISCHARGE PLANNING  Goal: Discharge to home or other facility with appropriate resources  Description: INTERVENTIONS:  - Identify barriers to discharge w/patient and caregiver  - Arrange for needed discharge resources and transportation as appropriate  - Identify discharge learning needs (meds, wound care, etc )  - Arrange for interpretive services to assist at discharge as needed  - Refer to Case Management Department for coordinating discharge planning if the patient needs post-hospital services based on physician/advanced practitioner order or complex needs related to functional status, cognitive ability, or social support system  Outcome: Progressing     Problem: Knowledge Deficit  Goal: Patient/family/caregiver demonstrates understanding of disease process, treatment plan, medications, and discharge instructions  Description: Complete learning assessment and assess knowledge base    Interventions:  - Provide teaching at level of understanding  - Provide teaching via preferred learning methods  Outcome: Progressing     Problem: SKIN/TISSUE INTEGRITY - ADULT  Goal: Incision(s), wounds(s) or drain site(s) healing without S/S of infection  Description: INTERVENTIONS  - Assess and document risk factors for skin impairment   - Assess and document dressing, incision, wound bed, drain sites and surrounding tissue  - Consider nutrition services referral as needed  - Oral mucous membranes remain intact  - Provide patient/ family education  Outcome: Progressing     Problem: MUSCULOSKELETAL - ADULT  Goal: Maintain or return mobility to safest level of function  Description: INTERVENTIONS:  - Assess patient's ability to carry out ADLs; assess patient's baseline for ADL function and identify physical deficits which impact ability to perform ADLs (bathing, care of mouth/teeth, toileting, grooming, dressing, etc )  - Assess/evaluate cause of self-care deficits   - Assess range of motion  - Assess patient's mobility  - Assess patient's need for assistive devices and provide as appropriate  - Encourage maximum independence but intervene and supervise when necessary  - Involve family in performance of ADLs  - Assess for home care needs following discharge   - Consider OT consult to assist with ADL evaluation and planning for discharge  - Provide patient education as appropriate  Outcome: Progressing

## 2021-06-07 NOTE — PROGRESS NOTES
[address]  Progress Note - Charlene Xavier 1945, 70 y o  female MRN: [id number]  Unit/Bed#: -01 Encounter: [id number]  Primary Care Provider: Milly Mabry MD   Date and time admitted to hospital: 5/31/2021 11:43 PM    * Cellulitis of right lower extremity  Assessment & Plan  · With necrosis of the wound that is getting worse she recently completed course of antibiotics as she had also associated streptococcal bacteremia  · Patient's wound cultures are positive for Pseudomonas and Proteus both sensitive to Levaquin discussed with infectious disease patient can not be on Levaquin for 7-10 days will plan for 10 days    · Patient is status post debridement today of the right lower extremity necrotic wounds pod #4  · Surgery following regarding dressing changes awaiting placement to SNF  · Dressing was changed today by General surgery the wound looks good    Acute cystitis with hematuria  Assessment & Plan  · Urinalysis: innumerable bacteria, WBC 20-30, trace leukocytes  · Holden catheter in place, insertion date 05/21/2021-will request removal and replacement  · Changed to Levaquin    Elbow pain, left  Assessment & Plan  · Presents with worsening of left elbow pain, was seen ER 5/30 also I did do an x-ray of the shoulder which is not show any fracture but severe arthritis as discussed with ortho this suspected is all secondary to the contusion she would need OT therapy  · Pain control  · Pain improving and she is able to move it     ARIAN on CPAP  Assessment & Plan  · Continue CPAP at bedtime    Essential hypertension  Assessment & Plan  · Her blood pressure is on soft side times a discontinue losartan so the Toprol XL COVID twice a day    BMI 50 0-59 9, adult ([facility])  Assessment & Plan  · Weight loss counseling    Chronic diastolic heart failure ([facility] [address] )  Assessment & Plan  Wt Readings from Last 3 Encounters:   06/07/21 (!) 138 kg (303 lb 9 2 oz)   05/31/21 (!) 152 kg (336 lb) 21 (!) 150 kg (331 lb 2 1 oz)       · Does not appear volume overloaded  · Daily weights and I&Os  · Low-salt diet  · I think she is actually over diuresing with potassium low and chloride being alone she has lost lot of weight since previous decrease her Bumex to 1 mg daily- sodium improving will supplement potassium doing well on Bumex doing well      Type 2 diabetes mellitus, without long-term current use of insulin (HCC)  Assessment & Plan    Lab Results   Component Value Date    HGBA1C 6 1 (H) 2021     · Patient's sugars are controlled continue her metformin 500 mg daily no need for Accu-Cheks    Chronic atrial fibrillation (HCC)  Assessment & Plan  · Rate is controlled to continue her Toprol  mg p o  B i d  and continue her Xarelto unfortunately the patient did not receive a Toprol XL last night secondary to suboptimal blood pressure will discontinue losartan to allow Toprol XL to go in twice a day        VTE Pharmacologic Prophylaxis:   Pharmacologic: Rivaroxaban (Xarelto)  Mechanical VTE Prophylaxis in Place: Yes    Patient Centered Rounds: I have performed bedside rounds with nursing staff today  Discussions with Specialists or Other Care Team Provider:  I have discussed with General surgery    Education and Discussions with Family / Patient:  Patient    Time Spent for Care: 30 minutes  More than 50% of total time spent on counseling and coordination of care as described above      Current Length of Stay: 6 day(s)    Current Patient Status: Inpatient   Certification Statement: The patient will continue to require additional inpatient hospital stay due to Secondary to placement    Discharge Plan:  Patient is medically clear to be placed    Code Status: Level 1 - Full Code      Subjective:   Patient seen and examined no complaints    Objective:     Vitals:   Temp (24hrs), Av 1 °F (36 7 °C), Min:97 8 °F (36 6 °C), Max:98 6 °F (37 °C)    Temp:  [97 8 °F (36 6 °C)-98 6 °F (37 °C)] 97 8 °F (36 6 °C)  HR:  [] 101  Resp:  [16-20] 18  BP: (107-113)/(68-73) 111/68  SpO2:  [92 %-98 %] 92 %  Body mass index is 55 52 kg/m²  Input and Output Summary (last 24 hours): Intake/Output Summary (Last 24 hours) at 6/7/2021 1142  Last data filed at 6/7/2021 1055  Gross per 24 hour   Intake 600 ml   Output 1750 ml   Net -1150 ml       Physical Exam:     Physical Exam  Vitals signs and nursing note reviewed  Constitutional:       General: She is not in acute distress  Appearance: She is well-developed  She is obese  HENT:      Head: Normocephalic and atraumatic  Eyes:      Conjunctiva/sclera: Conjunctivae normal    Neck:      Musculoskeletal: Neck supple  Cardiovascular:      Rate and Rhythm: Normal rate  Rhythm irregular  Heart sounds: No murmur  Pulmonary:      Effort: Pulmonary effort is normal  No respiratory distress  Breath sounds: Normal breath sounds  No wheezing or rales  Abdominal:      General: There is no distension  Palpations: Abdomen is soft  Tenderness: There is no abdominal tenderness  Musculoskeletal:         General: Swelling (Right lower extremity ) present  Skin:     General: Skin is warm and dry  Neurological:      General: No focal deficit present  Mental Status: She is alert and oriented to person, place, and time  Mental status is at baseline  Additional Data:     Labs:    Results from last 7 days   Lab Units 06/07/21  0510  06/04/21  0551 06/02/21  0634   WBC Thousand/uL 6 97   < > 6 41 6 82   HEMOGLOBIN g/dL 10 2*   < > 9 9* 9 8*   HEMATOCRIT % 34 3*   < > 33 8* 32 3*   PLATELETS Thousands/uL 258   < > 262 247   BANDS PCT %  --   --  2  --    NEUTROS PCT %  --   --   --  75   LYMPHS PCT %  --   --   --  10*   LYMPHO PCT %  --   --  11*  --    MONOS PCT %  --   --   --  9   MONO PCT %  --   --  6  --    EOS PCT %  --   --  5 4    < > = values in this interval not displayed       Results from last 7 days   Lab Units 06/07/21  0510   SODIUM mmol/L 137   POTASSIUM mmol/L 3 6   CHLORIDE mmol/L 102   CO2 mmol/L 28   BUN mg/dL 36*   CREATININE mg/dL 0 96   ANION GAP mmol/L 7   CALCIUM mg/dL 8 7   GLUCOSE RANDOM mg/dL 105     Results from last 7 days   Lab Units 05/31/21  2359   INR  2 70*     Results from last 7 days   Lab Units 06/07/21  1109 06/07/21  0726 06/06/21  1054 06/06/21  0722 06/05/21  2057 06/05/21  1552 06/05/21  1120 06/05/21  0737 06/04/21  2046 06/04/21  1600 06/04/21  1121 06/04/21  0733   POC GLUCOSE mg/dl 174* 117 139 115 138 135 151* 127 151* 198* 119 117         Results from last 7 days   Lab Units 06/02/21  0634 06/01/21  0110 05/31/21  2359   LACTIC ACID mmol/L  --   --  1 1   PROCALCITONIN ng/ml 0 09 0 09  --            * I Have Reviewed All Lab Data Listed Above  * Additional Pertinent Lab Tests Reviewed: All Labs Within Last 24 Hours Reviewed    Imaging:    Imaging Reports Reviewed Today Include: none today  Imaging Personally Reviewed by Myself Includes:      Recent Cultures (last 7 days):     Results from last 7 days   Lab Units 06/01/21  0005 06/01/21  0002 05/31/21  2359   BLOOD CULTURE  No Growth After 5 Days  --  No Growth After 5 Days     GRAM STAIN RESULT  No polys seen*  3+ Gram negative rods*  --   --    URINE CULTURE   --  >100,000 cfu/ml Pseudomonas aeruginosa*  10,000-19,000 cfu/ml Klebsiella pneumoniae*  --    WOUND CULTURE  4+ Growth of Pseudomonas aeruginosa*  4+ Growth of Proteus mirabilis*  2+ Growth of Staphylococcus coagulase negative*  --   --        Last 24 Hours Medication List:   Current Facility-Administered Medications   Medication Dose Route Frequency Provider Last Rate    acetaminophen  650 mg Oral Q6H PRN Elva Roaring Branch, DO      bisacodyl  5 mg Oral Daily PRN Elva Roaring Branch, DO      bumetanide  1 mg Oral Daily Elva Roaring Branch, DO      levalbuterol  1 25 mg Nebulization Q4H PRN Elva Roaring Branch, DO      levofloxacin  750 mg Oral Q24H Aida Luciano MD  magnesium hydroxide  30 mL Oral Daily PRN Elva Leola, DO      metFORMIN  500 mg Oral Daily With Breakfast Teena Chou MD      metoprolol succinate  100 mg Oral BID Teena Chou MD      morphine injection  4 mg Intravenous Q4H PRN Elva Leola, DO      ondansetron  4 mg Intravenous Q6H PRN Elva Leola, DO      oxyCODONE  5 mg Oral Q4H PRN Elva Leola, DO      polyethylene glycol  17 g Oral Daily PRN Elva Leola, DO      pravastatin  20 mg Oral Daily With Cisiv, DO      rivaroxaban  20 mg Oral Daily With Breakfast Teena Chou MD      senna-docusate sodium  1 tablet Oral HS Elva Leola, DO          Today, Patient Was Seen By: Teena Chou MD    ** Please Note: Dictation voice to text software may have been used in the creation of this document   **

## 2021-06-07 NOTE — PLAN OF CARE
Problem: PHYSICAL THERAPY ADULT  Goal: Performs mobility at highest level of function for planned discharge setting  See evaluation for individualized goals  Description: Treatment/Interventions: Functional transfer training, LE strengthening/ROM, Elevations, Therapeutic exercise, Endurance training, Patient/family training, Equipment eval/education, Bed mobility, Gait training, Spoke to nursing, Spoke to case management, OT  Equipment Recommended: (tbd by rehab)       See flowsheet documentation for full assessment, interventions and recommendations  Outcome: Progressing  Note: Prognosis: Fair  Problem List: Decreased strength, Decreased range of motion, Decreased endurance, Impaired balance, Decreased coordination, Decreased skin integrity, Obesity, Pain  Assessment: Pt seen for PT treatment session this date with interventions consisting of bed mobility, transfers, ambulation, and ther ex  Pt agreeable to PT treatment session upon arrival  In comparison to previous session, pt with improvements in ambulation distance and assistance level with all functional mobility but continues to be limited 2/2 to SOB, and endurance deficits that resolve with frequent rest breaks  Frequent v/c for hand placement with all transfers and ambulation needed for increased safety and balance  Continue to recommend STR at time of d/c in order to maximize pt's functional independence and safety w/ mobility  Pt continues to be functioning below baseline level, and remains limited 2* factors listed above  PT will continue to see pt while here in order to address the deficits listed above and provide interventions consistent w/ POC in effort to achieve STGs    Barriers to Discharge: Inaccessible home environment  Barriers to Discharge Comments: Pt currently requires mod assistance with bed mobility     PT Discharge Recommendation: Post acute rehabilitation services     PT - OK to Discharge: Yes    See flowsheet documentation for full assessment

## 2021-06-07 NOTE — ASSESSMENT & PLAN NOTE
· Rate is controlled to continue her Toprol  mg p o  B i d    and continue her Xarelto unfortunately the patient did not receive a Toprol XL last night secondary to suboptimal blood pressure will discontinue losartan to allow Toprol XL to go in twice a day

## 2021-06-07 NOTE — ASSESSMENT & PLAN NOTE
· With necrosis of the wound that is getting worse she recently completed course of antibiotics as she had also associated streptococcal bacteremia  · Patient's wound cultures are positive for Pseudomonas and Proteus both sensitive to Levaquin discussed with infectious disease patient can not be on Levaquin for 7-10 days will plan for 10 days    · Patient is status post debridement today of the right lower extremity necrotic wounds pod #4  · Surgery following regarding dressing changes awaiting placement to SNF patient will be discharged to [facility] today  · Dressing was changed today by General surgery the wound looks good

## 2021-08-06 NOTE — H&P
[address]  H&P- Martina Good 1945, 70 y o  female MRN: [id number]  Unit/Bed#: -Isak Encounter: [id number]  Primary Care Provider: Amy Carrillo MD   Date and time admitted to hospital: 8/5/2021 11:49 PM    * Wound cellulitis  Assessment & Plan      · Presents with chronic wounds and suspicion for cellulitis  · When compared with images in Media from 05/31/2021-wounds appear significantly improved  · Follow-up with surgery in 06/28/2021: Wound beds consisting mostly of healthy appearing pink granulation tissue and some slough, which was debrided  No signs of infection at that time  · On 06/01/2021 patient had wound culture positive for 4+ growth of Pseudomonas, 4+ growth of Proteus, 2+ growth of coagulase negative Staphylococcus- will use cefepime-she has tolerated this during her early June admission  · Continue vancomycin  · Wound care consult  · Surgery consult    ARIAN on CPAP  Assessment & Plan  · Continue CPAP    Chronic diastolic heart failure (HCC)  Assessment & Plan  Wt Readings from Last 3 Encounters:   08/05/21 136 kg (300 lb)   06/07/21 (!) 138 kg (303 lb 9 2 oz)   05/31/21 (!) 152 kg (336 lb)     · Does not appear volume overloaded  · Daily weights and I&Os  · Continue metoprolol        Type 2 diabetes mellitus, without long-term current use of insulin (HCC)  Assessment & Plan  Lab Results   Component Value Date    HGBA1C 6 1 (H) 05/16/2021       No results for input(s): POCGLU in the last 72 hours      Blood Sugar Average: Last 72 hrs:     · Diabetic diet  · Fingerstick blood sugar with sliding scale coverage  · Hold Glucophage while in hospital    Chronic atrial fibrillation (HCC)  Assessment & Plan  · Rate controlled with metoprolol  · Anticoagulated with Xarelto    VTE Prophylaxis: Rivaroxaban (Xarelto)  / reason for no mechanical VTE prophylaxis Lower extremity wounds   Code Status:  Full  POLST: POLST form is not discussed and not completed at this time   Discussion with family:  Patient    Anticipated Length of Stay:  Patient will be admitted on an Inpatient basis with an anticipated length of stay of  greater than 2 midnights  Justification for Hospital Stay:  Per plan above    Total Time for Visit, including Counseling / Coordination of Care: 45 minutes  Greater than 50% of this total time spent on direct patient counseling and coordination of care  Chief Complaint:   Cellulitis    History of Present Illness:    Jj Kay is a 70 y o  female with a history of CAD , atrial fibrillation on Xarelto, sleep apnea on CPAP, CHF, lymphedema , obesity , sepsis , non-insulin-dependent type 2 diabetes , lower extremity wounds who presents with cellulitis  She has chronic right lower extremity wounds  She was sent in from the nursing home for suspected infection  She denies fever, nasal congestion, chest pain, shortness of breath, nausea or vomiting, abdominal pain, dysuria, back pain, dizziness, syncope, or focal weakness  Review of Systems:    Review of Systems   Constitutional: Negative for chills and fever  HENT: Negative for congestion  Respiratory: Negative for cough and shortness of breath  Cardiovascular: Negative for chest pain, palpitations and leg swelling  Gastrointestinal: Negative for abdominal distention, abdominal pain, constipation, diarrhea, nausea and vomiting  Genitourinary: Negative for dysuria and frequency  Musculoskeletal: Negative for arthralgias and myalgias  Skin: Positive for wound  Neurological: Negative for dizziness, syncope, weakness and headaches  All other systems reviewed and are negative        Past Medical and Surgical History:     Past Medical History:   Diagnosis Date    Atrial fibrillation ([facility] )     Coronary artery disease     DM2 (diabetes mellitus, type 2) ([facility] )     History of cardiac cath     multiple, last one being sept, 2020    History of cardioversion     History of total knee replacement, left     Lymphedema     Lymphedema     Obesity     Osteoarthritis     Pulmonary HTN (HCC)     Sepsis ([address] )     Sleep apnea     SVT (supraventricular tachycardia) ([facility])        Past Surgical History:   Procedure Laterality Date    CHOLECYSTECTOMY      TONSILLECTOMY      WOUND DEBRIDEMENT Left 6/3/2021    Procedure: DEBRIDEMENT WOUND [facility] OUT); Surgeon: Luis Pickens DO;  Location:  MAIN OR;  Service: General       Meds/Allergies:    Prior to Admission medications    Medication Sig Start Date End Date Taking?  Authorizing Provider   acetaminophen (TYLENOL) 325 mg tablet Take 2 tablets (650 mg total) by mouth every 6 (six) hours as needed for mild pain or fever 5/22/21  Yes Luz Mares MD   aluminum-magnesium hydroxide-simethicone (MYLANTA) 200-200-20 mg/5 mL suspension Take 30 mL by mouth every 4 (four) hours as needed for indigestion or heartburn   Yes Historical Provider, MD   bisacodyl (bisacodyl) 5 mg EC tablet Take 5 mg by mouth daily as needed for constipation   Yes Historical Provider, MD   bumetanide (BUMEX) 1 mg tablet Take 1 tablet (1 mg total) by mouth daily 6/8/21  Yes Luz Mares MD   LACTOBACILLUS PO Take by mouth   Yes Historical Provider, MD   levalbuterol (XOPENEX) 1 25 mg/0 5 mL nebulizer solution Take 0 5 mL (1 25 mg total) by nebulization every 4 (four) hours as needed for wheezing 5/22/21  Yes Luz Mares MD   loperamide (IMODIUM) 2 mg capsule Take 2 mg by mouth 4 (four) times a day as needed for diarrhea   Yes Historical Provider, MD   metFORMIN (GLUCOPHAGE) 500 mg tablet 500 mg daily with breakfast  8/11/20  Yes Historical Provider, MD   metoprolol succinate (TOPROL-XL) 100 mg 24 hr tablet Take 1 tablet (100 mg total) by mouth 2 (two) times a day 6/7/21  Yes Luz Mares MD   polyethylene glycol (MIRALAX) 17 g packet Take 17 g by mouth daily as needed (constipation) 5/22/21  Yes Luz Mares MD   potassium chloride (MICRO-K) 10 MEQ CR capsule Take 1 capsule (10 mEq total) by mouth daily 6/7/21  Yes Kaylan Calvin MD   promethazine (PHENERGAN) 12 5 MG tablet Take 25 mg by mouth every 6 (six) hours as needed for nausea or vomiting   Yes Historical Provider, MD   senna-docusate sodium (SENOKOT S) 8 6-50 mg per tablet Take 1 tablet by mouth daily at bedtime 5/22/21  Yes Kaylan Calvin MD   simvastatin (ZOCOR) 10 mg tablet 10 mg daily at bedtime  8/11/20  Yes Historical Provider, MD   Xarelto 20 MG tablet Take 1 tablet (20 mg total) by mouth daily with breakfast 5/22/21  Yes Kaylan Calvin MD     I have reviewed home medications with patient personally  Allergies: Allergies   Allergen Reactions    Iodinated Diagnostic Agents Anaphylaxis    Metronidazole Seizures           Cimetidine Anxiety    Diltiazem Rash    Penicillins Rash       Social History:     Marital Status: Single   Occupation:  Not employed  Patient Pre-hospital Living Situation:  Nursing home  Patient Pre-hospital Level of Mobility:  Requires assist/walker  Patient Pre-hospital Diet Restrictions:  Diabetic  Substance Use History:   Social History     Substance and Sexual Activity   Alcohol Use Not Currently     Social History     Tobacco Use   Smoking Status Never Smoker   Smokeless Tobacco Never Used     Social History     Substance and Sexual Activity   Drug Use Not Currently       Family History:    non-contributory    Physical Exam:     Vitals:   Blood Pressure: 139/82 (08/06/21 0209)  Pulse: 104 (08/06/21 0115)  Temperature: 97 7 °F (36 5 °C) (08/06/21 0209)  Temp Source: Temporal (08/05/21 2354)  Respirations: 17 (08/06/21 0209)  Height: 5' 2" (157 5 cm) (08/05/21 2354)  Weight - Scale: 136 kg (300 lb) (08/05/21 2354)  SpO2: 95 % (08/06/21 0115)    Physical Exam  Vitals and nursing note reviewed  Constitutional:       Appearance: She is obese  HENT:      Head: Normocephalic and atraumatic        Mouth/Throat:      Mouth: Mucous membranes are moist       Pharynx: Oropharynx is clear  Eyes:      Extraocular Movements: Extraocular movements intact  Pupils: Pupils are equal, round, and reactive to light  Cardiovascular:      Rate and Rhythm: Normal rate and regular rhythm  Pulses: Normal pulses  Heart sounds: Normal heart sounds  Pulmonary:      Effort: Pulmonary effort is normal       Breath sounds: Normal breath sounds  Abdominal:      General: Abdomen is flat  Bowel sounds are normal       Palpations: Abdomen is soft  Musculoskeletal:         General: Normal range of motion  Cervical back: Normal range of motion and neck supple  Skin:     General: Skin is warm and dry  Capillary Refill: Capillary refill takes less than 2 seconds  Findings: Lesion present  Comments: Right lower extremity chronic wounds as pictured in media   Neurological:      General: No focal deficit present  Mental Status: She is alert and oriented to person, place, and time  Additional Data:     Lab Results: I have personally reviewed pertinent reports  Results from last 7 days   Lab Units 08/06/21  0448   WBC Thousand/uL 8 67   HEMOGLOBIN g/dL 9 2*   HEMATOCRIT % 31 6*   PLATELETS Thousands/uL 217   NEUTROS PCT % 68   LYMPHS PCT % 15   MONOS PCT % 10   EOS PCT % 5     Results from last 7 days   Lab Units 08/06/21  0448 08/06/21  0012   SODIUM mmol/L 139 140   POTASSIUM mmol/L 3 9 4 0   CHLORIDE mmol/L 108 106   CO2 mmol/L 22 26   BUN mg/dL 12 14   CREATININE mg/dL 0 76 0 83   ANION GAP mmol/L 9 8   CALCIUM mg/dL 8 0* 8 4   ALBUMIN g/dL  --  2 5*   TOTAL BILIRUBIN mg/dL  --  0 46   ALK PHOS U/L  --  88   ALT U/L  --  14   AST U/L  --  23   GLUCOSE RANDOM mg/dL 111 90                 Results from last 7 days   Lab Units 08/06/21  0013   LACTIC ACID mmol/L 1 2         No orders to display         [employer] [facility] [employer] Records Reviewed: Yes     ** Please Note: This note has been constructed using a voice recognition system   **

## 2021-08-06 NOTE — PROGRESS NOTES
Vancomycin Assessment    Panda Avila is a 70 y o  female who is currently receiving vancomycin 2000mg IV Q12H for CNS infection     Relevant clinical data and objective history reviewed:  Creatinine   Date Value Ref Range Status   08/06/2021 0 83 0 60 - 1 30 mg/dL Final     Comment:     Standardized to IDMS reference method   06/07/2021 0 96 0 60 - 1 30 mg/dL Final     Comment:     Standardized to IDMS reference method   06/05/2021 0 84 0 60 - 1 30 mg/dL Final     Comment:     Standardized to IDMS reference method     /82   Pulse 104   Temp 97 7 °F (36 5 °C)   Resp 17   Ht 5' 2" (1 575 m)   Wt 136 kg (300 lb)   SpO2 95%   BMI 54 87 kg/m²   No intake/output data recorded  Lab Results   Component Value Date/Time    BUN 14 08/06/2021 12:12 AM    WBC 9 95 08/06/2021 12:12 AM    HGB 10 4 (L) 08/06/2021 12:12 AM    HCT 34 6 (L) 08/06/2021 12:12 AM    MCV 88 08/06/2021 12:12 AM     08/06/2021 12:12 AM     Temp Readings from Last 3 Encounters:   08/06/21 97 7 °F (36 5 °C)   06/07/21 97 8 °F (36 6 °C)   05/31/21 (!) 96 9 °F (36 1 °C) (Temporal)     Vancomycin Days of Therapy: 1    Assessment/Plan  The patient is currently on vancomycin utilizing scheduled dosing based on adjusted body weight (due to obesity)  Baseline risks associated with therapy include: advanced age  The patient is currently receiving 2000mg IV Q12H and after clinical evaluation will be changed to 2000mg IV once, then 1500mg IV Q12H  Pharmacy will also follow closely for s/sx of nephrotoxicity, infusion reactions, and appropriateness of therapy  BMP and CBC will be ordered per protocol  Plan for trough as patient approaches steady state, prior to the 4th  dose at approximately 1430 on 8/7/21  Due to infection severity, will target a trough of 15-20 (appropriate for most indications)   Pharmacy will continue to follow the patients culture results and clinical progress daily      Vero Leblanc, Pharmacist

## 2021-08-06 NOTE — CONSULTS
Consultation - General Surgery   Jocelyn Lyons 70 y o  female MRN: [id number]  Unit/Bed#: -01 Encounter: [id number]    Assessment/Plan     Assessment:  - Multiple wounds of right lower extremity, s/p debridement in June 2021  - Mild cellulitis between several of the wounds  - VS WNL, afebrile  - WBC 8 67  - Anemia, Hgb 9 2  - Hx afib on Xarelto, CAD, DM2, sleep apnea  SVT, arthritis    Plan:  - Conservative management, no surgery indicated at this time  - IV abx: Vanco and Cefepime  - Local wound care with Xeroform gauze , ABD, and wrapped with Kerlix  - Apply Santyl to areas of adherent slough and necrosis as needed  - Awaiting blood cultures  - Anticoagulated with Xarelto  - Pain control prn  - Diet as tolerated  - Wound care consult placed  - Medical management as per hospitalist    History of Present Illness   HPI:  Jocelyn Lyons is a 70 y o  female who presents with multiple wounds of the right lower extremity, status post debridement 06/03/2021 by Dr Buford Gottron  She is a nursing home resident  Patient had been following me in the clinic in June but was lost to follow-up for the month of July due to scheduling issues with her nursing home  Patient was scheduled to see Dr Neftali Barba 8/13/21  She reports for the past few days feeling tired and weak  She has felt more short of breath when exerting herself  She reports she has been able to ambulate up to 120 ft at the nursing home with a walker until couple of days ago when her symptoms started  Nursing staff noted redness and warmth of the back of her leg near her wounds and were concerned about infection, thus prompting them to send her to the [address] [place] [address] ED  Patient states her wound is been consistently draining and bleeding  Dressings are changed at least once per day  She has not noticed any purulent drainage or odor  Patient states she feels very cold  Denies abdominal pain, n/v/d/c, urinary difficulties, fever, chest pain      She has voiced dissatisfaction with her nursing home  She states she only saw the doctor there once in June, and he did not even look at her wound  She reports that she reminded the staff there every other day to schedule her for an appointment to follow up my practice, as I had recommended  However, she was scheduled for a 6 week follow-up instead of the 2 week follow-up I had recommended  Consults    Review of Systems   Constitutional: Positive for chills and fatigue  Negative for appetite change and fever  HENT: Negative  Eyes: Negative  Respiratory: Negative  Cardiovascular: Negative  Gastrointestinal: Negative  Endocrine: Negative  Genitourinary: Negative  Musculoskeletal: Negative  Skin: Positive for wound  Allergic/Immunologic: Negative  Neurological: Positive for weakness  Hematological: Negative  Psychiatric/Behavioral: Negative  All other systems reviewed and are negative  Historical Information   Past Medical History:   Diagnosis Date    Atrial fibrillation ([facility] )     Coronary artery disease     DM2 (diabetes mellitus, type 2) ([facility] )     History of cardiac cath     multiple, last one being sept, 2020    History of cardioversion     History of total knee replacement, left     Lymphedema     Lymphedema     Obesity     Osteoarthritis     [facility]N ([facility] )     Sepsis ([facility] )     Sleep apnea     SVT (supraventricular tachycardia) ([facility] )      Past Surgical History:   Procedure Laterality Date    CHOLECYSTECTOMY      TONSILLECTOMY      WOUND DEBRIDEMENT Left 6/3/2021    Procedure: DEBRIDEMENT WOUND [facility] OUT);   Surgeon: May Hurtado DO;  Location:  MAIN OR;  Service: General     Social History   Social History     Substance and Sexual Activity   Alcohol Use Not Currently     Social History     Substance and Sexual Activity   Drug Use Not Currently     E-Cigarette/Vaping    E-Cigarette Use Never User      E-Cigarette/Vaping Substances     Social History Tobacco Use   Smoking Status Never Smoker   Smokeless Tobacco Never Used     Family History: non-contributory    Meds/Allergies   all current active meds have been reviewed, current meds:   Current Facility-Administered Medications   Medication Dose Route Frequency    acetaminophen (TYLENOL) tablet 650 mg  650 mg Oral Q6H PRN    bumetanide (BUMEX) tablet 1 mg  1 mg Oral Daily    cefepime (MAXIPIME) IVPB (premix in dextrose) 2,000 mg 50 mL  2,000 mg Intravenous Q12H    insulin lispro (HumaLOG) 100 units/mL subcutaneous injection 2-12 Units  2-12 Units Subcutaneous TID AC    insulin lispro (HumaLOG) 100 units/mL subcutaneous injection 2-12 Units  2-12 Units Subcutaneous HS    levalbuterol (XOPENEX) inhalation solution 1 25 mg  1 25 mg Nebulization Q4H PRN    metoprolol succinate (TOPROL-XL) 24 hr tablet 100 mg  100 mg Oral BID    pantoprazole (PROTONIX) EC tablet 40 mg  40 mg Oral Early Morning    polyethylene glycol (MIRALAX) packet 17 g  17 g Oral Daily PRN    pravastatin (PRAVACHOL) tablet 20 mg  20 mg Oral Daily With Dinner    rivaroxaban (XARELTO) tablet 20 mg  20 mg Oral Daily With Breakfast    senna-docusate sodium (SENOKOT S) 8 6-50 mg per tablet 1 tablet  1 tablet Oral HS    vancomycin (VANCOCIN) 1500 mg in sodium chloride 0 9% 250 mL IVPB  17 5 mg/kg (Adjusted) Intravenous Q12H    and PTA meds:   Prior to Admission Medications   Prescriptions Last Dose Informant Patient Reported? Taking?    LACTOBACILLUS PO 8/5/2021 at Unknown time  Yes Yes   Sig: Take by mouth   Xarelto 20 MG tablet 8/5/2021 at Unknown time  No Yes   Sig: Take 1 tablet (20 mg total) by mouth daily with breakfast   acetaminophen (TYLENOL) 325 mg tablet 8/5/2021 at Unknown time  No Yes   Sig: Take 2 tablets (650 mg total) by mouth every 6 (six) hours as needed for mild pain or fever   aluminum-magnesium hydroxide-simethicone (MYLANTA) 200-200-20 mg/5 mL suspension 8/5/2021 at Unknown time  Yes Yes   Sig: Take 30 mL by mouth every 4 (four) hours as needed for indigestion or heartburn   bisacodyl (bisacodyl) 5 mg EC tablet 2021 at Unknown time  Yes Yes   Sig: Take 5 mg by mouth daily as needed for constipation   bumetanide (BUMEX) 1 mg tablet 2021 at Unknown time  No Yes   Sig: Take 1 tablet (1 mg total) by mouth daily   hydrALAZINE (APRESOLINE) 50 mg tablet   Yes No   Sig: Take by mouth   levalbuterol (XOPENEX) 1 25 mg/0 5 mL nebulizer solution 2021 at Unknown time  No Yes   Sig: Take 0 5 mL (1 25 mg total) by nebulization every 4 (four) hours as needed for wheezing   loperamide (IMODIUM) 2 mg capsule 2021 at Unknown time  Yes Yes   Sig: Take 2 mg by mouth 4 (four) times a day as needed for diarrhea   losartan (COZAAR) 100 MG tablet   Yes No   Sig: Take 100 mg by mouth   magnesium oxide (MAG-OX) 400 mg tablet   Yes No   Sig: Take by mouth   metFORMIN (GLUCOPHAGE) 500 mg tablet 2021 at Unknown time  Yes Yes   Si mg daily with breakfast    metoprolol succinate (TOPROL-XL) 100 mg 24 hr tablet 2021 at Unknown time  No Yes   Sig: Take 1 tablet (100 mg total) by mouth 2 (two) times a day   omeprazole (PriLOSEC) 20 mg delayed release capsule   Yes No   Sig: Take by mouth   polyethylene glycol (MIRALAX) 17 g packet 2021 at Unknown time  No Yes   Sig: Take 17 g by mouth daily as needed (constipation)   potassium chloride (MICRO-K) 10 MEQ CR capsule 2021 at Unknown time  No Yes   Sig: Take 1 capsule (10 mEq total) by mouth daily   promethazine (PHENERGAN) 12 5 MG tablet 2021 at Unknown time  Yes Yes   Sig: Take 25 mg by mouth every 6 (six) hours as needed for nausea or vomiting   senna-docusate sodium (SENOKOT S) 8 6-50 mg per tablet 2021 at Unknown time  No Yes   Sig: Take 1 tablet by mouth daily at bedtime   simvastatin (ZOCOR) 10 mg tablet 2021 at Unknown time  Yes Yes   Sig: 10 mg daily at bedtime       Facility-Administered Medications: None     Allergies   Allergen Reactions    Iodinated Diagnostic Agents Anaphylaxis    Metronidazole Seizures           Cimetidine Anxiety    Diltiazem Rash    Penicillins Rash       Objective   First Vitals:   Blood Pressure: 143/70 (08/05/21 2354)  Pulse: 105 (08/05/21 2354)  Temperature: 97 8 °F (36 6 °C) (08/05/21 2354)  Temp Source: Temporal (08/05/21 2354)  Respirations: 20 (08/05/21 2354)  Height: 5' 2" (157 5 cm) (08/05/21 2354)  Weight - Scale: 136 kg (300 lb) (08/05/21 2354)  SpO2: 97 % (08/05/21 2354)    Current Vitals:   Blood Pressure: 131/80 (08/06/21 0736)  Pulse: 89 (08/06/21 0736)  Temperature: 98 6 °F (37 °C) (08/06/21 0736)  Temp Source: Temporal (08/05/21 2354)  Respirations: 17 (08/06/21 0736)  Height: 5' 2" (157 5 cm) (08/05/21 2354)  Weight - Scale: 136 kg (300 lb) (08/05/21 2354)  SpO2: 95 % (08/06/21 0736)      Intake/Output Summary (Last 24 hours) at 8/6/2021 0829  Last data filed at 8/6/2021 0110  Gross per 24 hour   Intake 1000 ml   Output --   Net 1000 ml       Invasive Devices     Peripheral Intravenous Line            Peripheral IV 08/06/21 Left;Proximal;Ventral (anterior) Forearm <1 day          Drain            External Urinary Catheter <1 day                Physical Exam  Constitutional:       General: She is not in acute distress  Appearance: Normal appearance  She is obese  HENT:      Head: Normocephalic  Nose: Nose normal       Mouth/Throat:      Mouth: Mucous membranes are moist    Eyes:      Extraocular Movements: Extraocular movements intact  Pupils: Pupils are equal, round, and reactive to light  Cardiovascular:      Rate and Rhythm: Normal rate  Rhythm irregular  Heart sounds: Normal heart sounds  No murmur heard  Pulmonary:      Effort: Pulmonary effort is normal  No respiratory distress  Breath sounds: Normal breath sounds  No stridor  No wheezing, rhonchi or rales  Abdominal:      General: Abdomen is flat  Bowel sounds are normal  There is no distension  Palpations: Abdomen is soft  Tenderness: There is no abdominal tenderness  Musculoskeletal:      Cervical back: Normal range of motion and neck supple  Comments: ROM of right knee limited due to tenderness   Skin:     General: Skin is warm  Capillary Refill: Capillary refill takes less than 2 seconds  Findings: Erythema and wound present  No abscess  Comments: Multiple wounds of right lower extremity  Right inner thigh wounds s/p debridement  All wounds present with granulation tissue and some slough, much improved since last seen in June  No active bleeding or drainage  Most wounds are extremely shallow and our level with surrounding skin  Large wound just superior to the right knee has small deep area measuring 1 cm deep at its posterior aspect without obvious tunneling in any other direction  Small wounds tracking from anterior to posterior along the medial aspect of the upper right inner thigh are small and appeared to be healing well  There is some mild erythema between some of these wounds with mild induration and tenderness  There are also several small shallow healthy-appearing wounds around the right lower leg, all with granulation tissue present  Right lower leg anterior wounds are tender to palpation  Neurological:      General: No focal deficit present  Mental Status: She is alert and oriented to person, place, and time  Psychiatric:         Mood and Affect: Mood normal          Behavior: Behavior normal          Thought Content: Thought content normal          Judgment: Judgment normal          Lab Results:   I have personally reviewed pertinent lab results    , CBC:   Lab Results   Component Value Date    WBC 8 67 08/06/2021    HGB 9 2 (L) 08/06/2021    HCT 31 6 (L) 08/06/2021    MCV 89 08/06/2021     08/06/2021    MCH 25 8 (L) 08/06/2021    MCHC 29 1 (L) 08/06/2021    RDW 18 3 (H) 08/06/2021    MPV 9 3 08/06/2021    NRBC 0 08/06/2021   , CMP:   Lab Results   Component Value Date    SODIUM 139 08/06/2021    K 3 9 08/06/2021     08/06/2021    CO2 22 08/06/2021    BUN 12 08/06/2021    CREATININE 0 76 08/06/2021    CALCIUM 8 0 (L) 08/06/2021    AST 23 08/06/2021    ALT 14 08/06/2021    ALKPHOS 88 08/06/2021    EGFR 78 08/06/2021     Imaging: I have personally reviewed pertinent reports  EKG, Pathology, and Other Studies: I have personally reviewed pertinent reports  Counseling / Coordination of Care  Total floor / unit time spent today 60 minutes  Greater than 50% of total time was spent with the patient and / or family counseling and / or coordination of care  A description of the counseling / coordination of care: Obtaining H&P, changing dressings      Jaron Schmid PA-C

## 2021-08-06 NOTE — WOUND OSTOMY CARE
Consult Note - Wound   Jo Ann Mckeon 70 y o  female MRN: [id number]  Unit/Bed#: -01 Encounter: [id number]      History and Present Illness:Pt seen for initial wound consult  Admitted with right lower extremity cellulitis  Pt s/p right lower extremity wound debridement 6/3/21 by Dr Leticia Daniels  Currently residing in nursing home, and followed up in [facility] in July  Pt is alert and oriented x4  She requires max assist to turn and reposition  She is currently using [employer] for bladder management  Pt agreed to assessment  States she feel embarrassed with her current situation and would like to return to her home with assistance of visiting nurses  Assessment Findings:   1)Bilateral heels intact  2) Right posterior thigh stage 3 POA wound bed 100% red, moist no induration or maceration  Right posterior thigh medially unstageable wound 100% yellow slough  Both areas cleansed with NSS  Maxorb AG to unstageable wound bed and xeroform to stage 2  Both wound covered with Allevyn foam    3) Right thigh full thickness skin loss  Wound bed 80% red moist  20% yellow slough  Areas cleansed with NSS, Maxorb to yellow slough area, xeroform to moist wound bed  Area covered with Allevyn foam  Pt has attempted to secure bandage with Surgiflex, Tubigrip, Keflex and Kerlix without success  Attempting use of Allevyn  Skin care plans:  1-Hydraguard to bilateral heels BID and PRN  2-Elevate heels to offload pressure  3-Ehob cushion in chair when out of bed  4-Moisturize skin daily with skin nourishing cream   5-Turn/reposition q2h or when medically stable for pressure re-distribution on skin  6-Cleanse right anterior/medial thigh wound, medial right knee wound, with NSS, Apply Maxorb AG to visible area of yellow tan slough and drainage  Apply Xeroform gauze to non slough areas of wound beds  Cover entire wound with Allevyn foam  Change daily and prn drainage  Wound 05/16/21 Other (comment) Thigh Right; [address] (Active)       Wound 05/16/21 Venous Ulcer Pretibial Distal;Right (Active)       Wound 05/16/21 Pressure Injury Pretibial Proximal;Right (Active)       Wound 05/16/21 MASD Abdomen Lower (Active)       Wound 05/16/21 Other (comment) Thigh Medial;Right;Upper (Active)       Wound 06/01/21 Venous Ulcer Pretibial Right (Active)       Wound 06/02/21 Thigh Posterior;Right (Active)   Wound Image   08/06/21 1025   Wound Description Beefy red;Drainage;Fragile;Pink;Slough; White 08/06/21 1025   Pressure Injury Stage U 08/06/21 1025   Vickie-wound Assessment Edema;Fragile 08/06/21 1025   Wound Length (cm) 0 5 cm 08/06/21 1025   Wound Width (cm) 0 5 cm 08/06/21 1025   Wound Surface Area (cm^2) 0 25 cm^2 08/06/21 1025   Tunneling 0 cm 08/06/21 1025   Undermining 0 08/06/21 1025   Drainage Amount Scant 08/06/21 1025       Wound 06/03/21 Leg Right (Active)       Wound 08/06/21 Thigh Anterior;Right (Active)   Wound Image   08/06/21 1001   Wound Description Beefy red;Drainage;Edema;Fragile;Slough; Swelling;Yellow 08/06/21 1001   Vickie-wound Assessment Intact;Edema; Erythema;Fragile; Swelling 08/06/21 1001   Wound Length (cm) 14 cm 08/06/21 1001   Wound Width (cm) 14 cm 08/06/21 1001   Wound Surface Area (cm^2) 196 cm^2 08/06/21 1001   Drainage Amount Scant 08/06/21 1001   Drainage Description Clear 08/06/21 1001   Non-staged Wound Description Full thickness 08/06/21 1001   Treatments Cleansed;Irrigation with NSS;Site care;Elevated 08/06/21 1001   Dressing Calcium Alginate with Silver; Foam, Silicon (eg  Allevyn, etc); Xeroform 08/06/21 1001   Wound packed? No 08/06/21 0300   Dressing Changed Changed 08/06/21 1001   Patient Tolerance Tolerated well 08/06/21 1001   Dressing Status Clean;Dry; Intact 08/06/21 1001       Wound 08/06/21 Pretibial Right (Active)   Wound Image   08/06/21 1020   Wound Description Dry;Beefy red;Brown;Fragile 08/06/21 1020   Pressure Injury Stage U 08/06/21 1020   Vickie-wound Assessment Clean;Dry; Intact;Edema;Fragile 08/06/21 1020   Wound Length (cm) 0 5 cm 08/06/21 1020   Wound Width (cm) 7 cm 08/06/21 1020   Wound Surface Area (cm^2) 3 5 cm^2 08/06/21 1020   Treatments Cleansed;Irrigation with NSS;Site care;Elevated 08/06/21 1020   Dressing Foam, Silicon (eg  Allevyn, etc); Xeroform 08/06/21 1020   Dressing Changed Changed 08/06/21 1020   Patient Tolerance Tolerated well 08/06/21 1020   Dressing Status Clean;Dry; Intact 08/06/21 1020       Wound 08/06/21 Thigh Right;Medial;Upper (Active)   Wound Image   08/06/21 1005   Wound Description Beefy red;Bleeding;Drainage;Edema;Fragile;Granulation tissue;Pink;Slough; Swelling;Yellow 08/06/21 1005   Pressure Injury Stage 3 08/06/21 1005   Vickie-wound Assessment Edema; Erythema;Fragile; Maceration;Pink;Scar Tissue; Swelling 08/06/21 1005   Wound Length (cm) 11 cm 08/06/21 1005   Wound Width (cm) 7 5 cm 08/06/21 1005   Wound Surface Area (cm^2) 82 5 cm^2 08/06/21 1005   Tunneling 0 cm 08/06/21 1005   Undermining 0 08/06/21 1005   Drainage Amount Scant 08/06/21 1005   Non-staged Wound Description Full thickness 08/06/21 1005   Treatments Cleansed;Irrigation with NSS;Site care;Elevated 08/06/21 1005   Dressing Calcium Alginate with Silver; Foam, Silicon (eg  Allevyn, etc); Xeroform 08/06/21 1005   Dressing Changed Changed 08/06/21 1005   Patient Tolerance Tolerated well 08/06/21 1005   Dressing Status Clean;Dry; Intact 08/06/21 1005       Wound 08/06/21 Tibial Posterior;Right (Active)   Wound Image   08/06/21 1031   Wound Description Beefy red 08/06/21 1031   Vickie-wound Assessment Intact; Erythema 08/06/21 1031   Wound Length (cm) 2 7 cm 08/06/21 1031   Wound Width (cm) 1 cm 08/06/21 1031   Wound Surface Area (cm^2) 2 7 cm^2 08/06/21 1031   Tunneling 0 cm 08/06/21 1031   Undermining 0 08/06/21 1031   Drainage Amount Scant 08/06/21 1031   Non-staged Wound Description Full thickness 08/06/21 1031   Treatments Cleansed;Irrigation with NSS;Site care;Elevated 08/06/21 1031   Dressing Changed Changed 08/06/21 1031   Patient Tolerance Tolerated well 08/06/21 1031   Dressing Status Clean;Dry; Intact 08/06/21 1031                       Call or tigertext with any questions    Wound Care will continue to follow    Clayton Easton RN

## 2021-08-06 NOTE — ASSESSMENT & PLAN NOTE
Lab Results   Component Value Date    HGBA1C 6 1 (H) 05/16/2021       No results for input(s): POCGLU in the last 72 hours      Blood Sugar Average: Last 72 hrs:     · Diabetic diet  · Fingerstick blood sugar with sliding scale coverage  · Hold Glucophage while in hospital

## 2021-08-06 NOTE — PROGRESS NOTES
Will order ensure max daily + Virgil fruit punch BID for wound healing, pt previously trialed these supplements and tolerated, agreeable to continuation  Encouraged continuation of these supplements after d/c  Will adjust diet to CCD 3 to better meet estimated needs  Will add 2 gm Na restriction given hx of CHF

## 2021-08-06 NOTE — DISCHARGE INSTR - OTHER ORDERS
Skin care plans:  1-Hydraguard to bilateral heels BID and PRN  2-Elevate heels to offload pressure  3-Ehob cushion in chair when out of bed  4-Moisturize skin daily with skin nourishing cream   5-Turn/reposition q2h or when medically stable for pressure re-distribution on skin  6-Cleanse right anterior/medial thigh wound, medial right knee wound, with NSS, Apply Maxorb AG to visible area of yellow tan slough and drainage  Apply Xeroform gauze to non slough areas of wound beds  Cover entire wound with Allevyn foam  Change daily and prn drainage  7-Cleanse posterior tibia wounds with NSS apply xerorform then Allevyn foam Change daily and prn   8-Cleanse anterior tibia with NSS apply Maxorb AG to wound cover with Allevyn foam  Change daily and prn  9-Cleanse sacrum and buttocks with soap and water, pat dry  Apply Allevyn foam to sacrum   Change every 3 days monitor skin beneath foam every shift

## 2021-08-06 NOTE — ED PROVIDER NOTES
History  Chief Complaint   Patient presents with    Wound Check     Pt has several large wound on right leg, progressively worsening, pt reports chills but denies fevers, nursing home staff concerned for infection and sepsis     Patient with history of diabetes, known wounds to the right leg, states that while dressing changes were being performed today at the nursing home, the staff there noticed that the areas appeared infected  They referred the patient to the emergency room for further evaluation  The patient does not complain of any increased pain in the area  She denies fevers chills  No other complaints      History provided by:  Patient   used: No    Medical Problem  Location:  Right leg  Quality:  Chronic wounds with infection  Severity:  Moderate  Onset quality:  Gradual  Timing:  Constant  Progression:  Worsening  Chronicity:  Recurrent  Relieved by:  Nothing  Worsened by:  Nothing  Ineffective treatments:  None tried  Associated symptoms: no abdominal pain, no chest pain, no cough, no diarrhea, no ear pain, no fatigue, no fever, no headaches, no loss of consciousness, no myalgias, no nausea, no rash, no shortness of breath, no sore throat, no vomiting and no wheezing        Prior to Admission Medications   Prescriptions Last Dose Informant Patient Reported? Taking?    LACTOBACILLUS PO 8/5/2021 at Unknown time  Yes Yes   Sig: Take by mouth   Xarelto 20 MG tablet 8/5/2021 at Unknown time  No Yes   Sig: Take 1 tablet (20 mg total) by mouth daily with breakfast   acetaminophen (TYLENOL) 325 mg tablet 8/5/2021 at Unknown time  No Yes   Sig: Take 2 tablets (650 mg total) by mouth every 6 (six) hours as needed for mild pain or fever   aluminum-magnesium hydroxide-simethicone (MYLANTA) 200-200-20 mg/5 mL suspension 8/5/2021 at Unknown time  Yes Yes   Sig: Take 30 mL by mouth every 4 (four) hours as needed for indigestion or heartburn   bisacodyl (bisacodyl) 5 mg EC tablet 8/5/2021 at Unknown time  Yes Yes   Sig: Take 5 mg by mouth daily as needed for constipation   bumetanide (BUMEX) 1 mg tablet 2021 at Unknown time  No Yes   Sig: Take 1 tablet (1 mg total) by mouth daily   levalbuterol (XOPENEX) 1 25 mg/0 5 mL nebulizer solution 2021 at Unknown time  No Yes   Sig: Take 0 5 mL (1 25 mg total) by nebulization every 4 (four) hours as needed for wheezing   loperamide (IMODIUM) 2 mg capsule 2021 at Unknown time  Yes Yes   Sig: Take 2 mg by mouth 4 (four) times a day as needed for diarrhea   metFORMIN (GLUCOPHAGE) 500 mg tablet 2021 at Unknown time  Yes Yes   Si mg daily with breakfast    metoprolol succinate (TOPROL-XL) 100 mg 24 hr tablet 2021 at Unknown time  No Yes   Sig: Take 1 tablet (100 mg total) by mouth 2 (two) times a day   polyethylene glycol (MIRALAX) 17 g packet 2021 at Unknown time  No Yes   Sig: Take 17 g by mouth daily as needed (constipation)   potassium chloride (MICRO-K) 10 MEQ CR capsule 2021 at Unknown time  No Yes   Sig: Take 1 capsule (10 mEq total) by mouth daily   promethazine (PHENERGAN) 12 5 MG tablet 2021 at Unknown time  Yes Yes   Sig: Take 25 mg by mouth every 6 (six) hours as needed for nausea or vomiting   senna-docusate sodium (SENOKOT S) 8 6-50 mg per tablet 2021 at Unknown time  No Yes   Sig: Take 1 tablet by mouth daily at bedtime   simvastatin (ZOCOR) 10 mg tablet 2021 at Unknown time  Yes Yes   Sig: 10 mg daily at bedtime       Facility-Administered Medications: None       Past Medical History:   Diagnosis Date    Coronary artery disease     DM2 (diabetes mellitus, type 2) (HCC)     Lymphedema     Lymphedema     Obesity     [facility]monary HTN ([facility] [address] )     Sepsis ([facility] [address] )        Past Surgical History:   Procedure Laterality Date    CHOLECYSTECTOMY      TONSILLECTOMY      WOUND DEBRIDEMENT Left 6/3/2021    Procedure: DEBRIDEMENT WOUND [facility] OUT);   Surgeon: Rianna Kan DO;  Location:  MAIN OR;  Service: General History reviewed  No pertinent family history  I have reviewed and agree with the history as documented  E-Cigarette/Vaping    E-Cigarette Use Never User      E-Cigarette/Vaping Substances     Social History     Tobacco Use    Smoking status: Never Smoker    Smokeless tobacco: Never Used   Vaping Use    Vaping Use: Never used   Substance Use Topics    Alcohol use: Not Currently    Drug use: Not Currently       Review of Systems   Constitutional: Negative for chills, fatigue and fever  HENT: Negative for ear pain, hearing loss, sore throat, trouble swallowing and voice change  Eyes: Negative for pain and discharge  Respiratory: Negative for cough, shortness of breath and wheezing  Cardiovascular: Negative for chest pain and palpitations  Gastrointestinal: Negative for abdominal pain, blood in stool, constipation, diarrhea, nausea and vomiting  Genitourinary: Negative for dysuria, flank pain, frequency and hematuria  Musculoskeletal: Negative for joint swelling, myalgias, neck pain and neck stiffness  Skin: Positive for wound  Negative for rash  Neurological: Negative for dizziness, seizures, loss of consciousness, syncope, facial asymmetry and headaches  Psychiatric/Behavioral: Negative for hallucinations, self-injury and suicidal ideas  All other systems reviewed and are negative  Physical Exam  Physical Exam  Vitals and nursing note reviewed  Constitutional:       General: She is not in acute distress  Appearance: She is well-developed  She is obese  HENT:      Head: Normocephalic and atraumatic  Right Ear: External ear normal       Left Ear: External ear normal    Eyes:      General: No scleral icterus  Right eye: No discharge  Left eye: No discharge  Extraocular Movements: Extraocular movements intact  Conjunctiva/sclera: Conjunctivae normal    Cardiovascular:      Rate and Rhythm: Normal rate and regular rhythm        Heart sounds: Normal heart sounds  No murmur heard  Pulmonary:      Effort: Pulmonary effort is normal       Breath sounds: Normal breath sounds  No wheezing or rales  Abdominal:      General: Bowel sounds are normal  There is no distension  Palpations: Abdomen is soft  Tenderness: There is no abdominal tenderness  There is no guarding or rebound  Musculoskeletal:         General: No deformity  Normal range of motion  Cervical back: Normal range of motion and neck supple  Skin:     General: Skin is warm and dry  Findings: No rash  Comments: There are chronic appearing wounds of the right leg from the groin to the knee on the inner thigh  These appear to be old with significant granulation tissue but the surrounding skin is hot and red consistent with cellulitis  There is no fluctuant collection but there is some mild oozing  Neurological:      General: No focal deficit present  Mental Status: She is alert and oriented to person, place, and time  Cranial Nerves: No cranial nerve deficit  Psychiatric:         Mood and Affect: Mood normal          Behavior: Behavior normal          Thought Content:  Thought content normal          Judgment: Judgment normal          Vital Signs  ED Triage Vitals [08/05/21 2354]   Temperature Pulse Respirations Blood Pressure SpO2   97 8 °F (36 6 °C) 105 20 143/70 97 %      Temp Source Heart Rate Source Patient Position - Orthostatic VS BP Location FiO2 (%)   Temporal Monitor Lying Right arm --      Pain Score       8           Vitals:    08/05/21 2354 08/06/21 0115 08/06/21 0209   BP: 143/70 145/68 139/82   Pulse: 105 104    Patient Position - Orthostatic VS: Lying Lying          Visual Acuity      ED Medications  Medications   sodium chloride 0 9 % bolus 1,000 mL (0 mL Intravenous Stopped 8/6/21 0110)   vancomycin (VANCOCIN) IVPB (premix in dextrose) 1,000 mg 200 mL (0 mg Intravenous Stopped 8/6/21 0127)   doxycycline (VIBRAMYCIN) 100 mg in sodium chloride 0 9 % 100 mL IVPB (100 mg Intravenous New Bag 8/6/21 0127)       Diagnostic Studies  Results Reviewed     Procedure Component Value Units Date/Time    Comprehensive metabolic panel [[id number]]  (Abnormal) Collected: 08/06/21 0012    Lab Status: Final result Specimen: Blood from Arm, Left Updated: 08/06/21 0050     Sodium 140 mmol/L      Potassium 4 0 mmol/L      Chloride 106 mmol/L      CO2 26 mmol/L      ANION GAP 8 mmol/L      BUN 14 mg/dL      Creatinine 0 83 mg/dL      Glucose 90 mg/dL      Calcium 8 4 mg/dL      Corrected Calcium 9 6 mg/dL      AST 23 U/L      ALT 14 U/L      Alkaline Phosphatase 88 U/L      Total Protein 7 0 g/dL      Albumin 2 5 g/dL      Total Bilirubin 0 46 mg/dL      eGFR 70 ml/min/1 73sq m     Narrative:      [place] guidelines for Chronic Kidney Disease (CKD):     Stage 1 with normal or high GFR (GFR > 90 mL/min/1 73 square meters)    Stage 2 Mild CKD (GFR = 60-89 mL/min/1 73 square meters)    Stage 3A Moderate CKD (GFR = 45-59 mL/min/1 73 square meters)    Stage 3B Moderate CKD (GFR = 30-44 mL/min/1 73 square meters)    Stage 4 Severe CKD (GFR = 15-29 mL/min/1 73 square meters)    Stage 5 End Stage CKD (GFR <15 mL/min/1 73 square meters)  Note: GFR calculation is accurate only with a steady state creatinine    Lactic acid, plasma [[id number]]  (Normal) Collected: 08/06/21 0013    Lab Status: Final result Specimen: Blood from Arm, Left Updated: 08/06/21 0044     LACTIC ACID 1 2 mmol/L     Narrative:      Result may be elevated if tourniquet was used during collection  Blood culture #1 [[id number]] Collected: 08/06/21 0022    Lab Status:  In process Specimen: Blood from Arm, Right Updated: 08/06/21 0029    CBC and differential [[id number]]  (Abnormal) Collected: 08/06/21 0012    Lab Status: Final result Specimen: Blood from Arm, Left Updated: 08/06/21 0025     WBC 9 95 Thousand/uL      RBC 3 94 Million/uL      Hemoglobin 10 4 g/dL      Hematocrit 34 6 %      MCV 88 fL      MCH 26 4 pg      MCHC 30 1 g/dL      RDW 18 3 %      MPV 9 5 fL      Platelets 951 Thousands/uL      nRBC 0 /100 WBCs      Neutrophils Relative 67 %      Immat GRANS % 1 %      Lymphocytes Relative 18 %      Monocytes Relative 9 %      Eosinophils Relative 5 %      Basophils Relative 0 %      Neutrophils Absolute 6 69 Thousands/µL      Immature Grans Absolute 0 08 Thousand/uL      Lymphocytes Absolute 1 79 Thousands/µL      Monocytes Absolute 0 85 Thousand/µL      Eosinophils Absolute 0 50 Thousand/µL      Basophils Absolute 0 04 Thousands/µL     Blood culture #2 [[id number]] Collected: 08/06/21 0012    Lab Status: In process Specimen: Blood from Arm, Left Updated: 08/06/21 0023                 No orders to display              Procedures  Procedures         ED Course                             SBIRT 20yo+      Most Recent Value   SBIRT (23 yo +)   In order to provide better care to our patients, we are screening all of our patients for alcohol and drug use  Would it be okay to ask you these screening questions? Yes Filed at: 08/06/2021 0111   Initial Alcohol Screen: US AUDIT-C    1  How often do you have a drink containing alcohol?  0 Filed at: 08/06/2021 0111   2  How many drinks containing alcohol do you have on a typical day you are drinking? 0 Filed at: 08/06/2021 0111   3a  Male UNDER 65: How often do you have five or more drinks on one occasion? 0 Filed at: 08/06/2021 0111   3b  FEMALE Any Age, or MALE 65+: How often do you have 4 or more drinks on one occassion? 0 Filed at: 08/06/2021 0111   Audit-C Score  0 Filed at: 08/06/2021 0111   GOPAL: How many times in the past year have you    Used an illegal drug or used a prescription medication for non-medical reasons?   Never Filed at: 08/06/2021 0111                    MDM  Number of Diagnoses or Management Options     Amount and/or Complexity of Data Reviewed  Clinical lab tests: ordered and reviewed  Tests in the radiology section of CPT®: ordered and reviewed  Decide to obtain previous medical records or to obtain history from someone other than the patient: yes  Review and summarize past medical records: yes  Independent visualization of images, tracings, or specimens: yes        Disposition  Final diagnoses:   Cellulitis of right leg     Time reflects when diagnosis was documented in both MDM as applicable and the Disposition within this note     Time User Action Codes Description Comment    8/6/2021  1:13 AM Albert Guy Add [L03 [id number]] Cellulitis of right leg       ED Disposition     ED Disposition Condition Date/Time Comment    Admit Stable Fri Aug 6, 2021  1:13 AM Case was discussed with Génesis Salvador and the patient's admission status was agreed to be Admission Status: inpatient status to the service of Dr Ponce Robison          Follow-up Information    None         Current Discharge Medication List      CONTINUE these medications which have NOT CHANGED    Details   acetaminophen (TYLENOL) 325 mg tablet Take 2 tablets (650 mg total) by mouth every 6 (six) hours as needed for mild pain or fever  Refills: 0    Associated Diagnoses: Cellulitis of right lower extremity      aluminum-magnesium hydroxide-simethicone (MYLANTA) 200-200-20 mg/5 mL suspension Take 30 mL by mouth every 4 (four) hours as needed for indigestion or heartburn      bisacodyl (bisacodyl) 5 mg EC tablet Take 5 mg by mouth daily as needed for constipation      bumetanide (BUMEX) 1 mg tablet Take 1 tablet (1 mg total) by mouth daily  Qty:  , Refills: 0    Associated Diagnoses: Chronic diastolic heart failure (HCC)      LACTOBACILLUS PO Take by mouth      levalbuterol (XOPENEX) 1 25 mg/0 5 mL nebulizer solution Take 0 5 mL (1 25 mg total) by nebulization every 4 (four) hours as needed for wheezing  Refills: 0    Associated Diagnoses: Acute respiratory failure with hypoxia (HCC)      loperamide (IMODIUM) 2 mg capsule Take 2 mg by mouth 4 (four) times a day as needed for diarrhea      metFORMIN (GLUCOPHAGE) 500 mg tablet 500 mg daily with breakfast       metoprolol succinate (TOPROL-XL) 100 mg 24 hr tablet Take 1 tablet (100 mg total) by mouth 2 (two) times a day  Qty:  , Refills: 0    Associated Diagnoses: Chronic atrial fibrillation (HCC)      polyethylene glycol (MIRALAX) 17 g packet Take 17 g by mouth daily as needed (constipation)  Refills: 0    Associated Diagnoses: Constipation      potassium chloride (MICRO-K) 10 MEQ CR capsule Take 1 capsule (10 mEq total) by mouth daily  Refills: 0    Associated Diagnoses: Chronic diastolic heart failure (HCC)      promethazine (PHENERGAN) 12 5 MG tablet Take 25 mg by mouth every 6 (six) hours as needed for nausea or vomiting      senna-docusate sodium (SENOKOT S) 8 6-50 mg per tablet Take 1 tablet by mouth daily at bedtime  Refills: 0    Associated Diagnoses: Constipation      simvastatin (ZOCOR) 10 mg tablet 10 mg daily at bedtime       Xarelto 20 MG tablet Take 1 tablet (20 mg total) by mouth daily with breakfast  Refills: 0    Associated Diagnoses: Atrial fibrillation with RVR (HCC)           No discharge procedures on file      PDMP Review     None          ED Provider  Electronically Signed by           Keyonna Jose MD  08/06/21 7061

## 2021-08-06 NOTE — PLAN OF CARE
Problem: Potential for Falls  Goal: Patient will remain free of falls  Description: INTERVENTIONS:  - Educate patient/family on patient safety including physical limitations  - Instruct patient to call for assistance with activity   - Consult OT/PT to assist with strengthening/mobility   - Keep Call bell within reach  - Keep bed low and locked with side rails adjusted as appropriate  - Keep care items and personal belongings within reach  - Initiate and maintain comfort rounds  - Make Fall Risk Sign visible to staff  - Offer Toileting every 3 Hours, in advance of need  - Initiate/Maintain bed alarm  - Obtain necessary fall risk management equipment  Outcome: Progressing     Problem: Nutrition/Hydration-ADULT  Goal: Nutrient/Hydration intake appropriate for improving, restoring or maintaining nutritional needs  Description: Monitor and assess patient's nutrition/hydration status for malnutrition  Collaborate with interdisciplinary team and initiate plan and interventions as ordered  Monitor patient's weight and dietary intake as ordered or per policy  Utilize nutrition screening tool and intervene as necessary  Determine patient's food preferences and provide high-protein, high-caloric foods as appropriate       INTERVENTIONS:  - Monitor oral intake, urinary output, labs, and treatment plans  - Assess nutrition and hydration status and recommend course of action  - Evaluate amount of meals eaten  - Allow adequate time for meals  - Recommend/ encourage appropriate diets, oral nutritional supplements, and vitamin/mineral supplements  - Order, calculate, and assess calorie counts as needed    - Provide specific nutrition/hydration education as appropriate  - Include patient/family/caregiver in decisions related to nutrition  Outcome: Progressing     Problem: MOBILITY - ADULT  Goal: Maintain or return to baseline ADL function  Description: INTERVENTIONS:  -  Assess patient's ability to carry out ADLs; assess patient's baseline for ADL function and identify physical deficits which impact ability to perform ADLs (bathing, care of mouth/teeth, toileting, grooming, dressing, etc )  - Assess/evaluate cause of self-care deficits   - Assess range of motion  - Assess patient's mobility; develop plan if impaired  - Assess patient's need for assistive devices and provide as appropriate  - Encourage maximum independence but intervene and supervise when necessary  - Involve family in performance of ADLs  - Consider OT consult to assist with ADL evaluation and planning for discharge  - Provide patient education as appropriate  Outcome: Progressing  Goal: Maintains/Returns to pre admission functional level  Description: INTERVENTIONS:  - Perform BMAT or MOVE assessment daily    - Set and communicate daily mobility goal to care team and patient/family/caregiver  - Collaborate with rehabilitation services on mobility goals if consulted  - Perform Range of Motion 4 times a day  - Reposition patient every 2 hours    - Record patient progress and toleration of activity level   Outcome: Progressing     Problem: DISCHARGE PLANNING - CARE MANAGEMENT  Goal: Discharge to post-acute care or home with appropriate resources  Description: INTERVENTIONS:  - Conduct assessment to determine patient/family and health care team treatment goals, and need for post-acute services based on payer coverage, community resources, and patient preferences, and barriers to discharge  - Address psychosocial, clinical, and financial barriers to discharge as identified in assessment in conjunction with the patient/family and health care team  - Arrange appropriate level of post-acute services according to patient's   needs and preference and payer coverage in collaboration with the physician and health care team  - Communicate with and update the patient/family, physician, and health care team regarding progress on the discharge plan  - Arrange appropriate transportation to post-acute venues  Outcome: Progressing     Problem: PAIN - ADULT  Goal: Verbalizes/displays adequate comfort level or baseline comfort level  Description: Interventions:  - Encourage patient to monitor pain and request assistance  - Assess pain using appropriate pain scale  - Administer analgesics based on type and severity of pain and evaluate response  - Implement non-pharmacological measures as appropriate and evaluate response  - Consider cultural and social influences on pain and pain management  - Notify physician/advanced practitioner if interventions unsuccessful or patient reports new pain  Outcome: Progressing     Problem: INFECTION - ADULT  Goal: Absence or prevention of progression during hospitalization  Description: INTERVENTIONS:  - Assess and monitor for signs and symptoms of infection  - Monitor lab/diagnostic results  - Monitor all insertion sites, i e  indwelling lines, tubes, and drains  - Administer medications as ordered  - Instruct and encourage patient and family to use good hand hygiene technique  - Identify and instruct in appropriate isolation precautions for identified infection/condition  Outcome: Progressing  Goal: Absence of fever/infection during neutropenic period  Description: INTERVENTIONS:  - Monitor WBC    Outcome: Progressing     Problem: SAFETY ADULT  Goal: Patient will remain free of falls  Description: INTERVENTIONS:  - Educate patient/family on patient safety including physical limitations  - Instruct patient to call for assistance with activity   - Consult OT/PT to assist with strengthening/mobility   - Keep Call bell within reach  - Keep bed low and locked with side rails adjusted as appropriate  - Keep care items and personal belongings within reach  - Initiate and maintain comfort rounds  - Make Fall Risk Sign visible to staff  - Offer Toileting every 3 Hours, in advance of need  - Initiate/Maintain bed alarm  - Obtain necessary fall risk management equipment  Outcome: Progressing  Goal: Maintain or return to baseline ADL function  Description: INTERVENTIONS:  -  Assess patient's ability to carry out ADLs; assess patient's baseline for ADL function and identify physical deficits which impact ability to perform ADLs (bathing, care of mouth/teeth, toileting, grooming, dressing, etc )  - Assess/evaluate cause of self-care deficits   - Assess range of motion  - Assess patient's mobility; develop plan if impaired  - Assess patient's need for assistive devices and provide as appropriate  - Encourage maximum independence but intervene and supervise when necessary  - Involve family in performance of ADLs  - Consider OT consult to assist with ADL evaluation and planning for discharge  - Provide patient education as appropriate  Outcome: Progressing     Problem: DISCHARGE PLANNING  Goal: Discharge to home or other facility with appropriate resources  Description: INTERVENTIONS:  - Identify barriers to discharge w/patient and caregiver  - Arrange for needed discharge resources and transportation as appropriate  - Identify discharge learning needs (meds, wound care, etc )  - Arrange for interpretive services to assist at discharge as needed  - Refer to Case Management Department for coordinating discharge planning if the patient needs post-hospital services based on physician/advanced practitioner order or complex needs related to functional status, cognitive ability, or social support system  Outcome: Progressing     Problem: Knowledge Deficit  Goal: Patient/family/caregiver demonstrates understanding of disease process, treatment plan, medications, and discharge instructions  Description: Complete learning assessment and assess knowledge base    Interventions:  - Provide teaching at level of understanding  - Provide teaching via preferred learning methods  Outcome: Progressing     Problem: Prexisting or High Potential for Compromised Skin Integrity  Goal: Skin integrity is maintained or improved  Description: INTERVENTIONS:  - Identify patients at risk for skin breakdown  - Assess and monitor skin integrity  - Assess and monitor nutrition and hydration status  - Monitor labs   - Assess for incontinence   - Turn and reposition patient  - Assist with mobility/ambulation  - Relieve pressure over bony prominences  - Avoid friction and shearing  - Provide appropriate hygiene as needed including keeping skin clean and dry  - Evaluate need for skin moisturizer/barrier cream  - Collaborate with interdisciplinary team   - Patient/family teaching  - Consider wound care consult   Outcome: Progressing

## 2021-08-06 NOTE — ASSESSMENT & PLAN NOTE
· Presents with chronic wounds and suspicion for cellulitis  · When compared with images in Media from 05/31/2021-wounds appear significantly improved  · Follow-up with surgery in 06/28/2021: Wound beds consisting mostly of healthy appearing pink granulation tissue and some slough, which was debrided    No signs of infection at that time  · On 06/01/2021 patient had wound culture positive for 4+ growth of Pseudomonas, 4+ growth of Proteus, 2+ growth of coagulase negative Staphylococcus- will use cefepime-she has tolerated this during her early June admission  · Continue vancomycin  · Wound care consult  · Surgery consult

## 2021-08-06 NOTE — PLAN OF CARE
Problem: Potential for Falls  Goal: Patient will remain free of falls  Description: INTERVENTIONS:  - Educate patient/family on patient safety including physical limitations  - Instruct patient to call for assistance with activity   - Consult OT/PT to assist with strengthening/mobility   - Keep Call bell within reach  - Keep bed low and locked with side rails adjusted as appropriate  - Keep care items and personal belongings within reach  - Initiate and maintain comfort rounds  - Make Fall Risk Sign visible to staff  - Offer Toileting every 3 Hours, in advance of need  - Initiate/Maintain bed alarm  - Obtain necessary fall risk management equipment  Outcome: Progressing     Problem: Nutrition/Hydration-ADULT  Goal: Nutrient/Hydration intake appropriate for improving, restoring or maintaining nutritional needs  Description: Monitor and assess patient's nutrition/hydration status for malnutrition  Collaborate with interdisciplinary team and initiate plan and interventions as ordered  Monitor patient's weight and dietary intake as ordered or per policy  Utilize nutrition screening tool and intervene as necessary  Determine patient's food preferences and provide high-protein, high-caloric foods as appropriate       INTERVENTIONS:  - Monitor oral intake, urinary output, labs, and treatment plans  - Assess nutrition and hydration status and recommend course of action  - Evaluate amount of meals eaten  - Allow adequate time for meals  - Recommend/ encourage appropriate diets, oral nutritional supplements, and vitamin/mineral supplements  - Order, calculate, and assess calorie counts as needed    - Provide specific nutrition/hydration education as appropriate  - Include patient/family/caregiver in decisions related to nutrition  Outcome: Progressing     Problem: MOBILITY - ADULT  Goal: Maintain or return to baseline ADL function  Description: INTERVENTIONS:  -  Assess patient's ability to carry out ADLs; assess patient's baseline for ADL function and identify physical deficits which impact ability to perform ADLs (bathing, care of mouth/teeth, toileting, grooming, dressing, etc )  - Assess/evaluate cause of self-care deficits   - Assess range of motion  - Assess patient's mobility; develop plan if impaired  - Assess patient's need for assistive devices and provide as appropriate  - Encourage maximum independence but intervene and supervise when necessary  - Involve family in performance of ADLs  - Consider OT consult to assist with ADL evaluation and planning for discharge  - Provide patient education as appropriate  Outcome: Progressing  Goal: Maintains/Returns to pre admission functional level  Description: INTERVENTIONS:  - Perform BMAT or MOVE assessment daily    - Set and communicate daily mobility goal to care team and patient/family/caregiver  - Collaborate with rehabilitation services on mobility goals if consulted  - Perform Range of Motion 4 times a day  - Reposition patient every 2 hours    - Record patient progress and toleration of activity level   Outcome: Progressing     Problem: DISCHARGE PLANNING - CARE MANAGEMENT  Goal: Discharge to post-acute care or home with appropriate resources  Description: INTERVENTIONS:  - Conduct assessment to determine patient/family and health care team treatment goals, and need for post-acute services based on payer coverage, community resources, and patient preferences, and barriers to discharge  - Address psychosocial, clinical, and financial barriers to discharge as identified in assessment in conjunction with the patient/family and health care team  - Arrange appropriate level of post-acute services according to patient's   needs and preference and payer coverage in collaboration with the physician and health care team  - Communicate with and update the patient/family, physician, and health care team regarding progress on the discharge plan  - Arrange appropriate transportation to post-acute venues  Outcome: Progressing     Problem: PAIN - ADULT  Goal: Verbalizes/displays adequate comfort level or baseline comfort level  Description: Interventions:  - Encourage patient to monitor pain and request assistance  - Assess pain using appropriate pain scale  - Administer analgesics based on type and severity of pain and evaluate response  - Implement non-pharmacological measures as appropriate and evaluate response  - Consider cultural and social influences on pain and pain management  - Notify physician/advanced practitioner if interventions unsuccessful or patient reports new pain  Outcome: Progressing     Problem: INFECTION - ADULT  Goal: Absence or prevention of progression during hospitalization  Description: INTERVENTIONS:  - Assess and monitor for signs and symptoms of infection  - Monitor lab/diagnostic results  - Monitor all insertion sites, i e  indwelling lines, tubes, and drains  - Administer medications as ordered  - Instruct and encourage patient and family to use good hand hygiene technique  - Identify and instruct in appropriate isolation precautions for identified infection/condition  Outcome: Progressing  Goal: Absence of fever/infection during neutropenic period  Description: INTERVENTIONS:  - Monitor WBC    Outcome: Progressing     Problem: SAFETY ADULT  Goal: Patient will remain free of falls  Description: INTERVENTIONS:  - Educate patient/family on patient safety including physical limitations  - Instruct patient to call for assistance with activity   - Consult OT/PT to assist with strengthening/mobility   - Keep Call bell within reach  - Keep bed low and locked with side rails adjusted as appropriate  - Keep care items and personal belongings within reach  - Initiate and maintain comfort rounds  - Make Fall Risk Sign visible to staff  - Offer Toileting every 3 Hours, in advance of need  - Initiate/Maintain bed alarm  - Obtain necessary fall risk management equipment  Outcome: Progressing  Goal: Maintain or return to baseline ADL function  Description: INTERVENTIONS:  -  Assess patient's ability to carry out ADLs; assess patient's baseline for ADL function and identify physical deficits which impact ability to perform ADLs (bathing, care of mouth/teeth, toileting, grooming, dressing, etc )  - Assess/evaluate cause of self-care deficits   - Assess range of motion  - Assess patient's mobility; develop plan if impaired  - Assess patient's need for assistive devices and provide as appropriate  - Encourage maximum independence but intervene and supervise when necessary  - Involve family in performance of ADLs  - Consider OT consult to assist with ADL evaluation and planning for discharge  - Provide patient education as appropriate  Outcome: Progressing     Problem: DISCHARGE PLANNING  Goal: Discharge to home or other facility with appropriate resources  Description: INTERVENTIONS:  - Identify barriers to discharge w/patient and caregiver  - Arrange for needed discharge resources and transportation as appropriate  - Identify discharge learning needs (meds, wound care, etc )  - Arrange for interpretive services to assist at discharge as needed  - Refer to Case Management Department for coordinating discharge planning if the patient needs post-hospital services based on physician/advanced practitioner order or complex needs related to functional status, cognitive ability, or social support system  Outcome: Progressing     Problem: Knowledge Deficit  Goal: Patient/family/caregiver demonstrates understanding of disease process, treatment plan, medications, and discharge instructions  Description: Complete learning assessment and assess knowledge base    Interventions:  - Provide teaching at level of understanding  - Provide teaching via preferred learning methods  Outcome: Progressing

## 2021-08-06 NOTE — ASSESSMENT & PLAN NOTE
Wt Readings from Last 3 Encounters:   08/05/21 136 kg (300 lb)   06/07/21 (!) 138 kg (303 lb 9 2 oz)   05/31/21 (!) 152 kg (336 lb)     · Does not appear volume overloaded  · Daily weights and I&Os  · Continue metoprolol

## 2021-08-06 NOTE — CASE MANAGEMENT
Case Management Assessment & Discharge Planning Note    Patient name Alena Mitchell  Location [facility] [phone] MRN [id number]  : 1945 Date 2021       Current Admission Date: 2021  Current Admission Diagnosis:  Wound cellulitis   Patient Active Problem List   Diagnosis    AYDEE (acute kidney injury) ([facility] )    Chronic atrial fibrillation (HCC)    Type 2 diabetes mellitus, without long-term current use of insulin (HCC)    Chronic diastolic heart failure (HCC)    CVA (cerebral vascular accident) ([facility] )    BMI 50 0-59 9, adult ([facility] )    Essential hypertension    ARIAN on CPAP    Streptococcal bacteremia    Cellulitis of right lower extremity    Wound cellulitis    Elbow pain, left    Acute cystitis with hematuria    Anemia    Previous Admission - Discharge Date:21   LOS (days): 0  Geometric Mean LOS (GMLOS) (days): 3 20  Days to GMLOS:2 5 Previous Discharge Diagnosis:  There are no discharge diagnoses documented for the most recent discharge         Risk of Unplanned Readmission Score  Predictive Model Details          22 (Low)  Factor Value    Calculated 2021 16:05 22% Number of active Rx orders 35    Risk of Unplanned Readmission Model 21% Number of ED visits in last six months 4     9% Number of hospitalizations in last year 2     9% ECG/EKG order present in last 6 months     9% Latest calcium low (8 0 mg/dL)     6% Imaging order present in last 6 months     6% Latest hemoglobin low (9 2 g/dL)     5% Age 70     4% Charlson Comorbidity Index 4     4% Diagnosis of deficiency anemia present     4% Active anticoagulant Rx order present     1% Active ulcer medication Rx order present     1% Current length of stay 0 619 days         BUNDLE:      Reason for Referral:    OBJECTIVE:  Pt is a 70y o  year old Single, white or  [1], female with Tenriism preference of Zoroastrianism admitted on 7207 11:49 PM  Pt is admitted to [facility] [phone] at [address] [place] [address] with complaints of Wound cellulitis   Current admission status: Inpatient       Preferred Pharmacy:   PATIENT/FAMILY REPORTS NO PREFERRED PHARMACY  No address on file      Primary Care Provider: Deb Tarango MD    Primary Insurance: MEDICARE  Secondary Insurance: BLUE CROSS    ASSESSMENT:  Active Health Care Agents    There are no active Health Care Agents on file  Advance Directives  Does patient have a [address]?: Yes (Pt brother is POA)  Does patient have Advance Directives?: Yes  Advance Directives: Power of  for health care, Living will, Power of  for finance                   Patient Information  Mental Status: Alert  During Assessment patient was accompanied by: Not accompanied during assessment  Assessment information provided by[de-identified] Patient  Primary Caregiver: Self  Support Systems:  (Brother, friends)  Home entry access options   Select all that apply : Stairs  Number of steps to enter home : 2  Type of Current Residence: [address]  Upon entering residence, is there a bedroom on the main floor (no further steps)?: Yes  Upon entering residence, is there a bathroom on the main floor (no further steps)?: Yes  Living Arrangements: Other (Comment) (lives with brother)  Is patient a ?: No    Activities of Daily Living Prior to Admission  Functional Status: Independent  Completes ADLs independently?: No  Level of ADL dependence: Assistance  Ambulates independently?: Yes  Does patient use assisted devices?: Yes  Assisted Devices (DME) used: Marrianne Gaudy, Wheelchair, [employer], CPAP  Does patient currently own DME?: Yes  What DME does the patient currently own?: Marrianne Gaudy, Wheelchair, [employer], CPAP  DME Company Name (respiratory supplies): [employer] Tap[employer]  Does patient have a history of Outpatient Therapy (PT/OT)?: No  Does the patient have a history of Short-Term Rehab?: Yes (pt is currently at [facility])  Does patient currently have [facility]?: No         Patient Information Continued  Income Source: Pension/nursing home  Does patient have prescription coverage?: Yes  Does patient receive dialysis treatments?: No  Does patient have a history of substance abuse?: No  Does patient have a history of Mental Health Diagnosis?: No         Means of Transportation  Means of Transport to Appts[de-identified] Drives Self  In the past 12 months, has lack of transportation kept you from medical appointments or from getting medications?: No  In the past 12 months, has lack of transportation kept you from meetings, work, or from getting things needed for daily living?: No    DISCHARGE DETAILS:    Discharge planning discussed with[de-identified] pt  Freedom of Choice: Yes    Contacts  Patient Contacts: brother, Carlitos Parr to Patient[de-identified] Family                   We would like to be able to fill any required prescriptions on discharge at our [address] [place] [address] and have them delivered to you at discharge in your room  Would you like to participate in this program? : No - Declined    Discharge Destination Plan[de-identified]  (tbd)         PT is currently at [facility] for [address]  Pt sts she has been there since approx the "First week of "  Pt has concerns that her "medicare days" will  soon  Pt also expressed her "dislike" at current STR  Pt sts she would ideally like ot return home form the hospital   Pt sts she thinks she can "make it at home with maybe home care and home aides"  Pt continues, "I just want to be at home, in my own bed, looking out my own window "  PT sts "Im weighing all my options and checking into alternatives myself  "Pt requesting and received a list of [facility], not paid for by insurance/out of pocket and a list of STR within a 20 mile radius

## 2021-08-07 PROBLEM — E66.01 MORBID OBESITY (HCC): Status: ACTIVE | Noted: 2021-01-01

## 2021-08-07 NOTE — PROGRESS NOTES
moist,  no induration or maceration  Right posterior thigh medial wound some yellow slough  Right thigh full thickness skin loss  Wound bed mostly red and moist with minimal slough      Lab, Imaging and other studies:  I have personally reviewed pertinent lab results      CBC:   Lab Results   Component Value Date    WBC 7 41 08/07/2021    HGB 9 9 (L) 08/07/2021    HCT 32 2 (L) 08/07/2021    MCV 89 08/07/2021     08/07/2021    MCH 27 2 08/07/2021    MCHC 30 7 (L) 08/07/2021    RDW 18 3 (H) 08/07/2021    MPV 9 1 08/07/2021     CMP:   Lab Results   Component Value Date    SODIUM 142 08/07/2021    K 4 1 08/07/2021     08/07/2021    CO2 24 08/07/2021    BUN 17 08/07/2021    CREATININE 0 80 08/07/2021    CALCIUM 8 5 08/07/2021    EGFR 73 08/07/2021       VTE Pharmacologic Prophylaxis: Xarelto    Luanneandrea Kapoor PA-C

## 2021-08-07 NOTE — ASSESSMENT & PLAN NOTE
Lab Results   Component Value Date    HGBA1C 6 1 (H) 05/16/2021     Recent Labs     08/06/21 2052 08/07/21  0724 08/07/21  1118 08/07/21  1555   POCGLU 146* 112 123 129     · Stable continue sliding scale  · Carb controlled diet   · Can resume metformin at time of discharge

## 2021-08-07 NOTE — PLAN OF CARE
Problem: Potential for Falls  Goal: Patient will remain free of falls  Description: INTERVENTIONS:  - Educate patient/family on patient safety including physical limitations  - Instruct patient to call for assistance with activity   - Consult OT/PT to assist with strengthening/mobility   - Keep Call bell within reach  - Keep bed low and locked with side rails adjusted as appropriate  - Keep care items and personal belongings within reach  - Initiate and maintain comfort rounds  - Make Fall Risk Sign visible to staff  - Offer Toileting every 3 Hours, in advance of need  - Initiate/Maintain bed alarm  - Obtain necessary fall risk management equipment  Outcome: Progressing

## 2021-08-07 NOTE — RESPIRATORY THERAPY NOTE
RT Ventilator Management Note  Sadie Bee 70 y o  female MRN: [id number]  Unit/Bed#: -01 Encounter: [id number]      Daily Screen    No data found in the last 10 encounters  Physical Exam:          Resp Comments: entered patient's room at [address]  to enquire about he CPAP needs  PAtient had already placed her mask on herself and was ready for sleep    She is capable of managing her machine's use

## 2021-08-07 NOTE — ASSESSMENT & PLAN NOTE
· Right lower extremity wound with surrounding cellulitis  · Previous polymicrobial growth with Pseudomonas and Proteus  · History of streptococcal bacteremia  · MRSA swab negative    · Will deescalate antibiotics to cefazolin and levofloxacin and monitor clinical improvement

## 2021-08-07 NOTE — ASSESSMENT & PLAN NOTE
Wt Readings from Last 3 Encounters:   08/05/21 136 kg (300 lb)   06/07/21 (!) 138 kg (303 lb 9 2 oz)   05/31/21 (!) 152 kg (336 lb)     · Remains stable and euvolemic  · Echocardiogram with EF of 55-60%  · Continue bumex 1 mg daily  · Daily weight/intake and output  · Monitor volume status

## 2021-08-07 NOTE — ASSESSMENT & PLAN NOTE
Background: Presented to the ED with RLE wound with surrounding cellulitis  · Previous polymicrobial growth with Pseudomonas and Proteus    · History of streptococcal bacteremia  · MRSA swab negative  · Deescalated antibiotics to cefazolin and levofloxacin; currently on day 2/14  · Would transition to PO levoquin and keflex at time of discharge to complete course   · Serial leg/wound exams   · Wound care consulted; input appreciated

## 2021-08-07 NOTE — ASSESSMENT & PLAN NOTE
Wt Readings from Last 3 Encounters:   08/05/21 136 kg (300 lb)   06/07/21 (!) 138 kg (303 lb 9 2 oz)   05/31/21 (!) 152 kg (336 lb)     · Stable continue bumex 1 mg daily

## 2021-08-07 NOTE — ASSESSMENT & PLAN NOTE
Lab Results   Component Value Date    HGBA1C 6 1 (H) 05/16/2021     Recent Labs     08/06/21  1612 08/06/21 2052 08/07/21  0724 08/07/21  1118   POCGLU 101 146* 112 123     · Stable continue sliding scale

## 2021-08-07 NOTE — PROGRESS NOTES
[address]  Progress Note - Guerita Evan 1945, 70 y o  female MRN: [id number]  Unit/Bed#: -01 Encounter: [id number]  Primary Care Provider: Ariane Estrada MD   Date and time admitted to hospital: 8/5/2021 11:49 PM    * Wound cellulitis  Assessment & Plan  · Right lower extremity wound with surrounding cellulitis  · Previous polymicrobial growth with Pseudomonas and Proteus  · History of streptococcal bacteremia  · MRSA swab negative  · Will deescalate antibiotics to cefazolin and levofloxacin and monitor clinical improvement          Morbid obesity ([facility] [address] )  Assessment & Plan  · Body mass index is 54 87 kg/m²  ARIAN on CPAP  Assessment & Plan  · Continue CPAP    Chronic diastolic heart failure (HCC)  Assessment & Plan  Wt Readings from Last 3 Encounters:   08/05/21 136 kg (300 lb)   06/07/21 (!) 138 kg (303 lb 9 2 oz)   05/31/21 (!) 152 kg (336 lb)     · Stable continue bumex 1 mg daily    Type 2 diabetes mellitus, without long-term current use of insulin [facility])  Assessment & Plan  Lab Results   Component Value Date    HGBA1C 6 1 (H) 05/16/2021     Recent Labs     08/06/21  1612 08/06/21  2052 08/07/21  0724 08/07/21  1118   POCGLU 101 146* 112 123     · Stable continue sliding scale    Chronic atrial fibrillation (HCC)  Assessment & Plan  · Continue metoprolol for rate control  · Continue xarelto      VTE Pharmacologic Prophylaxis: VTE Score: 6 High Risk (Score >/= 5) - Pharmacological DVT Prophylaxis Ordered: Rivaroxaban (Xarelto)  Sequential Compression Devices Ordered  Patient Centered Rounds: I have performed bedside rounds with nursing staff today  Discussions with Specialists or Other Care Team Provider:  Case management    Education and Discussions with Family / Patient:  Left voicemail for brother    Time Spent for Care: 25 mins  More than 50% of total time spent on counseling and coordination of care as described above      Current Length of Stay: 1 day(s)  Current Patient Status: Inpatient   Certification Statement: The patient will continue to require additional inpatient hospital stay due to wound care and eventual discharge back to rehab  Discharge Plan / Estimated Discharge Date: Anticipate discharge in 48-72 hrs to rehab facility  Code Status: Level 1 - Full Code      Subjective:   Patient seen and examined  Pain controlled, slept okay    Objective:   Vitals: Blood pressure 155/79, pulse 84, temperature 98 °F (36 7 °C), resp  rate 18, height 5' 2" (1 575 m), weight 136 kg (300 lb), SpO2 95 %  Physical Exam  Vitals reviewed  Constitutional:       General: She is not in acute distress  Appearance: Normal appearance  Eyes:      General: No scleral icterus  Cardiovascular:      Rate and Rhythm: Rhythm irregular  Heart sounds: Normal heart sounds  Pulmonary:      Breath sounds: Decreased breath sounds present  No wheezing  Abdominal:      General: Bowel sounds are normal       Palpations: Abdomen is soft  Tenderness: There is no guarding or rebound  Musculoskeletal:         General: Swelling (Lower extremities) present  Skin:     General: Skin is warm  Comments: Right lower extremity leg wounds as pictured   Neurological:      Mental Status: She is alert and oriented to person, place, and time  Mental status is at baseline     Psychiatric:         Mood and Affect: Mood normal        Additional Data:   Labs:  Results from last 7 days   Lab Units 08/07/21  0438 08/06/21 0448 08/06/21  0012   WBC Thousand/uL 7 41 8 67 9 95   HEMOGLOBIN g/dL 9 9* 9 2* 10 4*   HEMATOCRIT % 32 2* 31 6* 34 6*   MCV fL 89 89 88   PLATELETS Thousands/uL 234 217 264     Results from last 7 days   Lab Units 08/07/21  0438 08/06/21 0448 08/06/21  0012   SODIUM mmol/L 142 139 140   POTASSIUM mmol/L 4 1 3 9 4 0   CHLORIDE mmol/L 108 108 106   CO2 mmol/L 24 22 26   ANION GAP mmol/L 10 9 8   BUN mg/dL 17 12 14   CREATININE mg/dL 0 80 0 76 0 83   CALCIUM mg/dL 8 5 8 0* 8 4   ALBUMIN g/dL  --   --  2 5*   TOTAL BILIRUBIN mg/dL  --   --  0 46   ALK PHOS U/L  --   --  88   ALT U/L  --   --  14   AST U/L  --   --  23   EGFR ml/min/1 73sq m 73 78 70   GLUCOSE RANDOM mg/dL 116 111 90                  Results from last 7 days   Lab Units 08/06/21  0013   LACTIC ACID mmol/L 1 2     Results from last 7 days   Lab Units 08/07/21  1118 08/07/21  0724 08/06/21  2052 08/06/21  1612 08/06/21  1133 08/06/21  0734   POC GLUCOSE mg/dl 123 112 146* 101 138 116             * I Have Reviewed All Lab Data Listed Above  Cultures:   Results from last 7 days   Lab Units 08/06/21  0022 08/06/21  0012   BLOOD CULTURE  No Growth at 24 hrs  No Growth at 24 hrs  Lines/Drains:  Invasive Devices     Peripheral Intravenous Line            Peripheral IV 08/06/21 Left;Proximal;Ventral (anterior) Forearm 1 day          Drain            External Urinary Catheter 1 day              Telemetry:      Imaging:  Imaging Reports Reviewed Today Include:   No results found    Scheduled Meds:  Current Facility-Administered Medications   Medication Dose Route Frequency Provider Last Rate    acetaminophen  650 mg Oral Q6H PRN MELBA Darling      bumetanide  1 mg Oral Daily [place]      cefepime  2,000 mg Intravenous Q12H KirillMELBA Hemphill 2,000 mg (08/07/21 0443)    collagenase   Topical Daily Carmelo Lemus PA-C      HYDROmorphone  0 5 mg Intravenous Q4H PRN Annelle Overall, DO      insulin lispro  2-12 Units Subcutaneous TID AC MELBA Darling      insulin lispro  2-12 Units Subcutaneous HS MELBA Darling      levalbuterol  1 25 mg Nebulization Q4H PRN MELBA Darling      metoprolol succinate  100 mg Oral BID MELBA Darling      morphine  15 mg Oral Q4H PRN Annshin Overall, DO      pantoprazole  40 mg Oral Early Morning MELBA Darling      polyethylene glycol  17 g Oral Daily PRN MELBA Darling      pravastatin  20 mg Oral Daily With Dinner Reggy Nose, CRNP      rivaroxaban  20 mg Oral Daily With Breakfast Reggy Nose, CRNP      senna-docusate sodium  1 tablet Oral HS Reggy Nose, CRNP      vancomycin  17 5 mg/kg (Adjusted) Intravenous Q12H Reggy Nose, CRNP 1,500 mg (08/07/21 0506)       DO [address] [address] [facility] Internal Medicine  Hospitalist    ** Please Note: This note has been constructed using a voice recognition system   **

## 2021-08-08 PROBLEM — R53.81 PHYSICAL DECONDITIONING: Status: ACTIVE | Noted: 2021-01-01

## 2021-08-08 PROBLEM — I27.20 SEVERE PULMONARY HYPERTENSION (HCC): Status: ACTIVE | Noted: 2021-01-01

## 2021-08-08 NOTE — ASSESSMENT & PLAN NOTE
Lab Results   Component Value Date    HGBA1C 6 1 (H) 05/16/2021     Recent Labs     08/07/21  1555 08/07/21 2054 08/08/21  1132 08/08/21  1615   POCGLU 129 144* 112 122     · Stable continue sliding scale  · Carb controlled diet   · Can resume metformin at time of discharge

## 2021-08-08 NOTE — PROGRESS NOTES
[address]  Progress Note - Hemal Aiken 1945, 70 y o  female MRN: [id number]  Unit/Bed#: -01 Encounter: [id number]  Primary Care Provider: Marcine Gowers, MD   Date and time admitted to hospital: 8/5/2021 11:49 PM    * Wound cellulitis  Assessment & Plan  Background: Presented to the ED with RLE wound with surrounding cellulitis  · Previous polymicrobial growth with Pseudomonas and Proteus  · History of streptococcal bacteremia  · MRSA swab negative  · Deescalated antibiotics to cefazolin and levofloxacin; currently on day 2/14  · Would transition to PO levoquin and keflex at time of discharge to complete course   · Serial leg/wound exams   · Wound care consulted; input appreciated       Physical deconditioning  Assessment & Plan  · In the setting of hospitalization with acute infection  · PT/OT consulted; awaiting formal evaluations  · Case management on board to assist with dispo    [facility]  Assessment & Plan  · As evidence by echocardiogram from August 2020  · Pending toleration of Cozaar on 08/08 would consider initiation of low-dose Aldactone on 08/09    Morbid obesity ([facility] [address] )  Assessment & Plan  · Body mass index is 54 87 kg/m²     · Encourage aggressive lifestyle modifications     ARIAN on CPAP  Assessment & Plan  · Continue CPAP    Chronic diastolic heart failure (HCC)  Assessment & Plan  Wt Readings from Last 3 Encounters:   08/05/21 136 kg (300 lb)   06/07/21 (!) 138 kg (303 lb 9 2 oz)   05/31/21 (!) 152 kg (336 lb)     · Remains stable and euvolemic  · Echocardiogram with EF of 55-60%  · Continue bumex 1 mg daily  · Daily weight/intake and output  · Monitor volume status    Type 2 diabetes mellitus, without long-term current use of insulin [facility])  Assessment & Plan  Lab Results   Component Value Date    HGBA1C 6 1 (H) 05/16/2021     Recent Labs     08/07/21  0724 08/07/21  1118 08/07/21  1555 08/07/21 2054   POCGLU 112 123 129 144* · Stable continue sliding scale  · Carb controlled diet   · Can resume metformin at time of discharge     Chronic atrial fibrillation ([address] )  Assessment & Plan  · Remains rate controlled  · Continue metoprolol for rate control  · Continue xarelto      VTE Pharmacologic Prophylaxis: VTE Score: 6 High Risk (Score >/= 5) - Pharmacological DVT Prophylaxis Ordered: rivaroxaban (Xarelto)  Sequential Compression Devices Ordered  Patient Centered Rounds: I performed bedside rounds with nursing staff today  Discussions with Specialists or Other Care Team Provider:  Nursing staff and case management as well as attending physician    Education and Discussions with Family / Patient: Patient declined call to   Time Spent for Care: 30 minutes  More than 50% of total time spent on counseling and coordination of care as described above  Current Length of Stay: 2 day(s)  Current Patient Status: Inpatient   Certification Statement: The patient will continue to require additional inpatient hospital stay due to IV antibiotics and monitoring of wound  Discharge Plan: Anticipate discharge in 48 hrs to Pending PT and OT evaluation  Code Status: Level 1 - Full Code    Subjective:   Patient currently reporting significant improvement of her wound  Denies any nausea, vomiting, cramping from antibiotic therapy  Objective:     Vitals:   Temp (24hrs), Av 3 °F (36 8 °C), Min:98 °F (36 7 °C), Max:99 °F (37 2 °C)    Temp:  [98 °F (36 7 °C)-99 °F (37 2 °C)] 98 °F (36 7 °C)  HR:  [104-109] 104  Resp:  [18-19] 19  BP: (134-155)/(79-89) 146/89  SpO2:  [90 %-91 %] 90 %  Body mass index is 54 87 kg/m²  Input and Output Summary (last 24 hours): Intake/Output Summary (Last 24 hours) at 2021 1111  Last data filed at 2021 1001  Gross per 24 hour   Intake 560 ml   Output 4700 ml   Net -4140 ml       Physical Exam:   Physical Exam  Vitals and nursing note reviewed     Constitutional:       General: She is not in acute distress  Appearance: She is well-developed  She is obese  She is not ill-appearing or diaphoretic  HENT:      Head: Normocephalic and atraumatic  Eyes:      Conjunctiva/sclera: Conjunctivae normal    Cardiovascular:      Rate and Rhythm: Normal rate and regular rhythm  Pulses: Normal pulses  Heart sounds: No murmur heard  Pulmonary:      Effort: Pulmonary effort is normal  No respiratory distress  Breath sounds: Normal breath sounds  Abdominal:      General: Abdomen is flat  Bowel sounds are normal  There is no distension  Palpations: Abdomen is soft  Tenderness: There is no abdominal tenderness  Musculoskeletal:         General: Tenderness present  Cervical back: Neck supple  Skin:     General: Skin is warm and dry  Capillary Refill: Capillary refill takes less than 2 seconds  Findings: Erythema present  Neurological:      Mental Status: She is alert  Mental status is at baseline  Psychiatric:         Behavior: Behavior is cooperative            Additional Data:     Labs:  Results from last 7 days   Lab Units 08/08/21  0502 08/06/21  0448   WBC Thousand/uL 9 52 8 67   HEMOGLOBIN g/dL 9 7* 9 2*   HEMATOCRIT % 32 1* 31 6*   PLATELETS Thousands/uL 223 217   NEUTROS PCT %  --  68   LYMPHS PCT %  --  15   MONOS PCT %  --  10   EOS PCT %  --  5     Results from last 7 days   Lab Units 08/08/21  0502   SODIUM mmol/L 141   POTASSIUM mmol/L 3 8   CHLORIDE mmol/L 108   CO2 mmol/L 24   BUN mg/dL 22   CREATININE mg/dL 0 83   ANION GAP mmol/L 9   CALCIUM mg/dL 8 5   ALBUMIN g/dL 2 2*   TOTAL BILIRUBIN mg/dL 0 51   ALK PHOS U/L 71   ALT U/L 9*   AST U/L 15   GLUCOSE RANDOM mg/dL 116         Results from last 7 days   Lab Units 08/07/21  2054 08/07/21  1555 08/07/21  1118 08/07/21  0724 08/06/21  2052 08/06/21  1612 08/06/21  1133 08/06/21  0734   POC GLUCOSE mg/dl 144* 129 123 112 146* 101 138 116         Results from last 7 days   Lab Units 08/06/21  0013   LACTIC ACID mmol/L 1 2       Lines/Drains:  Invasive Devices     Peripheral Intravenous Line            Peripheral IV 08/06/21 Left;Proximal;Ventral (anterior) Forearm 2 days          Drain            External Urinary Catheter 2 days                Imaging: Reviewed radiology reports from this admission including: chest xray    Recent Cultures (last 7 days):   Results from last 7 days   Lab Units 08/06/21  0022 08/06/21  0012   BLOOD CULTURE  No Growth at 48 hrs  No Growth at 48 hrs  Last 24 Hours Medication List:   Current Facility-Administered Medications   Medication Dose Route Frequency Provider Last Rate    acetaminophen  650 mg Oral Q6H PRN Arno Sprain, CRNP      bumetanide  1 mg Oral Daily Arno Sprain, CRNP      cefazolin  2,000 mg Intravenous Q8H Bal Root, DO 2,000 mg (08/08/21 0801)    collagenase   Topical Daily Carmelo Lemus PA-C      HYDROmorphone  0 5 mg Intravenous Q4H PRN Dejah Lo, DO      insulin lispro  2-12 Units Subcutaneous TID AC Arno Sprain, CRNP      insulin lispro  2-12 Units Subcutaneous HS Arno Sprain, CRNP      levalbuterol  1 25 mg Nebulization Q4H PRN Arno Sprain, CRNP      levofloxacin  750 mg Intravenous Q24H Bal Root,  mg (08/07/21 1756)    losartan  100 mg Oral Daily MELBA Lund      metoprolol succinate  100 mg Oral BID Arno Sprain, CRNP      morphine  15 mg Oral Q4H PRN Dejah Lo, DO      pantoprazole  40 mg Oral Early Morning Arno Sprain, CRNP      polyethylene glycol  17 g Oral Daily PRN Arno Sprain, CRNP      pravastatin  20 mg Oral Daily With Dinner Arno Sprain, CRNP      rivaroxaban  20 mg Oral Daily With Breakfast Arno Sprain, CRNP      senna-docusate sodium  1 tablet Oral HS Arno Sprain, CRNP          Today, Patient Was Seen By: [address], CRNP    **Please Note: This note may have been constructed using a voice recognition system  **

## 2021-08-08 NOTE — ASSESSMENT & PLAN NOTE
· As evidence by echocardiogram from August 2020  · Pending toleration of Cozaar on 08/08 would consider initiation of low-dose Aldactone on 08/09

## 2021-08-08 NOTE — PROGRESS NOTES
Progress Note - General Surgery   Rell Srinivasan 70 y o  female MRN: [id number]  Unit/Bed#: -01 Encounter: [id number]    Assessment:  79-year-old female who is status post extensive I and D of multiple wounds of the right lower extremity in June of this year  Plan:  No acute surgical intervention is needed  From a surgical standpoint, it is appropriate to convert to oral antibiotics  Continue daily dressing change as instructed by wound care with Maxorb Ag and Alevyn foam        Subjective/Objective       Subjective: The patient is doing well  She states the most superior right thigh wound is a little sensitive however there is no severe pain  Objective:     Blood pressure 146/89, pulse 104, temperature 98 °F (36 7 °C), resp  rate 19, height 5' 2" (1 575 m), weight 136 kg (300 lb), SpO2 90 %  ,Body mass index is 54 87 kg/m²        Intake/Output Summary (Last 24 hours) at 8/8/2021 0856  Last data filed at 8/8/2021 0801  Gross per 24 hour   Intake 560 ml   Output 3500 ml   Net -2940 ml       Invasive Devices     Peripheral Intravenous Line            Peripheral IV 08/06/21 Left;Proximal;Ventral (anterior) Forearm 2 days          Drain            External Urinary Catheter 2 days                Physical Exam: General appearance: alert and oriented, in no acute distress  Head: Normocephalic, without obvious abnormality, atraumatic  Extremities: Right lower extremity dressings are dry and intact  Skin: Skin color, texture, turgor normal  No rashes or lesions  Neurologic: Grossly normal    Lab, Imaging and other studies:  CBC:   Lab Results   Component Value Date    WBC 9 52 08/08/2021    HGB 9 7 (L) 08/08/2021    HCT 32 1 (L) 08/08/2021    MCV 88 08/08/2021     08/08/2021    MCH 26 5 (L) 08/08/2021    MCHC 30 2 (L) 08/08/2021    RDW 18 3 (H) 08/08/2021    MPV 9 0 08/08/2021   , CMP:   Lab Results   Component Value Date    SODIUM 141 08/08/2021    K 3 8 08/08/2021     08/08/2021    CO2 24 08/08/2021 BUN 22 08/08/2021    CREATININE 0 83 08/08/2021    CALCIUM 8 5 08/08/2021    AST 15 08/08/2021    ALT 9 (L) 08/08/2021    ALKPHOS 71 08/08/2021    EGFR 70 08/08/2021     VTE Pharmacologic Prophylaxis:  Patient is on Xarelto  VTE Mechanical Prophylaxis: sequential compression device

## 2021-08-08 NOTE — ASSESSMENT & PLAN NOTE
Wt Readings from Last 3 Encounters:   08/05/21 136 kg (300 lb)   06/07/21 (!) 138 kg (303 lb 9 2 oz)   05/31/21 (!) 152 kg (336 lb)     · Remains stable and euvolemic  Does have history of pulmonary hypertension has not established care with local cardiologist   Previously followed with cardiologist at [facility]    · Echocardiogram with EF of 55-60%  · Continue bumex 1 mg daily

## 2021-08-08 NOTE — ASSESSMENT & PLAN NOTE
· As evidence by echocardiogram from August 2020  · Briefly discussed with cardiology    Follow-up locally as outpatient

## 2021-08-08 NOTE — ASSESSMENT & PLAN NOTE
· Right lower extremity wound with surrounding cellulitis  Previous with growth of pseudomonas and proteus  History of streptococcal bacteremia  · MRSA swab negative  · Antibiotics de-escalated to levofloxacin and cefazolin  · Surgery following

## 2021-08-09 NOTE — NURSING NOTE
Pt temp 100 8 at 2221  Given tylenol at 2251  Temp rechecked at 2355, still 100 8  Ice packs placed under arms  Will recheck in an hour  Will continue to monitor

## 2021-08-09 NOTE — PLAN OF CARE
Problem: OCCUPATIONAL THERAPY ADULT  Goal: Performs self-care activities at highest level of function for planned discharge setting  See evaluation for individualized goals  Description: Treatment Interventions: ADL retraining, Functional transfer training, UE strengthening/ROM, Endurance training, Patient/family training, Equipment evaluation/education, Neuromuscular reeducation, Compensatory technique education, Continued evaluation, Energy conservation, Activityengagement          See flowsheet documentation for full assessment, interventions and recommendations  Note: Limitation: Decreased ADL status, Decreased UE strength, Decreased UE ROM, Decreased Safe judgement during ADL, Decreased endurance, Decreased self-care trans, Decreased high-level ADLs  Prognosis: Good  Assessment: Pt is a 69 y/o F admitted to [facility] 8/5/2021 d/t experiencing increased SOB and increased BLE swelling  Dx: wound cellulitis  Pt with PMHx impacting performance during functional tasks including: a-fib, CAD, DM, lymphedema, obesity, osteoarthritis, sepsis, sleep apnea  Pt reports living in a 2 story home with 2 JADE  Pt has a 1st floor set-up with 1/2 bath available  Pt reports completing functional ambulation and toileting tasks @ Mod I  Pt has assistance for bathing and LB dressing and all IADL tasks  Pt has been at [facility] for rehabilitation since May 2021  On evaluation, Pt A&Ox4  Pt completing UB Dressing @ Min A  LB dressing @ Max A  STS from recliner>RW @ Min A  SPT @ Mod A  Pt's LUE ROM and MS WFL  Pt's RUE shoulder ROM and MS impaired  All other planes WFL   Pt's barriers to d/c include: decrease activity tolerance, decrease standing balance, decrease sitting balance, decrease performance during ADL tasks, decrease safety awareness , decrease BUE ROM, decrease UB MS, increased pain, decrease generalized strength, decrease activity engagement, decrease performance during functional transfers, steps to enter home and limited family support  Pt would benefit from continued acute OT services to address deficits as well as post acute rehab upon d/c from [address] [place] [address]       OT Discharge Recommendation: Post acute rehabilitation services

## 2021-08-09 NOTE — PROGRESS NOTES
Progress Note - General Surgery   Martina Good 70 y o  female MRN: [id number]  Unit/Bed#: -01 Encounter: [id number]    Assessment:  Overnight fever 100 8, afebrile this morning 98 1  Cellulitis right lower extremity improving  Multiple wounds right medial lower extremity all appear to be clean and healing  No need for further surgical debridement at this time  LE edema improving, diuresed yesterday 3400ml urine  Lungs faint rales on right, O2 sat on room air 94% presently  No labs obtained this morning    Blood cultures x2 this admission obtained August 6 no growth after 48 hours  Previous history of streptococcal bacteremia, wound cultures in June grew Pseudomonas and Proteus mirabilis  MRSA swab this admission negative  Chronic atrial fibrillation, patient on rivaroxaban  Type 2 diabetes mellitus  Chronic diastolic heart failure  ARIAN  Pulmonary hypertension  Morbid obesity BMI 54 87    Plan:  Monitor fever curve  No plan for surgical intervention at this time  Discussed with attending hospitalist physician, consider labs today to include electrolytes, possible BNP given her significant diuresis  Daily dressing change right lower extremity, instructions on medical record for Maxorb Ag and Allevyn foam  Patient remains on IV cephazolin and levafloxacin, may transition to p o  Levaquin and Keflex at time of hospital discharge to complete antibiotic course of therapy  PT/OT  Medical management per the attending hospitalist provider  Can be discharged to skilled nursing facility at the direction of the hospitalist provider when medically optimal   Follow-up with general surgery as an outpatient 2 or 3 weeks  Subjective/Objective   Chief Complaint:  Late last evening she had a low-grade temp 100 8°  Patient frustrated with her slow improvement  Subjective:  Overnight she had a 1 time temperature, ice pack the armpits and Tylenol given  This morning no complaints    She diuresed over 3400 mL of urine yesterday  Currently has a pure wick catheter in place  Mild shortness of breath at rest   No cough or wheezing  Denies any chest pain  She does have a history of atrial fibrillation, she is on rivaroxaban  She does have a history of pulmonary hypertension  She is frustrated over her slow progress, not only with her leg wounds but also because of physical conditioning  The patient stated that she has not been home since May of this year  Wound care to the medial upper right thigh multiple wounds covered with Allevyn  Patient has been nonambulatory, confined to her bed  Currently awaiting blood cultures x2, no growth after 48 hours obtained August 6th        Scheduled Meds:  Current Facility-Administered Medications   Medication Dose Route Frequency Provider Last Rate    acetaminophen  650 mg Oral Q6H PRN MELBA Ritter      bumetanide  1 mg Oral Daily MELBA Ritter      cefazolin  2,000 mg Intravenous Q8H Bal Root DO 2,000 mg (08/09/21 0657)    collagenase   Topical Daily Carmelo Lemus PA-C      HYDROmorphone  0 5 mg Intravenous Q4H PRN Isiah Thornton DO      insulin lispro  2-12 Units Subcutaneous TID AC MELBA Ritter      insulin lispro  2-12 Units Subcutaneous HS MELBA Ritter      levalbuterol  1 25 mg Nebulization Q4H PRN MELBA Ritter      levofloxacin  750 mg Intravenous Q24H Bal Root  mg (08/08/21 1703)    losartan  100 mg Oral Daily MELBA Lund      metoprolol succinate  100 mg Oral BID MELBA Ritter      morphine  15 mg Oral Q4H PRN Isiah Thornton DO      pantoprazole  40 mg Oral Early Morning MELBA Ritter      polyethylene glycol  17 g Oral Daily PRN MELBA Ritter      pravastatin  20 mg Oral Daily With Dinner MELBA Ritter      rivaroxaban  20 mg Oral Daily With Breakfast MELBA Ritter      senna-docusate sodium  1 tablet Oral HS MELBA Ritter       Continuous Infusions:   PRN Meds:   acetaminophen    HYDROmorphone    levalbuterol    morphine    polyethylene glycol    Objective:     Blood pressure 145/77, pulse 104, temperature 98 1 °F (36 7 °C), resp  rate 15, height 5' 2" (1 575 m), weight 136 kg (300 lb), SpO2 (!) 88 %  ,Body mass index is 54 87 kg/m²  I/O       08/07 0701 - 08/08 0700 08/08 0701 - 08/09 0700 08/09 0701 - 08/10 0700    P  O  560 760     IV Piggyback  250     Total Intake(mL/kg) 560 (4 1) 1010 (7 4)     Urine (mL/kg/hr) 2800 (0 9) 3400 (1) 750 (4 3)    Stool       Total Output 2800 3400 750    Net -2240 -2390 -750           Unmeasured Urine Occurrence 1 x              Invasive Devices     Peripheral Intravenous Line            Peripheral IV 08/06/21 Left;Proximal;Ventral (anterior) Forearm 3 days          Drain            External Urinary Catheter 3 days                Physical Exam:     Awake alert  No distress  Heart irregular rate and rhythm  Lungs faint rales on right  Abdomen wide girth  Positive bowel sounds, soft  Pure wick urinary catheter in place  Right lower extremities dressings were all lifted and wounds inspected  All wounds are dry, no drainage  Minimal erythema of the upper thigh wound  No need for surgical debridement  Calf and thigh muscles are all soft and supple  Neurological exam at baseline  Mental status appropriate  Ambulation could not be observed  Extremities some very mild tenderness to palpation over the medial right upper thigh  Trace to 1+ edema bilateral lower extremities  Some venous stasis discoloration noted  Good DP and PT pulses palpated to touch bilaterally  Lab, Imaging and other studies:I have personally reviewed pertinent lab results    , CBC: No results found for: WBC, HGB, HCT, MCV, PLT, ADJUSTEDWBC, MCH, MCHC, RDW, MPV, NRBC, CMP: No results found for: SODIUM, K, CL, CO2, ANIONGAP, BUN, CREATININE, GLUCOSE, CALCIUM, AST, ALT, ALKPHOS, PROT, BILITOT, EGFR  VTE Pharmacologic Prophylaxis:  Rivaroxaban  VTE Mechanical Prophylaxis: sequential compression device     Arjun Jack PA-C

## 2021-08-09 NOTE — OCCUPATIONAL THERAPY NOTE
Occupational Therapy Evaluation     Patient Name: Alena Mitchell  Today's Date: 8/9/2021  Problem List  Principal Problem:    Wound cellulitis  Active Problems:    Chronic atrial fibrillation (HCC)    Type 2 diabetes mellitus, without long-term current use of insulin (HCC)    Chronic diastolic heart failure (HCC)    ARIAN on CPAP    Morbid obesity ([facility] )    Severe pulmonary hypertension (HCC)    Physical deconditioning    Past Medical History  Past Medical History:   Diagnosis Date    Atrial fibrillation ([facility] )     Coronary artery disease     DM2 [facility]diabetes mellitus, type 2) [facility]     History of cardiac cath     multiple, last one being sept, 2020    History of cardioversion     History of total knee replacement, left     Lymphedema     Lymphedema     Obesity     Osteoarthritis     Pulmonary HTN ([facility] )     Sepsis ([facility] )     Sleep apnea     SVT (supraventricular tachycardia) ([facility] )      Past Surgical History  Past Surgical History:   Procedure Laterality Date    CHOLECYSTECTOMY      TONSILLECTOMY      WOUND DEBRIDEMENT Left 6/3/2021    Procedure: DEBRIDEMENT WOUND [facility] OUT); Surgeon: Sonam Youssef DO;  Location:  MAIN OR;  Service: General           08/09/21 1008   Note Type   Note type Evaluation   Restrictions/Precautions   Other Precautions Chair Alarm; Bed Alarm; Fall Risk;Pain   Pain Assessment   Pain Assessment Tool 0-10   Pain Score 5   Pain Location/Orientation Location: Back   Home Living   Type of Home House  (2 JADE)   Home Layout Two level;Performs ADLs on one level; Able to live on main level with bedroom/bathroom;1/2 bath on main level   Bathroom Shower/Tub   (will sponge bath)   Bathroom Toilet Standard   Home Equipment Walker   Additional Comments Pt reports currently being at a SNF for rehab although "i want to get out of there ASAP"  Pt reports having 2 JADE into home  bed on 1st floor with 1/2 bath available  Pt ambulates with RW at baseline      Prior Function   Level of [place] Needs assistance with ADLs and functional mobility   Lives With Alone  ("my brother lives in an apartment attached to my home")   Receives Help From Family   ADL Assistance Needs assistance   IADLs Needs assistance   Comments Pt reports completing functional transfers and toileting tasks @ Mod I  Pt has assistnace for LB ADLs, bathing and all IADL tasks  Pt has been in a SNF since May 2021  ADL   Where Assessed Chair   Eating Assistance 6  Modified independent   Eating Deficit Setup   Grooming Assistance 6  Modified Independent   Grooming Deficit Setup   UB Dressing Assistance 4  Minimal Assistance   UB Dressing Deficit Steadying;Verbal cueing;Supervision/safety; Increased time to complete; Thread RUE;Pull over head;Pull around back   LB Dressing Assistance 2  Maximal Assistance   LB Dressing Deficit Steadying; Requires assistive device for steadying;Verbal cueing;Supervision/safety; Increased time to complete; Don/doff L sock; Don/doff R sock; Thread RLE into pants; Thread LLE into pants;Pull up over hips   Additional Comments Pt completing ADL tasks while seated at EOB  UB dressing @ Min A d/t increased pain and decreased R shoulder ROM  Pt completing self-feeding and grooming tasks @ Mod I after set-up  LB dressing @ Max A for donning socks/pants around feet and CM around waist while staning at RW  Pt reports "i tend to wear dresses at home"  Bed Mobility   Additional Comments Pt seated OOB at start and end of session with call bell in reach, chair alarm intact and all needs met at this time   Transfers   Sit to Stand 4  Minimal assistance   Additional items Assist x 1;Verbal cues; Increased time required   Stand to Sit   [facility] Assist)   Additional items Assist x 1; Increased time required;Verbal cues   Stand pivot 3  Moderate assistance   Additional items Assist x 1; Increased time required;Verbal cues   Additional Comments Pt completing functional transfers with use of RW for UB support   Min A for STS from recliner and Mod A for SPT with use of RW for UB support  vc'ing for weight shfiting, hand placement and balance  Balance   Static Sitting Fair +   Dynamic Sitting Fair   Static Standing Fair -   Dynamic Standing Poor +   Activity Tolerance   Activity Tolerance Patient limited by pain; Patient limited by fatigue   Medical Staff Made Aware Spoke with PTCarina with Sammie SY   LUE Assessment   LUE Assessment [facility]   Hand Function   Gross Motor Coordination Functional   Fine Motor Coordination Functional   Sensation   Light Touch No apparent deficits   Additional Comments Pt reports R shoulder pain started "last night"  KERRIE Cochran aware  all other RUE places [facility]  Vision-Basic Assessment   Current Vision Wears glasses only for reading   Cognition   Overall Cognitive Status [facility]   Arousal/Participation Alert; Responsive; Cooperative   Attention Attends with cues to redirect   Orientation Level Oriented X4   Memory Within functional limits   Following Commands Follows one step commands without difficulty   Assessment   Limitation Decreased ADL status; Decreased UE strength;Decreased UE ROM; Decreased Safe judgement during ADL;Decreased endurance;Decreased self-care trans;Decreased high-level ADLs   Prognosis Good   Assessment Pt is a 71 y/o F admitted to [address] [place] [address] 8/5/2021 d/t experiencing increased SOB and increased BLE swelling  Dx: wound cellulitis  Pt with PMHx impacting performance during functional tasks including: a-fib, CAD, DM, lymphedema, obesity, osteoarthritis, sepsis, sleep apnea  Pt reports living in a 2 story home with 2 JADE  Pt has a 1st floor set-up with 1/2 bath available  Pt reports completing functional ambulation and toileting tasks @ Mod I  Pt has assistance for bathing and LB dressing and all IADL tasks  Pt has been at [facility] for rehabilitation since May 2021  On evaluation, Pt A&Ox4  Pt completing UB Dressing @ Min A  LB dressing @ Max A  STS from recliner>RW @ Min A  SPT @ Mod A  Pt's LUE ROM and MS WFL  Pt's RUE shoulder ROM and MS impaired  All other planes WFL  Pt's barriers to d/c include: decrease activity tolerance, decrease standing balance, decrease sitting balance, decrease performance during ADL tasks, decrease safety awareness , decrease BUE ROM, decrease UB MS, increased pain, decrease generalized strength, decrease activity engagement, decrease performance during functional transfers, steps to enter home and limited family support  Pt would benefit from continued acute OT services to address deficits as well as post acute rehab upon d/c from [address] [place] [address]  Plan   Treatment Interventions ADL retraining;Functional transfer training;UE strengthening/ROM; Endurance training;Patient/family training;Equipment evaluation/education; Neuromuscular reeducation; Compensatory technique education;Continued evaluation; Energy conservation; Activityengagement   Goal Expiration Date 08/19/21   OT Frequency 3-5x/wk   Recommendation   OT Discharge Recommendation Post acute rehabilitation services   [facility] Daily Activity Inpatient   Lower Body Dressing 1   Bathing 1   Toileting 2   Upper Body Dressing 2   Grooming 4   Eating 4   Daily Activity Raw Score 14   Daily Activity Standardized Score (Calc for Raw Score >=11) 33 39   AM-PAC Applied Cognition Inpatient   Following a Speech/Presentation 4   Understanding Ordinary Conversation 4   Taking Medications 4   Remembering Where Things Are Placed or Put Away 4   Remembering List of 4-5 Errands 4   Taking Care of Complicated Tasks 4   Applied Cognition Raw Score 24   Applied Cognition Standardized Score 62 21       The patient's raw score on the AM-PAC Daily Activity inpatient short form is 14, standardized score is 33 39, less than 39 4  Patients at this level are likely to benefit from DC to post-acute rehabilitation services  Please refer to the recommendation of the Occupational Therapist for safe DC planning  Pt goals to be met by 8/19/2021    1   Pt will demonstrate ability to complete UB ADLs including washing/dressing @ S in order to increase performance and participation during meaningful tasks  2  Pt will demonstrate ability to complete LB dressing @ Min A in order to increase safety and independence during meaningful tasks  3  Pt will demonstrate ability to complete toileting tasks including CM and pericare @ Min A in order to increase safety and independence during meaningful tasks  4  Pt will demonstrate ability to complete EOB, chair, toilet/commode transfers @ S in order to increase performance and participation during functional tasks  5  Pt will demonstrate ability to stand for 3-4 minutes while maintaining F+ balance with use of Rw for UB support PRN  6  Pt will demonstrate ability to tolerate 30-35 minute OT session with no vc'ing for deep breathing or use of energy conservation techniques in order to increase activity tolerance during functional tasks  7  Pt will demonstrate Good carryover of use of energy conservation/compensatory strategies during ADLs and functional tasks in order to increase safety and reduce risk for falls  8  Pt will demonstrate Good attention and participation in continued evaluation of functional ambulation house hold distances in order to assist with safe d/c planning  9  Pt will attend to continued cognitive assessments 100% of the time in order to provide most appropriate d/c recommendations  10  Pt will follow 100% simple 2-step commands and be A&O x4 consistently with environmental cues to increase participation in functional activities  11  Pt will identify 3 areas of interest/hobbies and 1 intervention on how to incorporate into daily life in order to increase interaction with environment and peers as well as increase participation in meaningful tasks  12  Pt will demonstrate 100% carryover of BUE HEP in order to increase BUE MS and increase performance during functional tasks upon d/c home      Ambika Rodgers Carol OTR/L

## 2021-08-09 NOTE — PHYSICAL THERAPY NOTE
PHYSICAL THERAPY EVALUATION  NAME:  Guerita Gordon  DATE: 08/09/21    AGE:   70 y o  Mrn:   [id number]  ADMIT DX:  Wound infection [T14  8XXA, L08 9]  Cellulitis of right leg [L03 115]    Past Medical History:   Diagnosis Date    Atrial fibrillation ([facility] )     Coronary artery disease     DM2 (diabetes mellitus, type 2) ([place] [address] )     History of cardiac cath     multiple, last one being sept, 2020    History of cardioversion     History of total knee replacement, left     Lymphedema     Lymphedema     Obesity     Osteoarthritis     Pulmonary HTN ([place] [address] )     Sepsis ([place] [address] )     Sleep apnea     SVT (supraventricular tachycardia) ([place] [address] )      Length Of Stay: 3  Performed at least 2 patient identifiers during session: Name and Birthday  PHYSICAL THERAPY EVALUATION :      08/09/21 1009   PT Last Visit   PT Visit Date 08/09/21   Note Type   Note type Evaluation   Pain Assessment   Pain Assessment Tool 0-10   Pain Score 5   Pain Location/Orientation Location: Back   Home Living   Type of Home House  (2 JADE)   Home Layout Two level;Performs ADLs on one level; Able to live on main level with bedroom/bathroom;1/2 bath on main level   Bathroom Shower/Tub   (will sponge bathe)   [facility]   Additional Comments Reports being at SNF for rehab, but is motivated to return to home  Reports using RW PTA  Prior Function   Level of [place] Needs assistance with ADLs and functional mobility   Lives With Alone  ("my brother lives in an apartment attached to my home")   Receives Help From Family   ADL Assistance Needs assistance   IADLs Needs assistance   Comments Pt has been in SNF since May 2021  Pt with extensive LE wounds and edema  Restrictions/Precautions   Other Precautions Chair Alarm; Bed Alarm; Fall Risk;Pain   Cognition   Orientation Level Oriented X4   Following Commands Follows one step commands without difficulty   RLE Overall AROM   R Hip Flexion minimal hip flexion sitting limited by pain and body habitus   R Knee Flexion WFL   R Knee Extension -20 degrees   R Ankle Dorsiflexion WFL   R Ankle Plantar Flexion WFL   Strength RLE   RLE Overall Strength 2+/5  (3-/5 at hip flexion)   LLE Assessment   LLE Assessment WFL   Coordination   Movements are Fluid and Coordinated 1   Light Touch   RLE Light Touch Grossly intact   LLE Light Touch Grossly intact   Bed Mobility   Additional Comments Pt sitting in recliner chair at start and end of session with all needs within reach and posey alarm on  Transfers   Sit to Stand 4  Minimal assistance   Additional items Assist x 1; Increased time required;Verbal cues   Stand to Sit   (steadying assistance)   Additional items Assist x 1; Increased time required;Verbal cues   Stand pivot 3  Moderate assistance   Additional items Assist x 1; Increased time required;Verbal cues   Additional Comments use of RW  sit to stand with miNAx1 with min cues for hand placement and technique for safety with RW  Ambulation/Elevation   Gait pattern Wide EDSON; Decreased foot clearance; Antalgic; Excessively slow; Short stride; Step to   Gait Assistance 3  Moderate assist   Additional items Assist x 1;Verbal cues   Assistive Device Rolling walker   Distance 7'x1 with RW and chair follow with antalgic pattern leading with R LE step to with decreased left step length  Balance   Static Sitting Fair +   Dynamic Sitting Fair   Static Standing Fair -   Dynamic Standing Poor +   Ambulatory Poor   Activity Tolerance   Activity Tolerance Patient limited by fatigue;Patient limited by pain   Medical Staff Made Aware OT, Caryle Purser   Nurse Made Aware Sammie SY   Assessment   Prognosis Good   Problem List Decreased strength;Decreased endurance; Impaired balance;Decreased mobility; Decreased safety awareness; Obesity; Decreased skin integrity;Pain   Barriers to Discharge Decreased caregiver support; Inaccessible home environment   Barriers to Discharge Comments requires increased assistance to complete all mobility   Goals   Patient Goals "Go home"   [facility] Expiration Date 08/19/21   PT Treatment Day 0   Plan   Treatment/Interventions Functional transfer training;LE strengthening/ROM; Elevations; Therapeutic exercise; Endurance training;Patient/family training;Equipment eval/education; Bed mobility;Gait training; Compensatory technique education;Spoke to nursing;Spoke to case management;OT   PT Frequency Other (Comment)  (3-5x/week)   Recommendation   PT Discharge Recommendation Post acute rehabilitation services   Equipment Recommended   (TBD by rehab)   PT - OK to Discharge   (when medically cleared to rehab)   Briana 8 in Bed Without Bedrails 2   Lying on Back to Sitting on Edge of Flat Bed 2   Moving Bed to Chair 2   Standing Up From Chair 3   Walk in Room 2   Climb 3-5 Stairs 2   Basic Mobility Inpatient Raw Score 13   Basic Mobility Standardized Score 33 99     (Please find full objective findings from PT assessment regarding body systems outlined above)  Assessment: Pt is a 70 y o  female seen for PT evaluation s/p admission to [address] [place] [address] on 8/5/2021 with Wound cellulitis  Order placed for PT services    Upon evaluation: Pt is presenting with impaired functional mobility due to pain, decreased strength, decreased ROM, decreased endurance, impaired balance, gait deviations, decreased safety awareness, impaired judgment, fall risk, LE edema and impaired skin integrity requiring minimal to moderate assistance for transfers and moderate assistance for ambulation with RW  Pt's clinical presentation is currently unpredictable given the functional mobility deficits above, especially weakness, decreased ROM, edema of extremities, decreased skin integrity, decreased endurance, gait deviations, pain, decreased activity tolerance, decreased functional mobility tolerance, decreased safety awareness and impaired judgement, coupled with fall risks as indicated by AM-PAC 6-Clicks: 29/93 as well as hx of falls, impaired balance, polypharmacy and decreased safety awareness and combined with medical complications of pain impacting overall mobility status, abnormal H&H, abnormal blood sugars, multiple readmissions and need for input for mobility technique/safety  Pt's PMHx and comorbidities that may affect physical performance and progress include: A fib, DM, CAD and h/o cardiac cath and cardioversion, lymphedema, OA, SVT  Personal factors affecting pt at time of IE include: inaccessible home environment, limited home support, inability to perform IADLs, inability to perform ADLs, inability to navigate level surfaces without external assistance, inability to ambulate household distances and recent fall(s)/fall history  Pt will benefit from continued skilled PT services to address deficits as defined above and to maximize level of functional mobility to facilitate return toward PLOF and improved QOL  From PT/mobility standpoint, recommendation at time of d/c would be Short term rehab pending progress in order to reduce fall risk and maximize pt's functional independence and consistency with mobility in order to facilitate return to PLOF  Recommend ther ex next 1-2 sessions  The patient's AM-[facility] Basic Mobility Inpatient Short Form Raw Score is 13, Standardized Score is 33 99  A standardized score less than 42 9 suggests the patient may benefit from discharge to post-acute rehabilitation services  Please also refer to the recommendation of the Physical Therapist for safe discharge planning  Goals: Pt will: Perform bed mobility tasks with Supervision to reposition in bed and prepare for transfers  Pt will perform transfers with Supervision to decrease burden of care, decrease risk for falls and improve activity tolerance and prepare for ambulation   Pt will ambulate with RW for >/= 48' with  Supervision  to decrease burden of care, decrease risk for falls, improve activity tolerance and improve gait quality and to access home environment  Pt will complete >/= 2 steps with with bilateral handrails with consistent min A of 1 to decrease burden of care, return to home with JADE, decrease risk for falls and improve activity tolerance  Pt will increase B LE strength >/= 1/2 MMT grade to facilitate functional mobility        Tereza Orozco, PT,DPT

## 2021-08-09 NOTE — CASE MANAGEMENT
Case Management Progress Note    Patient name Demi [place]  Location /-01 MRN [id number]  : 1945 Date 2021       LOS (days): 3  Geometric Mean LOS (GMLOS) (days): 3 20  Days to GMLOS:-0 3        BUNDLE:      OBJECTIVE:  Pt is a 70y o  year old Single, white or  [1], female with Pentec[employer]al preference of [employer] admitted on 2968 11:49 PM  Pt is admitted to [facility] [id number] at [address] [place] [address] with complaints of Wound cellulitis   Current admission status: Inpatient  Preferred Pharmacy:   PATIENT/FAMILY REPORTS NO PREFERRED PHARMACY  No address on file      Primary Care Provider: Luis Alberto Smith MD    Primary Insurance: MEDICARE  Secondary Insurance: BLUE CROSS    PROGRESS NOTE:    Was informed patient and brother wanted to talk to me  CM met with them, Nadine Fonseca is displeased with her "monthly visit and care proLvided by her PCP at [facility] and she would like Dr Galvan Needs  CM followed up with admission liaison and she did ask MD Galvan Needs if she can manage Kelsea's care needs in the facility  Lalito Cobos has agreed, informed Nadine Fonseca and her brother  Pt is aware she will need to find a new PCP post dc from [facility], as MD only agreed to care at the facility  Mechelle Rios verbalized understanding  Katy's choice is to return to [facility], as she is paying for the bed hold  CM reviewed OOP expense for [facility] transport is patients responsibility and she will be billed for this  Mechelle Rios verbalized understanding  She would like to see if [facility] is available for [facility] transport as she pays a yearly fee for ambulance services  ( will need nava WHEATLEY)    CM will continue to follow  DC plan is [facility]

## 2021-08-09 NOTE — WOUND OSTOMY CARE
Progress Note - Wound   Tawana [place] 70 y o  female MRN: [id number]  Unit/Bed#: -01 Encounter: [id number]    History and Present Illness:Pt seen for wound follow up  Admitted with right lower extremity cellulitis  Pt s/p right lower extremity wound debridement 6/3/21 by Dr Fany Pham  Currently residing in nursing home, and followed up in [facility] in July  Pt is alert and oriented x4  She requires max assist to turn and reposition  She is currently using [employer]ik for bladder management  Pt agreed to assessment  Possible discharge to rehabilitation tomorrow  Assessment Findings:   1)Bilateral heels intact  2) Right thigh full thickness skin loss of anterior and medial aspect  Wound bed 80% red moist  20% yellow slough  Areas cleansed with NSS, Maxorb to yellow slough area, xeroform to moist wound bed  Area covered with Allevyn foam  Pt has attempted to secure bandage with Surgiflex, Tubigrip, Keflex and Kerlix without success  Attempting use of Allevyn  3) Posterior tibia partial thickness skin loss  Wounds 100% red scant bloody drainage, no maceration or induration  Xeroform and Allevyn  foam applied  4)Anterior right tibia  Lateral aspect of wound moist red with 50% white tissue , Medial aspect of wound with green/yellow drainage on Maxorb and 70% yellow  green slough    NSS used to gently remove, area cleansed with NSS,Maxorb AG applied and covered with Allevyn foam  Skin care plans:  1-Hydraguard to bilateral heels BID and PRN  2-Elevate heels to offload pressure  3-Ehob cushion in chair when out of bed  4-Moisturize skin daily with skin nourishing cream   5-Turn/reposition q2h or when medically stable for pressure re-distribution on skin  6-Cleanse right anterior/medial thigh wound, medial right knee wound, with NSS, Apply Maxorb AG to visible area of yellow tan slough and drainage  Apply Xeroform gauze to non slough areas of wound beds   Cover entire wound with Allevyn foam  Change daily and prn drainage  7-Cleanse posterior tibia wounds with NSS apply xerorform then Allevyn foam Change daily and prn   8-Cleanse anterior tibia with NSS apply Maxorb AG to wound cover with Allevyn foam  Change daily and prn  9-Cleanse sacrum and buttocks with soap and water, pat dry  Apply Allevyn foam to sacrum  Change every 3 days monitor skin beneath foam every shift          Wound 05/16/21 Other (comment) Thigh Right; Inner (Active)       Wound 05/16/21 Venous Ulcer Pretibial Distal;Right (Active)       Wound 05/16/21 Pressure Injury Pretibial Proximal;Right (Active)       Wound 05/16/21 MASD Abdomen Lower (Active)       Wound 05/16/21 Other (comment) Thigh Medial;Right;Upper (Active)       Wound 06/01/21 Venous Ulcer Pretibial Right (Active)       Wound 06/02/21 Thigh Posterior;Right (Active)   Wound Image   08/06/21 1025   Wound Description SANDER 08/09/21 1045   Pressure Injury Stage U 08/06/21 1025   Vickie-wound Assessment SANDER 08/09/21 1045   Wound Length (cm) 0 5 cm 08/06/21 1025   Wound Width (cm) 0 5 cm 08/06/21 1025   Wound Surface Area (cm^2) 0 25 cm^2 08/06/21 1025   Tunneling 0 cm 08/06/21 1025   Undermining 0 08/06/21 1025   Drainage Amount Scant 08/08/21 2014   Treatments Cleansed;Irrigation with NSS;Site care 08/08/21 2014   Dressing Changed Changed 08/08/21 2014   Patient Tolerance Tolerated well 08/08/21 2014   Dressing Status Clean;Dry; Intact 08/09/21 1045       Wound 06/03/21 Leg Right (Active)       Wound 08/06/21 Thigh Anterior;Right (Active)   Wound Image   08/06/21 1001   Wound Description Beefy red;Bleeding;Drainage;Slough; Swelling;Yellow 08/09/21 1505   Vickie-wound Assessment Intact;Edema; Erythema;Fragile 08/09/21 1505   Wound Length (cm) 14 cm 08/09/21 1505   Wound Width (cm) 14 cm 08/09/21 1505   Wound Surface Area (cm^2) 196 cm^2 08/09/21 1505   Drainage Amount Moderate 08/09/21 1505   Drainage Description Clear 08/09/21 1505   Non-staged Wound Description Full thickness 08/09/21 1505   Treatments Irrigation with NSS;Site care;Elevated 08/09/21 1505   Dressing Calcium Alginate with Silver; Foam, Silicon (eg  Allevyn, etc) 08/09/21 1505   Wound packed? No 08/06/21 0300   Dressing Changed Changed 08/09/21 1505   Patient Tolerance Tolerated well 08/09/21 1505   Dressing Status Clean;Dry; Intact 08/09/21 1505       Wound 08/06/21 Pretibial Right (Active)   Wound Image   08/09/21 1449   Wound Description Bleeding;Eschar;Fragile;Pink;Slough; Swelling;Yellow 08/09/21 1449   Pressure Injury Stage U 08/06/21 1020   Vickie-wound Assessment Intact;Edema; Erythema 08/09/21 1449   Wound Length (cm) 0 5 cm 08/09/21 1449   Wound Width (cm) 7 cm 08/09/21 1449   Wound Surface Area (cm^2) 3 5 cm^2 08/09/21 1449   Treatments Cleansed;Irrigation with NSS;Site care;Elevated 08/09/21 1449   Dressing Calcium Alginate with Silver; Foam, Silicon (eg  Allevyn, etc); Xeroform 08/09/21 1449   Dressing Changed Changed 08/09/21 1449   Patient Tolerance Tolerated well 08/09/21 1449   Dressing Status Clean;Dry; Intact 08/09/21 1045       Wound 08/06/21 Thigh Right;Medial;Upper (Active)   Wound Image   08/06/21 1005   Wound Description Beefy red;Bleeding;Drainage;Edema;Fragile;Pink;Slough; Swelling; Tan 08/09/21 1505   Pressure Injury Stage 3 08/06/21 1005   Vickie-wound Assessment Clean;Dry; Intact;Edema; Erythema;Fragile; Swelling 08/09/21 1505   Wound Length (cm) 11 cm 08/09/21 1505   Wound Width (cm) 7 5 cm 08/09/21 1505   Wound Surface Area (cm^2) 82 5 cm^2 08/09/21 1505   Tunneling 0 cm 08/06/21 1005   Undermining 0 08/06/21 1005   Drainage Amount Small 08/09/21 1505   Drainage Description Green;Milky; Yellow 08/09/21 1505   Non-staged Wound Description Full thickness 08/09/21 1505   Treatments Cleansed;Site care;Elevated 08/09/21 1505   Dressing Calcium Alginate with Silver; Foam, Silicon (eg  Allevyn, etc) 08/09/21 1505   Dressing Changed New 08/09/21 1505   Patient Tolerance Tolerated well 08/09/21 1505   Dressing Status Clean;Dry; Intact 08/09/21 1505       Wound 08/06/21 Tibial Posterior;Right (Active)   Wound Image   08/09/21 1505   Wound Description Clean;Dry;Beefy red;Bleeding;Edema;Fragile 08/09/21 1505   Vickie-wound Assessment Clean;Dry; Intact;Edema 08/09/21 1505   Wound Length (cm) 2 cm 08/09/21 1505   Wound Width (cm) 1 cm 08/09/21 1505   Wound Surface Area (cm^2) 2 cm^2 08/09/21 1505   Tunneling 0 cm 08/06/21 1031   Undermining 0 08/06/21 1031   Drainage Amount Small 08/09/21 1505   Drainage Description Bloody 08/09/21 1505   Non-staged Wound Description Full thickness 08/09/21 1505   Treatments Cleansed;Irrigation with NSS;Site care;Elevated 08/09/21 1505   Dressing Changed Changed 08/09/21 1505   Patient Tolerance Tolerated well 08/09/21 1505   Dressing Status Clean;Dry; Intact 08/09/21 1045         Call or tigertext with any questions  Wound Care will continue to follow    Yonatan Beltran RN

## 2021-08-09 NOTE — PLAN OF CARE
Problem: PHYSICAL THERAPY ADULT  Goal: Performs mobility at highest level of function for planned discharge setting  See evaluation for individualized goals  Description: Treatment/Interventions: Functional transfer training, LE strengthening/ROM, Elevations, Therapeutic exercise, Endurance training, Patient/family training, Equipment eval/education, Bed mobility, Gait training, Compensatory technique education, Spoke to nursing, Spoke to case management, OT  Equipment Recommended:  (TBD by rehab)       See flowsheet documentation for full assessment, interventions and recommendations  Note: Prognosis: Good  Problem List: Decreased strength, Decreased endurance, Impaired balance, Decreased mobility, Decreased safety awareness, Obesity, Decreased skin integrity, Pain  Assessment: (P) Pt is a 70 y o  female seen for PT evaluation s/p admission to [address] [place] [address] on 8/5/2021 with Wound cellulitis  Order placed for PT services    Upon evaluation: Pt is presenting with impaired functional mobility due to pain, decreased strength, decreased ROM, decreased endurance, impaired balance, gait deviations, decreased safety awareness, impaired judgment, fall risk, LE edema and impaired skin integrity requiring minimal to moderate assistance for transfers and moderate assistance for ambulation with RW  Pt's clinical presentation is currently unpredictable given the functional mobility deficits above, especially weakness, decreased ROM, edema of extremities, decreased skin integrity, decreased endurance, gait deviations, pain, decreased activity tolerance, decreased functional mobility tolerance, decreased safety awareness and impaired judgement, coupled with fall risks as indicated by AM-PAC 6-Clicks: 57/69 as well as hx of falls, impaired balance, polypharmacy and decreased safety awareness and combined with medical complications of pain impacting overall mobility status, abnormal H&H, abnormal blood sugars, multiple readmissions and need for input for mobility technique/safety  Pt's PMHx and comorbidities that may affect physical performance and progress include: A fib, DM, CAD and h/o cardiac cath and cardioversion, lymphedema, OA, SVT  Personal factors affecting pt at time of IE include: inaccessible home environment, limited home support, inability to perform IADLs, inability to perform ADLs, inability to navigate level surfaces without external assistance, inability to ambulate household distances and recent fall(s)/fall history  Pt will benefit from continued skilled PT services to address deficits as defined above and to maximize level of functional mobility to facilitate return toward PLOF and improved QOL  From PT/mobility standpoint, recommendation at time of d/c would be Short term rehab pending progress in order to reduce fall risk and maximize pt's functional independence and consistency with mobility in order to facilitate return to PLOF  Recommend ther ex next 1-2 sessions  Barriers to Discharge: Decreased caregiver support, Inaccessible home environment  Barriers to Discharge Comments: requires increased assistance to complete all mobility     PT Discharge Recommendation: Post acute rehabilitation services     PT - OK to Discharge:  (when medically cleared to rehab)    See flowsheet documentation for full assessment

## 2021-08-09 NOTE — PROGRESS NOTES
[address]  Progress Note - Kristy Norwood 1945, 70 y o  female MRN: [id number]  Unit/Bed#: -01 Encounter: [id number]  Primary Care Provider: Siomara Washburn MD   Date and time admitted to hospital: 8/5/2021 11:49 PM    * Wound cellulitis  Assessment & Plan  · Right lower extremity wound with surrounding cellulitis  Previous with growth of pseudomonas and proteus  History of streptococcal bacteremia  · MRSA swab negative  · Antibiotics de-escalated to levofloxacin and cefazolin  · Surgery following  Physical deconditioning  Assessment & Plan  · Presents from SNF  PT/OT recommending returning to [facility]    Severe pulmonary hypertension [facility])  Assessment & Plan  · As evidence by echocardiogram from August 2020  · Briefly discussed with cardiology  Follow-up locally as outpatient    Morbid obesity ([facility] [address] )  Assessment & Plan  · Body mass index is 54 87 kg/m²  ARIAN on CPAP  Assessment & Plan  · Continue CPAP    Chronic diastolic heart failure [facility])  Assessment & Plan  Wt Readings from Last 3 Encounters:   08/05/21 136 kg (300 lb)   06/07/21 (!) 138 kg (303 lb 9 2 oz)   05/31/21 (!) 152 kg (336 lb)     · Remains stable and euvolemic  Does have history of pulmonary hypertension has not established care with local cardiologist   Previously followed with cardiologist at [facility]  · Echocardiogram with EF of 55-60%  · Continue bumex 1 mg daily    Type 2 diabetes mellitus, without long-term current use of insulin [facility])  Assessment & Plan  Lab Results   Component Value Date    HGBA1C 6 1 (H) 05/16/2021     Recent Labs     08/07/21  1555 08/07/21  2054 08/08/21  1132 08/08/21  1615   POCGLU 129 144* 112 122     · Stable continue sliding scale  · Carb controlled diet   · Can resume metformin at time of discharge     Chronic atrial fibrillation (HCC)  Assessment & Plan  · Rate controlled on metoprolol  Continue xarelto        VTE Pharmacologic Prophylaxis: VTE Score: 6 High Risk (Score >/= 5) - Pharmacological DVT Prophylaxis Ordered: Rivaroxaban (Xarelto)  Sequential Compression Devices Ordered  Patient Centered Rounds: I have performed bedside rounds with nursing staff today  Discussions with Specialists or Other Care Team Provider:  Cardiology and surgery    Education and Discussions with Family / Patient:  Brother at bedside    Time Spent for Care: 25 mins  More than 50% of total time spent on counseling and coordination of care as described above  Current Length of Stay: 3 day(s)  Current Patient Status: Inpatient   Certification Statement: The patient will continue to require additional inpatient hospital stay due to return to SNF when cleared by surgery  Discharge Plan / Estimated Discharge Date: Anticipate discharge tomorrow to rehab facility  Code Status: Level 1 - Full Code      Subjective:   Patient seen and examined  A bit tearful during examination this morning with brother at bedside  Worried about follow-up care    Objective:   Vitals: Blood pressure 137/75, pulse 88, temperature 98 1 °F (36 7 °C), resp  rate 18, height 5' 2" (1 575 m), weight 136 kg (300 lb), SpO2 96 %  Physical Exam  Vitals reviewed  Constitutional:       General: She is not in acute distress  Eyes:      General: No scleral icterus  Cardiovascular:      Rate and Rhythm: Regular rhythm  Heart sounds: Normal heart sounds  Pulmonary:      Breath sounds: Decreased breath sounds present  No wheezing  Abdominal:      General: Bowel sounds are normal       Palpations: Abdomen is soft  Tenderness: There is no guarding or rebound  Musculoskeletal:         General: Swelling (Lower extremities) present  Skin:     General: Skin is warm  Neurological:      General: No focal deficit present  Mental Status: She is alert and oriented to person, place, and time  Psychiatric:         Mood and Affect: Affect is tearful         Additional Data:   Labs:  Results from last 7 days   Lab Units 08/08/21  0502 08/07/21  0438 08/06/21  0448 08/06/21  0012   WBC Thousand/uL 9 52 7 41 8 67 9 95   HEMOGLOBIN g/dL 9 7* 9 9* 9 2* 10 4*   HEMATOCRIT % 32 1* 32 2* 31 6* 34 6*   MCV fL 88 89 89 88   PLATELETS Thousands/uL 223 234 217 264     Results from last 7 days   Lab Units 08/08/21  0502 08/07/21  0438 08/06/21  0448 08/06/21  0012   SODIUM mmol/L 141 142 139 140   POTASSIUM mmol/L 3 8 4 1 3 9 4 0   CHLORIDE mmol/L 108 108 108 106   CO2 mmol/L 24 24 22 26   ANION GAP mmol/L 9 10 9 8   BUN mg/dL 22 17 12 14   CREATININE mg/dL 0 83 0 80 0 76 0 83   CALCIUM mg/dL 8 5 8 5 8 0* 8 4   ALBUMIN g/dL 2 2*  --   --  2 5*   TOTAL BILIRUBIN mg/dL 0 51  --   --  0 46   ALK PHOS U/L 71  --   --  88   ALT U/L 9*  --   --  14   AST U/L 15  --   --  23   EGFR ml/min/1 73sq m 70 73 78 70   GLUCOSE RANDOM mg/dL 116 116 111 90                  Results from last 7 days   Lab Units 08/06/21  0013   LACTIC ACID mmol/L 1 2     Results from last 7 days   Lab Units 08/09/21  1116 08/09/21  0740 08/08/21  2108 08/08/21  1615 08/08/21  1132 08/07/21 2054 08/07/21  1555 08/07/21  1118 08/07/21  0724 08/06/21 2052 08/06/21  1612 08/06/21  1133   POC GLUCOSE mg/dl 128 124 138 122 112 144* 129 123 112 146* 101 138             * I Have Reviewed All Lab Data Listed Above  Cultures:   Results from last 7 days   Lab Units 08/06/21  0022 08/06/21  0012   BLOOD CULTURE  No Growth at 72 hrs  No Growth at 72 hrs  Lines/Drains:  Invasive Devices     Peripheral Intravenous Line            Peripheral IV 08/06/21 Left;Proximal;Ventral (anterior) Forearm 3 days          Drain            External Urinary Catheter 3 days              Telemetry:      Imaging:  Imaging Reports Reviewed Today Include:   No results found    Scheduled Meds:  Current Facility-Administered Medications   Medication Dose Route Frequency Provider Last Rate    acetaminophen  650 mg Oral Q6H PRN MELBA Darling      bumetanide  1 mg Oral Daily Ingris Brill, CRNP      cefazolin  2,000 mg Intravenous Q8H Bal Root, DO 2,000 mg (08/09/21 0657)    collagenase   Topical Daily Francisca Rutherford PA-C      HYDROmorphone  0 5 mg Intravenous Q4H PRN Siva Means, DO      insulin lispro  2-12 Units Subcutaneous TID AC Homer Brill, CRNP      insulin lispro  2-12 Units Subcutaneous HS Homer Brill, CRNP      levalbuterol  1 25 mg Nebulization Q4H PRN Ingris Brill, CRNP      levofloxacin  750 mg Intravenous Q24H Bal Dk,  mg (08/08/21 1703)    losartan  100 mg Oral Daily MELBA Lund      metoprolol succinate  100 mg Oral BID Homer Brill, CRNP      morphine  15 mg Oral Q4H PRN Siva Means, DO      pantoprazole  40 mg Oral Early Morning Ingris Brill, CRNP      polyethylene glycol  17 g Oral Daily PRN Homer Brill, CRNP      pravastatin  20 mg Oral Daily With Dinner Ingris Brill, CRNP      rivaroxaban  20 mg Oral Daily With Breakfast Ingris Brill, CRNP      senna-docusate sodium  1 tablet Oral HS Ingris Brill, CRNP         [facility] Internal Medicine  Hospitalist    ** Please Note: This note has been constructed using a voice recognition system   **

## 2021-08-10 PROBLEM — M25.511 ACUTE PAIN OF RIGHT SHOULDER: Status: ACTIVE | Noted: 2021-01-01

## 2021-08-10 NOTE — NURSING NOTE
Peripheral IV removed  Belongings reviewed  AVS and Summary of Care printed and faxed to receiving facility, [facility]  Report called to [place], LPN  All questions answered  Script sent with paperwork with transport team  Wound care sent with pt at discharge due to concerns that facility would not have proper material  CPAP sent with pt at discharge  Sent via [address] [facility]   for 1300

## 2021-08-10 NOTE — DISCHARGE SUMMARY
[address]  Discharge- Celina Komal 1945, 70 y o  female MRN: [id number]  Unit/Bed#: -01 Encounter: [id number]  Primary Care Provider: Austin Buck MD   Date and time admitted to hospital: 8/5/2021 11:49 PM    Acute pain of right shoulder  Assessment & Plan  · For the past 2 days  Also scapular tenderness on palpation  No trauma that she remembers it was not painful prior to presenting here but she has been laying and a mostly on her right side suspect this is more muscular in nature  I did do an x-ray of the scapula and shoulder is no acute abnormalities  Will place on Voltaren gel MS Contin p r n  Will discharge on 10 day course and also Flexeril p r n  Alicia Sena Physical therapy continuation at the nursing home    Physical deconditioning  Assessment & Plan  · Presents from SNF  PT/OT recommending returning to SNF    Severe pulmonary hypertension [facility])  Assessment & Plan  · As evidence by echocardiogram from August 2020  · Briefly discussed with cardiology  Follow-up locally as outpatient    Morbid obesity ([facility] [address] )  Assessment & Plan  · Body mass index is 54 87 kg/m²  ARIAN on CPAP  Assessment & Plan  · Continue CPAP    Chronic diastolic heart failure [facility])  Assessment & Plan  Wt Readings from Last 3 Encounters:   08/05/21 136 kg (300 lb)   06/07/21 (!) 138 kg (303 lb 9 2 oz)   05/31/21 (!) 152 kg (336 lb)     · Remains stable and euvolemic  Does have history of pulmonary hypertension has not established care with local cardiologist   Previously followed with cardiologist at [facility]    · Echocardiogram with EF of 55-60%  · Continue bumex 1 mg daily    Type 2 diabetes mellitus, without long-term current use of insulin [facility])  Assessment & Plan  Lab Results   Component Value Date    HGBA1C 6 1 (H) 05/16/2021     Recent Labs     08/09/21  1614 08/09/21  2049 08/10/21  0725 08/10/21  1114   POCGLU 116 152* 127 112     · Stable continue sliding scale  · Carb controlled diet · Can resume metformin at time of discharge     Chronic atrial fibrillation ([address] )  Assessment & Plan  · Rate controlled on metoprolol  Continue xarelto  * Wound cellulitis  Assessment & Plan  · Right lower extremity wound with surrounding cellulitis  Previous with growth of pseudomonas and proteus  History of streptococcal bacteremia  · MRSA swab negative  · Proteus and Pseudomonas was susceptible to Levaquin will discharge on Levaquin for 4 more days to complete the course of 7  Cellulitis much improved continue wound care as before          Medical Problems     Resolved Problems  Date Reviewed: 8/10/2021    None              Discharging Physician / Practitioner: Josephine Arriaga MD  PCP: Joellen Isaac MD  Admission Date:   Admission Orders (From admission, onward)     Ordered        08/06/21 0114  Inpatient Admission  Once                   Discharge Date: 08/10/21    Consultations During Hospital Stay:  · General surgery      Procedures Performed:   · None    Significant Findings / Test Results:   · X-ray of the right scapula negative for acute abnormality  · X-ray of the right shoulder two view- Degenerative changes as described  Stable widening of the acromioclavicular joint which may be related to old AC separation  Incidental Findings:   · See above     Test Results Pending at Discharge (will require follow up): · None     Outpatient Tests Requested:  · None    Complications:  None    Reason for Admission:   right leg wound infection    Hospital Course:   Se Cao is a 70 y o  female patient who originally presented to the hospital on 8/5/2021 due to cellulitis  Of the right lower extremity  Previous cultures of Proteus and Pseudomonas susceptible to Levaquin she was started on Levaquin and Keflex with consultation to surgery with no need for any surgical debridement  Local wound care was continued  Patient's vitals remained stable her labs stable    Now she was evaluated by Physical therapy with recommendation to return to her SNF  Patient had some complaints of the right shoulder pain tenderness on the scapula x-ray for without acute abnormalities it was present 2 days after being here she is mostly right-sided stent to the right suspect this is muscular provided some pain control local and p o  And continue physical therapy otherwise she switched to Levaquin for another 4 days to complete a course of 7 secondary to previous wound cultures  Please see above list of diagnoses and related plan for additional information  Condition at Discharge: stable    Discharge Day Visit / Exam:   Subjective:  Patient seen and examined denies any chest pain or shortness of breath complaining of the right shoulder pain  Vitals: Blood Pressure: 130/69 (08/10/21 0723)  Pulse: 95 (08/10/21 0723)  Temperature: 98 8 °F (37 1 °C) (08/10/21 0723)  Temp Source: Oral (08/10/21 0723)  Respirations: 21 (08/10/21 0723)  Height: 5' 2" (157 5 cm) (08/05/21 2354)  Weight - Scale: 136 kg (300 lb) (08/05/21 2354)  SpO2: 93 % (08/10/21 0723)  Exam:   Physical Exam  Vitals and nursing note reviewed  Constitutional:       General: She is not in acute distress  Appearance: She is well-developed  HENT:      Head: Normocephalic and atraumatic  Eyes:      Conjunctiva/sclera: Conjunctivae normal    Cardiovascular:      Rate and Rhythm: Normal rate and regular rhythm  Heart sounds: No murmur heard  Pulmonary:      Effort: Pulmonary effort is normal  No respiratory distress  Breath sounds: Normal breath sounds  No wheezing or rales  Abdominal:      General: There is no distension  Palpations: Abdomen is soft  Tenderness: There is no abdominal tenderness  Musculoskeletal:         General: No swelling  Cervical back: Neck supple  Comments: Tender on palpation   Skin:     General: Skin is warm and dry  Neurological:      General: No focal deficit present        Mental Status: She is alert and oriented to person, place, and time  Mental status is at baseline  Discussion with Family:patient    Discharge instructions/Information to patient and family:   See after visit summary for information provided to patient and family  Provisions for Follow-Up Care:  See after visit summary for information related to follow-up care and any pertinent home health orders  Disposition:   Lobito Almaraz [facility]    Planned Readmission: no     Discharge Statement:  I spent >35 minutes discharging the patient  This time was spent on the day of discharge  I had direct contact with the patient on the day of discharge  Greater than 50% of the total time was spent examining patient, answering all patient questions, arranging and discussing plan of care with patient as well as directly providing post-discharge instructions  Additional time then spent on discharge activities  Discharge Medications:  See after visit summary for reconciled discharge medications provided to patient and/or family        **Please Note: This note may have been constructed using a voice recognition system**

## 2021-08-10 NOTE — ASSESSMENT & PLAN NOTE
Wt Readings from Last 3 Encounters:   08/05/21 136 kg (300 lb)   06/07/21 (!) 138 kg (303 lb 9 2 oz)   05/31/21 (!) 152 kg (336 lb)     · Remains stable and euvolemic  Does have history of pulmonary hypertension has not established care with local cardiologist   Previously followed with cardiologist at [place] [id number] [facility]    · Echocardiogram with EF of 55-60%  · Continue bumex 1 mg daily

## 2021-08-10 NOTE — ASSESSMENT & PLAN NOTE
· For the past 2 days  Also scapular tenderness on palpation  No trauma that she remembers it was not painful prior to presenting here but she has been laying and a mostly on her right side suspect this is more muscular in nature  I did do an x-ray of the scapula and shoulder is no acute abnormalities  Will place on Voltaren gel MS Contin p r n  Will discharge on 10 day course and also Flexeril p r n  Georgette Ormond   Physical therapy continuation at the nursing home

## 2021-08-10 NOTE — ASSESSMENT & PLAN NOTE
· Right lower extremity wound with surrounding cellulitis  Previous with growth of pseudomonas and proteus  History of streptococcal bacteremia  · MRSA swab negative  · Proteus and Pseudomonas was susceptible to Levaquin will discharge on Levaquin for 4 more days to complete the course of 7   Cellulitis much improved continue wound care as before

## 2021-08-10 NOTE — CASE MANAGEMENT
Case Management Progress Note    Patient name Keo Vásuqez  Location /-01 MRN [id number]  : 1945 Date 8/10/2021       LOS (days): 4  Geometric Mean LOS (GMLOS) (days): 3 20  Days to GMLOS:-1 1        BUNDLE:      OBJECTIVE:  Pt is a 70y o  year old Single, white or  [1], female with [employer] preference of [employer] admitted on 7063 11:49 PM  Pt is admitted to [id number] at [address] with complaints of Wound cellulitis   PROGRESS NOTE:     In anticipation of dc reviewed DC IMM with patient she verbalized understanding and copy of signed IMM was placed in scanned bin  DC plan is [facility] SNF    6135- Discussed in huddle, anticipate dc today  Called Benja Banks, requested [facility], patient is requesting [facility] as she is a member there  COVID screen is pending    1000- Confirmed transport for 1200 with [facility] via [facility], if WC can not accommodate they will bring back an ambulance to transport patient  Nursing and MD aware of transport time  1130 CM faxed Neg Covid to the facility and CM called Admissions to [facility]  time is 1 PM as I changed it related to pending xray results

## 2021-08-10 NOTE — PLAN OF CARE
Prescription approved per [facility] Protocol.  Abby Malone PharmD   [facility]  [email or web address].     Problem: Potential for Falls  Goal: Patient will remain free of falls  Description: INTERVENTIONS:  - Educate patient/family on patient safety including physical limitations  - Instruct patient to call for assistance with activity   - Consult OT/PT to assist with strengthening/mobility   - Keep Call bell within reach  - Keep bed low and locked with side rails adjusted as appropriate  - Keep care items and personal belongings within reach  - Initiate and maintain comfort rounds  - Make Fall Risk Sign visible to staff  - Offer Toileting every 3 Hours, in advance of need  - Initiate/Maintain bed alarm  - Obtain necessary fall risk management equipment  Outcome: Progressing     Problem: Nutrition/Hydration-ADULT  Goal: Nutrient/Hydration intake appropriate for improving, restoring or maintaining nutritional needs  Description: Monitor and assess patient's nutrition/hydration status for malnutrition  Collaborate with interdisciplinary team and initiate plan and interventions as ordered  Monitor patient's weight and dietary intake as ordered or per policy  Utilize nutrition screening tool and intervene as necessary  Determine patient's food preferences and provide high-protein, high-caloric foods as appropriate       INTERVENTIONS:  - Monitor oral intake, urinary output, labs, and treatment plans  - Assess nutrition and hydration status and recommend course of action  - Evaluate amount of meals eaten  - Allow adequate time for meals  - Recommend/ encourage appropriate diets, oral nutritional supplements, and vitamin/mineral supplements  - Order, calculate, and assess calorie counts as needed    - Provide specific nutrition/hydration education as appropriate  - Include patient/family/caregiver in decisions related to nutrition  Outcome: Progressing     Problem: MOBILITY - ADULT  Goal: Maintain or return to baseline ADL function  Description: INTERVENTIONS:  -  Assess patient's ability to carry out ADLs; assess patient's baseline for ADL function and identify physical deficits which impact ability to perform ADLs (bathing, care of mouth/teeth, toileting, grooming, dressing, etc )  - Assess/evaluate cause of self-care deficits   - Assess range of motion  - Assess patient's mobility; develop plan if impaired  - Assess patient's need for assistive devices and provide as appropriate  - Encourage maximum independence but intervene and supervise when necessary  - Involve family in performance of ADLs  - Consider OT consult to assist with ADL evaluation and planning for discharge  - Provide patient education as appropriate  Outcome: Progressing  Goal: Maintains/Returns to pre admission functional level  Description: INTERVENTIONS:  - Perform BMAT or MOVE assessment daily    - Set and communicate daily mobility goal to care team and patient/family/caregiver  - Collaborate with rehabilitation services on mobility goals if consulted  - Perform Range of Motion 4 times a day  - Reposition patient every 2 hours    - Record patient progress and toleration of activity level   Outcome: Progressing     Problem: DISCHARGE PLANNING - CARE MANAGEMENT  Goal: Discharge to post-acute care or home with appropriate resources  Description: INTERVENTIONS:  - Conduct assessment to determine patient/family and health care team treatment goals, and need for post-acute services based on payer coverage, community resources, and patient preferences, and barriers to discharge  - Address psychosocial, clinical, and financial barriers to discharge as identified in assessment in conjunction with the patient/family and health care team  - Arrange appropriate level of post-acute services according to patient's   needs and preference and payer coverage in collaboration with the physician and health care team  - Communicate with and update the patient/family, physician, and health care team regarding progress on the discharge plan  - Arrange appropriate transportation to post-acute venues  Outcome: Progressing     Problem: PAIN - ADULT  Goal: Verbalizes/displays adequate comfort level or baseline comfort level  Description: Interventions:  - Encourage patient to monitor pain and request assistance  - Assess pain using appropriate pain scale  - Administer analgesics based on type and severity of pain and evaluate response  - Implement non-pharmacological measures as appropriate and evaluate response  - Consider cultural and social influences on pain and pain management  - Notify physician/advanced practitioner if interventions unsuccessful or patient reports new pain  Outcome: Progressing     Problem: INFECTION - ADULT  Goal: Absence or prevention of progression during hospitalization  Description: INTERVENTIONS:  - Assess and monitor for signs and symptoms of infection  - Monitor lab/diagnostic results  - Monitor all insertion sites, i e  indwelling lines, tubes, and drains  - Administer medications as ordered  - Instruct and encourage patient and family to use good hand hygiene technique  - Identify and instruct in appropriate isolation precautions for identified infection/condition  Outcome: Progressing  Goal: Absence of fever/infection during neutropenic period  Description: INTERVENTIONS:  - Monitor WBC    Outcome: Progressing     Problem: SAFETY ADULT  Goal: Patient will remain free of falls  Description: INTERVENTIONS:  - Educate patient/family on patient safety including physical limitations  - Instruct patient to call for assistance with activity   - Consult OT/PT to assist with strengthening/mobility   - Keep Call bell within reach  - Keep bed low and locked with side rails adjusted as appropriate  - Keep care items and personal belongings within reach  - Initiate and maintain comfort rounds  - Make Fall Risk Sign visible to staff  - Offer Toileting every 3 Hours, in advance of need  - Initiate/Maintain bed alarm  - Obtain necessary fall risk management equipment  Outcome: Progressing  Goal: Maintain or return to baseline ADL function  Description: INTERVENTIONS:  -  Assess patient's ability to carry out ADLs; assess patient's baseline for ADL function and identify physical deficits which impact ability to perform ADLs (bathing, care of mouth/teeth, toileting, grooming, dressing, etc )  - Assess/evaluate cause of self-care deficits   - Assess range of motion  - Assess patient's mobility; develop plan if impaired  - Assess patient's need for assistive devices and provide as appropriate  - Encourage maximum independence but intervene and supervise when necessary  - Involve family in performance of ADLs  - Consider OT consult to assist with ADL evaluation and planning for discharge  - Provide patient education as appropriate  Outcome: Progressing     Problem: DISCHARGE PLANNING  Goal: Discharge to home or other facility with appropriate resources  Description: INTERVENTIONS:  - Identify barriers to discharge w/patient and caregiver  - Arrange for needed discharge resources and transportation as appropriate  - Identify discharge learning needs (meds, wound care, etc )  - Arrange for interpretive services to assist at discharge as needed  - Refer to Case Management Department for coordinating discharge planning if the patient needs post-hospital services based on physician/advanced practitioner order or complex needs related to functional status, cognitive ability, or social support system  Outcome: Progressing     Problem: Knowledge Deficit  Goal: Patient/family/caregiver demonstrates understanding of disease process, treatment plan, medications, and discharge instructions  Description: Complete learning assessment and assess knowledge base    Interventions:  - Provide teaching at level of understanding  - Provide teaching via preferred learning methods  Outcome: Progressing     Problem: Prexisting or High Potential for Compromised Skin Integrity  Goal: Skin integrity is maintained or improved  Description: INTERVENTIONS:  - Identify patients at risk for skin breakdown  - Assess and monitor skin integrity  - Assess and monitor nutrition and hydration status  - Monitor labs   - Assess for incontinence   - Turn and reposition patient  - Assist with mobility/ambulation  - Relieve pressure over bony prominences  - Avoid friction and shearing  - Provide appropriate hygiene as needed including keeping skin clean and dry  - Evaluate need for skin moisturizer/barrier cream  - Collaborate with interdisciplinary team   - Patient/family teaching  - Consider wound care consult   Outcome: Progressing

## 2021-08-10 NOTE — PHYSICAL THERAPY NOTE
PHYSICAL THERAPY NOTE          Patient Name: Kayden Islas  UPOLP'C Date: 8/10/2021  Chart reviewed  Pt refusing PT due to increase pain in lumbar back  Pt is also getting dc and states they are going to do therapy with her when she returns to [facility]  Continue to follow and see pt as schedule allows       Penelope Richardson, MARLEN

## 2021-08-10 NOTE — ASSESSMENT & PLAN NOTE
Lab Results   Component Value Date    HGBA1C 6 1 (H) 05/16/2021     Recent Labs     08/09/21  1614 08/09/21  2049 08/10/21  0725 08/10/21  1114   POCGLU 116 152* 127 112     · Stable continue sliding scale  · Carb controlled diet   · Can resume metformin at time of discharge

## 2021-08-10 NOTE — RESPIRATORY THERAPY NOTE
RT Protocol Note  Romain Solders 70 y o  female MRN: [id number]  Unit/Bed#: -01 Encounter: [id number]    Assessment    Principal Problem:    Wound cellulitis  Active Problems:    Chronic atrial fibrillation (HCC)    Type 2 diabetes mellitus, without long-term current use of insulin (HCC)    Chronic diastolic heart failure (HCC)    ARIAN on CPAP    Morbid obesity (HCC)    Severe pulmonary hypertension (HCC)    Physical deconditioning      Home Pulmonary Medications:  Xopenex as needed for shortness of breath  Home Devices/Therapy: BiPAP/CPAP    Past Medical History:   Diagnosis Date    Atrial fibrillation ([place] [address] )     Coronary artery disease     DM2 (diabetes mellitus, type 2) ([place] [address] )     History of cardiac cath     multiple, last one being sept, 2020    History of cardioversion     History of total knee replacement, left     Lymphedema     Lymphedema     Obesity     Osteoarthritis     Pulmonary HTN ([place] [address] )     Sepsis ([place] [address] )     Sleep apnea     SVT (supraventricular tachycardia) ([place] [address] )      Social History     Socioeconomic History    Marital status: Single     Spouse name: None    Number of children: None    Years of education: None    Highest education level: None   Occupational History    None   Tobacco Use    Smoking status: Never Smoker    Smokeless tobacco: Never Used   Vaping Use    Vaping Use: Never used   Substance and Sexual Activity    Alcohol use: Not Currently    Drug use: Not Currently    Sexual activity: Not Currently   Other Topics Concern    None   Social History Narrative    None     Social Determinants of Health     Financial Resource Strain:     Difficulty of Paying Living Expenses:    Food Insecurity:     Worried About Running Out of Food in the Last Year:     Ran Out of Food in the Last Year:    Transportation Needs: No Transportation Needs    Lack of Transportation (Medical): No    Lack of Transportation (Non-Medical):  No   Physical Activity:     Days of Exercise per Week:     Minutes of Exercise per Session:    Stress:     Feeling of Stress :    Social Connections:     Frequency of Communication with Friends and Family:     Frequency of Social Gatherings with Friends and Family:     Attends [employer] Services:     Active Member of Clubs or Organizations:     Attends Club or Organization Meetings:     Marital Status:    Intimate Partner Violence:     Fear of Current or Ex-Partner:     Emotionally Abused:     Physically Abused:     Sexually Abused:        Subjective     Chief complaint is shoulder pain    Objective    Physical Exam:   Assessment Type: Assess only  General Appearance: Awake  Respiratory Pattern: Dyspnea with exertion  Chest Assessment: Chest expansion symmetrical  Bilateral Breath Sounds: Clear, Diminished  Cough: None    Vitals:  Blood pressure 131/71, pulse 102, temperature 98 1 °F (36 7 °C), resp  rate 16, height 5' 2" (1 575 m), weight 136 kg (300 lb), SpO2 95 %            Imaging and other studies: not performed          Plan    Respiratory Plan: No distress/Pulmonary history        Resp Comments: Patient has home CPAP unit, she is capable of managing its use on her own and was encouraged to do so

## 2022-08-26 NOTE — ASSESSMENT & PLAN NOTE
Lab Results   Component Value Date    HGBA1C 6 3 (H) 03/03/2021       Recent Labs     05/16/21  1926   POCGLU 83       Blood Sugar Average: Last 72 hrs:  · Hold home metformin  · Start SSI and accucheck Q6 while NPO Detail Level: Generalized

## 2022-09-02 NOTE — ASSESSMENT & PLAN NOTE
Patient: Shun Coleman    Procedure Summary     Date: 09/02/22 Room / Location: [facility] ENDOSCOPY 1 / [facility] ENDOSCOPY    Anesthesia Start: 1052 Anesthesia Stop: 1105    Procedure: COLONOSCOPY (N/A ) Diagnosis:       Encounter for screening for malignant neoplasm of colon      (Encounter for screening for malignant neoplasm of colon [Z12.11])    Surgeons: Jose Jaffe MD Provider: Ricky Larson CRNA    Anesthesia Type: MAC ASA Status: 3          Anesthesia Type: MAC    Vitals  No vitals data found for the desired time range.          Post Anesthesia Care and Evaluation    Patient location during evaluation: bedside  Patient participation: waiting for patient participation  Level of consciousness: sleepy but conscious  Pain management: adequate    Airway patency: patent  Anesthetic complications: No anesthetic complications  PONV Status: none  Cardiovascular status: acceptable  Respiratory status: acceptable  Hydration status: acceptable    Comments: -------------------------              09/02/22                    0947        -------------------------   BP:         118/66        Pulse:        62          Resp:         20          Temp:   96.8 °F (36 °C)   SpO2:         95%        -------------------------         Wt Readings from Last 3 Encounters:   06/03/21 (!) 140 kg (308 lb)   05/31/21 (!) 152 kg (336 lb)   05/22/21 (!) 150 kg (331 lb 2 1 oz)       · Does not appear volume overloaded  · Daily weights and I&Os  · Low-salt diet  · I think she is actually over diuresing with potassium low and chloride being alone she has lost lot of weight since previous decrease her Bumex to 1 mg daily- sodium improving will supplement potassium doing well on Bumex

## 2024-03-25 NOTE — ASSESSMENT & PLAN NOTE
[facility] refill request for:    Melatonin 10 MG Tab 30 tablet 11 1/4/2024 --    Sig - Route: Take 10 mg by mouth at bedtime. And take 1 tablet (10 mg) by mouth at bedtime as needed. - Oral    Sent to pharmacy as: Melatonin 10 MG Oral Tablet    Class: Eprescribe      NH: 03/01/2024  Route to provider for review     · Continue CPAP

## 2024-07-02 NOTE — ASSESSMENT & PLAN NOTE
HPI:     Ina Mckeon is a 84 year old female who presents for a pre-operative physical exam. Patient is to have left knee arthroplasty , to be done by Dr. Albarado  at [facility]  on 7/12/24.    Pt has had previous anesthesia:  Yes.  Previous complications:  No  Taking asa or Ibuprofen:   Will hold her asa  Can walk multiple flights of stairs without getting short of breath: no  Walks with walker. Can walk a few blocks with walker. Limited by knee pain  No shortness of breath or chest pain   history includes:    Had abnormal EKG  Saw cardiology  Had stress test and got cardiac clearance     Needs UA      HTN- hydrochlorothiazide 25, losartan 100, amlodipine 5  HL atorvastatin 40   Depression- on lexapro and bupropion, chronic and controlled, but  dementia so feels down  DM-has been well controlled  Has had some wine- 2x a week    Nonsmoker    Current Outpatient Medications   Medication Sig Dispense Refill    acetaminophen 325 MG Oral Tab Take 2 tablets (650 mg total) by mouth every 8 (eight) hours as needed for Pain.      cyclobenzaprine 5 MG Oral Tab Take 1 tablet (5 mg total) by mouth every 8 (eight) hours as needed for Muscle spasms.      polyethylene glycol, PEG 3350, 17 g Oral Powd Pack Take 17 g by mouth daily as needed (constipation).      HYDROCHLOROTHIAZIDE 25 MG Oral Tab TAKE 1 TABLET (25 MG TOTAL) BY MOUTH DAILY. (Patient not taking: Reported on 7/1/2024) 90 tablet 0    TOLTERODINE ER 4 MG Oral Capsule SR 24 Hr TAKE 1 CAPSULE BY MOUTH EVERY DAY (Patient taking differently: Take 1 capsule (4 mg total) by mouth every evening.) 30 capsule 1    DOCUSATE SODIUM 100 MG Oral Cap TAKE 100 MG BY MOUTH NIGHTLY AS NEEDED (CONSTIAPTION). (Patient taking differently: Take 1 capsule (100 mg total) by mouth 2 (two) times daily.) 90 capsule 0    losartan 100 MG Oral Tab Take 1 tablet (100 mg total) by mouth daily. 90 tablet 1    Meloxicam 15 MG Oral Tab Take 1 tablet (15 mg total) by mouth daily. (Patient not  · Likely secondary to dehydration   · Na 129 on admission  · Improved with IVF resuscitation  · Trend BMP  · Monitor neuro status taking: Reported on 7/1/2024) 30 tablet 0    buPROPion  MG Oral Tablet 12 Hr Take 1 tablet (150 mg total) by mouth 2 (two) times daily. 180 tablet 0    amLODIPine 5 MG Oral Tab Take 1 tablet (5 mg total) by mouth daily. 90 tablet 3    carvedilol 3.125 MG Oral Tab Take 1 tablet (3.125 mg total) by mouth 2 (two) times daily. 180 tablet 0    escitalopram 10 MG Oral Tab Take 1 tablet (10 mg total) by mouth daily. Start with one half tab for 8 days then full tab. (Patient not taking: Reported on 7/1/2024) 90 tablet 1    calcium carbonate-vitamin D 250-3.125 MG-MCG Oral Tab Take 1 tablet by mouth 2 (two) times daily with meals. 60 tablet 1    aspirin 325 MG Oral Tab EC Take 1 tablet (325 mg total) by mouth in the morning and 1 tablet (325 mg total) before bedtime.  0    cholecalciferol 1000 UNITS Oral Cap Take 1 capsule (1,000 Units total) by mouth daily.      atorvastatin 40 MG Oral Tab Take 1 tablet (40 mg total) by mouth nightly. 90 tablet 3    Albuterol Sulfate HFA (PROAIR HFA) 108 (90 Base) MCG/ACT Inhalation Aero Soln Inhale 2 puffs into the lungs every 4 (four) hours as needed for Wheezing. (Patient not taking: Reported on 7/1/2024) 1 Inhaler 0      Allergies: No Known Allergies   Past Medical History:    Alcoholic (HCC)    Anxiety state    Asthma (HCC)    Back problem    lower back arthritic pain    Depression    Essential hypertension    Fracture    left ankle    Hearing impairment    High blood pressure    High cholesterol    Hyperlipidemia    Incontinence    urinary at times, wears pad    Osteoarthritis    Visual impairment      Past Surgical History:   Procedure Laterality Date    Appendectomy      Fracture surgery Left     ankle    Total hip replacement Bilateral     Wrist arthroscop,intern fixatn Left       Family History   Problem Relation Age of Onset    Heart Attack Father     Hypertension Father     Heart Attack Mother     Cancer Brother         throat      Social History:      REVIEW OF SYSTEMS:    GENERAL: feels well otherwise  HENT: denies sore throat  LUNGS: denies shortness of breath with exertion  CARDIOVASCULAR: denies chest pain on exertion  GI: denies abdominal pain  : denies dysuria  MUSCULOSKELETAL: + knee pain    EXAM:   There were no vitals taken for this visit.  GENERAL: well developed, well nourished,in no apparent distress  HEENT: atraumatic, normocephalic, O/P clear  EYES: nl conjunctiva   NECK: supple,no adenopathy  LUNGS: clear to auscultation  CARDIO: RRR without murmur  GI: soft,no masses, HSM or tenderness  EXTREMITIES: no edema  NEURO: Alert, no focal deficits  Bilateral barefoot skin diabetic exam is normal, visualized feet and the appearance is normal.  Bilateral monofilament/sensation of both feet is normal.  Pulsation pedal pulse exam of both lower legs/feet is normal as well.       Normal nuclear stress test 6/27/24    ASSESSMENT AND PLAN:   Ina Mckeon is a 84 year old female who presents for a pre-operative physical exam.      Pt has a history of well-controlled diabetes, hypertension, COPD, and mild anemia. Patient had an abnormal pre-op EKG so she was sent to cardiology and had a stress test on 6/27/24. She has received cardiac clearance.    Patient still needs to give a UA sample. If this is normal, she can proceed with surgery without any further interventions.    1. Pre-op evaluation      2. Abnormal EKG        3. Essential hypertension  Controlled CPM     4. Type 2 diabetes mellitus without complication, without long-term current use of insulin (HCC)  Due for AWV and DM visit- they will set this up   Not on meds  Continue to watch diet  Needs DM eye   - POC Glycohemoglobin [01141]

## 2025-05-29 NOTE — PLAN OF CARE
Duplicate. See mychart encounter on 5/27/25.   Problem: Potential for Falls  Goal: Patient will remain free of falls  Description: INTERVENTIONS:  - Assess patient frequently for physical needs  -  Identify cognitive and physical deficits and behaviors that affect risk of falls    -  [place] fall precautions as indicated by assessment   - Educate patient/family on patient safety including physical limitations  - Instruct patient to call for assistance with activity based on assessment  - Modify environment to reduce risk of injury  - Consider OT/PT consult to assist with strengthening/mobility  Outcome: Progressing

## (undated) DEVICE — PLUMEPEN PRO 10FT

## (undated) DEVICE — DRAPE SHEET THREE QUARTER

## (undated) DEVICE — ABDOMINAL PAD: Brand: DERMACEA

## (undated) DEVICE — STERILE LATEX POWDER-FREE SURGICAL GLOVESWITH NITRILE COATING: Brand: PROTEXIS

## (undated) DEVICE — DRESSING XEROFORM 5 X 9

## (undated) DEVICE — KERLIX BANDAGE ROLL: Brand: KERLIX

## (undated) DEVICE — LIGHT HANDLE COVER SLEEVE DISP BLUE STELLAR

## (undated) DEVICE — THE SIMPULSE SOLO SYSTEM WITH ULTREX RETRACTABLE SPLASH SHIELD TIP: Brand: SIMPULSE SOLO

## (undated) DEVICE — PAD GROUNDING ADULT

## (undated) DEVICE — DRAPE EQUIPMENT RF WAND

## (undated) DEVICE — SPONGE STICK WITH PVP-I: Brand: KENDALL

## (undated) DEVICE — GLOVE SRG BIOGEL 6

## (undated) DEVICE — SPONGE LAP 18 X 18 IN

## (undated) DEVICE — MEDI-VAC YANK SUCT HNDL W/TPRD BULBOUS TIP: Brand: CARDINAL HEALTH

## (undated) DEVICE — TIBURON SPLIT SHEET: Brand: CONVERTORS

## (undated) DEVICE — GLOVE INDICATOR PI UNDERGLOVE SZ 6.5 BLUE

## (undated) DEVICE — TUBING SUCTION 5MM X 12 FT

## (undated) DEVICE — BETHLEHEM UNIVERSAL OUTPATIENT: Brand: CARDINAL HEALTH